# Patient Record
Sex: MALE | Race: WHITE | NOT HISPANIC OR LATINO | Employment: FULL TIME | URBAN - METROPOLITAN AREA
[De-identification: names, ages, dates, MRNs, and addresses within clinical notes are randomized per-mention and may not be internally consistent; named-entity substitution may affect disease eponyms.]

---

## 2018-01-07 ENCOUNTER — APPOINTMENT (OUTPATIENT)
Dept: GENERAL RADIOLOGY | Facility: HOSPITAL | Age: 58
End: 2018-01-07

## 2018-01-07 ENCOUNTER — APPOINTMENT (OUTPATIENT)
Dept: CARDIOLOGY | Facility: HOSPITAL | Age: 58
End: 2018-01-07

## 2018-01-07 ENCOUNTER — HOSPITAL ENCOUNTER (OUTPATIENT)
Facility: HOSPITAL | Age: 58
Setting detail: OBSERVATION
Discharge: HOME | End: 2018-01-09
Attending: EMERGENCY MEDICINE | Admitting: INTERNAL MEDICINE

## 2018-01-07 DIAGNOSIS — R07.9 CHEST PAIN WITH HIGH RISK FOR CARDIAC ETIOLOGY: Primary | ICD-10-CM

## 2018-01-07 DIAGNOSIS — R06.02 SOB (SHORTNESS OF BREATH): ICD-10-CM

## 2018-01-07 PROBLEM — Z72.0 TOBACCO ABUSE: Status: ACTIVE | Noted: 2018-01-07

## 2018-01-07 PROBLEM — J44.9 COPD (CHRONIC OBSTRUCTIVE PULMONARY DISEASE): Status: ACTIVE | Noted: 2018-01-07

## 2018-01-07 PROBLEM — I16.0 HYPERTENSIVE URGENCY: Status: ACTIVE | Noted: 2018-01-07

## 2018-01-07 LAB
ALBUMIN SERPL-MCNC: 4.4 G/DL (ref 3.2–4.8)
ALBUMIN/GLOB SERPL: 1.2 G/DL (ref 1.5–2.5)
ALP SERPL-CCNC: 101 U/L (ref 25–100)
ALT SERPL W P-5'-P-CCNC: 20 U/L (ref 7–40)
ANION GAP SERPL CALCULATED.3IONS-SCNC: 5 MMOL/L (ref 3–11)
ARTICHOKE IGE QN: 105 MG/DL (ref 0–130)
AST SERPL-CCNC: 27 U/L (ref 0–33)
BASOPHILS # BLD AUTO: 0.01 10*3/MM3 (ref 0–0.2)
BASOPHILS NFR BLD AUTO: 0.1 % (ref 0–1)
BILIRUB SERPL-MCNC: 0.3 MG/DL (ref 0.3–1.2)
BNP SERPL-MCNC: 37 PG/ML (ref 0–100)
BUN BLD-MCNC: 16 MG/DL (ref 9–23)
BUN/CREAT SERPL: 17.8 (ref 7–25)
CALCIUM SPEC-SCNC: 9.3 MG/DL (ref 8.7–10.4)
CHLORIDE SERPL-SCNC: 107 MMOL/L (ref 99–109)
CHOLEST SERPL-MCNC: 157 MG/DL (ref 0–200)
CO2 SERPL-SCNC: 30 MMOL/L (ref 20–31)
CREAT BLD-MCNC: 0.9 MG/DL (ref 0.6–1.3)
DEPRECATED RDW RBC AUTO: 54.2 FL (ref 37–54)
EOSINOPHIL # BLD AUTO: 0.01 10*3/MM3 (ref 0–0.3)
EOSINOPHIL NFR BLD AUTO: 0.1 % (ref 0–3)
ERYTHROCYTE [DISTWIDTH] IN BLOOD BY AUTOMATED COUNT: 15.6 % (ref 11.3–14.5)
FLUAV SUBTYP SPEC NAA+PROBE: NOT DETECTED
FLUBV RNA ISLT QL NAA+PROBE: DETECTED
GFR SERPL CREATININE-BSD FRML MDRD: 87 ML/MIN/1.73
GLOBULIN UR ELPH-MCNC: 3.6 GM/DL
GLUCOSE BLD-MCNC: 104 MG/DL (ref 70–100)
HCT VFR BLD AUTO: 46 % (ref 38.9–50.9)
HDLC SERPL-MCNC: 43 MG/DL (ref 40–60)
HGB BLD-MCNC: 14.6 G/DL (ref 13.1–17.5)
HOLD SPECIMEN: NORMAL
HOLD SPECIMEN: NORMAL
IMM GRANULOCYTES # BLD: 0.02 10*3/MM3 (ref 0–0.03)
IMM GRANULOCYTES NFR BLD: 0.3 % (ref 0–0.6)
LYMPHOCYTES # BLD AUTO: 2.64 10*3/MM3 (ref 0.6–4.8)
LYMPHOCYTES NFR BLD AUTO: 35.2 % (ref 24–44)
MCH RBC QN AUTO: 30.1 PG (ref 27–31)
MCHC RBC AUTO-ENTMCNC: 31.7 G/DL (ref 32–36)
MCV RBC AUTO: 94.8 FL (ref 80–99)
MONOCYTES # BLD AUTO: 1.14 10*3/MM3 (ref 0–1)
MONOCYTES NFR BLD AUTO: 15.2 % (ref 0–12)
NEUTROPHILS # BLD AUTO: 3.68 10*3/MM3 (ref 1.5–8.3)
NEUTROPHILS NFR BLD AUTO: 49.1 % (ref 41–71)
PLATELET # BLD AUTO: 229 10*3/MM3 (ref 150–450)
PMV BLD AUTO: 10.4 FL (ref 6–12)
POTASSIUM BLD-SCNC: 3.5 MMOL/L (ref 3.5–5.5)
PROT SERPL-MCNC: 8 G/DL (ref 5.7–8.2)
RBC # BLD AUTO: 4.85 10*6/MM3 (ref 4.2–5.76)
SODIUM BLD-SCNC: 142 MMOL/L (ref 132–146)
TRIGL SERPL-MCNC: 169 MG/DL (ref 0–150)
TROPONIN I SERPL-MCNC: 0 NG/ML (ref 0–0.07)
TROPONIN I SERPL-MCNC: 0.01 NG/ML (ref 0–0.07)
WBC NRBC COR # BLD: 7.5 10*3/MM3 (ref 3.5–10.8)
WHOLE BLOOD HOLD SPECIMEN: NORMAL
WHOLE BLOOD HOLD SPECIMEN: NORMAL

## 2018-01-07 PROCEDURE — 99254 IP/OBS CNSLTJ NEW/EST MOD 60: CPT | Performed by: INTERNAL MEDICINE

## 2018-01-07 PROCEDURE — 99285 EMERGENCY DEPT VISIT HI MDM: CPT

## 2018-01-07 PROCEDURE — 80061 LIPID PANEL: CPT | Performed by: EMERGENCY MEDICINE

## 2018-01-07 PROCEDURE — 87502 INFLUENZA DNA AMP PROBE: CPT | Performed by: NURSE PRACTITIONER

## 2018-01-07 PROCEDURE — 71045 X-RAY EXAM CHEST 1 VIEW: CPT

## 2018-01-07 PROCEDURE — 83880 ASSAY OF NATRIURETIC PEPTIDE: CPT | Performed by: EMERGENCY MEDICINE

## 2018-01-07 PROCEDURE — 99220 PR INITIAL OBSERVATION CARE/DAY 70 MINUTES: CPT | Performed by: INTERNAL MEDICINE

## 2018-01-07 PROCEDURE — G0378 HOSPITAL OBSERVATION PER HR: HCPCS

## 2018-01-07 PROCEDURE — 80053 COMPREHEN METABOLIC PANEL: CPT | Performed by: EMERGENCY MEDICINE

## 2018-01-07 PROCEDURE — 93306 TTE W/DOPPLER COMPLETE: CPT | Performed by: INTERNAL MEDICINE

## 2018-01-07 PROCEDURE — 93306 TTE W/DOPPLER COMPLETE: CPT

## 2018-01-07 PROCEDURE — 85025 COMPLETE CBC W/AUTO DIFF WBC: CPT | Performed by: EMERGENCY MEDICINE

## 2018-01-07 PROCEDURE — 84484 ASSAY OF TROPONIN QUANT: CPT

## 2018-01-07 PROCEDURE — 93005 ELECTROCARDIOGRAM TRACING: CPT | Performed by: EMERGENCY MEDICINE

## 2018-01-07 RX ORDER — LISINOPRIL 5 MG/1
5 TABLET ORAL
Status: DISCONTINUED | OUTPATIENT
Start: 2018-01-07 | End: 2018-01-08

## 2018-01-07 RX ORDER — ASPIRIN 81 MG/1
TABLET, CHEWABLE ORAL
Status: COMPLETED
Start: 2018-01-07 | End: 2018-01-07

## 2018-01-07 RX ORDER — CLONIDINE HYDROCHLORIDE 0.1 MG/1
0.2 TABLET ORAL ONCE
Status: DISCONTINUED | OUTPATIENT
Start: 2018-01-07 | End: 2018-01-07

## 2018-01-07 RX ORDER — ASPIRIN 81 MG/1
324 TABLET, CHEWABLE ORAL ONCE
Status: COMPLETED | OUTPATIENT
Start: 2018-01-07 | End: 2018-01-07

## 2018-01-07 RX ORDER — ATORVASTATIN CALCIUM 40 MG/1
40 TABLET, FILM COATED ORAL NIGHTLY
Status: DISCONTINUED | OUTPATIENT
Start: 2018-01-07 | End: 2018-01-09 | Stop reason: HOSPADM

## 2018-01-07 RX ORDER — ACETAMINOPHEN 325 MG/1
650 TABLET ORAL EVERY 6 HOURS PRN
Status: DISCONTINUED | OUTPATIENT
Start: 2018-01-07 | End: 2018-01-09 | Stop reason: HOSPADM

## 2018-01-07 RX ORDER — DOXYCYCLINE HYCLATE 100 MG/1
100 CAPSULE ORAL EVERY 12 HOURS SCHEDULED
Status: DISCONTINUED | OUTPATIENT
Start: 2018-01-07 | End: 2018-01-09 | Stop reason: HOSPADM

## 2018-01-07 RX ORDER — SODIUM CHLORIDE 0.9 % (FLUSH) 0.9 %
10 SYRINGE (ML) INJECTION AS NEEDED
Status: DISCONTINUED | OUTPATIENT
Start: 2018-01-07 | End: 2018-01-09 | Stop reason: HOSPADM

## 2018-01-07 RX ADMIN — NITROGLYCERIN 1 INCH: 20 OINTMENT TOPICAL at 11:52

## 2018-01-07 RX ADMIN — ASPIRIN 81 MG 324 MG: 81 TABLET ORAL at 11:52

## 2018-01-07 RX ADMIN — ATORVASTATIN CALCIUM 40 MG: 40 TABLET, FILM COATED ORAL at 16:54

## 2018-01-07 RX ADMIN — LISINOPRIL 5 MG: 5 TABLET ORAL at 16:54

## 2018-01-07 RX ADMIN — METOPROLOL TARTRATE 25 MG: 25 TABLET ORAL at 19:21

## 2018-01-07 RX ADMIN — DOXYCYCLINE HYCLATE 100 MG: 100 CAPSULE ORAL at 19:22

## 2018-01-07 RX ADMIN — ASPIRIN 324 MG: 81 TABLET, CHEWABLE ORAL at 11:52

## 2018-01-07 NOTE — ED NOTES
+++ekg done at 1446 done on wrong name.  EKG department not available on Sundays to correct+++     Grover Palafox  01/07/18 5499

## 2018-01-07 NOTE — CONSULTS
[facility] at [facility]  CARDIOLOGY CONSULTATION NOTE    Francisco Clark  : 1960  MRN:[id number]  Home Phone:[phone]    Date of Admission:2018  Date of Consultation: 18  Referring: Hospitalist   PCP: None    IDENTIFICATION: A 57 y.o. male     Chief Complaint   Patient presents with   • Shortness of Breath       PROBLEM LIST:   1. Chest pain with atypical, typical features, negative markers, EKG  2. HTN urgencies  3. COPD, Bronchitis  4. Ongoing tobacco abuse    ALLERGIES: No Known Allergies    HPI:   The patient seen in consultation is a pleasant 57-year-old white male who is seen in consultation regarding chest pain and hypertension urgency.  The patient denies any previous cardiac history but does report multiple risk factors including ongoing tobacco abuse family history point disease and uncontrolled blood pressure.  He states he does not go to a physician on a regular basis and has not been to a doctor for a long time.  He states that about a couple weeks ago he began having fevers chills and he was pretty certain that he had the flu.  He began to feel somewhat better but then after this.  Began to have progressively worsening shortness of breath and cough with sputum production.  Associated with this he's had a severe cough and when he does cough he has significant chest pain and even at times with ambulation.  He also reports that when he tries to exert himself seems to make his chest feel tight as well as makes the cough worse.  He has been to urgent treatment back on  with an chest x-ray revealing no evidence of pneumonia.  However his symptoms have progressed.  He did start himself on Tamiflu as he is hoping this would make him feel better but he continues to feel bad. Trop and BNP WNL, EKG with only nonspecific changes.  He decided to present to the emergency room this morning was found to have severe hypertension with blood pressure greater than  "200.  He had a troponin level that was negative and EKG revealing normal rhythm with LVH.  EKG revealed no acute changes.      In view of the patient's symptoms and risk factors for CAD the ER physician recommended a cardiology evaluation for this chest pain.       The pain comes and goes and def  gets worse with cough and even ambulation and movement at certain times.  Seems that there mostly atypical but some typical features as well as the chest pain.  He does have multiple risk factors.  It is concerning low however that the patient doesn't have upper respiratory type infection symptoms prior to all these issues.  He'll dealing with a cough with sputum production however no fevers and chills the past couple days however prior to that did have fever and chills.  He states biggest thing is really just never gotten over how he felt from a sickness that he had about 2-3 weeks ago with fevers chills night sweats cough with sputum production.    ROS: All systems have been reviewed and are negative with the exception of those mentioned in the HPI and problem list above.    Surgical History: Surgical history reviewed and nothing affecting acute issues    Social History:   Social History     Social History   • Marital status:      Spouse name: N/A   • Number of children: N/A   • Years of education: N/A     Occupational History   • Not on file.     Social History Main Topics   • Smoking status: Current Every Day Smoker     Packs/day: 1.00     Types: Cigarettes   • Smokeless tobacco: Not on file   • Alcohol use No   • Drug use: Defer   • Sexual activity: Defer     Other Topics Concern   • Not on file     Social History Narrative   • No narrative on file       Family History:   Coronary disease but not early in life, DM, Hypertension, COPD    Objective     /98  Pulse 73  Temp 97.9 °F (36.6 °C) (Oral)   Resp 18  Ht 177.8 cm (70\")  Wt 59 kg (130 lb)  SpO2 92%  BMI 18.65 kg/m2  No intake or output data in " the 24 hours ending 01/07/18 1508    PHYSICAL EXAM:  Constitutional:  NAD at this time. With talking to me does get sob.   Head:  Normocephalic, without obvious abnormality, atraumatic.   Neck: No adenopathy, supple, trachea midline, no thyromegaly, no    carotid bruit, no JVD.   Respiratory:   Decreased breath sounds, wheezes    Cardiovascular:  Regular rhythm and normal rate, normal S1 and S2, no            murmur, no gallop, no rub, no click.   Pulses: Peripheral pulses are present and equal bilaterally.   GI:   Soft, non-distended. Bowel sounds heard throughout. No organomegaly or masses. Non-tender to palpation, no guarding. thin   Extremities: No edema, clubbing or cyanosis.   Skin: Skin is warm and dry. No bleeding, bruising or rash.   Neurological: Alert, oriented to time, person and place. No focal deficits.     All PE reviewed and completed and agree with changes made accordingly    Labs/Diagnostic Data    Results from last 7 days  Lab Units 01/07/18  1149   SODIUM mmol/L 142   POTASSIUM mmol/L 3.5   CHLORIDE mmol/L 107   CO2 mmol/L 30.0   BUN mg/dL 16   CREATININE mg/dL 0.90   GLUCOSE mg/dL 104*   CALCIUM mg/dL 9.3           Results from last 7 days  Lab Units 01/07/18  1149   WBC 10*3/mm3 7.50   HEMOGLOBIN g/dL 14.6   HEMATOCRIT % 46.0   PLATELETS 10*3/mm3 229           Results from last 7 days  Lab Units 01/07/18  1149   CHOLESTEROL mg/dL 157   TRIGLYCERIDES mg/dL 169*   HDL CHOL mg/dL 43   LDL CHOL mg/dL 105                       I personally reviewed the patient's EKG/Telemetry data    Radiology Data:   EXAMINATION: XR CHEST 2 VW- 01/04/2018    INDICATION: cough     COMPARISON: NONE    FINDINGS: Cardiac silhouette within normal limits. Pulmonary vascularity  within normal limits. Chronic lung changes including hyperinflated  appearance as well as prior granulomatous involvement without focal  opacification or consolidation. No pneumothorax or pleural effusion.        IMPRESSION:  Chronic lung changes  without acute cardiopulmonary process.    D:  01/04/2018  E:  01/04/2018    This report was finalized on 1/4/2018 4:17 PM by Dr. Javi Mansfield.       EKG:NSR 90 bpm, LAD, peaked T waves in ant leads, LVH. Otherwise nonspecific changes.              Assessment and Plan:     1. SOB with atypical and typical chest pain features.  On discussion with the patient seems that the patient's chest pain does get worse with cough and even movement of his upper body.  However patient does get some chest tightness as well as ambulation.  This is a tough situation since the patient does have multiple risk factors however did have an upper respiratory type infection just weeks priors with fevers chills and productive cough.  Patient still states cough and shortness of breath are his major issues and with more coughing the chest tightness started occurring.  Patient is a smoker.  Chest x-ray shows COPD with nothing acute just chronic hyperinflation of lungs. Normal BNP  2. Recent Bronchitis, Fever.  3. COPD, Tobacco abuse  4. HTN urgency    PLAN:  · Admission per Hospitalist rule out Flu, PNA and treatment for COPD exacerbation  · Serial cardiac markers, EKG's.  First set of enzymes were negative.  Normal creatinine and no significant issues with electrolytes.  On EKG patient does have some T waves however other nonspecific changes noted and in normal sinus rhythm.  · Echocardiogram  · Add BB, ACE and continue NTG paste for BP. Currently Chest tightness is not there unless he coughs he states. Needs better BP control  · Consider further evaluation for ischemic heart disease secondary to risk factors ie stress.  We'll continue trending enzymes and serial EKGs.  If anything was to change or if no other causes of the patient's chest tightness then could be more aggressive and consider LHC prior to a stress test.   · Dr Coreas to take over care in AM    Discussed with ER team      Scribed for Dr. Nava by Eduard Arroyo PA-C. 1/7/2018   3:08 PM      Thank you for allowing me to participate in the care of Francisco HOMAR Amber. Feel free to contact me directly with any further questions or concerns.    I, Eduardo Nava, personally performed the services described in this documentation as scribed by the above named individual in my presence, and it is both accurate and complete.  1/7/2018  3:33 PM    Eduardo Nava,   3:33 PM  01/07/18

## 2018-01-07 NOTE — H&P
"    [facility] Medicine Services  HISTORY AND PHYSICAL    Patient Name: Francisco Clark  : 1960  MRN: [id number]  Primary Care Physician: No Known Provider    Subjective   Subjective     Chief Complaint:  SOA/ chest tightness     HPI:  Francisco Clark is a 57 y.o. male with no known past medical history due to lack of seeking medical treatment with last several years, presented to [facility] ED today with complaints of shortness of air and chest tightness.  Patient has been a longtime smoker who endorses that he has had some intermittent shortness of breat at times until most recently.  Patient indicated that he had the flu last month with fever, body aches, nausea and vomiting, increased cough and weakness. Patient went to [facility] on 18 to be seen for shortness of air, sputum production, chest tightness, fatigue and easily winded once his flu symptoms mostly resolved without resolution of \"winded and cough\".  Patient was told at that time the bronchitis, no pneumonia seen on chest x-ray but was not given any prescriptions.  Has continued to have increasing fatigue and being easily winded over the last week especially at work, and decided to be seen in the ED today.  Upon arrival to ED was noted to have hypertensive urgency with systolics greater than 200's.  Has had a headache today but no visual changes.  Endorses he has continued to have thick, green productive sputum over the last month.  Preliminary reading of chest x-ray in ED with chronic changes, no acute process seen. Troponins negative, cont to trend. EKG without ischemic changes. BNP 37.   We'll be admitted to Hospital medicine for further evaluation with consultation to cardiology.      Review of Systems   Constitutional: Positive for activity change, appetite change and fatigue. Negative for chills, fever and unexpected weight change.   HENT: Negative for congestion, facial swelling, mouth sores, postnasal drip, rhinorrhea, " sinus pain, trouble swallowing and voice change.    Eyes: Negative for photophobia, redness and visual disturbance.   Respiratory: Positive for cough, chest tightness, shortness of breath and wheezing.    Cardiovascular: Positive for chest pain. Negative for palpitations and leg swelling.   Gastrointestinal: Negative for abdominal distention, abdominal pain, constipation, diarrhea, nausea and vomiting.   Endocrine: Negative for cold intolerance and heat intolerance.   Genitourinary: Negative for difficulty urinating, dysuria, flank pain and hematuria.   Musculoskeletal: Negative for arthralgias, back pain and gait problem.   Skin: Negative for color change, pallor, rash and wound.   Neurological: Positive for headaches. Negative for dizziness, tremors, syncope, facial asymmetry, speech difficulty, weakness and light-headedness.   Hematological: Negative for adenopathy. Does not bruise/bleed easily.   Psychiatric/Behavioral: Negative for agitation, behavioral problems and confusion. The patient is not nervous/anxious.       Otherwise 10-system ROS reviewed and is negative except as mentioned in the HPI.    Personal History     History reviewed. No pertinent past medical history.    History reviewed. No pertinent surgical history.    Family History: family history is not on file.     Social History:  reports that he has been smoking Cigarettes.  He has been smoking about 1.00 pack per day. He does not have any smokeless tobacco history on file. Drug use questions deferred to the physician. He reports that he does not drink alcohol.  Social History     Social History Narrative   • No narrative on file       Medications:    (Not in a hospital admission)    No Known Allergies    Objective   Objective     Vital Signs:   Temp:  [97.9 °F (36.6 °C)] 97.9 °F (36.6 °C)  Heart Rate:  [67-93] 67  Resp:  [18] 18  BP: (138-212)/() 156/72        Physical Exam   Constitutional: No acute distress, awake, alert  Eyes: PERRLA,  sclerae anicteric, no conjunctival injection  HENT: NCAT, mucous membranes moist  Neck: Supple, no thyromegaly, no lymphadenopathy, trachea midline  Respiratory: Very minimal air movement in bilateral bases, prolonged exp wheezing, nonlabored respirations, no r/rh  Cardiovascular: RRR, no murmurs, rubs, or gallops, palpable pedal pulses bilaterally  Gastrointestinal: Positive bowel sounds, soft, nontender, nondistended  Musculoskeletal: No bilateral ankle edema, no clubbing or cyanosis to extremities  Psychiatric: Appropriate affect, cooperative  Neurologic: Oriented x 3, strength symmetric in all extremities, Cranial Nerves grossly intact to confrontation, speech clear  Skin: No rashes      Results Reviewed:  I have personally reviewed current lab, radiology, and data and agree.      Results from last 7 days  Lab Units 01/07/18  1149   WBC 10*3/mm3 7.50   HEMOGLOBIN g/dL 14.6   HEMATOCRIT % 46.0   PLATELETS 10*3/mm3 229       Results from last 7 days  Lab Units 01/07/18  1149   SODIUM mmol/L 142   POTASSIUM mmol/L 3.5   CHLORIDE mmol/L 107   CO2 mmol/L 30.0   BUN mg/dL 16   CREATININE mg/dL 0.90   GLUCOSE mg/dL 104*   CALCIUM mg/dL 9.3   ALT (SGPT) U/L 20   AST (SGOT) U/L 27     Brief Urine Lab Results     None        BNP   Date Value Ref Range Status   01/07/2018 37.0 0.0 - 100.0 pg/mL Final     No results found for: PHART  Imaging Results (last 24 hours)     Procedure Component Value Units Date/Time    XR Chest 1 View [[id number]] Collected:  01/07/18 1725     Updated:  01/07/18 1725    Narrative:          EXAMINATION: XR CHEST 1 VW - 01/07/2018     INDICATION: Shortness of air.     COMPARISON: 01/04/2018.     FINDINGS: Portable chest reveals cardiac and mediastinal silhouettes are  within normal limits. Underlying chronic and emphysematous changes seen  within the lung fields  bilaterally. No focal parenchymal opacification present. No pleural  effusion or pneumothorax. Degenerative change is seen within the  "spine.  Pulmonary vascularity is within normal limits.           Impression:       Chronic and emphysematous changes within  the lung fields  bilaterally with no acute parenchymal disease.     DICTATED:     01/07/2018  EDITED    :     01/07/2018                    Assessment/Plan   Assessment / Plan     Hospital Problem List     * (Principal)Hypertensive urgency    Chest pain    COPD (chronic obstructive pulmonary disease)    Tobacco abuse            Assessment & Plan:  Admit to tele  Consult Cardiology-- already seen in ED  ECHO --pending   Continue to trend troponin's  Steroids   Nicotine patch and  tobacco cessation education  Cont current HTN medications including statin   Influenza PCR   Regular diet  PRN pain meds   Doxycycline x 5 days     DVT prophylaxis: Lovenox     CODE STATUS:  No OrderFULL    Admission Status:  I believe this patient meets OBSERVATION status, however if further evaluation or treatment plans warrant, status may change.  Based upon current information, I predict patient's care encounter to be less than or equal to 2 midnights.      Blanca Atkinson, APRN   01/07/18   5:40 PM      Brief Attending Admission Attestation     I have seen and examined the patient, performing an independent face-to-face diagnostic evaluation with plan of care reviewed and developed with the advanced practice clinician (APC).      Brief Summary Statement/HPI:   Francisco Clark is a 57 y.o. male is a pleasant  male with no significant past medical history except tobacco abuse who presents to  ED with complaints of chest tightness and SOB. Patient has not been to see a physician in \"many years\". He endorses worsening wheezing, productive cough and chest tightness as well as dypsnea on exertion. He was seen last week in urgent care and diagnosed with bronchitis, but was given no prescriptions. On arrival to the ED his BP was noted to be 206/104. Troponin negative. EKG without ischemic " changes.      Attending Physical Exam:  Constitutional: No acute distress, awake, alert  Eyes: PERRLA, sclerae anicteric, no conjunctival injection  HENT: NCAT, mucous membranes moist  Neck: Supple, no thyromegaly, no lymphadenopathy, trachea midline  Respiratory: very poor air movement bilaterally, no wheezing heard, no increased WOB  Cardiovascular: RRR, no murmurs, rubs, or gallops, palpable pedal pulses bilaterally  Gastrointestinal: Positive bowel sounds, soft, nontender, nondistended  Musculoskeletal: No bilateral ankle edema, no clubbing or cyanosis to extremities  Psychiatric: Appropriate affect, cooperative  Neurologic: Oriented x 3, strength symmetric in all extremities, Cranial Nerves grossly intact to confrontation, speech clear  Skin: No rashes      Brief Assessment/Plan :  See above for further detailed assessment and plan developed with APC which I have reviewed and/or edited.    1. HUNTER - He likely has underlying undiagnosed COPD from many years of tobacco abuse, CXR showing chronic emphysematous changes, no infiltrate to suggest PNA. Will treat for COPD exacerbation with steroids, nebs and doxy. He will need outpatient PFTs and to be set up with a PCP to follow him regularly at discharge. Cardiac causes need to be r/o given he is at increased risk, cards already following. Will trend trop and get echo. EKG without ischemic changes.    2. HTN urgency - already started on PO BP medications by cards in ED, will continue and adjust as needed.    I believe this patient meets OBSERVATION status, however if further evaluation or treatment plans warrant, status may change.  Based upon current information, I predict patient's care encounter to be less than or equal to 2 midnights.    Jacinda Moran DO  01/07/18  6:11 PM

## 2018-01-08 LAB
ANION GAP SERPL CALCULATED.3IONS-SCNC: 7 MMOL/L (ref 3–11)
BASOPHILS # BLD AUTO: 0.01 10*3/MM3 (ref 0–0.2)
BASOPHILS NFR BLD AUTO: 0.1 % (ref 0–1)
BH CV ECHO MEAS - AI DEC SLOPE: 151.5 CM/SEC^2
BH CV ECHO MEAS - AI MAX PG: 88.5 MMHG
BH CV ECHO MEAS - AI MAX VEL: 470 CM/SEC
BH CV ECHO MEAS - AI P1/2T: 908.6 MSEC
BH CV ECHO MEAS - AO ROOT AREA (BSA CORRECTED): 1.8
BH CV ECHO MEAS - AO ROOT AREA: 7.5 CM^2
BH CV ECHO MEAS - AO ROOT DIAM: 3.1 CM
BH CV ECHO MEAS - ASC AORTA: 2.4 CM
BH CV ECHO MEAS - BSA(HAYCOCK): 1.7 M^2
BH CV ECHO MEAS - BSA: 1.7 M^2
BH CV ECHO MEAS - BZI_BMI: 18.7 KILOGRAMS/M^2
BH CV ECHO MEAS - BZI_METRIC_HEIGHT: 177.8 CM
BH CV ECHO MEAS - BZI_METRIC_WEIGHT: 59 KG
BH CV ECHO MEAS - CONTRAST EF (2CH): 46.7 ML/M^2
BH CV ECHO MEAS - CONTRAST EF 4CH: 45.5 ML/M^2
BH CV ECHO MEAS - EDV(CUBED): 82.9 ML
BH CV ECHO MEAS - EDV(MOD-SP2): 60 ML
BH CV ECHO MEAS - EDV(MOD-SP4): 66 ML
BH CV ECHO MEAS - EDV(TEICH): 85.8 ML
BH CV ECHO MEAS - EF(CUBED): 40.8 %
BH CV ECHO MEAS - EF(MOD-SP2): 46.7 %
BH CV ECHO MEAS - EF(MOD-SP4): 45.5 %
BH CV ECHO MEAS - EF(TEICH): 34 %
BH CV ECHO MEAS - ESV(CUBED): 49 ML
BH CV ECHO MEAS - ESV(MOD-SP2): 32 ML
BH CV ECHO MEAS - ESV(MOD-SP4): 36 ML
BH CV ECHO MEAS - ESV(TEICH): 56.6 ML
BH CV ECHO MEAS - FS: 16.1 %
BH CV ECHO MEAS - IVS/LVPW: 1.1
BH CV ECHO MEAS - IVSD: 1 CM
BH CV ECHO MEAS - LAT PEAK E' VEL: 8.5 CM/SEC
BH CV ECHO MEAS - LV DIASTOLIC VOL/BSA (35-75): 38 ML/M^2
BH CV ECHO MEAS - LV MASS(C)D: 138.6 GRAMS
BH CV ECHO MEAS - LV MASS(C)DI: 79.7 GRAMS/M^2
BH CV ECHO MEAS - LV SYSTOLIC VOL/BSA (12-30): 20.7 ML/M^2
BH CV ECHO MEAS - LVIDD: 4.4 CM
BH CV ECHO MEAS - LVIDS: 3.7 CM
BH CV ECHO MEAS - LVLD AP2: 7.5 CM
BH CV ECHO MEAS - LVLD AP4: 7 CM
BH CV ECHO MEAS - LVLS AP2: 5.8 CM
BH CV ECHO MEAS - LVLS AP4: 6.1 CM
BH CV ECHO MEAS - LVOT AREA (M): 3.5 CM^2
BH CV ECHO MEAS - LVOT AREA: 3.5 CM^2
BH CV ECHO MEAS - LVOT DIAM: 2.1 CM
BH CV ECHO MEAS - LVPWD: 0.92 CM
BH CV ECHO MEAS - MED PEAK E' VEL: 7.74 CM/SEC
BH CV ECHO MEAS - MV A MAX VEL: 75.5 CM/SEC
BH CV ECHO MEAS - MV DEC TIME: 0.27 SEC
BH CV ECHO MEAS - MV E MAX VEL: 56.8 CM/SEC
BH CV ECHO MEAS - MV E/A: 0.75
BH CV ECHO MEAS - PA ACC SLOPE: 958 CM/SEC^2
BH CV ECHO MEAS - PA ACC TIME: 0.13 SEC
BH CV ECHO MEAS - PA MAX PG: 7.2 MMHG
BH CV ECHO MEAS - PA PR(ACCEL): 21.9 MMHG
BH CV ECHO MEAS - PA V2 MAX: 134 CM/SEC
BH CV ECHO MEAS - SI(CUBED): 19.5 ML/M^2
BH CV ECHO MEAS - SI(MOD-SP2): 16.1 ML/M^2
BH CV ECHO MEAS - SI(MOD-SP4): 17.3 ML/M^2
BH CV ECHO MEAS - SI(TEICH): 16.8 ML/M^2
BH CV ECHO MEAS - SV(CUBED): 33.9 ML
BH CV ECHO MEAS - SV(MOD-SP2): 28 ML
BH CV ECHO MEAS - SV(MOD-SP4): 30 ML
BH CV ECHO MEAS - SV(TEICH): 29.2 ML
BH CV ECHO MEAS - TAPSE (>1.6): 2.94 CM2
BH CV XLRA - RV BASE: 1.9 CM
BH CV XLRA - RV LENGTH: 5 CM
BH CV XLRA - RV MID: 1.2 CM
BH CV XLRA - TDI S': 20.7 CM/SEC
BUN BLD-MCNC: 18 MG/DL (ref 9–23)
BUN/CREAT SERPL: 25.7 (ref 7–25)
CALCIUM SPEC-SCNC: 9.6 MG/DL (ref 8.7–10.4)
CHLORIDE SERPL-SCNC: 107 MMOL/L (ref 99–109)
CO2 SERPL-SCNC: 27 MMOL/L (ref 20–31)
CREAT BLD-MCNC: 0.7 MG/DL (ref 0.6–1.3)
DEPRECATED RDW RBC AUTO: 51.7 FL (ref 37–54)
E/E' RATIO: 7.3
EOSINOPHIL # BLD AUTO: 0 10*3/MM3 (ref 0–0.3)
EOSINOPHIL NFR BLD AUTO: 0 % (ref 0–3)
ERYTHROCYTE [DISTWIDTH] IN BLOOD BY AUTOMATED COUNT: 15 % (ref 11.3–14.5)
GFR SERPL CREATININE-BSD FRML MDRD: 116 ML/MIN/1.73
GLUCOSE BLD-MCNC: 112 MG/DL (ref 70–100)
HBA1C MFR BLD: 6.2 % (ref 4.8–5.6)
HCT VFR BLD AUTO: 42.7 % (ref 38.9–50.9)
HGB BLD-MCNC: 13.8 G/DL (ref 13.1–17.5)
IMM GRANULOCYTES # BLD: 0.03 10*3/MM3 (ref 0–0.03)
IMM GRANULOCYTES NFR BLD: 0.4 % (ref 0–0.6)
LEFT ATRIUM VOLUME INDEX: 5.7 ML/M2
LV EF 2D ECHO EST: 45 %
LYMPHOCYTES # BLD AUTO: 0.87 10*3/MM3 (ref 0.6–4.8)
LYMPHOCYTES NFR BLD AUTO: 11.6 % (ref 24–44)
MAXIMAL PREDICTED HEART RATE: 162 BPM
MCH RBC QN AUTO: 30.3 PG (ref 27–31)
MCHC RBC AUTO-ENTMCNC: 32.3 G/DL (ref 32–36)
MCV RBC AUTO: 93.8 FL (ref 80–99)
MONOCYTES # BLD AUTO: 0.16 10*3/MM3 (ref 0–1)
MONOCYTES NFR BLD AUTO: 2.1 % (ref 0–12)
NEUTROPHILS # BLD AUTO: 6.42 10*3/MM3 (ref 1.5–8.3)
NEUTROPHILS NFR BLD AUTO: 85.8 % (ref 41–71)
PLATELET # BLD AUTO: 228 10*3/MM3 (ref 150–450)
PMV BLD AUTO: 9.6 FL (ref 6–12)
POTASSIUM BLD-SCNC: 4.1 MMOL/L (ref 3.5–5.5)
RBC # BLD AUTO: 4.55 10*6/MM3 (ref 4.2–5.76)
SODIUM BLD-SCNC: 141 MMOL/L (ref 132–146)
STRESS TARGET HR: 138 BPM
TROPONIN I SERPL-MCNC: 0.01 NG/ML
WBC NRBC COR # BLD: 7.49 10*3/MM3 (ref 3.5–10.8)

## 2018-01-08 PROCEDURE — 85025 COMPLETE CBC W/AUTO DIFF WBC: CPT | Performed by: NURSE PRACTITIONER

## 2018-01-08 PROCEDURE — G0378 HOSPITAL OBSERVATION PER HR: HCPCS

## 2018-01-08 PROCEDURE — 94760 N-INVAS EAR/PLS OXIMETRY 1: CPT

## 2018-01-08 PROCEDURE — 99232 SBSQ HOSP IP/OBS MODERATE 35: CPT | Performed by: INTERNAL MEDICINE

## 2018-01-08 PROCEDURE — 80048 BASIC METABOLIC PNL TOTAL CA: CPT | Performed by: NURSE PRACTITIONER

## 2018-01-08 PROCEDURE — 96374 THER/PROPH/DIAG INJ IV PUSH: CPT

## 2018-01-08 PROCEDURE — 94640 AIRWAY INHALATION TREATMENT: CPT

## 2018-01-08 PROCEDURE — 94799 UNLISTED PULMONARY SVC/PX: CPT

## 2018-01-08 PROCEDURE — 96376 TX/PRO/DX INJ SAME DRUG ADON: CPT

## 2018-01-08 PROCEDURE — 84484 ASSAY OF TROPONIN QUANT: CPT | Performed by: PHYSICIAN ASSISTANT

## 2018-01-08 PROCEDURE — 99225 PR SBSQ OBSERVATION CARE/DAY 25 MINUTES: CPT | Performed by: INTERNAL MEDICINE

## 2018-01-08 PROCEDURE — 83036 HEMOGLOBIN GLYCOSYLATED A1C: CPT | Performed by: NURSE PRACTITIONER

## 2018-01-08 PROCEDURE — 25010000002 METHYLPREDNISOLONE PER 40 MG: Performed by: NURSE PRACTITIONER

## 2018-01-08 RX ORDER — SODIUM CHLORIDE 0.9 % (FLUSH) 0.9 %
1-10 SYRINGE (ML) INJECTION AS NEEDED
Status: DISCONTINUED | OUTPATIENT
Start: 2018-01-08 | End: 2018-01-09 | Stop reason: HOSPADM

## 2018-01-08 RX ORDER — CARVEDILOL 12.5 MG/1
12.5 TABLET ORAL EVERY 12 HOURS SCHEDULED
Status: DISCONTINUED | OUTPATIENT
Start: 2018-01-08 | End: 2018-01-09 | Stop reason: HOSPADM

## 2018-01-08 RX ORDER — DOCUSATE SODIUM 100 MG/1
100 CAPSULE, LIQUID FILLED ORAL 2 TIMES DAILY
Status: DISCONTINUED | OUTPATIENT
Start: 2018-01-08 | End: 2018-01-09 | Stop reason: HOSPADM

## 2018-01-08 RX ORDER — OSELTAMIVIR PHOSPHATE 75 MG/1
75 CAPSULE ORAL EVERY 12 HOURS SCHEDULED
Status: DISCONTINUED | OUTPATIENT
Start: 2018-01-08 | End: 2018-01-09 | Stop reason: HOSPADM

## 2018-01-08 RX ORDER — ASPIRIN 81 MG/1
324 TABLET, CHEWABLE ORAL DAILY
Status: DISCONTINUED | OUTPATIENT
Start: 2018-01-08 | End: 2018-01-08

## 2018-01-08 RX ORDER — ASPIRIN 81 MG/1
81 TABLET ORAL DAILY
Status: DISCONTINUED | OUTPATIENT
Start: 2018-01-09 | End: 2018-01-09 | Stop reason: HOSPADM

## 2018-01-08 RX ORDER — SENNA AND DOCUSATE SODIUM 50; 8.6 MG/1; MG/1
2 TABLET, FILM COATED ORAL NIGHTLY
Status: DISCONTINUED | OUTPATIENT
Start: 2018-01-08 | End: 2018-01-09 | Stop reason: HOSPADM

## 2018-01-08 RX ORDER — ACETAMINOPHEN, ASPIRIN AND CAFFEINE 250; 250; 65 MG/1; MG/1; MG/1
1 TABLET, FILM COATED ORAL EVERY 6 HOURS PRN
COMMUNITY
End: 2022-11-29

## 2018-01-08 RX ORDER — METHYLPREDNISOLONE SODIUM SUCCINATE 40 MG/ML
30 INJECTION, POWDER, LYOPHILIZED, FOR SOLUTION INTRAMUSCULAR; INTRAVENOUS EVERY 12 HOURS SCHEDULED
Status: DISCONTINUED | OUTPATIENT
Start: 2018-01-08 | End: 2018-01-09 | Stop reason: HOSPADM

## 2018-01-08 RX ORDER — NICOTINE 21 MG/24HR
1 PATCH, TRANSDERMAL 24 HOURS TRANSDERMAL
Status: DISCONTINUED | OUTPATIENT
Start: 2018-01-08 | End: 2018-01-09 | Stop reason: HOSPADM

## 2018-01-08 RX ORDER — LISINOPRIL 10 MG/1
10 TABLET ORAL
Status: DISCONTINUED | OUTPATIENT
Start: 2018-01-09 | End: 2018-01-09

## 2018-01-08 RX ORDER — FAMOTIDINE 20 MG/1
40 TABLET, FILM COATED ORAL DAILY
Status: DISCONTINUED | OUTPATIENT
Start: 2018-01-08 | End: 2018-01-09 | Stop reason: HOSPADM

## 2018-01-08 RX ADMIN — CARVEDILOL 12.5 MG: 12.5 TABLET, FILM COATED ORAL at 13:10

## 2018-01-08 RX ADMIN — METHYLPREDNISOLONE SODIUM SUCCINATE 30 MG: 40 INJECTION, POWDER, FOR SOLUTION INTRAMUSCULAR; INTRAVENOUS at 02:44

## 2018-01-08 RX ADMIN — METOPROLOL TARTRATE 25 MG: 25 TABLET ORAL at 09:02

## 2018-01-08 RX ADMIN — DOCUSATE SODIUM 100 MG: 100 CAPSULE, LIQUID FILLED ORAL at 09:01

## 2018-01-08 RX ADMIN — ASPIRIN 81 MG 324 MG: 81 TABLET ORAL at 09:02

## 2018-01-08 RX ADMIN — ATORVASTATIN CALCIUM 40 MG: 40 TABLET, FILM COATED ORAL at 20:53

## 2018-01-08 RX ADMIN — IPRATROPIUM BROMIDE 0.5 MG: 0.5 SOLUTION RESPIRATORY (INHALATION) at 12:12

## 2018-01-08 RX ADMIN — IPRATROPIUM BROMIDE 0.5 MG: 0.5 SOLUTION RESPIRATORY (INHALATION) at 15:49

## 2018-01-08 RX ADMIN — LISINOPRIL 5 MG: 5 TABLET ORAL at 09:02

## 2018-01-08 RX ADMIN — DOXYCYCLINE HYCLATE 100 MG: 100 CAPSULE ORAL at 09:02

## 2018-01-08 RX ADMIN — FAMOTIDINE 40 MG: 20 TABLET, FILM COATED ORAL at 09:01

## 2018-01-08 RX ADMIN — OSELTAMIVIR PHOSPHATE 75 MG: 75 CAPSULE ORAL at 20:53

## 2018-01-08 RX ADMIN — CARVEDILOL 12.5 MG: 12.5 TABLET, FILM COATED ORAL at 20:53

## 2018-01-08 RX ADMIN — OSELTAMIVIR PHOSPHATE 75 MG: 75 CAPSULE ORAL at 10:29

## 2018-01-08 RX ADMIN — IPRATROPIUM BROMIDE 0.5 MG: 0.5 SOLUTION RESPIRATORY (INHALATION) at 19:58

## 2018-01-08 RX ADMIN — DOXYCYCLINE HYCLATE 100 MG: 100 CAPSULE ORAL at 20:53

## 2018-01-08 RX ADMIN — DOCUSATE SODIUM 100 MG: 100 CAPSULE, LIQUID FILLED ORAL at 20:53

## 2018-01-08 RX ADMIN — Medication 2 TABLET: at 20:53

## 2018-01-08 RX ADMIN — IPRATROPIUM BROMIDE 0.5 MG: 0.5 SOLUTION RESPIRATORY (INHALATION) at 08:00

## 2018-01-08 RX ADMIN — METHYLPREDNISOLONE SODIUM SUCCINATE 30 MG: 40 INJECTION, POWDER, FOR SOLUTION INTRAMUSCULAR; INTRAVENOUS at 17:18

## 2018-01-08 NOTE — PROGRESS NOTES
[facility] Medicine Services  PROGRESS NOTE    Patient Name: Francisco Clark  : 1960  MRN: [id number]    Date of Admission: 2018  Length of Stay: 0  Primary Care Physician: No Known Provider    Subjective   Subjective     CC:CP, SOB    SUBJECTIVE:  Rest in bed in no acute distress overall feels comfortable.  Denies any fever or chills.  No chest pain or palpitation.  No nausea, vomiting, diarrhea.      Review of Systems      Otherwise ROS is negative except as mentioned above.    Objective   Objective     Vital Signs:   Temp:  [96.9 °F (36.1 °C)-98.6 °F (37 °C)] 96.9 °F (36.1 °C)  Heart Rate:  [60-87] 64  Resp:  [14-18] 16  BP: (103-177)/() 134/80        Physical Exam:  Constitutional: No acute distress, awake, alert  Eyes: PERRLA, sclerae anicteric, no conjunctival injection  HENT: NCAT, mucous membranes moist  Neck: Supple, no thyromegaly, no lymphadenopathy, trachea midline  Respiratory: Clear to auscultation bilaterally, nonlabored respirations   Cardiovascular: RRR, no murmurs, rubs, or gallops, palpable pedal pulses bilaterally  Gastrointestinal: Positive bowel sounds, soft, nontender, nondistended  Musculoskeletal: Trajectory treated very low muscle mass.  No temporal wasting however.  Psychiatric: Appropriate affect, cooperative  Neurologic: Oriented x 3, strength symmetric in all extremities, Cranial Nerves grossly intact to confrontation, speech clear  Skin: No rashes    Results Reviewed:  I have personally reviewed current lab, radiology, and data and agree.      Results from last 7 days  Lab Units 18  0630 18  1149   WBC 10*3/mm3 7.49 7.50   HEMOGLOBIN g/dL 13.8 14.6   HEMATOCRIT % 42.7 46.0   PLATELETS 10*3/mm3 228 229       Results from last 7 days  Lab Units 18  0630 18  1149   SODIUM mmol/L 141 142   POTASSIUM mmol/L 4.1 3.5   CHLORIDE mmol/L 107 107   CO2 mmol/L 27.0 30.0   BUN mg/dL 18 16   CREATININE mg/dL 0.70 0.90   GLUCOSE  mg/dL 112* 104*   CALCIUM mg/dL 9.6 9.3   ALT (SGPT) U/L  --  20   AST (SGOT) U/L  --  27   TROPONIN I ng/mL 0.010  --      BNP   Date Value Ref Range Status   01/07/2018 37.0 0.0 - 100.0 pg/mL Final     No results found for: PHART    Microbiology Results Abnormal     Procedure Component Value - Date/Time    Influenza A & B, RT PCR - Swab, Nasopharynx [[id number]]  (Abnormal) Collected:  01/07/18 1834    Lab Status:  Final result Specimen:  Swab from Nasopharynx Updated:  01/07/18 1934     Influenza A PCR Not Detected     Influenza B PCR Detected (A)          Imaging Results (last 24 hours)     Procedure Component Value Units Date/Time    XR Chest 1 View [[id number]] Collected:  01/07/18 1725     Updated:  01/08/18 0907    Narrative:          EXAMINATION: XR CHEST 1 VW - 01/07/2018     INDICATION: Shortness of air.     COMPARISON: 01/04/2018.     FINDINGS: Portable chest reveals cardiac and mediastinal silhouettes are  within normal limits. Underlying chronic and emphysematous changes seen  within the lung fields  bilaterally. No focal parenchymal opacification present. No pleural  effusion or pneumothorax. Degenerative change is seen within the spine.  Pulmonary vascularity is within normal limits.           Impression:       Chronic and emphysematous changes within  the lung fields  bilaterally with no acute parenchymal disease.     DICTATED:     01/07/2018  EDITED    :     01/07/2018      This report was finalized on 1/8/2018 9:05 AM by Dr. Cinthia Malcolm MD.           Results for orders placed during the hospital encounter of 01/07/18   Adult Transthoracic Echo Complete W/ Cont if Necessary Per Protocol    Narrative · Left ventricular systolic function is mildly decreased. Estimated EF =   45%.  · Left ventricular diastolic dysfunction (grade I) consistent with   impaired relaxation.  · Mild aortic valve regurgitation is present.  · Trace mitral regurgitation, trace tricuspid regurgitation.          I have  reviewed the medications.    Assessment/Plan   Assessment / Plan     Hospital Problem List     * (Principal)Hypertensive urgency    Chest pain    COPD (chronic obstructive pulmonary disease)    Tobacco abuse    Chest pain with high risk for cardiac etiology             Brief Hospital Course to date:  Francisco Clark is a 57 y.o. male       Assessment & Plan:  *CP, resolved.  Cardiology has seen and evaluated the patient and recommends continuation of medical management and then probably more cardiac workup after he gets over the flu.    * Flu, started on Tamiflu.    * COPD with ongoing tobacco abuse.    * Cachexia, chronic since many years ago.  No recent fluctuation in weight.  PLAN:  - Continue current care.  - Home soon when he is more stable.    DVT Prophylaxis:      CODE STATUS: Full Code    Disposition: I expect the patient to be discharged home in a few days.    Adam Toure MD  01/08/18  4:28 PM

## 2018-01-08 NOTE — PLAN OF CARE
Problem: Patient Care Overview (Adult)  Goal: Plan of Care Review  Outcome: Ongoing (interventions implemented as appropriate)   01/08/18 1500   Coping/Psychosocial Response Interventions   Plan Of Care Reviewed With patient   Patient Care Overview   Progress no change   Outcome Evaluation   Outcome Summary/Follow up Plan Pt has been pleasant all day, complains of no pain and mild shortness of breath on exertion. RA throughout day, BP has been elevated. Dr. Coreas adjusted cardiac meds. NSR on tele. Started on tamiflu will continue to monitor and DC home when medically ready.

## 2018-01-08 NOTE — PROGRESS NOTES
"[facility] at [facility] Progress Note     LOS: 0 days   Patient Care Team:  No Known Provider as PCP - General  PCP:  No Known Provider    Chief Complaint: Chest pain    SUBJECTIVE:   Feeling better today.  Still with intermittent cough which produces sharp localized chest pain.  Blood pressure trend improving.  Transthoracic echo reviewed.      Review of Systems:   All systems have been reviewed and are negative with the exception of those mentioned above.      OBJECTIVE:    Vital Sign Min/Max for last 24 hours  Temp  Min: 97.1 °F (36.2 °C)  Max: 98.6 °F (37 °C)   BP  Min: 103/57  Max: 177/98   Pulse  Min: 60  Max: 93   Resp  Min: 14  Max: 18   SpO2  Min: 89 %  Max: 95 %   No Data Recorded   Weight  Min: 50.9 kg (112 lb 3.2 oz)  Max: 59 kg (130 lb)     Flowsheet Rows         First Filed Value    Admission Height  177.8 cm (70\") Documented at 01/07/2018 1124    Admission Weight  59 kg (130 lb) Documented at 01/07/2018 1124              No intake or output data in the 24 hours ending 01/08/18 1244  Intake & Output (last 3 days)     None           Physical Exam:    General Appearance:    Alert, cooperative, no distress, appears stated age   Neck:   Supple, symmetrical, trachea midline.   Lungs:     Mild scattered wheezing, respirations unlabored   Chest Wall:    No tenderness or deformity    Heart:    Regular rate and rhythm, S1 and S2 normal, no murmur, rub   or gallop, normal carotid impulse bilaterally without bruit.   Extremities:   Extremities normal, atraumatic, no cyanosis or edema   Pulses:   2+ and symmetric all extremities   Skin:   Skin color, texture, turgor normal, no rashes or lesions      LABS/DIAGNOSTIC DATA:    Results from last 7 days  Lab Units 01/08/18  0630 01/07/18  1149   WBC 10*3/mm3 7.49 7.50   HEMOGLOBIN g/dL 13.8 14.6   HEMATOCRIT % 42.7 46.0   PLATELETS 10*3/mm3 228 229     No results found for: TROPONINT        Results from last 7 days  Lab Units 01/08/18  0630 " 01/07/18  1149   SODIUM mmol/L 141 142   POTASSIUM mmol/L 4.1 3.5   CHLORIDE mmol/L 107 107   CO2 mmol/L 27.0 30.0   BUN mg/dL 18 16   CREATININE mg/dL 0.70 0.90   CALCIUM mg/dL 9.6 9.3   BILIRUBIN mg/dL  --  0.3   ALK PHOS U/L  --  101*   ALT (SGPT) U/L  --  20   AST (SGOT) U/L  --  27   GLUCOSE mg/dL 112* 104*       Results from last 7 days  Lab Units 01/08/18  0630   HEMOGLOBIN A1C % 6.20*       Results from last 7 days  Lab Units 01/07/18  1149   CHOLESTEROL mg/dL 157   TRIGLYCERIDES mg/dL 169*   HDL CHOL mg/dL 43           Results from last 7 days  Lab Units 01/07/18  1149   BNP pg/mL 37.0       Medication Review:     [START ON 1/9/2018] aspirin 81 mg Oral Daily   atorvastatin 40 mg Oral Nightly   carvedilol 12.5 mg Oral Q12H   docusate sodium 100 mg Oral BID   doxycycline 100 mg Oral Q12H   enoxaparin 40 mg Subcutaneous Q24H   famotidine 40 mg Oral Daily   ipratropium 0.5 mg Nebulization 4x Daily - RT   [START ON 1/9/2018] lisinopril 10 mg Oral Q24H   methylPREDNISolone sodium succinate 30 mg Intravenous Q12H   nicotine 1 patch Transdermal Q24H   oseltamivir 75 mg Oral Q12H   pharmacy consult - MTM  Does not apply Daily   sennosides-docusate sodium 2 tablet Oral Nightly            Principal Problem:    Hypertensive urgency  Active Problems:    Chest pain    COPD (chronic obstructive pulmonary disease)    Tobacco abuse    Chest pain with high risk for cardiac etiology  Cardiomyopathy, EF 45% currently euvolemic and compensated  Noncardiac chest pain: Pleuritic    ASSESSMENT/PLAN:  Stable from a cardiac standpoint  Continue cardiac medical therapy to include ACE inhibitor beta-blockade: Lisinopril and carvedilol  Outpatient exercise myocardial perfusion imaging once he has recovered from influenza  Smoking cessation  Low-dose aspirin  Follow-up with Dr. Coreas in 2 weeks.          Amador Coreas III, MD   01/08/18  12:44 PM

## 2018-01-08 NOTE — NURSING NOTE
[facility] Note    Re: HTN, depressed LVEF    Medical records reviewed. LVEF 45%. Patient admitted with chest pain and hypertensive urgency. No history of CHF. BNP and CXR unremarkable.  Discussed role of [facility] and when to call. Reviewed HF zones and signs and symptoms of CHF. Handout provided. Provided him with contact information for our clinic. Follow up in 1 week

## 2018-01-08 NOTE — PROGRESS NOTES
"Adult Nutrition  Assessment/PES    Patient Name:  Francisco Clark  YOB: 1960  MRN: [id number]  Admit Date:  1/7/2018    Assessment Date:  1/8/2018        Reason for Assessment       01/08/18 1434    Reason for Assessment    Reason For Assessment/Visit identified at risk by screening criteria    Identified At Risk By Screening Criteria BMI    Time Spent (min) 20    Diagnosis Diagnosis    Cardiac Other (comment)   hypertensive urgency    Infectious Disease Other (comment)   influenza B positive (1/7)    Pulmonary/Critical Care COPD   shortness of air    Substance Use Tobacco              Nutrition/Diet History       01/08/18 1446    Nutrition/Diet History    Reported/Observed By Patient;Family    Food Habit/Preferences Uses Supplements    Other Patient and family member in room at time of visit. Patient initial reports no recent weight loss, states appetite has been normal eating well \"especially over the holidays with all the good food.\" When questioned about admitting weight of 130 lbs and recent standing scale weight of 112 lbs. Patient states his UBW is around 135 and the last he remembered weighing this was around \"a couple months ago.\" Reports he may have been eating slightly less than normal due to his sickness but nothing drastic. Willing to drink chocolate ensure while here, has had it before.            Anthropometrics       01/08/18 1458    Anthropometrics (Special Considerations)    Height Used for Calculations 1.778 m (5' 10\")    Weight Used for Calculations 50.9 kg (112 lb 3.4 oz)   standing scale weight per charting 1/8    Usual Body Weight (UBW)    Usual Body Weight 61.2 kg (135 lb)    Weight Loss 10.4 kg (23 lb)    % Weight Loss  17 %    Weight Loss Time Frame ~ 2 months            Labs/Tests/Procedures/Meds       01/08/18 1500    Labs/Tests/Procedures/Meds    Labs/Tests Review Reviewed                Nutrition Prescription Ordered       01/08/18 1501    Nutrition Prescription PO    " Current PO Diet Regular    Common Modifiers Cardiac            Evaluation of Received Nutrient/Fluid Intake       01/08/18 1502    PO Evaluation    Number of Days PO Intake Evaluated --   no recorded PO intake          Problem/Interventions:        Problem 1       01/08/18 1502    Nutrition Diagnoses Problem 1    Problem 1 Unintended Weight Loss    Etiology (related to) --   clinical condition    Signs/Symptoms (evidenced by) Unintended Weight Change    Unintended Weight Change Loss    Number of Pounds Lost 23 lbs    Weight loss time period ~ 2 months                Intervention Goal       01/08/18 1505    Intervention Goal    General Nutrition support treatment    PO Establish PO            Nutrition Intervention       01/08/18 1505    Nutrition Intervention    RD/Tech Action Advise alternate selection;Encourage intake;Recommend/ordered;Supplement provided;Follow Tx progress;Care plan reviewd    Recommended/Ordered Supplement            Nutrition Prescription       01/08/18 1505    Nutrition Prescription PO    PO Prescription Begin/change supplement    Supplement Ensure HP   chocolate    Supplement Frequency 2 times a day    New PO Prescription Ordered? Yes            Education/Evaluation       01/08/18 1506    Monitor/Evaluation    Monitor Per protocol;PO intake;Supplement intake;Weight        Electronically signed by:  Leyid Philippe  01/08/18 3:06 PM

## 2018-01-08 NOTE — PROGRESS NOTES
Discharge Planning Assessment  [facility]     Patient Name: Francisco Clark  MRN: [id number]  Today's Date: 1/8/2018    Admit Date: 1/7/2018          Discharge Needs Assessment       01/08/18 1624    Living Environment    Lives With child(jose martin), adult    Living Arrangements apartment    Home Accessibility no concerns    Stair Railings at Home none    Type of Financial/Environmental Concern none    Transportation Available car;family or friend will provide    Living Environment    Provides Primary Care For no one    Quality Of Family Relationships supportive    Able to Return to Prior Living Arrangements yes    Discharge Needs Assessment    Concerns To Be Addressed discharge planning concerns    Readmission Within The Last 30 Days no previous admission in last 30 days    Anticipated Changes Related to Illness none    Equipment Currently Used at Home none    Equipment Needed After Discharge none    Discharge Disposition still a patient            Discharge Plan       01/08/18 1627    Case Management/Social Work Plan    Plan Home    Additional Comments Met with Mr. Clark at the bedside, he denies having any DME or HH services.  He is independant with all ADL's and works at [employer].  He reports his medications and copays are affordable.  His plan is to return home and go back to work.  He states his son will transport home.   Message left for Lorrie Malcolm to arrange a pcp appointment.  CM will cont to follow.         Discharge Placement     No information found        Expected Discharge Date and Time     Expected Discharge Date Expected Discharge Time    Jan 9, 2018               Demographic Summary       01/08/18 1621    Referral Information    Admission Type inpatient    Arrived From home or self-care    Referral Source admission list    Record Reviewed medical record;history and physical    Contact Information    Permission Granted to Share Information With             Functional Status        01/08/18 1622    Functional Status Current    Current Functional Level Comment see nursing notes    Functional Status Prior    Ambulation 0-->independent    Transferring 0-->independent    Toileting 0-->independent    Bathing 0-->independent    Dressing 0-->independent    Eating 0-->independent    Communication 0-->understands/communicates without difficulty    Swallowing 0-->swallows foods/liquids without difficulty    IADL    Medications independent    Meal Preparation independent    Housekeeping independent    Laundry independent    Shopping independent    Oral Care independent    Activity Tolerance    Current Activity Limitations none    Usual Activity Tolerance good    Current Activity Tolerance good            Psychosocial     None            Abuse/Neglect     None            Legal     None            Substance Abuse     None            Patient Forms     None          Aubree Crowell RN

## 2018-01-09 VITALS
TEMPERATURE: 98.2 F | OXYGEN SATURATION: 94 % | DIASTOLIC BLOOD PRESSURE: 72 MMHG | BODY MASS INDEX: 16.06 KG/M2 | HEART RATE: 56 BPM | RESPIRATION RATE: 18 BRPM | WEIGHT: 112.2 LBS | SYSTOLIC BLOOD PRESSURE: 139 MMHG | HEIGHT: 70 IN

## 2018-01-09 PROCEDURE — 94760 N-INVAS EAR/PLS OXIMETRY 1: CPT

## 2018-01-09 PROCEDURE — 25010000002 ENOXAPARIN PER 10 MG: Performed by: NURSE PRACTITIONER

## 2018-01-09 PROCEDURE — 25010000002 METHYLPREDNISOLONE PER 40 MG: Performed by: NURSE PRACTITIONER

## 2018-01-09 PROCEDURE — 94799 UNLISTED PULMONARY SVC/PX: CPT

## 2018-01-09 PROCEDURE — 96372 THER/PROPH/DIAG INJ SC/IM: CPT

## 2018-01-09 PROCEDURE — 99213 OFFICE O/P EST LOW 20 MIN: CPT | Performed by: INTERNAL MEDICINE

## 2018-01-09 PROCEDURE — 96376 TX/PRO/DX INJ SAME DRUG ADON: CPT

## 2018-01-09 PROCEDURE — 94640 AIRWAY INHALATION TREATMENT: CPT

## 2018-01-09 PROCEDURE — G0378 HOSPITAL OBSERVATION PER HR: HCPCS

## 2018-01-09 PROCEDURE — 99217 PR OBSERVATION CARE DISCHARGE MANAGEMENT: CPT | Performed by: NURSE PRACTITIONER

## 2018-01-09 RX ORDER — DOXYCYCLINE HYCLATE 100 MG/1
100 CAPSULE ORAL EVERY 12 HOURS SCHEDULED
Qty: 6 CAPSULE | Refills: 0 | Status: SHIPPED | OUTPATIENT
Start: 2018-01-09 | End: 2018-01-12

## 2018-01-09 RX ORDER — ASPIRIN 81 MG/1
81 TABLET ORAL DAILY
Qty: 30 TABLET | Refills: 0 | Status: SHIPPED | OUTPATIENT
Start: 2018-01-10 | End: 2018-03-08

## 2018-01-09 RX ORDER — OSELTAMIVIR PHOSPHATE 75 MG/1
75 CAPSULE ORAL EVERY 12 HOURS SCHEDULED
Qty: 7 CAPSULE | Refills: 0 | Status: SHIPPED | OUTPATIENT
Start: 2018-01-09 | End: 2018-01-13

## 2018-01-09 RX ORDER — PREDNISONE 10 MG/1
TABLET ORAL
Qty: 21 TABLET | Refills: 0 | Status: SHIPPED | OUTPATIENT
Start: 2018-01-09 | End: 2018-01-23

## 2018-01-09 RX ORDER — NICOTINE 21 MG/24HR
1 PATCH, TRANSDERMAL 24 HOURS TRANSDERMAL
Qty: 28 PATCH | Refills: 0 | Status: SHIPPED | OUTPATIENT
Start: 2018-01-10 | End: 2018-01-23

## 2018-01-09 RX ORDER — LISINOPRIL 20 MG/1
20 TABLET ORAL
Qty: 30 TABLET | Refills: 0 | Status: SHIPPED | OUTPATIENT
Start: 2018-01-10 | End: 2018-02-12 | Stop reason: SDUPTHER

## 2018-01-09 RX ORDER — CARVEDILOL 12.5 MG/1
12.5 TABLET ORAL EVERY 12 HOURS SCHEDULED
Qty: 60 TABLET | Refills: 0 | Status: SHIPPED | OUTPATIENT
Start: 2018-01-09 | End: 2018-02-12

## 2018-01-09 RX ORDER — LISINOPRIL 20 MG/1
20 TABLET ORAL
Status: DISCONTINUED | OUTPATIENT
Start: 2018-01-10 | End: 2018-01-09 | Stop reason: HOSPADM

## 2018-01-09 RX ORDER — ATORVASTATIN CALCIUM 40 MG/1
40 TABLET, FILM COATED ORAL NIGHTLY
Qty: 30 TABLET | Refills: 0 | Status: SHIPPED | OUTPATIENT
Start: 2018-01-09 | End: 2018-02-12 | Stop reason: SDUPTHER

## 2018-01-09 RX ADMIN — LISINOPRIL 10 MG: 10 TABLET ORAL at 08:42

## 2018-01-09 RX ADMIN — FAMOTIDINE 40 MG: 20 TABLET, FILM COATED ORAL at 08:42

## 2018-01-09 RX ADMIN — CARVEDILOL 12.5 MG: 12.5 TABLET, FILM COATED ORAL at 08:42

## 2018-01-09 RX ADMIN — DOCUSATE SODIUM 100 MG: 100 CAPSULE, LIQUID FILLED ORAL at 08:42

## 2018-01-09 RX ADMIN — ASPIRIN 81 MG: 81 TABLET, COATED ORAL at 08:42

## 2018-01-09 RX ADMIN — METHYLPREDNISOLONE SODIUM SUCCINATE 30 MG: 40 INJECTION, POWDER, FOR SOLUTION INTRAMUSCULAR; INTRAVENOUS at 06:09

## 2018-01-09 RX ADMIN — IPRATROPIUM BROMIDE 0.5 MG: 0.5 SOLUTION RESPIRATORY (INHALATION) at 07:24

## 2018-01-09 RX ADMIN — OSELTAMIVIR PHOSPHATE 75 MG: 75 CAPSULE ORAL at 08:42

## 2018-01-09 RX ADMIN — ENOXAPARIN SODIUM 40 MG: 40 INJECTION SUBCUTANEOUS at 08:42

## 2018-01-09 RX ADMIN — DOXYCYCLINE HYCLATE 100 MG: 100 CAPSULE ORAL at 08:42

## 2018-01-09 RX ADMIN — IPRATROPIUM BROMIDE 0.5 MG: 0.5 SOLUTION RESPIRATORY (INHALATION) at 12:04

## 2018-01-09 NOTE — PROGRESS NOTES
"[facility] at [facility] Progress Note     LOS: 0 days   Patient Care Team:  No Known Provider as PCP - General  PCP:  No Known Provider    Chief Complaint: Chest pain    SUBJECTIVE:   Feeling better today.  Cough resolving.  Blood pressure trend improving.  Transthoracic echo reviewed.      Review of Systems:   All systems have been reviewed and are negative with the exception of those mentioned above.      OBJECTIVE:    Vital Sign Min/Max for last 24 hours  Temp  Min: 96.9 °F (36.1 °C)  Max: 98.2 °F (36.8 °C)   BP  Min: 134/80  Max: 145/89   Pulse  Min: 53  Max: 77   Resp  Min: 16  Max: 20   SpO2  Min: 89 %  Max: 94 %   Flow (L/min)  Min: 3  Max: 3   No Data Recorded     Flowsheet Rows         First Filed Value    Admission Height  177.8 cm (70\") Documented at 01/07/2018 1124    Admission Weight  59 kg (130 lb) Documented at 01/07/2018 1124                Intake/Output Summary (Last 24 hours) at 01/09/18 1316  Last data filed at 01/09/18 1140   Gross per 24 hour   Intake              480 ml   Output              400 ml   Net               80 ml     Intake & Output (last 3 days)       01/06 0701 - 01/07 0700 01/07 0701 - 01/08 0700 01/08 0701 - 01/09 0700 01/09 0701 - 01/10 0700    P.O.   240 240    Total Intake(mL/kg)   240 (4.7) 240 (4.7)    Urine (mL/kg/hr)   400 (0.3)     Total Output     400      Net     -160 +240            Unmeasured Urine Occurrence   2 x            Physical Exam:    General Appearance:    Alert, cooperative, no distress, appears stated age   Neck:   Supple, symmetrical, trachea midline.   Lungs:     Diminished but otherwise clear, respirations unlabored   Chest Wall:    No tenderness or deformity    Heart:    Regular rate and rhythm, S1 and S2 normal, no murmur, rub   or gallop, normal carotid impulse bilaterally without bruit.   Extremities:   Extremities normal, atraumatic, no cyanosis or edema   Pulses:   2+ and symmetric all extremities   Skin:   Skin color, " texture, turgor normal, no rashes or lesions      LABS/DIAGNOSTIC DATA:    Results from last 7 days  Lab Units 01/08/18  0630 01/07/18  1149   WBC 10*3/mm3 7.49 7.50   HEMOGLOBIN g/dL 13.8 14.6   HEMATOCRIT % 42.7 46.0   PLATELETS 10*3/mm3 228 229     No results found for: TROPONINT        Results from last 7 days  Lab Units 01/08/18  0630 01/07/18  1149   SODIUM mmol/L 141 142   POTASSIUM mmol/L 4.1 3.5   CHLORIDE mmol/L 107 107   CO2 mmol/L 27.0 30.0   BUN mg/dL 18 16   CREATININE mg/dL 0.70 0.90   CALCIUM mg/dL 9.6 9.3   BILIRUBIN mg/dL  --  0.3   ALK PHOS U/L  --  101*   ALT (SGPT) U/L  --  20   AST (SGOT) U/L  --  27   GLUCOSE mg/dL 112* 104*       Results from last 7 days  Lab Units 01/08/18  0630   HEMOGLOBIN A1C % 6.20*       Results from last 7 days  Lab Units 01/07/18  1149   CHOLESTEROL mg/dL 157   TRIGLYCERIDES mg/dL 169*   HDL CHOL mg/dL 43           Results from last 7 days  Lab Units 01/07/18  1149   BNP pg/mL 37.0       Medication Review:     aspirin 81 mg Oral Daily   atorvastatin 40 mg Oral Nightly   carvedilol 12.5 mg Oral Q12H   docusate sodium 100 mg Oral BID   doxycycline 100 mg Oral Q12H   enoxaparin 40 mg Subcutaneous Q24H   famotidine 40 mg Oral Daily   ipratropium 0.5 mg Nebulization 4x Daily - RT   [START ON 1/10/2018] lisinopril 20 mg Oral Q24H   methylPREDNISolone sodium succinate 30 mg Intravenous Q12H   nicotine 1 patch Transdermal Q24H   oseltamivir 75 mg Oral Q12H   pharmacy consult - MTM  Does not apply Daily   sennosides-docusate sodium 2 tablet Oral Nightly            Principal Problem:    Hypertensive urgency  Active Problems:    Chest pain    COPD (chronic obstructive pulmonary disease)    Tobacco abuse    Chest pain with high risk for cardiac etiology  Cardiomyopathy, EF 45% currently euvolemic and compensated  Noncardiac chest pain: Pleuritic    ASSESSMENT/PLAN:  -Stable from a cardiac standpoint for discharge on current medical therapy: ASA, statin, ACE-I, and  coreg.  -Outpatient exercise myocardial perfusion imaging once he has recovered from influenza  -Smoking cessation  -Follow-up with Dr. Coreas in 2-4 weeks.          Amador Coreas III, MD   01/09/18  1:16 PM

## 2018-01-09 NOTE — DISCHARGE SUMMARY
"    [facility] Medicine Services  DISCHARGE SUMMARY    Patient Name: Francisco Clark  : 1960  MRN: [id number]    Date of Admission: 2018  Date of Discharge:  18  Primary Care Physician: No Known Provider    Consults     Date and Time Order Name Status Description    2018 0231 Inpatient Consult to Cardiology          Hospital Course     Presenting Problem:   Chest pain with high risk for cardiac etiology [R07.9]    Active Hospital Problems (** Indicates Principal Problem)    Diagnosis Date Noted   • **Hypertensive urgency [I16.0] 2018   • Chest pain [R07.9] 2018   • COPD (chronic obstructive pulmonary disease) [J44.9] 2018   • Tobacco abuse [Z72.0] 2018   • Chest pain with high risk for cardiac etiology [R07.9] 2018      Resolved Hospital Problems    Diagnosis Date Noted Date Resolved   No resolved problems to display.          Hospital Course:  Francisco Clark is a 57 y.o. male with no known past medical history due to lack of seeking medical treatment in last several years, presented to [facility] ED on 18 with complaints of shortness of air and chest tightness.  Patient has been a longtime smoker who endorses that he has had some intermittent shortness of breat at times until most recently.  Patient indicated that he had the flu last month with fever, body aches, nausea and vomiting, increased cough and weakness. Patient went to [facility] on 18 to be seen for shortness of air, sputum production, chest tightness, fatigue and easily winded once his flu symptoms mostly resolved without resolution of \"winded and cough\".  Patient was told at that time the bronchitis, no pneumonia seen on chest x-ray but was not given any prescriptions.  Has continued to have increasing fatigue and being easily winded over the last week especially at work, and decided to seek attention at the ED.  Upon arrival to ED was noted to have hypertensive urgency with " systolics greater than 200's.  Preliminary reading of chest x-ray in ED with chronic changes, no acute process seen. Troponins negative, cont to trend. EKG without ischemic changes. BNP 37. He was admitted Hospital medicine for further evaluation with consultation to cardiology.    Upon admission, the patient tested positive for Influenza B.  Tamiflu was started as well as course of doxycycline and IV steroids.  Cardiology evaluated the patient and made recommendations for blood pressure control.  The patient was advised about the need for smoking cessation.  He was also started on low dose aspirin and statin therapy.    He has remained stable and cardiology has cleared the patient from cardiac standpoint for discharge home.  He will be discharged home today with course of prednisone dose pack, doxycycline, and Tamiflu.  Additionally, cardiology has added medications as listed above (lisinopril, aspirin, atorvastatin, and carvedilol).  He will be transported home by family member.    Mr. Clark will need to follow up with Dr. Coreas in 2 weeks.  At that time, he will need an exercise stress test to further evaluate cardiac status.  Of note, he needs to recover from the flu before going forth with this.  He will need to see a PCP in 1-2 weeks.      I have provided smoking cessation counseling and have also prescribed Mr. Clark a 1 month supply of nicotine replacement to use at his discretion.               Day of Discharge     HPI:   Patient resting in bed awake, family at bedside.  Patient reports feels much better overall but still has some ongoing shortness of breath.  He is requesting a work excuse for this week.    Review of Systems   Constitutional: Negative for chills and fever.   Cardiovascular: Negative for chest pain.   Gastrointestinal: Negative for abdominal pain, diarrhea, nausea and vomiting.     Otherwise ROS is negative except as mentioned in the HPI.    Vital Signs:   Temp:  [96.9 °F (36.1  °C)-98.2 °F (36.8 °C)] 98.2 °F (36.8 °C)  Heart Rate:  [53-77] 56  Resp:  [16-20] 18  BP: (134-145)/(72-89) 139/72     Physical Exam:  Constitutional: No acute distress, awake, alert  Eyes: PERRLA, sclerae anicteric, no conjunctival injection  HENT: NCAT, mucous membranes moist  Neck: Supple, no thyromegaly, no lymphadenopathy, trachea midline  Respiratory: Clear to auscultation bilaterally, nonlabored respirations   Cardiovascular: RRR, no murmurs, rubs, or gallops, palpable pedal pulses bilaterally  Gastrointestinal: Positive bowel sounds, soft, nontender, nondistended  Musculoskeletal: Trajectory treated very low muscle mass.  No temporal wasting however.  Psychiatric: Appropriate affect, cooperative  Neurologic: Oriented x 3, strength symmetric in all extremities, Cranial Nerves grossly intact to confrontation, speech clear  Skin: No rashes       Pertinent  and/or Most Recent Results       Results from last 7 days  Lab Units 01/08/18  0630 01/07/18  1149   WBC 10*3/mm3 7.49 7.50   HEMOGLOBIN g/dL 13.8 14.6   HEMATOCRIT % 42.7 46.0   PLATELETS 10*3/mm3 228 229   SODIUM mmol/L 141 142   POTASSIUM mmol/L 4.1 3.5   CHLORIDE mmol/L 107 107   CO2 mmol/L 27.0 30.0   BUN mg/dL 18 16   CREATININE mg/dL 0.70 0.90   GLUCOSE mg/dL 112* 104*   CALCIUM mg/dL 9.6 9.3       Results from last 7 days  Lab Units 01/07/18  1149   BILIRUBIN mg/dL 0.3   ALK PHOS U/L 101*   ALT (SGPT) U/L 20   AST (SGOT) U/L 27       Results from last 7 days  Lab Units 01/07/18  1149   CHOLESTEROL mg/dL 157   TRIGLYCERIDES mg/dL 169*   HDL CHOL mg/dL 43   LDL CHOL mg/dL 105       Results from last 7 days  Lab Units 01/08/18  0630 01/07/18  1149   HEMOGLOBIN A1C % 6.20*  --    BNP pg/mL  --  37.0   TROPONIN I ng/mL 0.010  --      Brief Urine Lab Results     None          Microbiology Results Abnormal     Procedure Component Value - Date/Time    Influenza A & B, RT PCR - Swab, Nasopharynx [[id number]]  (Abnormal) Collected:  01/07/18 0044    Lab Status:   Final result Specimen:  Swab from Nasopharynx Updated:  01/07/18 1934     Influenza A PCR Not Detected     Influenza B PCR Detected (A)          Imaging Results (all)     Procedure Component Value Units Date/Time    XR Chest 1 View [[id number]] Collected:  01/07/18 1725     Updated:  01/08/18 0907    Narrative:          EXAMINATION: XR CHEST 1 VW - 01/07/2018     INDICATION: Shortness of air.     COMPARISON: 01/04/2018.     FINDINGS: Portable chest reveals cardiac and mediastinal silhouettes are  within normal limits. Underlying chronic and emphysematous changes seen  within the lung fields  bilaterally. No focal parenchymal opacification present. No pleural  effusion or pneumothorax. Degenerative change is seen within the spine.  Pulmonary vascularity is within normal limits.           Impression:       Chronic and emphysematous changes within  the lung fields  bilaterally with no acute parenchymal disease.     DICTATED:     01/07/2018  EDITED    :     01/07/2018      This report was finalized on 1/8/2018 9:05 AM by Dr. Cinthia Malcolm MD.             Results for orders placed during the hospital encounter of 01/07/18   Adult Transthoracic Echo Complete W/ Cont if Necessary Per Protocol    Narrative · Left ventricular systolic function is mildly decreased. Estimated EF =   45%.  · Left ventricular diastolic dysfunction (grade I) consistent with   impaired relaxation.  · Mild aortic valve regurgitation is present.  · Trace mitral regurgitation, trace tricuspid regurgitation.            Discharge Details      Francisco Clark   Home Medication Instructions SURJIT:[id number]    Printed on:01/09/18 1543   Medication Information                      aspirin 81 MG EC tablet  Take 1 tablet by mouth Daily.             aspirin-acetaminophen-caffeine (EXCEDRIN MIGRAINE) 250-250-65 MG per tablet  Take 1 tablet by mouth Every 6 (Six) Hours As Needed for Headache (migraine).             atorvastatin (LIPITOR) 40 MG  tablet  Take 1 tablet by mouth Every Night.             carvedilol (COREG) 12.5 MG tablet  Take 1 tablet by mouth Every 12 (Twelve) Hours.             doxycycline (VIBRAMYCIN) 100 MG capsule  Take 1 capsule by mouth Every 12 (Twelve) Hours for 6 doses. Indications: Upper Respiratory Tract Infection             lisinopril (PRINIVIL,ZESTRIL) 20 MG tablet  Take 1 tablet by mouth Daily.             oseltamivir (TAMIFLU) 75 MG capsule  Take 1 capsule by mouth Every 12 (Twelve) Hours for 7 doses. Indications: Influenza B             PredniSONE (DELTASONE) 10 MG (48) tablet pack  Take as directed                   Discharge Disposition:  Home or Self Care    Discharge Diet:  Diet Instructions     Diet: Regular, Cardiac; Thin       Discharge Diet:   Regular  Cardiac      Fluid Consistency:  Thin                 Discharge Activity:   Activity Instructions     Activity as Tolerated                     Future Appointments  Date Time Provider Department Center   1/25/2018 10:30 AM NATALIO Rainey [facility] None       Additional Instructions for the Follow-ups that You Need to Schedule     Discharge Follow-up with PCP    As directed    Follow Up Details:  Follow up with PCP in 1-2 weeks.           Discharge Follow-up with Specified Provider: Follow up with Dr. Coreas in 2-3 weeks.  Will need exercise stress test.    As directed    To:  Follow up with Dr. Coreas in 2-3 weeks.  Will need exercise stress test.                   Time Spent on Discharge:  35 minutes    NATALIO Hannah  01/09/18  2:45 PM

## 2018-01-09 NOTE — PLAN OF CARE
Problem: Patient Care Overview (Adult)  Goal: Plan of Care Review  Outcome: Ongoing (interventions implemented as appropriate)   01/09/18 7302   Coping/Psychosocial Response Interventions   Plan Of Care Reviewed With patient   Patient Care Overview   Progress no change   Outcome Evaluation   Outcome Summary/Follow up Plan Pt VSS. No c/o of pain. NSR on tele. ready for d/c

## 2018-01-09 NOTE — PLAN OF CARE
Problem: Patient Care Overview (Adult)  Goal: Plan of Care Review  Outcome: Ongoing (interventions implemented as appropriate)   01/09/18 0510   Coping/Psychosocial Response Interventions   Plan Of Care Reviewed With patient   Patient Care Overview   Progress progress toward functional goals as expected   Outcome Evaluation   Outcome Summary/Follow up Plan Pt VSS. BP remained WDL overnight. NSR on tele. No complaints.        Problem: Hypertensive Disease/Crisis (Arterial) (Adult)  Goal: Signs and Symptoms of Listed Potential Problems Will be Absent or Manageable (Hypertensive Disease/Crisis)  Outcome: Ongoing (interventions implemented as appropriate)   01/09/18 0510   Hypertensive Disease/Crisis (Arterial)   Problems Assessed (Hypertensive Disease/Crisis (Arterial)) all   Problems Present (Hypertensive Disease/Crisis (Arterial)) none

## 2018-01-09 NOTE — PROGRESS NOTES
Continued Stay Note  [facility]     Patient Name: Francisco Clark  MRN: [id number]  Today's Date: 1/9/2018    Admit Date: 1/7/2018          Discharge Plan       01/09/18 1624    Case Management/Social Work Plan    Additional Comments PCP appointment made and placed on the AVS per Lorrie Malcolm.  No other needs identified. CM will cont to follow.               Discharge Codes     None        Expected Discharge Date and Time     Expected Discharge Date Expected Discharge Time    Jan 9, 2018             Aubree Crowell RN

## 2018-01-23 ENCOUNTER — HOSPITAL ENCOUNTER (OUTPATIENT)
Dept: CARDIOLOGY | Facility: HOSPITAL | Age: 58
Discharge: HOME | End: 2018-01-23
Attending: NURSE PRACTITIONER | Admitting: NURSE PRACTITIONER

## 2018-01-23 ENCOUNTER — OFFICE VISIT (OUTPATIENT)
Dept: CARDIOLOGY | Facility: HOSPITAL | Age: 58
End: 2018-01-23

## 2018-01-23 VITALS
DIASTOLIC BLOOD PRESSURE: 65 MMHG | HEIGHT: 70 IN | RESPIRATION RATE: 18 BRPM | TEMPERATURE: 98.1 F | OXYGEN SATURATION: 97 % | WEIGHT: 116 LBS | HEART RATE: 84 BPM | SYSTOLIC BLOOD PRESSURE: 107 MMHG | BODY MASS INDEX: 16.61 KG/M2

## 2018-01-23 DIAGNOSIS — R06.00 DYSPNEA, UNSPECIFIED TYPE: ICD-10-CM

## 2018-01-23 DIAGNOSIS — J44.9 CHRONIC OBSTRUCTIVE PULMONARY DISEASE, UNSPECIFIED COPD TYPE (HCC): ICD-10-CM

## 2018-01-23 DIAGNOSIS — Z72.0 TOBACCO ABUSE: ICD-10-CM

## 2018-01-23 DIAGNOSIS — I42.9 CARDIOMYOPATHY, UNSPECIFIED TYPE (HCC): Primary | ICD-10-CM

## 2018-01-23 DIAGNOSIS — I10 ESSENTIAL HYPERTENSION: ICD-10-CM

## 2018-01-23 PROCEDURE — 99214 OFFICE O/P EST MOD 30 MIN: CPT | Performed by: NURSE PRACTITIONER

## 2018-01-23 PROCEDURE — 93005 ELECTROCARDIOGRAM TRACING: CPT | Performed by: NURSE PRACTITIONER

## 2018-01-23 PROCEDURE — 99406 BEHAV CHNG SMOKING 3-10 MIN: CPT | Performed by: NURSE PRACTITIONER

## 2018-01-23 PROCEDURE — 93010 ELECTROCARDIOGRAM REPORT: CPT | Performed by: INTERNAL MEDICINE

## 2018-01-23 NOTE — PROGRESS NOTES
"Encounter Date:01/23/2018      Patient ID: Francisco Clark is a 57 y.o. male.        Subjective:     Chief Complaint: Hospital FU, decreased LVEF     History of Present Illness  Francisco Clark is a 57 y.o. male with no known past medical history due to lack of seeking medical treatment in last several years, presented to [facility] ED on 1/7/18 with complaints of shortness of air and chest tightness.  Patient has been a longtime smoker who endorses that he has had some intermittent shortness of breat at times until most recently.  Patient indicated that he had the flu last month with fever, body aches, nausea and vomiting, increased cough and weakness. Patient went to [facility] on 1/4/18 to be seen for shortness of air, sputum production, chest tightness, fatigue and easily winded once his flu symptoms mostly resolved without resolution of \"winded and cough\".  Patient was told at that time the bronchitis, no pneumonia seen on chest x-ray but was not given any prescriptions. In the ED was found to have a systolic blood pressure of over 200.  He tested positive for influenza B.  He did report chest pain but troponins were negative.  Echo showed a left ventricular systolic function of 45% with grade 1 diastolic dysfunction.  His BNP was 37 and chest x-ray showed chronic lung changes with no acute cardiopulmonary process.    He [facility] to the [facility] today to follow-up on left ventricular systolic dysfunction. He reports his breathing has improved significantly and chest pain has resolved. However, he is still very weak. He says he has lost 30lbs since he was diagnosed with the flu but has gained 4lbs back since the hospital. He is now back to work at [employer] and says he moves \"slower than the 80 year olds\". He is trying to increase his calories by drinking Boost three days a week. Patient denies chest pain, chest pressure, palpitations, tachycardia,  presyncope, syncope, orthopnea, PND, abdominal fullness, early " satiety, claudication, cough or edema.    Patient Active Problem List   Diagnosis   • Chest pain   • COPD (chronic obstructive pulmonary disease)   • Tobacco abuse   • Hypertensive urgency   • Chest pain with high risk for cardiac etiology         Past Surgical History:   Procedure Laterality Date   • NO PAST SURGERIES         No Known Allergies      Current Outpatient Prescriptions:   •  aspirin 81 MG EC tablet, Take 1 tablet by mouth Daily., Disp: 30 tablet, Rfl: 0  •  aspirin-acetaminophen-caffeine (EXCEDRIN MIGRAINE) 250-250-65 MG per tablet, Take 1 tablet by mouth Every 6 (Six) Hours As Needed for Headache (migraine)., Disp: , Rfl:   •  atorvastatin (LIPITOR) 40 MG tablet, Take 1 tablet by mouth Every Night., Disp: 30 tablet, Rfl: 0  •  carvedilol (COREG) 12.5 MG tablet, Take 1 tablet by mouth Every 12 (Twelve) Hours., Disp: 60 tablet, Rfl: 0  •  lisinopril (PRINIVIL,ZESTRIL) 20 MG tablet, Take 1 tablet by mouth Daily., Disp: 30 tablet, Rfl: 0    The following portions of the chart were reviewed and updated as appropriate: Allergies, current medications, past family history, social history, past medical history.     Review of Systems   Constitution: Positive for weakness, malaise/fatigue and weight loss. Negative for chills, decreased appetite, diaphoresis, fever, night sweats and weight gain.   HENT: Negative for congestion, hearing loss, hoarse voice and nosebleeds.    Eyes: Negative for blurred vision, visual disturbance and visual halos.   Cardiovascular: Positive for dyspnea on exertion. Negative for chest pain, claudication, cyanosis, irregular heartbeat, leg swelling, near-syncope, orthopnea, palpitations, paroxysmal nocturnal dyspnea and syncope.   Respiratory: Positive for cough and shortness of breath. Negative for hemoptysis, sleep disturbances due to breathing, snoring, sputum production and wheezing.    Hematologic/Lymphatic: Negative for bleeding problem. Does not bruise/bleed easily.   Skin:  "Negative for dry skin, itching and rash.   Musculoskeletal: Negative for arthritis, joint pain, joint swelling and myalgias.   Gastrointestinal: Positive for abdominal pain and nausea. Negative for bloating, constipation, diarrhea, flatus, heartburn, hematemesis, hematochezia, melena and vomiting.   Genitourinary: Negative for dysuria, frequency, hematuria, nocturia and urgency.   Neurological: Negative for excessive daytime sleepiness, dizziness, headaches, light-headedness and loss of balance.   Psychiatric/Behavioral: Negative for depression. The patient does not have insomnia and is not nervous/anxious.            Objective:     Vitals:    01/23/18 1511 01/23/18 1514 01/23/18 1516   BP: 114/58 115/55 107/65   BP Location: Right arm Left arm Left arm   Patient Position: Sitting Sitting Standing   Pulse: 75 75 84   Resp: 18     Temp: 98.1 °F (36.7 °C)     TempSrc: Temporal Artery      SpO2: 97%     Weight: 52.6 kg (116 lb)     Height: 177.8 cm (70\")           Physical Exam   Constitutional: He is oriented to person, place, and time. He appears well-developed. He appears cachectic. No distress.   HENT:   Head: Normocephalic.   Eyes: Conjunctivae are normal. Pupils are equal, round, and reactive to light.   Neck: Neck supple. No JVD present. No thyromegaly present.   Cardiovascular: Normal rate, regular rhythm, normal heart sounds and intact distal pulses.  Exam reveals no gallop and no friction rub.    No murmur heard.  Pulmonary/Chest: Effort normal. No respiratory distress. He has decreased breath sounds in the right lower field and the left lower field. He has no wheezes. He has rhonchi. He has no rales. He exhibits no tenderness.   Abdominal: Soft. Bowel sounds are normal.   Musculoskeletal: Normal range of motion. He exhibits no edema.   Neurological: He is alert and oriented to person, place, and time.   Skin: Skin is warm and dry.   Psychiatric: He has a normal mood and affect. His behavior is normal. " Thought content normal.   Vitals reviewed.      Lab and Diagnostic Review:    Echo 1/8/18  · Left ventricular systolic function is mildly decreased. Estimated EF = 45%.  · Left ventricular diastolic dysfunction (grade I) consistent with impaired relaxation.  · Mild aortic valve regurgitation is present.  · Trace mitral regurgitation, trace tricuspid regurgitation.  EKG: NSR, possible LAE, LAFB    Assessment and Plan:         1. Cardiomyopathy, unspecified type  - LVEF 45%  - Euvolemic   - Heart failure education provided today including signs and symptoms, causes of heart failure, medications, daily weights, low sodium diet (less than 2000 mg per day), 1.5L fluid restriction and daily physical activity as tolerated. Reviewed HF Zones with patient and family.  - Needs ischemic work up. He has a follow up in one week with , so I think this will give him more time to gain his strength and gain some of his weight back  - ECG 12 Lead; Future    2. Chronic obstructive pulmonary disease, unspecified COPD type  - No acute exacerbation    3. Tobacco abuse  - Smoking cessation counseling provided for 5 minutes.  Various cessation strategies discussed including nicotine replacement therapy, bupropion, chantix, cognitive behavioral therapy and community resources. Discussed risks of ongoing tobacco abuse including cardiovascular disease, stroke, lung cancer, high blood pressure and death    4. Essential hypertension  - Controlled    5. Cachexia secondary to acute illness  - Continue to increase protein intake. Eat every 3 hours. Discussed calorie-rich foods to help him get back to his baseline  - If weight loss persists then recommend that he have CT scan of his lungs to rule out lung CA. I discussed this with him    Also provided him with a list of PCPs to get established with    FU in 4 weeks    It has been a pleasure to participate in the care of this patient.  Patient was instructed to call the [facility] with any questions, concerns, or worsening symptoms.      * Please note that portions of this note were completed with a voice recognition program. Efforts were made to edit the dictation but occasionally words are transcribed.

## 2018-02-01 ENCOUNTER — OFFICE VISIT (OUTPATIENT)
Dept: INTERNAL MEDICINE | Facility: CLINIC | Age: 58
End: 2018-02-01

## 2018-02-01 VITALS
HEIGHT: 70 IN | HEART RATE: 74 BPM | SYSTOLIC BLOOD PRESSURE: 98 MMHG | BODY MASS INDEX: 16.86 KG/M2 | WEIGHT: 117.8 LBS | RESPIRATION RATE: 18 BRPM | DIASTOLIC BLOOD PRESSURE: 52 MMHG | TEMPERATURE: 97.5 F | OXYGEN SATURATION: 98 %

## 2018-02-01 DIAGNOSIS — R07.9 CHEST PAIN WITH HIGH RISK FOR CARDIAC ETIOLOGY: ICD-10-CM

## 2018-02-01 DIAGNOSIS — Z12.11 SCREENING FOR COLON CANCER: ICD-10-CM

## 2018-02-01 DIAGNOSIS — I10 ESSENTIAL HYPERTENSION: ICD-10-CM

## 2018-02-01 DIAGNOSIS — J44.9 CHRONIC OBSTRUCTIVE PULMONARY DISEASE, UNSPECIFIED COPD TYPE (HCC): Primary | ICD-10-CM

## 2018-02-01 DIAGNOSIS — Z23 NEED FOR PNEUMOCOCCAL VACCINATION: ICD-10-CM

## 2018-02-01 DIAGNOSIS — E78.5 HYPERLIPIDEMIA, UNSPECIFIED HYPERLIPIDEMIA TYPE: ICD-10-CM

## 2018-02-01 DIAGNOSIS — Z72.0 TOBACCO ABUSE: ICD-10-CM

## 2018-02-01 PROCEDURE — 99203 OFFICE O/P NEW LOW 30 MIN: CPT | Performed by: NURSE PRACTITIONER

## 2018-02-01 PROCEDURE — 90471 IMMUNIZATION ADMIN: CPT | Performed by: NURSE PRACTITIONER

## 2018-02-01 PROCEDURE — 90732 PPSV23 VACC 2 YRS+ SUBQ/IM: CPT | Performed by: NURSE PRACTITIONER

## 2018-02-01 PROCEDURE — 99406 BEHAV CHNG SMOKING 3-10 MIN: CPT | Performed by: NURSE PRACTITIONER

## 2018-02-01 NOTE — PROGRESS NOTES
"Subjective   Francisco Clark is a 57 y.o. male    Chief Complaint   Patient presents with   • Establish Care     Still gets very tired and weak easily and sometimes still has difficulty breathing.      History of Present Illness     New pt here to establish care    Francisco Clark is a 57 y.o. male with no known past medical history due to lack of seeking medical treatment in last several years, presented to [facility] ED on 1/7/18 with complaints of shortness of air and chest tightness.  Patient has been a longtime smoker who endorses that he has had some intermittent shortness of breat at times until most recently.  Patient indicated that he had the flu last month with fever, body aches, nausea and vomiting, increased cough and weakness. Patient went to [facility] on 1/4/18 to be seen for shortness of air, sputum production, chest tightness, fatigue and easily winded once his flu symptoms mostly resolved without resolution of \"winded and cough\".  Patient was told at that time the bronchitis, no pneumonia seen on chest x-ray but was not given any prescriptions.  Has continued to have increasing fatigue and being easily winded over the last week especially at work, and decided to seek attention at the ED.  Upon arrival to ED was noted to have hypertensive urgency with systolics greater than 200's. Preliminary reading of chest x-ray in ED with chronic changes, no acute process seen. Troponins negative, cont to trend. EKG without ischemic changes. BNP 37. He was admitted Hospital medicine for further evaluation with consultation to cardiology.     Upon admission, the patient tested positive for Influenza B.  Tamiflu was started as well as course of doxycycline and IV steroids.  Cardiology evaluated the patient and made recommendations for blood pressure control.  The patient was advised about the need for smoking cessation.  He was also started on low dose aspirin and statin therapy.     He has remained stable and cardiology has " cleared the patient from cardiac standpoint for discharge home.  He will be discharged home on 1/9/18 with a prednisone dose pack, doxycycline, and Tamiflu. Additionally, cardiology added medications as listed above (lisinopril, aspirin, atorvastatin, and carvedilol).  He was transported home by family member.     Mr. Clark was instructed to f/u with Dr. Coreas in 2 weeks, which he has already seen.  He has another upcoming appt later this month.  He has not had a stress test yet, as they were waiting until he gained is strength.      Presents today to establish care.  States that overall, he is feeling much better.  Has has increased his calories and drinking Ensure daily.  Has gained back about 5 lbs since his hospitalization.    Past Medical History:   Diagnosis Date   • History of stomach ulcers      Past Surgical History:   Procedure Laterality Date   • NO PAST SURGERIES       No Known Allergies    Family History   Problem Relation Age of Onset   • Emphysema Mother    • Heart disease Father    • Migraines Father    • Heart attack Father    • Aneurysm Sister    • Heart disease Brother      50's   • Migraines Brother    • No Known Problems Maternal Grandmother    • No Known Problems Maternal Grandfather    • No Known Problems Paternal Grandmother    • No Known Problems Paternal Grandfather    • Other Brother      killed in [place]   • No Known Problems Son    • No Known Problems Son    • No Known Problems Daughter      Social History     Social History   • Marital status:      Spouse name: N/A   • Number of children: N/A   • Years of education: N/A     Occupational History   • Not on file.     Social History Main Topics   • Smoking status: Current Every Day Smoker     Packs/day: 1.00     Years: 35.00     Types: Cigarettes   • Smokeless tobacco: Never Used      Comment: less than 1/2 ppd since getting sick   • Alcohol use No   • Drug use: No   • Sexual activity: Defer      Comment:      Other  Topics Concern   • Not on file     Social History Narrative    Caffeine use: none.    Patient lives at home with his son.          at [employer]          The following portions of the patient's history were reviewed and updated as appropriate: allergies, current medications, past family history, past medical history, past social history, past surgical history and problem list.    Current Outpatient Prescriptions:   •  aspirin 81 MG EC tablet, Take 1 tablet by mouth Daily., Disp: 30 tablet, Rfl: 0  •  atorvastatin (LIPITOR) 40 MG tablet, Take 1 tablet by mouth Every Night., Disp: 30 tablet, Rfl: 0  •  carvedilol (COREG) 12.5 MG tablet, Take 1 tablet by mouth Every 12 (Twelve) Hours., Disp: 60 tablet, Rfl: 0  •  lisinopril (PRINIVIL,ZESTRIL) 20 MG tablet, Take 1 tablet by mouth Daily., Disp: 30 tablet, Rfl: 0  •  aspirin-acetaminophen-caffeine (EXCEDRIN MIGRAINE) 250-250-65 MG per tablet, Take 1 tablet by mouth Every 6 (Six) Hours As Needed for Headache (migraine)., Disp: , Rfl:      Review of Systems   Constitutional: Negative for chills, fatigue and fever.   Respiratory: Negative for cough, chest tightness and shortness of breath.    Cardiovascular: Negative for chest pain.   Gastrointestinal: Negative for abdominal pain, diarrhea, nausea and vomiting.   Endocrine: Negative for cold intolerance and heat intolerance.   Musculoskeletal: Negative for arthralgias.   Neurological: Negative for dizziness.       Objective   Physical Exam   Constitutional: He is oriented to person, place, and time. He appears well-developed and well-nourished.   HENT:   Head: Normocephalic and atraumatic.   Eyes: Conjunctivae and EOM are normal. Pupils are equal, round, and reactive to light.   Neck: Normal range of motion.   Cardiovascular: Normal rate, regular rhythm and normal heart sounds.    Pulmonary/Chest: Effort normal and breath sounds normal.   Abdominal: Soft. Bowel sounds are normal.   Musculoskeletal: Normal range  "of motion.   Neurological: He is alert and oriented to person, place, and time. He has normal reflexes.   Skin: Skin is warm and dry.   Psychiatric: He has a normal mood and affect. His behavior is normal. Judgment and thought content normal.     Vitals:    02/01/18 1452   BP: 98/52   Pulse: 74   Resp: 18   Temp: 97.5 °F (36.4 °C)   TempSrc: Temporal Artery    SpO2: 98%   Weight: 53.4 kg (117 lb 12.8 oz)   Height: 177.8 cm (70\")         Assessment/Plan   Francisco was seen today for establish care.    Diagnoses and all orders for this visit:    Chronic obstructive pulmonary disease, unspecified COPD type    Chest pain with high risk for cardiac etiology    Tobacco abuse  -     CT Chest Low Dose Wo; Future    Essential hypertension    Hyperlipidemia, unspecified hyperlipidemia type    Screening for colon cancer  -     Ambulatory Referral For Screening Colonoscopy    Need for pneumococcal vaccination  -     Pneumococcal Polysaccharide Vaccine 23-Valent Greater Than or Equal To 3yo Subcutaneous / IM      Pneumovax updated today  Lung CA screening Ct ordered  Screening colonoscopy ordered  No med changes  I advised the patient of the risks in continuing to use tobacco, and I provided this patient with smoking cessation educational materials.  I also discussed how to quit smoking and the patient has expressed the willingness to quit.    During this visit, I spent 3-10 mintues counseling the patient regarding smoking cessation.   RTC soon for PE               "

## 2018-02-02 NOTE — PATIENT INSTRUCTIONS
Steps to Quit Smoking  Smoking tobacco can be harmful to your health and can affect almost every organ in your body. Smoking puts you, and those around you, at risk for developing many serious chronic diseases. Quitting smoking is difficult, but it is one of the best things that you can do for your health. It is never too late to quit.  What are the benefits of quitting smoking?  When you quit smoking, you lower your risk of developing serious diseases and conditions, such as:  · Lung cancer or lung disease, such as COPD.  · Heart disease.  · Stroke.  · Heart attack.  · Infertility.  · Osteoporosis and bone fractures.  Additionally, symptoms such as coughing, wheezing, and shortness of breath may get better when you quit. You may also find that you get sick less often because your body is stronger at fighting off colds and infections. If you are pregnant, quitting smoking can help to reduce your chances of having a baby of low birth weight.  How do I get ready to quit?  When you decide to quit smoking, create a plan to make sure that you are successful. Before you quit:  · Pick a date to quit. Set a date within the next two weeks to give you time to prepare.  · Write down the reasons why you are quitting. Keep this list in places where you will see it often, such as on your bathroom mirror or in your car or wallet.  · Identify the people, places, things, and activities that make you want to smoke (triggers) and avoid them. Make sure to take these actions:  ¨ Throw away all cigarettes at home, at work, and in your car.  ¨ Throw away smoking accessories, such as ashtrays and lighters.  ¨ Clean your car and make sure to empty the ashtray.  ¨ Clean your home, including curtains and carpets.  · Tell your family, friends, and coworkers that you are quitting. Support from your loved ones can make quitting easier.  · Talk with your health care provider about your options for quitting smoking.  · Find out what treatment  options are covered by your health insurance.  What strategies can I use to quit smoking?  Talk with your healthcare provider about different strategies to quit smoking. Some strategies include:  · Quitting smoking altogether instead of gradually lessening how much you smoke over a period of time. Research shows that quitting “cold turkey” is more successful than gradually quitting.  · Attending in-person counseling to help you build problem-solving skills. You are more likely to have success in quitting if you attend several counseling sessions. Even short sessions of 10 minutes can be effective.  · Finding resources and support systems that can help you to quit smoking and remain smoke-free after you quit. These resources are most helpful when you use them often. They can include:  ¨ Online chats with a counselor.  ¨ Telephone quitlines.  ¨ Printed self-help materials.  ¨ Support groups or group counseling.  ¨ Text messaging programs.  ¨ Mobile phone applications.  · Taking medicines to help you quit smoking. (If you are pregnant or breastfeeding, talk with your health care provider first.) Some medicines contain nicotine and some do not. Both types of medicines help with cravings, but the medicines that include nicotine help to relieve withdrawal symptoms. Your health care provider may recommend:  ¨ Nicotine patches, gum, or lozenges.  ¨ Nicotine inhalers or sprays.  ¨ Non-nicotine medicine that is taken by mouth.  Talk with your health care provider about combining strategies, such as taking medicines while you are also receiving in-person counseling. Using these two strategies together makes you more likely to succeed in quitting than if you used either strategy on its own.  If you are pregnant or breastfeeding, talk with your health care provider about finding counseling or other support strategies to quit smoking. Do not take medicine to help you quit smoking unless told to do so by your health care  provider.  What things can I do to make it easier to quit?  Quitting smoking might feel overwhelming at first, but there is a lot that you can do to make it easier. Take these important actions:  · Reach out to your family and friends and ask that they support and encourage you during this time. Call telephone quitlines, reach out to support groups, or work with a counselor for support.  · Ask people who smoke to avoid smoking around you.  · Avoid places that trigger you to smoke, such as bars, parties, or smoke-break areas at work.  · Spend time around people who do not smoke.  · Lessen stress in your life, because stress can be a smoking trigger for some people. To lessen stress, try:  ¨ Exercising regularly.  ¨ Deep-breathing exercises.  ¨ Yoga.  ¨ Meditating.  ¨ Performing a body scan. This involves closing your eyes, scanning your body from head to toe, and noticing which parts of your body are particularly tense. Purposefully relax the muscles in those areas.  · Download or purchase mobile phone or tablet apps (applications) that can help you stick to your quit plan by providing reminders, tips, and encouragement. There are many free apps, such as QuitGuide from the CDC (Centers for Disease Control and Prevention). You can find other support for quitting smoking (smoking cessation) through smokefree.gov and other websites.  How will I feel when I quit smoking?  Within the first 24 hours of quitting smoking, you may start to feel some withdrawal symptoms. These symptoms are usually most noticeable 2-3 days after quitting, but they usually do not last beyond 2-3 weeks. Changes or symptoms that you might experience include:  · Mood swings.  · Restlessness, anxiety, or irritation.  · Difficulty concentrating.  · Dizziness.  · Strong cravings for sugary foods in addition to nicotine.  · Mild weight gain.  · Constipation.  · Nausea.  · Coughing or a sore throat.  · Changes in how your medicines work in your  body.  · A depressed mood.  · Difficulty sleeping (insomnia).  After the first 2-3 weeks of quitting, you may start to notice more positive results, such as:  · Improved sense of smell and taste.  · Decreased coughing and sore throat.  · Slower heart rate.  · Lower blood pressure.  · Clearer skin.  · The ability to breathe more easily.  · Fewer sick days.  Quitting smoking is very challenging for most people. Do not get discouraged if you are not successful the first time. Some people need to make many attempts to quit before they achieve long-term success. Do your best to stick to your quit plan, and talk with your health care provider if you have any questions or concerns.  This information is not intended to replace advice given to you by your health care provider. Make sure you discuss any questions you have with your health care provider.  Document Released: 12/12/2002 Document Revised: 08/15/2017 Document Reviewed: 05/03/2016  [employer] Interactive Patient Education © 2017 [employer]

## 2018-02-12 ENCOUNTER — OFFICE VISIT (OUTPATIENT)
Dept: CARDIOLOGY | Facility: CLINIC | Age: 58
End: 2018-02-12

## 2018-02-12 VITALS
HEIGHT: 70 IN | HEART RATE: 90 BPM | WEIGHT: 115 LBS | BODY MASS INDEX: 16.46 KG/M2 | DIASTOLIC BLOOD PRESSURE: 64 MMHG | OXYGEN SATURATION: 96 % | SYSTOLIC BLOOD PRESSURE: 140 MMHG

## 2018-02-12 DIAGNOSIS — I10 ESSENTIAL HYPERTENSION: ICD-10-CM

## 2018-02-12 DIAGNOSIS — R07.2 PRECORDIAL PAIN: ICD-10-CM

## 2018-02-12 DIAGNOSIS — I42.0 DILATED CARDIOMYOPATHY (HCC): Primary | ICD-10-CM

## 2018-02-12 PROCEDURE — 99213 OFFICE O/P EST LOW 20 MIN: CPT | Performed by: INTERNAL MEDICINE

## 2018-02-12 RX ORDER — ATORVASTATIN CALCIUM 40 MG/1
40 TABLET, FILM COATED ORAL NIGHTLY
Qty: 90 TABLET | Refills: 1 | Status: SHIPPED | OUTPATIENT
Start: 2018-02-12 | End: 2022-10-16

## 2018-02-12 RX ORDER — LISINOPRIL 20 MG/1
20 TABLET ORAL
Qty: 30 TABLET | Refills: 5 | Status: SHIPPED | OUTPATIENT
Start: 2018-02-12 | End: 2022-10-16

## 2018-02-12 RX ORDER — CARVEDILOL 6.25 MG/1
6.25 TABLET ORAL 2 TIMES DAILY
Qty: 180 TABLET | Refills: 3 | Status: SHIPPED | OUTPATIENT
Start: 2018-02-12 | End: 2022-10-16

## 2018-02-12 NOTE — PROGRESS NOTES
"[facility]  Office visit  Francisco Clark  1960  [phone]    VISIT DATE:  02/12/2018    PCP: Rossana Garner, APRN  [address] [place] [id number]    CC:  Chief Complaint   Patient presents with   • Chest Pain   • Shortness of Breath   • Dizziness   • Cardiomyopathy       ASSESSMENT:   Diagnosis Plan   1. Dilated cardiomyopathy     2. Essential hypertension     3. Precordial pain         PLAN:  Exercise myocardial perfusion imaging  Weaning carvedilol back to 6.25 mg by mouth twice a day due to symptoms of fatigue and intermittent symptomatic hypotension, continue lisinopril 20 mg by mouth bedtime  Continue aspirin and statin therapy.  Smoking cessation.    Subjective  57-year-old active smoker who Recently presented with chest discomfort and hypertensive urgency in the setting of influenza.   transthoracic echocardiogram revealed an EF of 45% grade 1 diastolic dysfunction and mild aortic insufficiency.He still having intermittent episodes of mild chest tightness.  Often triggered by exertion.  Continues to smoke less than a pack per day.  Blood pressures were running less than 130/80 mmHg and intermittently less than 100/60 mmHg he had routine follow-up.  He is compliant with medical therapy.  Reports generalized fatigue on current medical therapy.    PHYSICAL EXAMINATION:  Vitals:    02/12/18 1527   BP: 140/64   BP Location: Right arm   Patient Position: Sitting   Pulse: 90   SpO2: 96%   Weight: 52.2 kg (115 lb)   Height: 177.8 cm (70\")     General Appearance:    Alert, cooperative, no distress, appears stated age   Head:    Normocephalic, without obvious abnormality, atraumatic   Eyes:    conjunctiva/corneas clear   Nose:   Nares normal, septum midline, mucosa normal, no drainage   Throat:   Lips, teeth and gums normal   Neck:   Supple, symmetrical, trachea midline, no carotid    bruit or JVD   Lungs:     Clear to auscultation bilaterally, respirations " unlabored   Chest Wall:    No tenderness or deformity    Heart:    Regular rate and rhythm, S1 and S2 normal, no murmur, rub   or gallop, normal carotid impulse bilaterally without bruit.   Abdomen:     Soft, non-tender   Extremities:   Extremities normal, atraumatic, no cyanosis or edema   Pulses:   2+ and symmetric all extremities   Skin:   Skin color, texture, turgor normal, no rashes or lesions       Diagnostic Data:  Procedures  Lab Results   Component Value Date    TRIG 169 (H) 01/07/2018    HDL 43 01/07/2018    LDLDIRECT 105 01/07/2018     Lab Results   Component Value Date    GLUCOSE 112 (H) 01/08/2018    BUN 18 01/08/2018    CREATININE 0.70 01/08/2018     01/08/2018    K 4.1 01/08/2018     01/08/2018    CO2 27.0 01/08/2018     Lab Results   Component Value Date    HGBA1C 6.20 (H) 01/08/2018     Lab Results   Component Value Date    WBC 7.49 01/08/2018    HGB 13.8 01/08/2018    HCT 42.7 01/08/2018     01/08/2018       Allergies  No Known Allergies    Current Medications    Current Outpatient Prescriptions:   •  aspirin 81 MG EC tablet, Take 1 tablet by mouth Daily., Disp: 30 tablet, Rfl: 0  •  aspirin-acetaminophen-caffeine (EXCEDRIN MIGRAINE) 250-250-65 MG per tablet, Take 1 tablet by mouth Every 6 (Six) Hours As Needed for Headache (migraine)., Disp: , Rfl:   •  atorvastatin (LIPITOR) 40 MG tablet, Take 1 tablet by mouth Every Night., Disp: 30 tablet, Rfl: 0  •  carvedilol (COREG) 12.5 MG tablet, Take 1 tablet by mouth Every 12 (Twelve) Hours., Disp: 60 tablet, Rfl: 0  •  lisinopril (PRINIVIL,ZESTRIL) 20 MG tablet, Take 1 tablet by mouth Daily., Disp: 30 tablet, Rfl: 0          ROS  Review of Systems   Cardiovascular: Positive for chest pain and dyspnea on exertion. Negative for irregular heartbeat and palpitations.   Respiratory: Positive for cough and shortness of breath. Negative for sputum production.          SOCIAL HX  Social History     Social History   • Marital status:       Spouse name: N/A   • Number of children: N/A   • Years of education: N/A     Occupational History   • Not on file.     Social History Main Topics   • Smoking status: Current Every Day Smoker     Packs/day: 0.50     Years: 35.00     Types: Cigarettes   • Smokeless tobacco: Never Used      Comment: less than 1/2 ppd since getting sick   • Alcohol use No   • Drug use: No   • Sexual activity: Defer      Comment:      Other Topics Concern   • Not on file     Social History Narrative    Caffeine use: none.    Patient lives at home with his son.          at [facility]        [place]   Family History   Problem Relation Age of Onset   • Emphysema Mother    • Heart disease Father    • Migraines Father    • Heart attack Father    • Aneurysm Sister    • Heart disease Brother      50's   • Migraines Brother    • No Known Problems Maternal Grandmother    • No Known Problems Maternal Grandfather    • No Known Problems Paternal Grandmother    • No Known Problems Paternal Grandfather    • Other Brother      killed in [place]   • No Known Problems Son    • No Known Problems Son    • No Known Problems Daughter              Amador Coreas III, MD, FACC

## 2018-02-21 ENCOUNTER — OFFICE VISIT (OUTPATIENT)
Dept: CARDIOLOGY | Facility: HOSPITAL | Age: 58
End: 2018-02-21

## 2018-02-21 VITALS
BODY MASS INDEX: 17.12 KG/M2 | DIASTOLIC BLOOD PRESSURE: 67 MMHG | TEMPERATURE: 98.4 F | RESPIRATION RATE: 16 BRPM | OXYGEN SATURATION: 95 % | HEIGHT: 70 IN | WEIGHT: 119.6 LBS | SYSTOLIC BLOOD PRESSURE: 112 MMHG | HEART RATE: 80 BPM

## 2018-02-21 DIAGNOSIS — I10 ESSENTIAL HYPERTENSION: ICD-10-CM

## 2018-02-21 DIAGNOSIS — Z72.0 TOBACCO ABUSE: ICD-10-CM

## 2018-02-21 DIAGNOSIS — I51.89 MILD LEFT VENTRICULAR SYSTOLIC DYSFUNCTION: Primary | ICD-10-CM

## 2018-02-21 DIAGNOSIS — J44.9 CHRONIC OBSTRUCTIVE PULMONARY DISEASE, UNSPECIFIED COPD TYPE (HCC): ICD-10-CM

## 2018-02-21 PROCEDURE — 99406 BEHAV CHNG SMOKING 3-10 MIN: CPT | Performed by: NURSE PRACTITIONER

## 2018-02-21 PROCEDURE — 99214 OFFICE O/P EST MOD 30 MIN: CPT | Performed by: NURSE PRACTITIONER

## 2018-02-21 RX ORDER — VARENICLINE TARTRATE 1 MG/1
1 TABLET, FILM COATED ORAL DAILY
Qty: 30 TABLET | Refills: 1 | Status: SHIPPED | OUTPATIENT
Start: 2018-02-21 | End: 2022-10-16

## 2018-02-21 NOTE — PROGRESS NOTES
Encounter Date:02/21/2018      Patient ID: Francisco Clark is a 57 y.o. male.        Subjective:     Chief Complaint: Follow-up cardiomyopathy     History of Present Illness  [facility]. [facility] on cardiomyopathy. He is doing much better. Appetite and fatigue has improved dramatically. Denies shortness of breath, denies chest pain. He has stress test at the end of this month. Unfortunately he is still smoking 1/2 ppd.     Patient Active Problem List   Diagnosis   • Chest pain   • COPD (chronic obstructive pulmonary disease)   • Tobacco abuse   • Essential hypertension   • Precordial pain   • Dilated cardiomyopathy         Past Surgical History:   Procedure Laterality Date   • NO PAST SURGERIES         No Known Allergies      Current Outpatient Prescriptions:   •  aspirin 81 MG EC tablet, Take 1 tablet by mouth Daily., Disp: 30 tablet, Rfl: 0  •  aspirin-acetaminophen-caffeine (EXCEDRIN MIGRAINE) 250-250-65 MG per tablet, Take 1 tablet by mouth Every 6 (Six) Hours As Needed for Headache (migraine)., Disp: , Rfl:   •  atorvastatin (LIPITOR) 40 MG tablet, Take 1 tablet by mouth Every Night., Disp: 90 tablet, Rfl: 1  •  carvedilol (COREG) 6.25 MG tablet, Take 1 tablet by mouth 2 (Two) Times a Day., Disp: 180 tablet, Rfl: 3  •  lisinopril (PRINIVIL,ZESTRIL) 20 MG tablet, Take 1 tablet by mouth every night at bedtime., Disp: 30 tablet, Rfl: 5  •  varenicline (CHANTIX CONTINUING MONTH PAK) 1 MG tablet, Take 1 tablet by mouth Daily., Disp: 30 tablet, Rfl: 1  •  varenicline (CHANTIX STARTING MONTH PAK) 0.5 MG X 11 & 1 MG X 42 tablet, Take 0.5 mg one daily on days 1-3 and and 0.5 mg twice daily on days 4-7.Then 1 mg twice daily for a total of 12 weeks., Disp: 53 tablet, Rfl: 0    The following portions of the chart were reviewed and updated as appropriate: Allergies, current medications, past family history, social history, past medical history.     Review of Systems  "  Constitution: Positive for weakness, malaise/fatigue and weight loss. Negative for chills, decreased appetite, diaphoresis, fever, night sweats and weight gain.   HENT: Negative for congestion, hearing loss, hoarse voice and nosebleeds.    Eyes: Negative for blurred vision, visual disturbance and visual halos.   Cardiovascular: Positive for dyspnea on exertion. Negative for chest pain, claudication, cyanosis, irregular heartbeat, leg swelling, near-syncope, orthopnea, palpitations, paroxysmal nocturnal dyspnea and syncope.   Respiratory: Positive for shortness of breath. Negative for cough, hemoptysis, sleep disturbances due to breathing, snoring, sputum production and wheezing.    Hematologic/Lymphatic: Negative for bleeding problem. Does not bruise/bleed easily.   Skin: Negative for dry skin, itching and rash.   Musculoskeletal: Negative for arthritis, joint pain, joint swelling and myalgias.   Gastrointestinal: Positive for abdominal pain, diarrhea and nausea. Negative for bloating, constipation, flatus, heartburn, hematemesis, hematochezia, melena and vomiting.   Genitourinary: Negative for dysuria, frequency, hematuria, nocturia and urgency.   Neurological: Positive for dizziness. Negative for excessive daytime sleepiness, headaches, light-headedness and loss of balance.   Psychiatric/Behavioral: Negative for depression. The patient does not have insomnia and is not nervous/anxious.            Objective:     Vitals:    02/21/18 1525 02/21/18 1528 02/21/18 1529   BP: 109/59 110/57 112/67   BP Location: Right arm Left arm Left arm   Patient Position: Sitting Sitting Standing   Pulse: 78  80   Resp: 16     Temp: 98.4 °F (36.9 °C)     TempSrc: Temporal Artery      SpO2: 95%     Weight: 54.3 kg (119 lb 9.6 oz)     Height: 177.8 cm (70\")           Physical Exam   Constitutional: He is oriented to person, place, and time. He appears well-developed. No distress.   HENT:   Head: Normocephalic.   Eyes: Conjunctivae are " normal. Pupils are equal, round, and reactive to light.   Neck: Neck supple. No JVD present. No thyromegaly present.   Cardiovascular: Normal rate, regular rhythm, normal heart sounds and intact distal pulses.  Exam reveals no gallop and no friction rub.    No murmur heard.  Pulmonary/Chest: Effort normal. No respiratory distress. He has decreased breath sounds in the right lower field and the left lower field. He has no wheezes. He has no rhonchi. He has no rales. He exhibits no tenderness.   Abdominal: Soft. Bowel sounds are normal.   Musculoskeletal: Normal range of motion. He exhibits no edema.   Neurological: He is alert and oriented to person, place, and time.   Skin: Skin is warm and dry.   Psychiatric: He has a normal mood and affect. His behavior is normal. Thought content normal.   Vitals reviewed.      Lab and Diagnostic Review:         Assessment and Plan:         1. Mild left ventricular systolic dysfunction  - EF 45% in the setting of viral illness.   - Stress test pending  - Euvolemic    2. Essential hypertension  - Controlled. Prone to hypotension. Coreg recently reduced    3. Chronic obstructive pulmonary disease, unspecified COPD type  - No exacerbation  - Tobacco cessation counseling  - CT chest pending to rule out malignancy    4. Tobacco abuse  - Smoking cessation counseling provided for 7 minutes.  Various cessation strategies discussed including nicotine replacement therapy, bupropion, chantix, cognitive behavioral therapy and community resources. Discussed risks of ongoing tobacco abuse including cardiovascular disease, stroke, lung cancer, PVD, high blood pressure and death. He would like to try chantix. Discussed risks vs. Benefits. Provided information and coupon. Prescription sent. Call if any adverse side effects    It has been a pleasure to participate in the care of this patient.  Patient was instructed to call the [facility] with any questions, concerns, or worsening  symptoms. Continue to follow up with Dr. Coreas. [facility] with [facility]      * Please note that portions of this note were completed with a voice recognition program. Efforts were made to edit the dictation but occasionally words are transcribed.

## 2018-02-28 ENCOUNTER — HOSPITAL ENCOUNTER (OUTPATIENT)
Dept: CARDIOLOGY | Facility: HOSPITAL | Age: 58
Discharge: HOME | End: 2018-02-28
Attending: INTERNAL MEDICINE

## 2018-02-28 VITALS — BODY MASS INDEX: 17.04 KG/M2 | HEIGHT: 70 IN | WEIGHT: 119 LBS

## 2018-02-28 LAB
BH CV STRESS BP STAGE 1: NORMAL
BH CV STRESS BP STAGE 2: NORMAL
BH CV STRESS DURATION MIN STAGE 1: 3
BH CV STRESS DURATION MIN STAGE 2: 2
BH CV STRESS DURATION SEC STAGE 1: 0
BH CV STRESS DURATION SEC STAGE 2: 20
BH CV STRESS GRADE STAGE 1: 10
BH CV STRESS GRADE STAGE 2: 12
BH CV STRESS HR STAGE 1: 127
BH CV STRESS HR STAGE 2: 137
BH CV STRESS METS STAGE 1: 5
BH CV STRESS METS STAGE 2: 7.5
BH CV STRESS O2 STAGE 1: 96
BH CV STRESS O2 STAGE 2: 97
BH CV STRESS PROTOCOL 1: NORMAL
BH CV STRESS RECOVERY BP: NORMAL MMHG
BH CV STRESS RECOVERY HR: 75 BPM
BH CV STRESS RECOVERY O2: 97 %
BH CV STRESS SPEED STAGE 1: 1.7
BH CV STRESS SPEED STAGE 2: 2.5
BH CV STRESS STAGE 1: 1
BH CV STRESS STAGE 2: 2
LV EF NUC BP: 38 %
MAXIMAL PREDICTED HEART RATE: 162 BPM
PERCENT MAX PREDICTED HR: 85.8 %
STRESS BASELINE BP: NORMAL MMHG
STRESS BASELINE HR: 58 BPM
STRESS O2 SAT REST: 96 %
STRESS PERCENT HR: 101 %
STRESS POST ESTIMATED WORKLOAD: 6.1 METS
STRESS POST EXERCISE DUR MIN: 5 MIN
STRESS POST EXERCISE DUR SEC: 20 SEC
STRESS POST O2 SAT PEAK: 97 %
STRESS POST PEAK BP: NORMAL MMHG
STRESS POST PEAK HR: 139 BPM
STRESS TARGET HR: 138 BPM

## 2018-02-28 PROCEDURE — 78452 HT MUSCLE IMAGE SPECT MULT: CPT

## 2018-02-28 PROCEDURE — 93018 CV STRESS TEST I&R ONLY: CPT | Performed by: INTERNAL MEDICINE

## 2018-02-28 PROCEDURE — 78452 HT MUSCLE IMAGE SPECT MULT: CPT | Performed by: INTERNAL MEDICINE

## 2018-02-28 PROCEDURE — 0 TECHNETIUM SESTAMIBI: Performed by: INTERNAL MEDICINE

## 2018-02-28 PROCEDURE — 93017 CV STRESS TEST TRACING ONLY: CPT

## 2018-02-28 PROCEDURE — A9500 TC99M SESTAMIBI: HCPCS | Performed by: INTERNAL MEDICINE

## 2018-02-28 RX ADMIN — TECHNETIUM TC 99M SESTAMIBI 1 DOSE: 1 INJECTION INTRAVENOUS at 08:10

## 2018-02-28 RX ADMIN — TECHNETIUM TC 99M SESTAMIBI 1 DOSE: 1 INJECTION INTRAVENOUS at 09:40

## 2018-03-05 ENCOUNTER — OUTSIDE FACILITY SERVICE (OUTPATIENT)
Dept: GASTROENTEROLOGY | Facility: CLINIC | Age: 58
End: 2018-03-05

## 2018-03-05 PROCEDURE — G0121 COLON CA SCRN NOT HI RSK IND: HCPCS | Performed by: INTERNAL MEDICINE

## 2018-03-06 ENCOUNTER — TELEPHONE (OUTPATIENT)
Dept: CARDIOLOGY | Facility: CLINIC | Age: 58
End: 2018-03-06

## 2018-03-06 NOTE — TELEPHONE ENCOUNTER
Patient called returning call from last week about stress test. Told him stress test revealed that heart function is stable. No evidence of blockage. He verbalized understanding.

## 2018-03-08 ENCOUNTER — OFFICE VISIT (OUTPATIENT)
Dept: INTERNAL MEDICINE | Facility: CLINIC | Age: 58
End: 2018-03-08

## 2018-03-08 VITALS
OXYGEN SATURATION: 98 % | HEIGHT: 70 IN | TEMPERATURE: 98 F | BODY MASS INDEX: 17.38 KG/M2 | SYSTOLIC BLOOD PRESSURE: 126 MMHG | WEIGHT: 121.4 LBS | HEART RATE: 56 BPM | DIASTOLIC BLOOD PRESSURE: 74 MMHG | RESPIRATION RATE: 16 BRPM

## 2018-03-08 DIAGNOSIS — J44.9 CHRONIC OBSTRUCTIVE PULMONARY DISEASE, UNSPECIFIED COPD TYPE (HCC): ICD-10-CM

## 2018-03-08 DIAGNOSIS — I42.0 DILATED CARDIOMYOPATHY (HCC): ICD-10-CM

## 2018-03-08 DIAGNOSIS — I10 ESSENTIAL HYPERTENSION: Primary | ICD-10-CM

## 2018-03-08 DIAGNOSIS — Z72.0 TOBACCO ABUSE: ICD-10-CM

## 2018-03-08 PROCEDURE — 99213 OFFICE O/P EST LOW 20 MIN: CPT | Performed by: NURSE PRACTITIONER

## 2018-03-08 NOTE — PROGRESS NOTES
Subjective   Francisco Clark is a 57 y.o. male    Chief Complaint   Patient presents with   • 4 week follow up     Has since seen Cardiology and had Colonoscopy.     History of Present Illness     Francisco presents today for f/u     He was hospitalized in January 2018 for Influenza and was noted to have CP.  He was treated and released with appt's to see Cards.  Since his initial visit with me, he has seen cards and had a NORMAL stress test.  He was started in Coreg and Lisinopril during his hospitalization.  Coreg has been decreased by Dr. Coreas due to c/o fatigue and hypotension. States that he feels much better!    Also had screening colonscopy since last visit     Low dose Ct of the chest was ordered.  He has not hear anything about his appt yet.  He continues to smoke, but has cut down.    Tdap - unknown  Flu shot - declines  Pneumovax - 2018  Colon - 3/5/18  Hep C - never    The following portions of the patient's history were reviewed and updated as appropriate: allergies, current medications, past family history, past medical history, past social history, past surgical history and problem list.    Current Outpatient Prescriptions:   •  aspirin-acetaminophen-caffeine (EXCEDRIN MIGRAINE) 250-250-65 MG per tablet, Take 1 tablet by mouth Every 6 (Six) Hours As Needed for Headache (migraine)., Disp: , Rfl:   •  atorvastatin (LIPITOR) 40 MG tablet, Take 1 tablet by mouth Every Night., Disp: 90 tablet, Rfl: 1  •  carvedilol (COREG) 6.25 MG tablet, Take 1 tablet by mouth 2 (Two) Times a Day., Disp: 180 tablet, Rfl: 3  •  lisinopril (PRINIVIL,ZESTRIL) 20 MG tablet, Take 1 tablet by mouth every night at bedtime., Disp: 30 tablet, Rfl: 5  •  varenicline (CHANTIX CONTINUING MONTH DERRICK) 1 MG tablet, Take 1 tablet by mouth Daily., Disp: 30 tablet, Rfl: 1  •  varenicline (CHANTIX STARTING MONTH DERRICK) 0.5 MG X 11 & 1 MG X 42 tablet, Take 0.5 mg one daily on days 1-3 and and 0.5 mg twice daily on days 4-7.Then 1 mg twice daily  "for a total of 12 weeks., Disp: 53 tablet, Rfl: 0     Review of Systems   Constitutional: Negative for chills, fatigue and fever.   Respiratory: Negative for cough, chest tightness and shortness of breath.    Cardiovascular: Negative for chest pain.   Gastrointestinal: Negative for abdominal pain, diarrhea, nausea and vomiting.   Endocrine: Negative for cold intolerance and heat intolerance.   Musculoskeletal: Negative for arthralgias.   Neurological: Negative for dizziness.       Objective   Physical Exam   Constitutional: He is oriented to person, place, and time. He appears well-developed and well-nourished.   HENT:   Head: Normocephalic and atraumatic.   Eyes: Conjunctivae and EOM are normal. Pupils are equal, round, and reactive to light.   Neck: Normal range of motion.   Cardiovascular: Normal rate, regular rhythm and normal heart sounds.    Pulmonary/Chest: Effort normal and breath sounds normal.   Abdominal: Soft. Bowel sounds are normal.   Musculoskeletal: Normal range of motion.   Neurological: He is alert and oriented to person, place, and time. He has normal reflexes.   Skin: Skin is warm and dry.   Psychiatric: He has a normal mood and affect. His behavior is normal. Judgment and thought content normal.     Vitals:    03/08/18 1418   BP: 126/74   Pulse: 56   Resp: 16   Temp: 98 °F (36.7 °C)   TempSrc: Temporal Artery    SpO2: 98%   Weight: 55.1 kg (121 lb 6.4 oz)   Height: 177.8 cm (70\")         Assessment/Plan   Francisco was seen today for 4 week follow up.    Diagnoses and all orders for this visit:    Essential hypertension    Dilated cardiomyopathy    Chronic obstructive pulmonary disease, unspecified COPD type    Tobacco abuse    No changes today  Referral coordinator is working on the Low Dose CT of the chest  F/U in 3 months for PE or RTC sooner with any problems           "

## 2018-03-13 DIAGNOSIS — F17.210 CIGARETTE SMOKER: Primary | ICD-10-CM

## 2018-03-13 DIAGNOSIS — Z87.891 PERSONAL HISTORY OF SMOKING: ICD-10-CM

## 2018-03-14 ENCOUNTER — HOSPITAL ENCOUNTER (OUTPATIENT)
Dept: CT IMAGING | Facility: HOSPITAL | Age: 58
End: 2018-03-14

## 2018-03-14 ENCOUNTER — APPOINTMENT (OUTPATIENT)
Dept: CT IMAGING | Facility: HOSPITAL | Age: 58
End: 2018-03-14

## 2018-03-16 ENCOUNTER — HOSPITAL ENCOUNTER (OUTPATIENT)
Dept: CT IMAGING | Facility: HOSPITAL | Age: 58
Discharge: HOME | End: 2018-03-16
Admitting: NURSE PRACTITIONER

## 2018-03-16 DIAGNOSIS — Z87.891 PERSONAL HISTORY OF SMOKING: ICD-10-CM

## 2018-03-16 DIAGNOSIS — F17.210 CIGARETTE SMOKER: ICD-10-CM

## 2018-03-16 PROCEDURE — G0297 LDCT FOR LUNG CA SCREEN: HCPCS

## 2018-03-23 ENCOUNTER — TELEPHONE (OUTPATIENT)
Dept: INTERNAL MEDICINE | Facility: CLINIC | Age: 58
End: 2018-03-23

## 2022-10-16 ENCOUNTER — HOSPITAL ENCOUNTER (OUTPATIENT)
Facility: HOSPITAL | Age: 62
Discharge: HOME | End: 2022-10-20
Attending: EMERGENCY MEDICINE | Admitting: INTERNAL MEDICINE

## 2022-10-16 ENCOUNTER — APPOINTMENT (OUTPATIENT)
Dept: CT IMAGING | Facility: HOSPITAL | Age: 62
End: 2022-10-16

## 2022-10-16 ENCOUNTER — APPOINTMENT (OUTPATIENT)
Dept: CARDIOLOGY | Facility: HOSPITAL | Age: 62
End: 2022-10-16

## 2022-10-16 ENCOUNTER — APPOINTMENT (OUTPATIENT)
Dept: GENERAL RADIOLOGY | Facility: HOSPITAL | Age: 62
End: 2022-10-16

## 2022-10-16 DIAGNOSIS — J90 PLEURAL EFFUSION, BILATERAL: ICD-10-CM

## 2022-10-16 DIAGNOSIS — R13.12 OROPHARYNGEAL DYSPHAGIA: ICD-10-CM

## 2022-10-16 DIAGNOSIS — R06.00 DYSPNEA AND RESPIRATORY ABNORMALITIES: ICD-10-CM

## 2022-10-16 DIAGNOSIS — I50.9 CONGESTIVE HEART FAILURE, UNSPECIFIED HF CHRONICITY, UNSPECIFIED HEART FAILURE TYPE: Primary | ICD-10-CM

## 2022-10-16 DIAGNOSIS — J44.9 OBSTRUCTIVE LUNG DISEASE: ICD-10-CM

## 2022-10-16 DIAGNOSIS — R06.89 DYSPNEA AND RESPIRATORY ABNORMALITIES: ICD-10-CM

## 2022-10-16 LAB
ALBUMIN SERPL-MCNC: 3.8 G/DL (ref 3.5–5.2)
ALBUMIN/GLOB SERPL: 1.2 G/DL
ALP SERPL-CCNC: 142 U/L (ref 39–117)
ALT SERPL W P-5'-P-CCNC: 33 U/L (ref 1–41)
ANION GAP SERPL CALCULATED.3IONS-SCNC: 10 MMOL/L (ref 5–15)
AST SERPL-CCNC: 40 U/L (ref 1–40)
BASOPHILS # BLD AUTO: 0.02 10*3/MM3 (ref 0–0.2)
BASOPHILS NFR BLD AUTO: 0.3 % (ref 0–1.5)
BH CV ECHO MEAS - AO MAX PG: 6.6 MMHG
BH CV ECHO MEAS - AO MEAN PG: 2.9 MMHG
BH CV ECHO MEAS - AO ROOT DIAM: 2.8 CM
BH CV ECHO MEAS - AO V2 MAX: 128 CM/SEC
BH CV ECHO MEAS - AO V2 VTI: 21.5 CM
BH CV ECHO MEAS - AVA(I,D): 1.79 CM2
BH CV ECHO MEAS - EDV(CUBED): 247.2 ML
BH CV ECHO MEAS - EDV(MOD-SP2): 99.8 ML
BH CV ECHO MEAS - EDV(MOD-SP4): 106 ML
BH CV ECHO MEAS - EF(MOD-BP): 10.8 %
BH CV ECHO MEAS - EF(MOD-SP2): 6.5 %
BH CV ECHO MEAS - EF(MOD-SP4): 10.8 %
BH CV ECHO MEAS - ESV(CUBED): 197.9 ML
BH CV ECHO MEAS - ESV(MOD-SP2): 93.3 ML
BH CV ECHO MEAS - ESV(MOD-SP4): 94.6 ML
BH CV ECHO MEAS - FS: 7.1 %
BH CV ECHO MEAS - IVS/LVPW: 1.06 CM
BH CV ECHO MEAS - IVSD: 1 CM
BH CV ECHO MEAS - LA DIMENSION: 4.2 CM
BH CV ECHO MEAS - LAT PEAK E' VEL: 5.4 CM/SEC
BH CV ECHO MEAS - LV MASS(C)D: 175.3 GRAMS
BH CV ECHO MEAS - LV MAX PG: 3.9 MMHG
BH CV ECHO MEAS - LV MEAN PG: 1.92 MMHG
BH CV ECHO MEAS - LV V1 MAX: 99 CM/SEC
BH CV ECHO MEAS - LV V1 VTI: 12.8 CM
BH CV ECHO MEAS - LVIDD: 6.3 CM
BH CV ECHO MEAS - LVIDS: 5.8 CM
BH CV ECHO MEAS - LVOT AREA: 3 CM2
BH CV ECHO MEAS - LVOT DIAM: 1.96 CM
BH CV ECHO MEAS - LVPWD: 1 CM
BH CV ECHO MEAS - MED PEAK E' VEL: 4 CM/SEC
BH CV ECHO MEAS - MV A MAX VEL: 77.1 CM/SEC
BH CV ECHO MEAS - MV DEC SLOPE: 1050 CM/SEC2
BH CV ECHO MEAS - MV DEC TIME: 0.13 MSEC
BH CV ECHO MEAS - MV E MAX VEL: 122 CM/SEC
BH CV ECHO MEAS - MV E/A: 1.58
BH CV ECHO MEAS - MV MAX PG: 6.7 MMHG
BH CV ECHO MEAS - MV MEAN PG: 2.31 MMHG
BH CV ECHO MEAS - MV P1/2T: 30.3 MSEC
BH CV ECHO MEAS - MV V2 VTI: 17.7 CM
BH CV ECHO MEAS - MVA(P1/2T): 7.3 CM2
BH CV ECHO MEAS - MVA(VTI): 2.18 CM2
BH CV ECHO MEAS - PA ACC TIME: 0.06 SEC
BH CV ECHO MEAS - PA PR(ACCEL): 52.1 MMHG
BH CV ECHO MEAS - RAP SYSTOLE: 3 MMHG
BH CV ECHO MEAS - RVSP: 39.3 MMHG
BH CV ECHO MEAS - SV(LVOT): 38.5 ML
BH CV ECHO MEAS - SV(MOD-SP2): 6.5 ML
BH CV ECHO MEAS - SV(MOD-SP4): 11.4 ML
BH CV ECHO MEAS - TAPSE (>1.6): 2.38 CM
BH CV ECHO MEAS - TR MAX PG: 36.3 MMHG
BH CV ECHO MEAS - TR MAX VEL: 301.3 CM/SEC
BH CV ECHO MEASUREMENTS AVERAGE E/E' RATIO: 25.96
BH CV XLRA - RV BASE: 3.7 CM
BH CV XLRA - RV LENGTH: 6.1 CM
BH CV XLRA - RV MID: 2.8 CM
BH CV XLRA - TDI S': 11.2 CM/SEC
BILIRUB SERPL-MCNC: 0.3 MG/DL (ref 0–1.2)
BUN SERPL-MCNC: 28 MG/DL (ref 8–23)
BUN/CREAT SERPL: 31.8 (ref 7–25)
CALCIUM SPEC-SCNC: 9.3 MG/DL (ref 8.6–10.5)
CHLORIDE SERPL-SCNC: 101 MMOL/L (ref 98–107)
CO2 SERPL-SCNC: 27 MMOL/L (ref 22–29)
CREAT SERPL-MCNC: 0.88 MG/DL (ref 0.76–1.27)
D DIMER PPP FEU-MCNC: 0.53 MCGFEU/ML (ref 0.01–0.5)
DEPRECATED RDW RBC AUTO: 51.8 FL (ref 37–54)
EGFRCR SERPLBLD CKD-EPI 2021: 97.2 ML/MIN/1.73
EOSINOPHIL # BLD AUTO: 0.01 10*3/MM3 (ref 0–0.4)
EOSINOPHIL NFR BLD AUTO: 0.1 % (ref 0.3–6.2)
ERYTHROCYTE [DISTWIDTH] IN BLOOD BY AUTOMATED COUNT: 18.4 % (ref 12.3–15.4)
FLUAV RNA RESP QL NAA+PROBE: NOT DETECTED
FLUBV RNA RESP QL NAA+PROBE: NOT DETECTED
GLOBULIN UR ELPH-MCNC: 3.2 GM/DL
GLUCOSE SERPL-MCNC: 132 MG/DL (ref 65–99)
HCT VFR BLD AUTO: 44.2 % (ref 37.5–51)
HGB BLD-MCNC: 13.1 G/DL (ref 13–17.7)
HOLD SPECIMEN: NORMAL
IMM GRANULOCYTES # BLD AUTO: 0.03 10*3/MM3 (ref 0–0.05)
IMM GRANULOCYTES NFR BLD AUTO: 0.4 % (ref 0–0.5)
LEFT ATRIUM VOLUME INDEX: 32.7 ML/M2
LIPASE SERPL-CCNC: 34 U/L (ref 13–60)
LV EF 2D ECHO EST: 18 %
LYMPHOCYTES # BLD AUTO: 1.06 10*3/MM3 (ref 0.7–3.1)
LYMPHOCYTES NFR BLD AUTO: 14.8 % (ref 19.6–45.3)
MAXIMAL PREDICTED HEART RATE: 158 BPM
MCH RBC QN AUTO: 24.2 PG (ref 26.6–33)
MCHC RBC AUTO-ENTMCNC: 29.6 G/DL (ref 31.5–35.7)
MCV RBC AUTO: 81.5 FL (ref 79–97)
MONOCYTES # BLD AUTO: 0.8 10*3/MM3 (ref 0.1–0.9)
MONOCYTES NFR BLD AUTO: 11.2 % (ref 5–12)
NEUTROPHILS NFR BLD AUTO: 5.22 10*3/MM3 (ref 1.7–7)
NEUTROPHILS NFR BLD AUTO: 73.2 % (ref 42.7–76)
NRBC BLD AUTO-RTO: 0 /100 WBC (ref 0–0.2)
NT-PROBNP SERPL-MCNC: ABNORMAL PG/ML (ref 0–900)
PLATELET # BLD AUTO: 236 10*3/MM3 (ref 140–450)
PMV BLD AUTO: 9.8 FL (ref 6–12)
POTASSIUM SERPL-SCNC: 4.5 MMOL/L (ref 3.5–5.2)
PROT SERPL-MCNC: 7 G/DL (ref 6–8.5)
QT INTERVAL: 354 MS
QTC INTERVAL: 492 MS
RBC # BLD AUTO: 5.42 10*6/MM3 (ref 4.14–5.8)
SARS-COV-2 RNA RESP QL NAA+PROBE: NOT DETECTED
SODIUM SERPL-SCNC: 138 MMOL/L (ref 136–145)
STRESS TARGET HR: 134 BPM
TROPONIN T SERPL-MCNC: 0.03 NG/ML (ref 0–0.03)
TROPONIN T SERPL-MCNC: 0.04 NG/ML (ref 0–0.03)
TROPONIN T SERPL-MCNC: 0.05 NG/ML (ref 0–0.03)
WBC NRBC COR # BLD: 7.14 10*3/MM3 (ref 3.4–10.8)
WHOLE BLOOD HOLD COAG: NORMAL
WHOLE BLOOD HOLD SPECIMEN: NORMAL

## 2022-10-16 PROCEDURE — 87636 SARSCOV2 & INF A&B AMP PRB: CPT | Performed by: EMERGENCY MEDICINE

## 2022-10-16 PROCEDURE — 93005 ELECTROCARDIOGRAM TRACING: CPT | Performed by: EMERGENCY MEDICINE

## 2022-10-16 PROCEDURE — 96365 THER/PROPH/DIAG IV INF INIT: CPT

## 2022-10-16 PROCEDURE — 84484 ASSAY OF TROPONIN QUANT: CPT | Performed by: HOSPITALIST

## 2022-10-16 PROCEDURE — 93306 TTE W/DOPPLER COMPLETE: CPT

## 2022-10-16 PROCEDURE — 94799 UNLISTED PULMONARY SVC/PX: CPT

## 2022-10-16 PROCEDURE — 93005 ELECTROCARDIOGRAM TRACING: CPT

## 2022-10-16 PROCEDURE — 71250 CT THORAX DX C-: CPT

## 2022-10-16 PROCEDURE — 96375 TX/PRO/DX INJ NEW DRUG ADDON: CPT

## 2022-10-16 PROCEDURE — 85379 FIBRIN DEGRADATION QUANT: CPT | Performed by: INTERNAL MEDICINE

## 2022-10-16 PROCEDURE — 96372 THER/PROPH/DIAG INJ SC/IM: CPT

## 2022-10-16 PROCEDURE — 74176 CT ABD & PELVIS W/O CONTRAST: CPT

## 2022-10-16 PROCEDURE — 25010000002 ENOXAPARIN PER 10 MG: Performed by: INTERNAL MEDICINE

## 2022-10-16 PROCEDURE — 96366 THER/PROPH/DIAG IV INF ADDON: CPT

## 2022-10-16 PROCEDURE — G0378 HOSPITAL OBSERVATION PER HR: HCPCS

## 2022-10-16 PROCEDURE — 71045 X-RAY EXAM CHEST 1 VIEW: CPT

## 2022-10-16 PROCEDURE — 25010000002 FUROSEMIDE PER 20 MG: Performed by: EMERGENCY MEDICINE

## 2022-10-16 PROCEDURE — 80053 COMPREHEN METABOLIC PANEL: CPT | Performed by: EMERGENCY MEDICINE

## 2022-10-16 PROCEDURE — 99285 EMERGENCY DEPT VISIT HI MDM: CPT

## 2022-10-16 PROCEDURE — 94761 N-INVAS EAR/PLS OXIMETRY MLT: CPT

## 2022-10-16 PROCEDURE — 94640 AIRWAY INHALATION TREATMENT: CPT

## 2022-10-16 PROCEDURE — 96376 TX/PRO/DX INJ SAME DRUG ADON: CPT

## 2022-10-16 PROCEDURE — 83880 ASSAY OF NATRIURETIC PEPTIDE: CPT | Performed by: EMERGENCY MEDICINE

## 2022-10-16 PROCEDURE — 93306 TTE W/DOPPLER COMPLETE: CPT | Performed by: INTERNAL MEDICINE

## 2022-10-16 PROCEDURE — 99204 OFFICE O/P NEW MOD 45 MIN: CPT | Performed by: INTERNAL MEDICINE

## 2022-10-16 PROCEDURE — 25010000002 METHYLPREDNISOLONE PER 125 MG: Performed by: HOSPITALIST

## 2022-10-16 PROCEDURE — C9803 HOPD COVID-19 SPEC COLLECT: HCPCS

## 2022-10-16 PROCEDURE — 85025 COMPLETE CBC W/AUTO DIFF WBC: CPT | Performed by: EMERGENCY MEDICINE

## 2022-10-16 PROCEDURE — 99220 PR INITIAL OBSERVATION CARE/DAY 70 MINUTES: CPT | Performed by: HOSPITALIST

## 2022-10-16 PROCEDURE — 83690 ASSAY OF LIPASE: CPT | Performed by: EMERGENCY MEDICINE

## 2022-10-16 PROCEDURE — 84484 ASSAY OF TROPONIN QUANT: CPT | Performed by: EMERGENCY MEDICINE

## 2022-10-16 RX ORDER — FAMOTIDINE 20 MG/1
20 TABLET, FILM COATED ORAL DAILY PRN
COMMUNITY
End: 2022-10-31

## 2022-10-16 RX ORDER — SODIUM CHLORIDE 0.9 % (FLUSH) 0.9 %
10 SYRINGE (ML) INJECTION AS NEEDED
Status: DISCONTINUED | OUTPATIENT
Start: 2022-10-16 | End: 2022-10-20 | Stop reason: HOSPADM

## 2022-10-16 RX ORDER — ACETAMINOPHEN 160 MG/5ML
650 SOLUTION ORAL EVERY 4 HOURS PRN
Status: DISCONTINUED | OUTPATIENT
Start: 2022-10-16 | End: 2022-10-20 | Stop reason: HOSPADM

## 2022-10-16 RX ORDER — CARVEDILOL 6.25 MG/1
6.25 TABLET ORAL 2 TIMES DAILY
Status: DISCONTINUED | OUTPATIENT
Start: 2022-10-16 | End: 2022-10-16

## 2022-10-16 RX ORDER — FUROSEMIDE 10 MG/ML
40 INJECTION INTRAMUSCULAR; INTRAVENOUS ONCE
Status: COMPLETED | OUTPATIENT
Start: 2022-10-16 | End: 2022-10-16

## 2022-10-16 RX ORDER — LISINOPRIL 20 MG/1
20 TABLET ORAL
Status: DISCONTINUED | OUTPATIENT
Start: 2022-10-16 | End: 2022-10-17

## 2022-10-16 RX ORDER — AMOXICILLIN 250 MG
2 CAPSULE ORAL 2 TIMES DAILY
Status: DISCONTINUED | OUTPATIENT
Start: 2022-10-16 | End: 2022-10-20 | Stop reason: HOSPADM

## 2022-10-16 RX ORDER — ENOXAPARIN SODIUM 100 MG/ML
40 INJECTION SUBCUTANEOUS EVERY 24 HOURS
Status: DISCONTINUED | OUTPATIENT
Start: 2022-10-16 | End: 2022-10-20 | Stop reason: HOSPADM

## 2022-10-16 RX ORDER — BISACODYL 5 MG/1
5 TABLET, DELAYED RELEASE ORAL DAILY PRN
Status: DISCONTINUED | OUTPATIENT
Start: 2022-10-16 | End: 2022-10-20 | Stop reason: HOSPADM

## 2022-10-16 RX ORDER — CARVEDILOL 12.5 MG/1
12.5 TABLET ORAL 2 TIMES DAILY
Status: DISCONTINUED | OUTPATIENT
Start: 2022-10-16 | End: 2022-10-19

## 2022-10-16 RX ORDER — METHYLPREDNISOLONE SODIUM SUCCINATE 125 MG/2ML
60 INJECTION, POWDER, LYOPHILIZED, FOR SOLUTION INTRAMUSCULAR; INTRAVENOUS ONCE
Status: COMPLETED | OUTPATIENT
Start: 2022-10-16 | End: 2022-10-16

## 2022-10-16 RX ORDER — PANTOPRAZOLE SODIUM 40 MG/1
40 TABLET, DELAYED RELEASE ORAL
Status: DISCONTINUED | OUTPATIENT
Start: 2022-10-17 | End: 2022-10-17

## 2022-10-16 RX ORDER — BUMETANIDE 0.25 MG/ML
0.5 INJECTION INTRAMUSCULAR; INTRAVENOUS ONCE
Status: COMPLETED | OUTPATIENT
Start: 2022-10-16 | End: 2022-10-16

## 2022-10-16 RX ORDER — IPRATROPIUM BROMIDE AND ALBUTEROL SULFATE 2.5; .5 MG/3ML; MG/3ML
3 SOLUTION RESPIRATORY (INHALATION) EVERY 4 HOURS PRN
Status: DISCONTINUED | OUTPATIENT
Start: 2022-10-16 | End: 2022-10-20 | Stop reason: HOSPADM

## 2022-10-16 RX ORDER — DOXYCYCLINE 100 MG/1
100 CAPSULE ORAL EVERY 12 HOURS SCHEDULED
Status: DISCONTINUED | OUTPATIENT
Start: 2022-10-16 | End: 2022-10-20 | Stop reason: HOSPADM

## 2022-10-16 RX ORDER — ACETAMINOPHEN 650 MG/1
650 SUPPOSITORY RECTAL EVERY 4 HOURS PRN
Status: DISCONTINUED | OUTPATIENT
Start: 2022-10-16 | End: 2022-10-20 | Stop reason: HOSPADM

## 2022-10-16 RX ORDER — POLYETHYLENE GLYCOL 3350 17 G/17G
17 POWDER, FOR SOLUTION ORAL DAILY PRN
Status: DISCONTINUED | OUTPATIENT
Start: 2022-10-16 | End: 2022-10-20 | Stop reason: HOSPADM

## 2022-10-16 RX ORDER — ACETAMINOPHEN 325 MG/1
650 TABLET ORAL EVERY 4 HOURS PRN
Status: DISCONTINUED | OUTPATIENT
Start: 2022-10-16 | End: 2022-10-20 | Stop reason: HOSPADM

## 2022-10-16 RX ORDER — BISACODYL 10 MG
10 SUPPOSITORY, RECTAL RECTAL DAILY PRN
Status: DISCONTINUED | OUTPATIENT
Start: 2022-10-16 | End: 2022-10-20 | Stop reason: HOSPADM

## 2022-10-16 RX ORDER — DILTIAZEM HCL IN NACL,ISO-OSM 125 MG/125
5-15 PLASTIC BAG, INJECTION (ML) INTRAVENOUS CONTINUOUS
Status: DISCONTINUED | OUTPATIENT
Start: 2022-10-16 | End: 2022-10-20 | Stop reason: HOSPADM

## 2022-10-16 RX ORDER — IPRATROPIUM BROMIDE AND ALBUTEROL SULFATE 2.5; .5 MG/3ML; MG/3ML
3 SOLUTION RESPIRATORY (INHALATION)
Status: DISCONTINUED | OUTPATIENT
Start: 2022-10-16 | End: 2022-10-20 | Stop reason: HOSPADM

## 2022-10-16 RX ORDER — SODIUM CHLORIDE 0.9 % (FLUSH) 0.9 %
10 SYRINGE (ML) INJECTION EVERY 12 HOURS SCHEDULED
Status: DISCONTINUED | OUTPATIENT
Start: 2022-10-16 | End: 2022-10-20 | Stop reason: HOSPADM

## 2022-10-16 RX ORDER — ATORVASTATIN CALCIUM 40 MG/1
40 TABLET, FILM COATED ORAL NIGHTLY
Status: DISCONTINUED | OUTPATIENT
Start: 2022-10-16 | End: 2022-10-20 | Stop reason: HOSPADM

## 2022-10-16 RX ADMIN — ENOXAPARIN SODIUM 40 MG: 40 INJECTION SUBCUTANEOUS at 17:46

## 2022-10-16 RX ADMIN — BUMETANIDE 0.5 MG: 0.25 INJECTION, SOLUTION INTRAMUSCULAR; INTRAVENOUS at 17:46

## 2022-10-16 RX ADMIN — FUROSEMIDE 40 MG: 10 INJECTION, SOLUTION INTRAMUSCULAR; INTRAVENOUS at 08:55

## 2022-10-16 RX ADMIN — Medication 10 ML: at 23:08

## 2022-10-16 RX ADMIN — LISINOPRIL 20 MG: 20 TABLET ORAL at 13:53

## 2022-10-16 RX ADMIN — SENNOSIDES AND DOCUSATE SODIUM 2 TABLET: 50; 8.6 TABLET ORAL at 23:06

## 2022-10-16 RX ADMIN — IPRATROPIUM BROMIDE AND ALBUTEROL SULFATE 3 ML: .5; 3 SOLUTION RESPIRATORY (INHALATION) at 12:13

## 2022-10-16 RX ADMIN — IPRATROPIUM BROMIDE AND ALBUTEROL SULFATE 3 ML: .5; 3 SOLUTION RESPIRATORY (INHALATION) at 16:21

## 2022-10-16 RX ADMIN — ATORVASTATIN CALCIUM 40 MG: 40 TABLET, FILM COATED ORAL at 23:06

## 2022-10-16 RX ADMIN — ACETAMINOPHEN 650 MG: 325 TABLET, FILM COATED ORAL at 23:06

## 2022-10-16 RX ADMIN — METHYLPREDNISOLONE SODIUM SUCCINATE 60 MG: 125 INJECTION, POWDER, FOR SOLUTION INTRAMUSCULAR; INTRAVENOUS at 12:55

## 2022-10-16 RX ADMIN — DOXYCYCLINE 100 MG: 100 CAPSULE ORAL at 23:06

## 2022-10-16 RX ADMIN — Medication 5 MG/HR: at 08:55

## 2022-10-16 RX ADMIN — CARVEDILOL 12.5 MG: 12.5 TABLET, FILM COATED ORAL at 23:06

## 2022-10-16 RX ADMIN — IPRATROPIUM BROMIDE AND ALBUTEROL SULFATE 3 ML: .5; 3 SOLUTION RESPIRATORY (INHALATION) at 20:42

## 2022-10-16 RX ADMIN — DOXYCYCLINE 100 MG: 100 CAPSULE ORAL at 12:54

## 2022-10-16 NOTE — H&P
[facility] Medicine Services  HISTORY AND PHYSICAL    Patient Name: Francisco Clark  : 1960  MRN: [id number]  Primary Care Physician: Provider, No Known  Date of admission: 10/16/2022      Subjective   Subjective     Chief Complaint: Dyspnea    HPI:  Francisco Clark is a 61 y.o. male with history of tobacco abuse and probable COPD, CM, and HTN, that awoke with acute shortness of breath. He noted chest tightness with wheezing. He notes palpitations. He has a cough with white sputum. Denies recent f/c/sweats. No chest pain or pressure currently. Found to have tachycardia in the ED and reported intermittent atrial fibrillation.       Review of Systems   Constitutional: Positive for activity change and fatigue.   HENT: Positive for congestion.    Respiratory: Positive for cough, chest tightness, shortness of breath and wheezing.    Cardiovascular: Positive for palpitations.   Gastrointestinal: Positive for abdominal pain and nausea.   Genitourinary: Negative.    Musculoskeletal: Negative.    Neurological: Negative.         All other systems reviewed and are negative.     Personal History     Past Medical History:   Diagnosis Date   • History of stomach ulcers        Past Surgical History:   Procedure Laterality Date   • COLONOSCOPY  2018    Dr. AGNES Olea. Normal. Repeat 1- yrs if wihtout significant family history or 5 years if first degree relative younger than age 60 with high rish polyp or colorectal cancer.    • NO PAST SURGERIES         Family History: Her family history includes Aneurysm in his sister; Emphysema in his mother; Heart attack in his father; Heart disease in his brother and father; Migraines in his brother and father; No Known Problems in his daughter, maternal grandfather, maternal grandmother, paternal grandfather, paternal grandmother, son, and son; Other in his brother. Otherwise pertinent FHx was reviewed and unremarkable.     Social History:   reports that he has been smoking cigarettes. He has a 17.50 pack-year smoking history. He has never used smokeless tobacco. He reports that he does not drink alcohol and does not use drugs.  Social History     Social History Narrative    Caffeine use: none.    Patient lives at home with his son.          at [employer] [employer] [facility] [employer]        Medications:  Available home medication information reviewed.  (Not in a hospital admission)      No Known Allergies    Objective   Objective     Vital Signs:   Temp:  [98.3 °F (36.8 °C)] 98.3 °F (36.8 °C)  Heart Rate:  [] 98  Resp:  [18] 18  BP: (137-159)/(88-99) 137/95  Flow (L/min):  [2] 2       Physical Exam   NAD, alert and oriented  OP clear, MMM  Neck supple  No LAD  Tachy  Decreased at bases, distant BS, no wheezing  B LE edema  No rashes  Normal affect    Result Review:  I have personally reviewed the results from the time of this admission to 10/16/2022 10:58 EDT and agree with these findings:  []  Laboratory list / accordion  []  Microbiology  []  Radiology  []  EKG/Telemetry   []  Cardiology/Vascular   []  Pathology  []  Old records  []  Other:  Most notable findings include: CT reviewed        LAB RESULTS:      Lab 10/16/22  0712   WBC 7.14   HEMOGLOBIN 13.1   HEMATOCRIT 44.2   PLATELETS 236   NEUTROS ABS 5.22   IMMATURE GRANS (ABS) 0.03   LYMPHS ABS 1.06   MONOS ABS 0.80   EOS ABS 0.01   MCV 81.5         Lab 10/16/22  0712   SODIUM 138   POTASSIUM 4.5   CHLORIDE 101   CO2 27.0   ANION GAP 10.0   BUN 28*   CREATININE 0.88   EGFR 97.2   GLUCOSE 132*   CALCIUM 9.3         Lab 10/16/22  0712   TOTAL PROTEIN 7.0   ALBUMIN 3.80   GLOBULIN 3.2   ALT (SGPT) 33   AST (SGOT) 40   BILIRUBIN 0.3   ALK PHOS 142*   LIPASE 34         Lab 10/16/22  0712   PROBNP 13,718.0*   TROPONIN T 0.047*                     Microbiology Results (last 10 days)     Procedure Component Value - Date/Time    COVID PRE-OP / PRE-PROCEDURE SCREENING ORDER (NO ISOLATION) - Swab, Nasopharynx  [[id number]]  (Normal) Collected: 10/16/22 0737    Lab Status: Final result Specimen: Swab from Nasopharynx Updated: 10/16/22 0826    Narrative:      The following orders were created for panel order COVID PRE-OP / PRE-PROCEDURE SCREENING ORDER (NO ISOLATION) - Swab, Nasopharynx.  Procedure                               Abnormality         Status                     ---------                               -----------         ------                     COVID-19 and FLU A/B PCR...[[id number]]  Normal              Final result                 Please view results for these tests on the individual orders.    COVID-19 and FLU A/B PCR - Swab, Nasopharynx [[id number]]  (Normal) Collected: 10/16/22 0737    Lab Status: Final result Specimen: Swab from Nasopharynx Updated: 10/16/22 0826     COVID19 Not Detected     Influenza A PCR Not Detected     Influenza B PCR Not Detected    Narrative:      Fact sheet for providers: https://www.fda.gov/media/297904/download    Fact sheet for patients: https://www.fda.gov/media/790119/download    Test performed by PCR.          CT Abdomen Pelvis Without Contrast    Result Date: 10/16/2022  DATE OF EXAM: 10/16/2022 7:47 AM  PROCEDURE: CT CHEST WO CONTRAST DIAGNOSTIC-, CT ABDOMEN PELVIS WO CONTRAST-  INDICATIONS: cough, copd, hx pna, + sputum  COMPARISON: No comparisons available.  TECHNIQUE: Routine transaxial slices were obtained through the chest, abdomen and pelvis without the administration of intravenous contrast. Reconstructed coronal and sagittal images were also obtained. Automated exposure control and iterative construction methods were used.  The radiation dose reduction device was turned on for each scan per the ALARA (As Low as Reasonably Achievable) protocol.  FINDINGS: CT chest: There is no pathologic axillary adenopathy or other worrisome body wall soft tissue finding in the chest. There are small to moderate right and trace left pleural effusions. There is no pericardial  effusion. There is no pathologic mediastinal adenopathy. Mildly atherosclerotic, nonaneurysmal thoracic aorta. Evaluation of the osseous structures demonstrates no evidence of acute fracture or aggressive osseous lesion. Evaluation of the lung fields demonstrates diffuse moderate to severe centrilobular emphysema. There is evidence of some mild pulmonary edema, with interlobular septal thickening and intervening groundglass opacity present at the lung bases. Mild four-chamber cardiac enlargement is present. There is no evidence of acute infectious process or distinct suspicious pulmonary nodularity.  CT abdomen pelvis: The body wall soft tissues demonstrate no acute finding. The liver, spleen, pancreas and bilateral adrenal glands demonstrate homogeneous attenuation without evidence of suspicious focal lesion, accounting for limited noncontrast exam. Minimal cholelithiasis is present without acute inflammatory signs of cholecystitis. Simple appearing renal cysts are present, with the kidneys otherwise demonstrate no evidence of stones, hydronephrosis or nephropathy. Small and large bowel loops are nondilated, with evaluation limited due to noncontrast technique and paucity of intra-abdominal fat. Diverticulosis changes are noted, without acute inflammatory signs of diverticulitis. There is no free fluid or pneumoperitoneum. Atherosclerotic abdominal aorta. No bulky retroperitoneal lymphadenopathy. The pelvic viscera are normal. There is moderate anasarca.      Impression: There is severe emphysema in addition to bilateral pleural effusions, larger on the right, with evidence of likely cardiogenic pulmonary edema present, most notably at the lung bases. There is otherwise no evidence of pneumonia or suspicious pulmonary nodularity.  Abdominal evaluation is significantly limited due to noncontrast technique and a paucity of intra-abdominal fat. There is no obvious pneumoperitoneum, free fluid, abscess or obstruction.  There is moderate diffuse anasarca. Diverticulosis changes are present, without specific evidence of diverticulitis.  This report was finalized on 10/16/2022 8:44 AM by Franklin Chan.      CT Chest Without Contrast Diagnostic    Result Date: 10/16/2022  DATE OF EXAM: 10/16/2022 7:47 AM  PROCEDURE: CT CHEST WO CONTRAST DIAGNOSTIC-, CT ABDOMEN PELVIS WO CONTRAST-  INDICATIONS: cough, copd, hx pna, + sputum  COMPARISON: No comparisons available.  TECHNIQUE: Routine transaxial slices were obtained through the chest, abdomen and pelvis without the administration of intravenous contrast. Reconstructed coronal and sagittal images were also obtained. Automated exposure control and iterative construction methods were used.  The radiation dose reduction device was turned on for each scan per the ALARA (As Low as Reasonably Achievable) protocol.  FINDINGS: CT chest: There is no pathologic axillary adenopathy or other worrisome body wall soft tissue finding in the chest. There are small to moderate right and trace left pleural effusions. There is no pericardial effusion. There is no pathologic mediastinal adenopathy. Mildly atherosclerotic, nonaneurysmal thoracic aorta. Evaluation of the osseous structures demonstrates no evidence of acute fracture or aggressive osseous lesion. Evaluation of the lung fields demonstrates diffuse moderate to severe centrilobular emphysema. There is evidence of some mild pulmonary edema, with interlobular septal thickening and intervening groundglass opacity present at the lung bases. Mild four-chamber cardiac enlargement is present. There is no evidence of acute infectious process or distinct suspicious pulmonary nodularity.  CT abdomen pelvis: The body wall soft tissues demonstrate no acute finding. The liver, spleen, pancreas and bilateral adrenal glands demonstrate homogeneous attenuation without evidence of suspicious focal lesion, accounting for limited noncontrast exam. Minimal  cholelithiasis is present without acute inflammatory signs of cholecystitis. Simple appearing renal cysts are present, with the kidneys otherwise demonstrate no evidence of stones, hydronephrosis or nephropathy. Small and large bowel loops are nondilated, with evaluation limited due to noncontrast technique and paucity of intra-abdominal fat. Diverticulosis changes are noted, without acute inflammatory signs of diverticulitis. There is no free fluid or pneumoperitoneum. Atherosclerotic abdominal aorta. No bulky retroperitoneal lymphadenopathy. The pelvic viscera are normal. There is moderate anasarca.      Impression: There is severe emphysema in addition to bilateral pleural effusions, larger on the right, with evidence of likely cardiogenic pulmonary edema present, most notably at the lung bases. There is otherwise no evidence of pneumonia or suspicious pulmonary nodularity.  Abdominal evaluation is significantly limited due to noncontrast technique and a paucity of intra-abdominal fat. There is no obvious pneumoperitoneum, free fluid, abscess or obstruction. There is moderate diffuse anasarca. Diverticulosis changes are present, without specific evidence of diverticulitis.  This report was finalized on 10/16/2022 8:44 AM by Franklin Chan.      XR Chest 1 View    Result Date: 10/16/2022  XR CHEST 1 VW-  Date of Exam: 10/16/2022 7:24 AM  Indication: SOA triage protocol.  Comparison:?01/17/2018  Technique:?A single view of the chest was obtained.  FINDINGS:  ?There is mild cardiomegaly.  Pulmonary vessels are within normal limits.  There is emphysematous change of the lungs.  There is left basilar airspace disease which may be due to atelectasis or pneumonia. There is also some hazy right basilar airspace disease which may also be secondary to pneumonia.  There are small bilateral pleural effusions.        Impression:   1.  Small bilateral pleural effusions and mild bibasilar airspace disease which may be  secondary to atelectasis or pneumonia.   This report was finalized on 10/16/2022 7:53 AM by Tin Iverson MD.        Results for orders placed during the hospital encounter of 01/07/18    Adult Transthoracic Echo Complete W/ Cont if Necessary Per Protocol    Interpretation Summary  · Left ventricular systolic function is mildly decreased. Estimated EF = 45%.  · Left ventricular diastolic dysfunction (grade I) consistent with impaired relaxation.  · Mild aortic valve regurgitation is present.  · Trace mitral regurgitation, trace tricuspid regurgitation.      Assessment & Plan   Assessment & Plan     Active Hospital Problems    Diagnosis  POA   • **Congestive heart failure, unspecified HF chronicity, unspecified heart failure type (HCC) [I50.9]  Yes   • Dilated cardiomyopathy (HCC) [I42.0]  Yes   • Chest pain [R07.9]  Yes   • COPD (chronic obstructive pulmonary disease) (HCC) [J44.9]  Yes   • Tobacco abuse [Z72.0]  Yes     Acute hypoxic respiratory failure  A/C CHF  Possible atrial fibrillation  Suspected COPD with AE  -s/p lasix  -cardizem for rate control  -ECHO  -trend troponin levels  -heparin, for now  -doxy/nebs  -steroids x 1  -consult cardiology    GERD  Odynophagia  -notes difficulty swallowing and pain after eating  -unremarkable CT  -PPI  -consult GI for EGD in AM    Hx of tobacco abuse    HTN        DVT prophylaxis:  SCDS      CODE STATUS:  Full/CPR  Code Status and Medical Interventions:   Ordered at: 10/16/22 1054     Code Status (Patient has no pulse and is not breathing):    CPR (Attempt to Resuscitate)     Medical Interventions (Patient has pulse or is breathing):    Full Support         Lito Welch MD  10/16/22

## 2022-10-16 NOTE — CONSULTS
Francisco Clark  [id number]  1960   LOS: 0 days   Patient Care Team:  Provider, No Known as PCP - General    ID: 61-year-old  white male [employer] / from [place], [place] admitted from [facility] ED.    Chief Complaint:  SOB    Problem List:  1. Acute hypoxic respiratory failure in the setting of probable chronic obstructive pulmonary disease with acute exacerbation and chronic tobacco use, October 2022  2. Intermittent tachypalpitation-possible atrial fibrillation - probable multifocal atrial tachycardia  3. Possible hypertensive cardiovascular disease/nonischemic dilated congestive cardiomyopathy  A.  Abnormal echocardiogram with mildly reduced estimated LVEF (0.45) with mild diastolic LV dysfunction and mild AI without pulm arterial hypertension, January 2018  B.  Abnormal Cardiolite GXT with moderate exercise impairment the absence of myocardial perfusion abnormalities/abnormal electrocardiogram with moderate reduction in global systolic left ventricular function (LVEF 0.38), 2018  C.  Residual CCS class II-3 atypical chest pain syndrome/NYHA class III-4 CHF symptoms with abnormal electrocardiogram and borderline elevation of serum troponin, October 2022  4. Odynophagia with apparent 15 pound weight loss over the past year.  5. Remote Covid pneumonia/respiratory failure not requiring hospitalization, March 2021 without subsequent immunizations  6. Chronic tobacco use  7. Hypertension with remote hypertensive urgency, 2018  8. Reduced weight suggestive of suboptimal nutrition  9. Dental caries  10. Remote acceptable colonoscopy (2018) without apparent additional operations  11. Type II prediabetes mellitus (hemoglobin A1c 6.2% January 2018)    No Known Allergies     Medications Prior to Admission   Medication Sig Dispense Refill Last Dose   • aspirin-acetaminophen-caffeine (EXCEDRIN MIGRAINE) 250-250-65 MG per tablet Take 1 tablet by mouth Every 6 (Six) Hours As Needed for  Headache (migraine). OTC   Past Month   • EPINEPHrine 0.125 MG/ACT aerosol Inhale 2 puffs 2 (Two) Times a Day. OTC   10/16/2022   • famotidine (PEPCID) 20 MG tablet Take 1 tablet by mouth 2 (Two) Times a Day As Needed for Heartburn or Indigestion. OTC   Past Week     Scheduled Meds:atorvastatin, 40 mg, Oral, Nightly  carvedilol, 6.25 mg, Oral, BID  doxycycline, 100 mg, Oral, Q12H  ipratropium-albuterol, 3 mL, Nebulization, 4x Daily - RT  lisinopril, 20 mg, Oral, Q24H  [START ON 10/17/2022] pantoprazole, 40 mg, Oral, Q AM  pharmacy consult - MTM, , Does not apply, Daily  senna-docusate sodium, 2 tablet, Oral, BID  sodium chloride, 10 mL, Intravenous, Q12H      Continuous Infusions:dilTIAZem, 5-15 mg/hr, Last Rate: 5 mg/hr (10/16/22 1259)      PRN Meds:.•  acetaminophen **OR** acetaminophen **OR** acetaminophen  •  senna-docusate sodium **AND** polyethylene glycol **AND** bisacodyl **AND** bisacodyl  •  ipratropium-albuterol  •  sodium chloride  •  sodium chloride       History of Present Illness:      Middle aged gentleman who remotely underwent hospitalization and evaluation for increased exertional dyspnea and fatigue in early 2018 with findings of probable moderate nonischemic cardiomyopathy and mild to moderate COPD; he was lost to follow-up and states that he has not had subsequent significant medical evaluation or care over the ensuing many years.  Continues to work as outlined above but has had increasing difficulty with fatigue, weakness, and poor ability to tolerate activity.  He states he develops marked fatigue and weakness as well as acute dyspnea after walking less than 1 block.  Currently admitted to hospital with a several week history of increased orthopnea, PND, audible bronchospasm treated with over-the-counter Primatene Mist, and nonproductive cough with somewhat suboptimal appetite.  He relates less notable tobacco use over the past week.  He denies anginal type chest discomfort, sputum  "production, pleurisy, hemoptysis, or tachypalpitations.  There have been no episodes of presyncope, TIA, or claudication.  The patient lives with a son.  He has had recent pedal edema.  No headache or focal motor-sensory changes.  Patient has not been hospitalized since his evaluation in early 2018.  Usually smokes 0.5 packs/day and has done so the majority of his adult life.    Cardiac risk factors: advanced age (older than 55 for men, 65 for women), dyslipidemia, family history of premature cardiovascular disease, hypertension, male gender, sedentary lifestyle and smoking/ tobacco exposure.    Social History     Socioeconomic History   • Marital status:    Tobacco Use   • Smoking status: Every Day     Packs/day: 0.50     Years: 35.00     Pack years: 17.50     Types: Cigarettes   • Smokeless tobacco: Never   • Tobacco comments:     less than 1/2 ppd since getting sick   Substance and Sexual Activity   • Alcohol use: No   • Drug use: No   • Sexual activity: Defer     Comment:      Family History   Problem Relation Age of Onset   • Emphysema Mother    • Heart disease Father    • Migraines Father    • Heart attack Father    • Aneurysm Sister    • Heart disease Brother         50's   • Migraines Brother    • No Known Problems Maternal Grandmother    • No Known Problems Maternal Grandfather    • No Known Problems Paternal Grandmother    • No Known Problems Paternal Grandfather    • Other Brother         killed in [place]   • No Known Problems Son    • No Known Problems Son    • No Known Problems Daughter        Review of Systems  10 point review of systems was completed, positives outlined in the HPI, and otherwise all other systems are negative.      Objective:       Physical Exam  /93   Pulse 105   Temp 98.3 °F (36.8 °C) (Oral)   Resp 18   Ht 177.8 cm (70\")   Wt 55.1 kg (121 lb 8 oz)   SpO2 96%   BMI 17.43 kg/m²       10/16/22  0704 10/16/22  1341   Weight: 59 kg (130 lb) 55.1 kg (121 lb 8 " oz)     Body mass index is 17.43 kg/m².  No intake or output data in the 24 hours ending 10/16/22 1448    General Appearance:  Alert, cooperative, no distress, appears older than stated age and chronically ill   Head:  Normocephalic, without obvious abnormality, atraumatic   Eyes:  PERRL, conjunctivae/corneas clear, EOM's intact, fundi benign, both eyes   Throat: Lips, mucosa, and tongue normal; teeth and gums normal   Neck: Supple, symmetrical, trachea midline, no adenopathy, thyroid: not enlarged, symmetric, no tenderness/mass/nodules, no carotid bruit or JVD   Lungs:    Diffuse diminished breath sounds with scattered rhonchi and wheezes and bibasilar crackles without egophony or dullness, respirations unlabored on 2 L/min nasal cannula (oximetry 94%)   Heart:  Regular rate and rhythm, S1, S2 normal, grade 1/6 systolic murmur, rub or gallop   Abdomen:   Soft, nontender, no masses, no organomegaly, bowel sounds audible x4   Extremities:  Trace bipedal edema, normal range of motion   Pulses: 2+ and symmetric   Skin: Skin color, texture, turgor normal, no rashes or lesions   Neurologic: Normal     · Cardiographics:    · EKG:    Sinus tachycardia with premature atrial complexes  Biatrial enlargement  Pulmonary disease pattern  Left anterior fascicular block  Left ventricular hypertrophy with QRS widening and repolarization abnormality  Abnormal ECG  When compared with ECG of 23-JAN-2018 15:19,  premature atrial complexes are now present  Vent. rate has increased BY  43 BPM  Questionable change in QRS duration  Confirmed by BECK PRO MD ([id number]) on 10/16/2022 7:14:22 AM    · Imaging:    · Chest x-ray:    FINDINGS:     There is mild cardiomegaly.  Pulmonary vessels are within normal  limits.  There is emphysematous change of the lungs.  There is left  basilar airspace disease which may be due to atelectasis or pneumonia.   There is also some hazy right basilar airspace disease which may also be  secondary to  pneumonia.  There are small bilateral pleural effusions.     IMPRESSION:  Small bilateral pleural effusions and mild bibasilar airspace  disease which may be secondary to atelectasis or pneumonia.    · ABDOMINOPELVIC CT SCAN WO CONTRAST:    IMPRESSION:  There is severe emphysema in addition to bilateral pleural effusions,  larger on the right, with evidence of likely cardiogenic pulmonary edema  present, most notably at the lung bases. There is otherwise no evidence  of pneumonia or suspicious pulmonary nodularity.     Abdominal evaluation is significantly limited due to noncontrast  technique and a paucity of intra-abdominal fat. There is no obvious  pneumoperitoneum, free fluid, abscess or obstruction. There is moderate  diffuse anasarca. Diverticulosis changes are present, without specific  evidence of diverticulitis.    · CHEST CT SCAN WO CONTRAST:    FINDINGS:  CT chest: There is no pathologic axillary adenopathy or other worrisome  body wall soft tissue finding in the chest. There are small to moderate  right and trace left pleural effusions. There is no pericardial  effusion. There is no pathologic mediastinal adenopathy. Mildly  atherosclerotic, nonaneurysmal thoracic aorta. Evaluation of the osseous  structures demonstrates no evidence of acute fracture or aggressive  osseous lesion. Evaluation of the lung fields demonstrates diffuse  moderate to severe centrilobular emphysema. There is evidence of some  mild pulmonary edema, with interlobular septal thickening and  intervening groundglass opacity present at the lung bases. Mild  four-chamber cardiac enlargement is present. There is no evidence of  acute infectious process or distinct suspicious pulmonary nodularity.     CT abdomen pelvis: The body wall soft tissues demonstrate no acute  finding. The liver, spleen, pancreas and bilateral adrenal glands  demonstrate homogeneous attenuation without evidence of suspicious focal  lesion, accounting for limited  noncontrast exam. Minimal cholelithiasis  is present without acute inflammatory signs of cholecystitis. Simple  appearing renal cysts are present, with the kidneys otherwise  demonstrate no evidence of stones, hydronephrosis or nephropathy. Small  and large bowel loops are nondilated, with evaluation limited due to  noncontrast technique and paucity of intra-abdominal fat. Diverticulosis  changes are noted, without acute inflammatory signs of diverticulitis.  There is no free fluid or pneumoperitoneum. Atherosclerotic abdominal  aorta. No bulky retroperitoneal lymphadenopathy. The pelvic viscera are  normal. There is moderate anasarca.     IMPRESSION:  There is severe emphysema in addition to bilateral pleural effusions,  larger on the right, with evidence of likely cardiogenic pulmonary edema  present, most notably at the lung bases. There is otherwise no evidence  of pneumonia or suspicious pulmonary nodularity.     Abdominal evaluation is significantly limited due to noncontrast  technique and a paucity of intra-abdominal fat. There is no obvious  pneumoperitoneum, free fluid, abscess or obstruction. There is moderate  diffuse anasarca. Diverticulosis changes are present, without specific  evidence of diverticulitis.    Lab Review:     Results from last 7 days   Lab Units 10/16/22  0712   SODIUM mmol/L 138   POTASSIUM mmol/L 4.5   CHLORIDE mmol/L 101   CO2 mmol/L 27.0   BUN mg/dL 28*   CREATININE mg/dL 0.88   GLUCOSE mg/dL 132*   CALCIUM mg/dL 9.3     Results from last 7 days   Lab Units 10/16/22  0712   WBC 10*3/mm3 7.14   HEMOGLOBIN g/dL 13.1   HEMATOCRIT % 44.2   PLATELETS 10*3/mm3 236     Results from last 7 days   Lab Units 10/16/22  0712   TROPONIN T ng/mL 0.047*     · ProBNP: 13,718.0  · LIPASE: 34   · DRIP = IV diltiazem 5 mg/hour continuous infusion.     Assessment:      Unfortunate chronically ill gentleman with probable significant chronic obstructive pulmonary disease and associated decompensated  biventricular congestive heart failure related to probable nonischemic cardiomyopathy not treated.  He has been on no guideline directed medical therapy for his congestive heart failure over the past many years.  I doubt acute coronary syndrome and suspect elevated troponin related to decompensated congestive heart failure.  Concur with empiric statin drug therapy, carvedilol, and empiric lisinopril.  He needs DVT prophylaxis.      Plan:     1.  Defer MPS/LHC at this time  2.  Defer full anticoagulation with IV heparin but would recommend DVT prophylaxis with Lovenox 40 mg subcu daily  3.  Add IV Bumex 0.5 mg BID  4.  Reassess GFR, magnesium level, troponin, and electrocardiogram in a.m. as well as FLP and D-dimer  5.  Echocardiogram to assess LV size and function to help plan further appropriate therapy and intervention; if there has been progressive decline in systolic left ventricular function consideration of diagnostic coronary angiography as well as altering lisinopril to Entresto and adding selective aldosterone receptor antagonist as well as possible eventual SGLT2 inhibitor drug therapy  6.  Total abstinence from alcohol and tobacco at this time  7.  Defer IV Diamox therapy at this time as well as metolazone  8.  Consideration of baseline pulmonary function test with assessment of bronchodilator response and DLCO when decongested  9.  Will uptitrate on carvedilol and would wean off IV diltiazem if heart rate less than 100/minute; I am unable to find any electrocardiograms or rhythm strips that document atrial fibrillation at this time and suspect his prominent presentation was multifocal atrial tachycardia  10.  Defer formal antiarrhythmic therapy or NOAC at this time; patient may need outpatient screening event monitor at the time of discharge  11.  Patient may need to consider medical disability or leave from his current occupation

## 2022-10-16 NOTE — ED PROVIDER NOTES
[place]    EMERGENCY DEPARTMENT ENCOUNTER      Pt Name: Francisco Clark  MRN: [id number]  YOB: 1960  Date of evaluation: 10/16/2022  Provider: Ab Ashton DO    CHIEF COMPLAINT       Chief Complaint   Patient presents with   • Shortness of Breath   • Abdominal Pain         HISTORY OF PRESENT ILLNESS  (Location/Symptom, Timing/Onset, Context/Setting, Quality, Duration, Modifying Factors, Severity.)   Francisco Clark is a 61 y.o. male who presents to the emergency department for evaluation of increasing shortness of breath, dyspnea which he notes is been worsening over the last 1 week.  He notes a remote history of COPD, smoking history, has had issues with chronic respiratory illness.  Over the last few years has had recurrent bouts of pneumonia, COVID and since then really has not returned back to his respiratory baseline.  He does now has had a cough and congestion, dyspnea with any type of exertion, orthopnea without any peripheral edema.  No history of heart disease or CHF or tachycardia or dysrhythmia.  He denies any vomiting or diarrhea, states his appetite has been somewhat decreased but no vomiting.  He denies any chest pain, he does not wear any supplemental oxygen at home.  He has been using over-the-counter epinephrine Primatene inhaler, likely more than he should per the patient.  He denies any previous cardiac work-up, no known history of heart failure.  Patient denies any other acute systemic complaints at this time.      Nursing notes were reviewed.    REVIEW OF SYSTEMS    (2-9 systems for level 4, 10 or more for level 5)   ROS:  General:  No fevers, no chills, + weakness  Cardiovascular:  No chest pain, no palpitations  Respiratory:  + shortness of breath, + cough, no wheezing  Gastrointestinal:  No pain, no nausea, no vomiting, no diarrhea  Musculoskeletal:  No muscle pain, no joint pain  Skin:  No rash  Neurologic:  No headache, no unilateral weakness  Psychiatric:  +  Mild anxiety  Genitourinary:  No dysuria, no hematuria    Except as noted above the remainder of the review of systems was reviewed and negative.       PAST MEDICAL HISTORY     Past Medical History:   Diagnosis Date   • History of stomach ulcers          SURGICAL HISTORY       Past Surgical History:   Procedure Laterality Date   • COLONOSCOPY  03/05/2018    Dr. AGNES Olea. Normal. Repeat 1- yrs if wihtout significant family history or 5 years if first degree relative younger than age 60 with high rish polyp or colorectal cancer.    • NO PAST SURGERIES           CURRENT MEDICATIONS       Current Facility-Administered Medications:   •  dilTIAZem (CARDIZEM) 125 mg in 125 mL 0.7% sodium chloride  infusion, 5-15 mg/hr, Intravenous, Continuous, Ab Ashton DO, Last Rate: 5 mL/hr at 10/16/22 0855, 5 mg/hr at 10/16/22 0855  •  sodium chloride 0.9 % flush 10 mL, 10 mL, Intravenous, PRN, Ab Ashton DO    Current Outpatient Medications:   •  aspirin-acetaminophen-caffeine (EXCEDRIN MIGRAINE) 250-250-65 MG per tablet, Take 1 tablet by mouth Every 6 (Six) Hours As Needed for Headache (migraine)., Disp: , Rfl:   •  atorvastatin (LIPITOR) 40 MG tablet, Take 1 tablet by mouth Every Night., Disp: 90 tablet, Rfl: 1  •  carvedilol (COREG) 6.25 MG tablet, Take 1 tablet by mouth 2 (Two) Times a Day., Disp: 180 tablet, Rfl: 3  •  lisinopril (PRINIVIL,ZESTRIL) 20 MG tablet, Take 1 tablet by mouth every night at bedtime., Disp: 30 tablet, Rfl: 5  •  varenicline (CHANTIX CONTINUING MONTH PAK) 1 MG tablet, Take 1 tablet by mouth Daily., Disp: 30 tablet, Rfl: 1  •  varenicline (CHANTIX STARTING MONTH PAK) 0.5 MG X 11 & 1 MG X 42 tablet, Take 0.5 mg one daily on days 1-3 and and 0.5 mg twice daily on days 4-7.Then 1 mg twice daily for a total of 12 weeks., Disp: 53 tablet, Rfl: 0    ALLERGIES     Patient has no known allergies.    FAMILY HISTORY       Family History   Problem Relation Age of Onset   • Emphysema  Mother    • Heart disease Father    • Migraines Father    • Heart attack Father    • Aneurysm Sister    • Heart disease Brother         50's   • Migraines Brother    • No Known Problems Maternal Grandmother    • No Known Problems Maternal Grandfather    • No Known Problems Paternal Grandmother    • No Known Problems Paternal Grandfather    • Other Brother         killed in [place]   • No Known Problems Son    • No Known Problems Son    • No Known Problems Daughter           SOCIAL HISTORY       Social History     Socioeconomic History   • Marital status:    Tobacco Use   • Smoking status: Every Day     Packs/day: 0.50     Years: 35.00     Pack years: 17.50     Types: Cigarettes   • Smokeless tobacco: Never   • Tobacco comments:     less than 1/2 ppd since getting sick   Substance and Sexual Activity   • Alcohol use: No   • Drug use: No   • Sexual activity: Defer     Comment:          PHYSICAL EXAM    (up to 7 for level 4, 8 or more for level 5)     Vitals:    10/16/22 0945 10/16/22 1013 10/16/22 1014 10/16/22 1015   BP: 159/96      BP Location: Right arm      Patient Position: Lying      Pulse: 104 101 92 89   Resp: 18      Temp:       TempSrc:       SpO2: 98% 96% 96% 96%   Weight:       Height:           Physical Exam  General : Patient is awake, alert, oriented, in no acute distress, nontoxic appearing  HEENT: Pupils are equally round, EOMI, conjunctivae clear, there is no injection no icterus.  Oral mucosa is moist, uvula midline  Neck: Neck is supple, full range of motion, trachea midline  Cardiac: Heart regular rate, rhythm, no murmurs, rubs, or gallops  Lungs: Lungs with decreased breath sounds bilaterally, no acute respiratory distress, oxygen saturations 98% on 2 L nasal cannula  Chest wall: There is no tenderness to palpation over the chest wall or over ribs  Abdomen: Abdomen is soft, nontender, nondistended. There are no firm or pulsatile masses, no rebound rigidity or  guarding  Musculoskeletal: Generalized muscle atrophy, no peripheral edema, 5 out of 5 strength in all 4 extremities.  No focal muscle deficits are appreciated  Neuro: Motor intact, sensory intact, level of consciousness is normal  Dermatology: Skin is warm and dry  Psych: Mentation is grossly normal, cognition is grossly normal. Affect is appropriate      DIAGNOSTIC RESULTS     EKG: All EKGs are interpreted by the Emergency Department Physician who either signs or Co-signs this chart in the absence of a cardiologist.    ECG 12 Lead   Final Result   Test Reason : SOA Protocol   Blood Pressure :   */*   mmHG   Vent. Rate : 116 BPM     Atrial Rate : 116 BPM      P-R Int : 136 ms          QRS Dur : 120 ms       QT Int : 354 ms       P-R-T Axes :  86 -63 110 degrees      QTc Int : 492 ms      Sinus tachycardia with premature atrial complexes   Biatrial enlargement   Pulmonary disease pattern   Left anterior fascicular block   Left ventricular hypertrophy with QRS widening and repolarization    abnormality   Abnormal ECG   When compared with ECG of 23-JAN-2018 15:19,   premature atrial complexes are now present   Vent. rate has increased BY  43 BPM   Questionable change in QRS duration   Confirmed by BECK PRO MD ([id number]) on 10/16/2022 7:14:22 AM      Referred By: EDMD           Confirmed By: BECK PRO MD          RADIOLOGY:   Non-plain film images such as CT, Ultrasound and MRI are read by the radiologist. Plain radiographic images are visualized and preliminarily interpreted by the emergency physician with the below findings:      [] Radiologist's Report Reviewed:  CT Chest Without Contrast Diagnostic   Final Result   There is severe emphysema in addition to bilateral pleural effusions,   larger on the right, with evidence of likely cardiogenic pulmonary edema   present, most notably at the lung bases. There is otherwise no evidence   of pneumonia or suspicious pulmonary nodularity.       Abdominal evaluation  is significantly limited due to noncontrast   technique and a paucity of intra-abdominal fat. There is no obvious   pneumoperitoneum, free fluid, abscess or obstruction. There is moderate   diffuse anasarca. Diverticulosis changes are present, without specific   evidence of diverticulitis.       This report was finalized on 10/16/2022 8:44 AM by Franklin Chan.          CT Abdomen Pelvis Without Contrast   Final Result   There is severe emphysema in addition to bilateral pleural effusions,   larger on the right, with evidence of likely cardiogenic pulmonary edema   present, most notably at the lung bases. There is otherwise no evidence   of pneumonia or suspicious pulmonary nodularity.       Abdominal evaluation is significantly limited due to noncontrast   technique and a paucity of intra-abdominal fat. There is no obvious   pneumoperitoneum, free fluid, abscess or obstruction. There is moderate   diffuse anasarca. Diverticulosis changes are present, without specific   evidence of diverticulitis.       This report was finalized on 10/16/2022 8:44 AM by Franklin Chan.          XR Chest 1 View   Final Result           1.  Small bilateral pleural effusions and mild bibasilar airspace   disease which may be secondary to atelectasis or pneumonia.           This report was finalized on 10/16/2022 7:53 AM by Tin Iverson MD.                ED BEDSIDE ULTRASOUND:   Performed by ED Physician - none    LABS:    I have reviewed and interpreted all of the currently available lab results from this visit (if applicable):  Results for orders placed or performed during the hospital encounter of 10/16/22   COVID-19 and FLU A/B PCR - Swab, Nasopharynx    Specimen: Nasopharynx; Swab   Result Value Ref Range    COVID19 Not Detected Not Detected - Ref. Range    Influenza A PCR Not Detected Not Detected    Influenza B PCR Not Detected Not Detected   Comprehensive Metabolic Panel    Specimen: Blood   Result Value Ref Range     Glucose 132 (H) 65 - 99 mg/dL    BUN 28 (H) 8 - 23 mg/dL    Creatinine 0.88 0.76 - 1.27 mg/dL    Sodium 138 136 - 145 mmol/L    Potassium 4.5 3.5 - 5.2 mmol/L    Chloride 101 98 - 107 mmol/L    CO2 27.0 22.0 - 29.0 mmol/L    Calcium 9.3 8.6 - 10.5 mg/dL    Total Protein 7.0 6.0 - 8.5 g/dL    Albumin 3.80 3.50 - 5.20 g/dL    ALT (SGPT) 33 1 - 41 U/L    AST (SGOT) 40 1 - 40 U/L    Alkaline Phosphatase 142 (H) 39 - 117 U/L    Total Bilirubin 0.3 0.0 - 1.2 mg/dL    Globulin 3.2 gm/dL    A/G Ratio 1.2 g/dL    BUN/Creatinine Ratio 31.8 (H) 7.0 - 25.0    Anion Gap 10.0 5.0 - 15.0 mmol/L    eGFR 97.2 >60.0 mL/min/1.73   BNP    Specimen: Blood   Result Value Ref Range    proBNP 13,718.0 (H) 0.0 - 900.0 pg/mL   Troponin    Specimen: Blood   Result Value Ref Range    Troponin T 0.047 (C) 0.000 - 0.030 ng/mL   CBC Auto Differential    Specimen: Blood   Result Value Ref Range    WBC 7.14 3.40 - 10.80 10*3/mm3    RBC 5.42 4.14 - 5.80 10*6/mm3    Hemoglobin 13.1 13.0 - 17.7 g/dL    Hematocrit 44.2 37.5 - 51.0 %    MCV 81.5 79.0 - 97.0 fL    MCH 24.2 (L) 26.6 - 33.0 pg    MCHC 29.6 (L) 31.5 - 35.7 g/dL    RDW 18.4 (H) 12.3 - 15.4 %    RDW-SD 51.8 37.0 - 54.0 fl    MPV 9.8 6.0 - 12.0 fL    Platelets 236 140 - 450 10*3/mm3    Neutrophil % 73.2 42.7 - 76.0 %    Lymphocyte % 14.8 (L) 19.6 - 45.3 %    Monocyte % 11.2 5.0 - 12.0 %    Eosinophil % 0.1 (L) 0.3 - 6.2 %    Basophil % 0.3 0.0 - 1.5 %    Immature Grans % 0.4 0.0 - 0.5 %    Neutrophils, Absolute 5.22 1.70 - 7.00 10*3/mm3    Lymphocytes, Absolute 1.06 0.70 - 3.10 10*3/mm3    Monocytes, Absolute 0.80 0.10 - 0.90 10*3/mm3    Eosinophils, Absolute 0.01 0.00 - 0.40 10*3/mm3    Basophils, Absolute 0.02 0.00 - 0.20 10*3/mm3    Immature Grans, Absolute 0.03 0.00 - 0.05 10*3/mm3    nRBC 0.0 0.0 - 0.2 /100 WBC   Lipase    Specimen: Blood   Result Value Ref Range    Lipase 34 13 - 60 U/L   ECG 12 Lead   Result Value Ref Range    QT Interval 354 ms    QTC Interval 492 ms   Green Top (Gel)    Result Value Ref Range    Extra Tube Hold for add-ons.    Lavender Top   Result Value Ref Range    Extra Tube hold for add-on    Gold Top - SST   Result Value Ref Range    Extra Tube Hold for add-ons.    Light Blue Top   Result Value Ref Range    Extra Tube Hold for add-ons.         All other labs were within normal range or not returned as of this dictation.      EMERGENCY DEPARTMENT COURSE and DIFFERENTIAL DIAGNOSIS/MDM:   Vitals:    Vitals:    10/16/22 0945 10/16/22 1013 10/16/22 1014 10/16/22 1015   BP: 159/96      BP Location: Right arm      Patient Position: Lying      Pulse: 104 101 92 89   Resp: 18      Temp:       TempSrc:       SpO2: 98% 96% 96% 96%   Weight:       Height:                Patient with increasing dyspnea with a history of chronic COPD, emphysema.  Has had increased shortness of breath over the last 1 week.  On arrival he is tachycardic, sinus tach with intermittent PACs, appears to be going in and out of atrial fibrillation as well.  He has been using his Primatene inhaler, he describes likely too much, which could be underlying exacerbating factor.  Denies any peripheral edema, blood pressure 141/99, we did obtain IV, labs, imaging for further evaluation.  He does have elevated BNP, over 13,000, kidney function is normal, white count is 7.1.  X-ray with bilateral pleural effusions, atelectasis, patient without fever, white count normal, less likely pneumonia at this point.  Patient started on diuresis.  We will plan on continuing diuresis and treatment for new onset heart failure, rate control medications initiated.  He does have bilateral pleural effusions on top of his obstructive lung disease.  As this is all new diagnosis we will plan for admission to the hospital for further work-up treatment and evaluation.  Case discussed with hospitalist team for admission, Dr Padron      MEDICATIONS ADMINISTERED IN ED:  Medications   sodium chloride 0.9 % flush 10 mL (has no administration in time  range)   dilTIAZem (CARDIZEM) 125 mg in 125 mL 0.7% sodium chloride  infusion (5 mg/hr Intravenous New Bag 10/16/22 0855)   dilTIAZem (CARDIZEM) bolus from bag 1 mg/mL 10 mg (10 mg Intravenous Bolus from Bag 10/16/22 0900)   furosemide (LASIX) injection 40 mg (40 mg Intravenous Given 10/16/22 0855)       PROCEDURES:  Procedures    CRITICAL CARE TIME    Total Critical Care time was 30 minutes, excluding separately reportable procedures.  New onset cardiopulmonary process with heart failure in conjunction with COPD requiring of medication administrations, continuous monitoring, reevaluations, discussion with consultants. There was a high probability of clinically significant/life threatening deterioration in the patient's condition which required my urgent intervention.      FINAL IMPRESSION      1. Congestive heart failure, unspecified HF chronicity, unspecified heart failure type (HCC)    2. Pleural effusion, bilateral    3. Dyspnea and respiratory abnormalities    4. Obstructive lung disease (HCC)          DISPOSITION/PLAN     ED Disposition     ED Disposition   Decision to Admit    Condition   --    Comment   Level of Care: Telemetry [5]   Diagnosis: Congestive heart failure, unspecified HF chronicity, unspecified heart failure type (HCC) [[id number]]                 Comment: Please note this report has been produced using speech recognition software.      Ab Ashton DO  Attending Emergency Physician               Ab Ashton,   10/16/22 1026       Ab Ashton,   10/16/22 1027

## 2022-10-17 LAB
ALBUMIN SERPL-MCNC: 3.2 G/DL (ref 3.5–5.2)
ALBUMIN/GLOB SERPL: 1.1 G/DL
ALP SERPL-CCNC: 110 U/L (ref 39–117)
ALT SERPL W P-5'-P-CCNC: 25 U/L (ref 1–41)
ANION GAP SERPL CALCULATED.3IONS-SCNC: 9 MMOL/L (ref 5–15)
AST SERPL-CCNC: 26 U/L (ref 1–40)
BILIRUB SERPL-MCNC: 0.2 MG/DL (ref 0–1.2)
BUN SERPL-MCNC: 31 MG/DL (ref 8–23)
BUN/CREAT SERPL: 36.9 (ref 7–25)
CALCIUM SPEC-SCNC: 8.8 MG/DL (ref 8.6–10.5)
CHLORIDE SERPL-SCNC: 103 MMOL/L (ref 98–107)
CHOLEST SERPL-MCNC: 134 MG/DL (ref 0–200)
CO2 SERPL-SCNC: 30 MMOL/L (ref 22–29)
CREAT SERPL-MCNC: 0.84 MG/DL (ref 0.76–1.27)
DEPRECATED RDW RBC AUTO: 52.1 FL (ref 37–54)
EGFRCR SERPLBLD CKD-EPI 2021: 98.6 ML/MIN/1.73
ERYTHROCYTE [DISTWIDTH] IN BLOOD BY AUTOMATED COUNT: 17.4 % (ref 12.3–15.4)
GLOBULIN UR ELPH-MCNC: 2.8 GM/DL
GLUCOSE BLDC GLUCOMTR-MCNC: 130 MG/DL (ref 70–130)
GLUCOSE BLDC GLUCOMTR-MCNC: 158 MG/DL (ref 70–130)
GLUCOSE SERPL-MCNC: 133 MG/DL (ref 65–99)
HCT VFR BLD AUTO: 39.3 % (ref 37.5–51)
HDLC SERPL-MCNC: 46 MG/DL (ref 40–60)
HGB BLD-MCNC: 11.4 G/DL (ref 13–17.7)
LDLC SERPL CALC-MCNC: 75 MG/DL (ref 0–100)
LDLC/HDLC SERPL: 1.64 {RATIO}
MAGNESIUM SERPL-MCNC: 2 MG/DL (ref 1.6–2.4)
MCH RBC QN AUTO: 24.1 PG (ref 26.6–33)
MCHC RBC AUTO-ENTMCNC: 29 G/DL (ref 31.5–35.7)
MCV RBC AUTO: 83.1 FL (ref 79–97)
PHOSPHATE SERPL-MCNC: 4.9 MG/DL (ref 2.5–4.5)
PLATELET # BLD AUTO: 231 10*3/MM3 (ref 140–450)
PMV BLD AUTO: 9.6 FL (ref 6–12)
POTASSIUM SERPL-SCNC: 4.4 MMOL/L (ref 3.5–5.2)
PROT SERPL-MCNC: 6 G/DL (ref 6–8.5)
RBC # BLD AUTO: 4.73 10*6/MM3 (ref 4.14–5.8)
SODIUM SERPL-SCNC: 142 MMOL/L (ref 136–145)
TRIGL SERPL-MCNC: 62 MG/DL (ref 0–150)
TROPONIN T SERPL-MCNC: 0.03 NG/ML (ref 0–0.03)
VLDLC SERPL-MCNC: 13 MG/DL (ref 5–40)
WBC NRBC COR # BLD: 6.89 10*3/MM3 (ref 3.4–10.8)

## 2022-10-17 PROCEDURE — 96366 THER/PROPH/DIAG IV INF ADDON: CPT

## 2022-10-17 PROCEDURE — 93005 ELECTROCARDIOGRAM TRACING: CPT | Performed by: INTERNAL MEDICINE

## 2022-10-17 PROCEDURE — 99226 PR SBSQ OBSERVATION CARE/DAY 35 MINUTES: CPT | Performed by: INTERNAL MEDICINE

## 2022-10-17 PROCEDURE — 94799 UNLISTED PULMONARY SVC/PX: CPT

## 2022-10-17 PROCEDURE — G0378 HOSPITAL OBSERVATION PER HR: HCPCS

## 2022-10-17 PROCEDURE — 25010000002 FUROSEMIDE PER 20 MG: Performed by: INTERNAL MEDICINE

## 2022-10-17 PROCEDURE — 99222 1ST HOSP IP/OBS MODERATE 55: CPT | Performed by: PHYSICIAN ASSISTANT

## 2022-10-17 PROCEDURE — 25010000002 ENOXAPARIN PER 10 MG: Performed by: INTERNAL MEDICINE

## 2022-10-17 PROCEDURE — 85027 COMPLETE CBC AUTOMATED: CPT | Performed by: HOSPITALIST

## 2022-10-17 PROCEDURE — 99214 OFFICE O/P EST MOD 30 MIN: CPT | Performed by: INTERNAL MEDICINE

## 2022-10-17 PROCEDURE — 96376 TX/PRO/DX INJ SAME DRUG ADON: CPT

## 2022-10-17 PROCEDURE — 80061 LIPID PANEL: CPT | Performed by: INTERNAL MEDICINE

## 2022-10-17 PROCEDURE — 80053 COMPREHEN METABOLIC PANEL: CPT | Performed by: HOSPITALIST

## 2022-10-17 PROCEDURE — 83735 ASSAY OF MAGNESIUM: CPT | Performed by: INTERNAL MEDICINE

## 2022-10-17 PROCEDURE — 63710000001 INSULIN LISPRO (HUMAN) PER 5 UNITS: Performed by: INTERNAL MEDICINE

## 2022-10-17 PROCEDURE — 84100 ASSAY OF PHOSPHORUS: CPT | Performed by: INTERNAL MEDICINE

## 2022-10-17 PROCEDURE — 96372 THER/PROPH/DIAG INJ SC/IM: CPT

## 2022-10-17 PROCEDURE — 84484 ASSAY OF TROPONIN QUANT: CPT | Performed by: INTERNAL MEDICINE

## 2022-10-17 PROCEDURE — 82962 GLUCOSE BLOOD TEST: CPT

## 2022-10-17 RX ORDER — BUMETANIDE 1 MG/1
0.5 TABLET ORAL
Status: DISCONTINUED | OUTPATIENT
Start: 2022-10-17 | End: 2022-10-19

## 2022-10-17 RX ORDER — NICOTINE POLACRILEX 4 MG
15 LOZENGE BUCCAL
Status: DISCONTINUED | OUTPATIENT
Start: 2022-10-17 | End: 2022-10-20 | Stop reason: HOSPADM

## 2022-10-17 RX ORDER — INSULIN LISPRO 100 [IU]/ML
0-7 INJECTION, SOLUTION INTRAVENOUS; SUBCUTANEOUS
Status: DISCONTINUED | OUTPATIENT
Start: 2022-10-17 | End: 2022-10-20 | Stop reason: HOSPADM

## 2022-10-17 RX ORDER — SPIRONOLACTONE 25 MG/1
25 TABLET ORAL DAILY
Status: DISCONTINUED | OUTPATIENT
Start: 2022-10-17 | End: 2022-10-19

## 2022-10-17 RX ORDER — DEXTROSE MONOHYDRATE 25 G/50ML
25 INJECTION, SOLUTION INTRAVENOUS
Status: DISCONTINUED | OUTPATIENT
Start: 2022-10-17 | End: 2022-10-20 | Stop reason: HOSPADM

## 2022-10-17 RX ORDER — FUROSEMIDE 10 MG/ML
40 INJECTION INTRAMUSCULAR; INTRAVENOUS ONCE
Status: COMPLETED | OUTPATIENT
Start: 2022-10-17 | End: 2022-10-17

## 2022-10-17 RX ORDER — PANTOPRAZOLE SODIUM 40 MG/1
40 TABLET, DELAYED RELEASE ORAL
Status: DISCONTINUED | OUTPATIENT
Start: 2022-10-17 | End: 2022-10-20 | Stop reason: HOSPADM

## 2022-10-17 RX ADMIN — BUMETANIDE 0.5 MG: 1 TABLET ORAL at 17:27

## 2022-10-17 RX ADMIN — FUROSEMIDE 40 MG: 10 INJECTION, SOLUTION INTRAMUSCULAR; INTRAVENOUS at 09:49

## 2022-10-17 RX ADMIN — ENOXAPARIN SODIUM 40 MG: 40 INJECTION SUBCUTANEOUS at 17:26

## 2022-10-17 RX ADMIN — Medication 10 ML: at 08:49

## 2022-10-17 RX ADMIN — SPIRONOLACTONE 25 MG: 25 TABLET ORAL at 11:21

## 2022-10-17 RX ADMIN — Medication 10 ML: at 22:12

## 2022-10-17 RX ADMIN — DOXYCYCLINE 100 MG: 100 CAPSULE ORAL at 22:12

## 2022-10-17 RX ADMIN — IPRATROPIUM BROMIDE AND ALBUTEROL SULFATE 3 ML: .5; 3 SOLUTION RESPIRATORY (INHALATION) at 19:59

## 2022-10-17 RX ADMIN — CARVEDILOL 12.5 MG: 12.5 TABLET, FILM COATED ORAL at 08:47

## 2022-10-17 RX ADMIN — DOXYCYCLINE 100 MG: 100 CAPSULE ORAL at 08:47

## 2022-10-17 RX ADMIN — EMPAGLIFLOZIN 10 MG: 10 TABLET, FILM COATED ORAL at 12:38

## 2022-10-17 RX ADMIN — SENNOSIDES AND DOCUSATE SODIUM 2 TABLET: 50; 8.6 TABLET ORAL at 22:12

## 2022-10-17 RX ADMIN — CARVEDILOL 12.5 MG: 12.5 TABLET, FILM COATED ORAL at 22:12

## 2022-10-17 RX ADMIN — LISINOPRIL 20 MG: 20 TABLET ORAL at 08:48

## 2022-10-17 RX ADMIN — PANTOPRAZOLE SODIUM 40 MG: 40 TABLET, DELAYED RELEASE ORAL at 17:27

## 2022-10-17 RX ADMIN — INSULIN LISPRO 2 UNITS: 100 INJECTION, SOLUTION INTRAVENOUS; SUBCUTANEOUS at 11:21

## 2022-10-17 RX ADMIN — IPRATROPIUM BROMIDE AND ALBUTEROL SULFATE 3 ML: .5; 3 SOLUTION RESPIRATORY (INHALATION) at 14:41

## 2022-10-17 RX ADMIN — ATORVASTATIN CALCIUM 40 MG: 40 TABLET, FILM COATED ORAL at 22:12

## 2022-10-17 RX ADMIN — BUMETANIDE 0.5 MG: 1 TABLET ORAL at 11:21

## 2022-10-17 NOTE — PLAN OF CARE
Problem: Adult Inpatient Plan of Care  Goal: Plan of Care Review  Outcome: Ongoing, Progressing  Flowsheets (Taken 10/17/2022 1313)  Progress: no change  Goal: Patient-Specific Goal (Individualized)  Outcome: Ongoing, Progressing  Goal: Absence of Hospital-Acquired Illness or Injury  Outcome: Ongoing, Progressing  Intervention: Identify and Manage Fall Risk  Recent Flowsheet Documentation  Taken 10/17/2022 1040 by Belén Jean RN  Safety Promotion/Fall Prevention: safety round/check completed  Taken 10/17/2022 0855 by Belén Jean RN  Safety Promotion/Fall Prevention:   assistive device/personal items within reach   clutter free environment maintained   safety round/check completed  Intervention: Prevent Skin Injury  Recent Flowsheet Documentation  Taken 10/17/2022 0855 by Belén Jean RN  Skin Protection: adhesive use limited  Intervention: Prevent and Manage VTE (Venous Thromboembolism) Risk  Recent Flowsheet Documentation  Taken 10/17/2022 0855 by Belén Jean RN  VTE Prevention/Management: sequential compression devices off  Intervention: Prevent Infection  Recent Flowsheet Documentation  Taken 10/17/2022 1040 by Belén Jean RN  Infection Prevention: hand hygiene promoted  Taken 10/17/2022 0855 by Belén Jean RN  Infection Prevention: hand hygiene promoted  Goal: Optimal Comfort and Wellbeing  Outcome: Ongoing, Progressing  Intervention: Provide Person-Centered Care  Recent Flowsheet Documentation  Taken 10/17/2022 0855 by Belén Jean RN  Trust Relationship/Rapport: care explained  Goal: Readiness for Transition of Care  Outcome: Ongoing, Progressing     Problem: Heart Failure Comorbidity  Goal: Maintenance of Heart Failure Symptom Control  Outcome: Ongoing, Progressing  Intervention: Maintain Heart Failure-Management  Recent Flowsheet Documentation  Taken 10/17/2022 0855 by Belén Jean RN  Medication Review/Management: medications reviewed     Problem:  Adjustment to Illness (Heart Failure)  Goal: Optimal Coping  Outcome: Ongoing, Progressing  Intervention: Support Psychosocial Response  Recent Flowsheet Documentation  Taken 10/17/2022 0855 by Belén Jean RN  Supportive Measures: active listening utilized  Family/Support System Care: support provided     Problem: Cardiac Output Decreased (Heart Failure)  Goal: Optimal Cardiac Output  Outcome: Ongoing, Progressing  Intervention: Optimize Cardiac Output  Recent Flowsheet Documentation  Taken 10/17/2022 0855 by Belén Jean RN  Environmental Support: calm environment promoted     Problem: Dysrhythmia (Heart Failure)  Goal: Stable Heart Rate and Rhythm  Outcome: Ongoing, Progressing     Problem: Fluid Imbalance (Heart Failure)  Goal: Fluid Balance  Outcome: Ongoing, Progressing     Problem: Functional Ability Impaired (Heart Failure)  Goal: Optimal Functional Ability  Outcome: Ongoing, Progressing     Problem: Oral Intake Inadequate (Heart Failure)  Goal: Optimal Nutrition Intake  Outcome: Ongoing, Progressing     Problem: Respiratory Compromise (Heart Failure)  Goal: Effective Oxygenation and Ventilation  Outcome: Ongoing, Progressing  Intervention: Promote Airway Secretion Clearance  Recent Flowsheet Documentation  Taken 10/17/2022 0855 by Belén Jean RN  Breathing Techniques/Airway Clearance: deep/controlled cough encouraged  Cough And Deep Breathing: done independently per patient  Intervention: Optimize Oxygenation and Ventilation  Recent Flowsheet Documentation  Taken 10/17/2022 0855 by Belén Jean RN  Airway/Ventilation Management: airway patency maintained     Problem: Sleep Disordered Breathing (Heart Failure)  Goal: Effective Breathing Pattern During Sleep  Outcome: Ongoing, Progressing  Intervention: Monitor and Manage Obstructive Sleep Apnea  Recent Flowsheet Documentation  Taken 10/17/2022 0855 by Belén Jean RN  NPPV/CPAP Maintenance: tubes secured   Goal Outcome  Evaluation:      Pt rested throughout shift. No complaints of pain. Pt remains in SR and 3L O2. No O2 at home. Denies chest pain. Cardizem gtt stopped last night. GI consulted. NPO tonight for possible heart cath tomorrow 10/18. Pt complained of SOA, see MAR. Will continue to observe.      Progress: no change

## 2022-10-17 NOTE — CONSULTS
[facility]    Referring Provider:  Cleve Giang MD   PCP: Provider, No Known    Reason for Consultation: Dysphagia and GERD     Chief complaint: Shortness of breath     History of present illness:    Francisco Clark is a 61 y.o. male who is admitted with acute decompensated systolic heart failure (EF of 15%) and acute hypoxia secondary to pulmonary edema.  He is being diuresed with IV Lasix.  GI is consulted for concerns of dysphagia.   The patient describes intermittent dysphagia to solids and liquids for one year.  He states his symptoms occur approximately once per month and occur sporadically where he feels he gets choked on food or water.   He additionally has post prandial epigastric pain described as burning.   He takes excessive OTC aspirin for headaches.   He has lost 15 lbs in the last year unintentionally.  He has never had an EGD.      Allergies:  Patient has no known allergies.    Scheduled Meds:  atorvastatin, 40 mg, Oral, Nightly  bumetanide, 0.5 mg, Oral, BID  carvedilol, 12.5 mg, Oral, BID  doxycycline, 100 mg, Oral, Q12H  empagliflozin, 10 mg, Oral, Daily  enoxaparin, 40 mg, Subcutaneous, Q24H  insulin lispro, 0-7 Units, Subcutaneous, TID AC  ipratropium-albuterol, 3 mL, Nebulization, 4x Daily - RT  pantoprazole, 40 mg, Oral, Q AM  pharmacy consult - [facility], , Does not apply, Daily  [START ON 10/18/2022] sacubitril-valsartan, 1 tablet, Oral, Q12H  senna-docusate sodium, 2 tablet, Oral, BID  sodium chloride, 10 mL, Intravenous, Q12H  spironolactone, 25 mg, Oral, Daily         Infusions:  dilTIAZem, 5-15 mg/hr, Last Rate: Stopped (10/17/22 0107)        PRN Meds:  •  acetaminophen **OR** acetaminophen **OR** acetaminophen  •  senna-docusate sodium **AND** polyethylene glycol **AND** bisacodyl **AND** bisacodyl  •  dextrose  •  dextrose  •  glucagon (human recombinant)  •  ipratropium-albuterol  •  sodium chloride  •  sodium chloride    Home Meds:  Medications Prior to Admission    Medication Sig Dispense Refill Last Dose   • aspirin-acetaminophen-caffeine (EXCEDRIN MIGRAINE) 250-250-65 MG per tablet Take 1 tablet by mouth Every 6 (Six) Hours As Needed for Headache (migraine). OTC   Past Month   • EPINEPHrine 0.125 MG/ACT aerosol Inhale 2 puffs 2 (Two) Times a Day. OTC   10/16/2022   • famotidine (PEPCID) 20 MG tablet Take 1 tablet by mouth 2 (Two) Times a Day As Needed for Heartburn or Indigestion. OTC   Past Week       ROS: Review of Systems   Constitutional: Positive for fatigue and unexpected weight change.   HENT: Positive for trouble swallowing.    Eyes: Negative.    Respiratory: Positive for choking and shortness of breath.    Cardiovascular: Negative.    Gastrointestinal: Positive for abdominal pain.   Endocrine: Negative.    Genitourinary: Negative.    Musculoskeletal: Negative.    Skin: Negative.    Allergic/Immunologic: Negative.    Neurological: Positive for weakness and headaches.   Hematological: Negative.    Psychiatric/Behavioral: Negative.        PAST MED HX:  Past Medical History:   Diagnosis Date   • History of stomach ulcers        PAST SURG HX:  Past Surgical History:   Procedure Laterality Date   • COLONOSCOPY  03/05/2018    Dr. AGNES Olea. Normal. Repeat 1- yrs if wihtout significant family history or 5 years if first degree relative younger than age 60 with high rish polyp or colorectal cancer.    • NO PAST SURGERIES         FAM HX:  Family History   Problem Relation Age of Onset   • Emphysema Mother    • Heart disease Father    • Migraines Father    • Heart attack Father    • Aneurysm Sister    • Heart disease Brother         50's   • Migraines Brother    • No Known Problems Maternal Grandmother    • No Known Problems Maternal Grandfather    • No Known Problems Paternal Grandmother    • No Known Problems Paternal Grandfather    • Other Brother         killed in [place]   • No Known Problems Son    • No Known Problems Son    • No Known Problems Daughter        SOC  "HX:  Social History     Socioeconomic History   • Marital status:    Tobacco Use   • Smoking status: Every Day     Packs/day: 0.50     Years: 35.00     Pack years: 17.50     Types: Cigarettes   • Smokeless tobacco: Never   • Tobacco comments:     less than 1/2 ppd since getting sick   Substance and Sexual Activity   • Alcohol use: No   • Drug use: No   • Sexual activity: Defer     Comment:        PHYSICAL EXAM  /72   Pulse 82   Temp 98.8 °F (37.1 °C) (Oral)   Resp 18   Ht 177.8 cm (70\")   Wt 55.9 kg (123 lb 3.2 oz)   SpO2 91%   BMI 17.68 kg/m²   Wt Readings from Last 3 Encounters:   10/17/22 55.9 kg (123 lb 3.2 oz)   03/08/18 55.1 kg (121 lb 6.4 oz)   02/28/18 54 kg (119 lb)   ,body mass index is 17.68 kg/m².  Physical Exam  Constitutional:       General: He is not in acute distress.     Appearance: He is ill-appearing.      Comments: Thin, underweight appearing male.   He appears chronically ill and older than his stated age.   Pleasant to speak with    HENT:      Head: Normocephalic and atraumatic.      Mouth/Throat:      Mouth: Mucous membranes are moist.   Eyes:      General: No scleral icterus.  Cardiovascular:      Rate and Rhythm: Normal rate and regular rhythm.   Pulmonary:      Effort: Pulmonary effort is normal. No respiratory distress.   Abdominal:      General: Bowel sounds are normal. There is no distension.      Palpations: Abdomen is soft.      Tenderness: There is no abdominal tenderness.   Musculoskeletal:      Right lower leg: No edema.      Left lower leg: No edema.   Skin:     General: Skin is warm and dry.   Neurological:      Mental Status: He is alert and oriented to person, place, and time.   Psychiatric:         Behavior: Behavior normal.       Results Review:   I reviewed the patient's new clinical results.    Lab Results   Component Value Date    WBC 6.89 10/17/2022    HGB 11.4 (L) 10/17/2022    HGB 13.1 10/16/2022    HGB 13.8 01/08/2018    HCT 39.3 10/17/2022 "    MCV 83.1 10/17/2022     10/17/2022     No results found for: INR    Lab Results   Component Value Date    GLUCOSE 133 (H) 10/17/2022    BUN 31 (H) 10/17/2022    CREATININE 0.84 10/17/2022    EGFRIFNONA 116 01/08/2018    BCR 36.9 (H) 10/17/2022     10/17/2022    K 4.4 10/17/2022    CO2 30.0 (H) 10/17/2022    CALCIUM 8.8 10/17/2022    ALBUMIN 3.20 (L) 10/17/2022    ALKPHOS 110 10/17/2022    BILITOT 0.2 10/17/2022    ALT 25 10/17/2022    AST 26 10/17/2022     CT Abdomen/Pelvis and Chest (as interpreted by radiologist)    FINDINGS:  CT chest: There is no pathologic axillary adenopathy or other worrisome  body wall soft tissue finding in the chest. There are small to moderate  right and trace left pleural effusions. There is no pericardial  effusion. There is no pathologic mediastinal adenopathy. Mildly  atherosclerotic, nonaneurysmal thoracic aorta. Evaluation of the osseous  structures demonstrates no evidence of acute fracture or aggressive  osseous lesion. Evaluation of the lung fields demonstrates diffuse  moderate to severe centrilobular emphysema. There is evidence of some  mild pulmonary edema, with interlobular septal thickening and  intervening groundglass opacity present at the lung bases. Mild  four-chamber cardiac enlargement is present. There is no evidence of  acute infectious process or distinct suspicious pulmonary nodularity.   CT abdomen pelvis: The body wall soft tissues demonstrate no acute  finding. The liver, spleen, pancreas and bilateral adrenal glands  demonstrate homogeneous attenuation without evidence of suspicious focal  lesion, accounting for limited noncontrast exam. Minimal cholelithiasis  is present without acute inflammatory signs of cholecystitis. Simple  appearing renal cysts are present, with the kidneys otherwise  demonstrate no evidence of stones, hydronephrosis or nephropathy. Small  and large bowel loops are nondilated, with evaluation limited due to  noncontrast  technique and paucity of intra-abdominal fat. Diverticulosis  changes are noted, without acute inflammatory signs of diverticulitis.  There is no free fluid or pneumoperitoneum. Atherosclerotic abdominal  aorta. No bulky retroperitoneal lymphadenopathy. The pelvic viscera are  normal. There is moderate anasarca.   IMPRESSION:  There is severe emphysema in addition to bilateral pleural effusions,  larger on the right, with evidence of likely cardiogenic pulmonary edema  present, most notably at the lung bases. There is otherwise no evidence  of pneumonia or suspicious pulmonary nodularity.   Abdominal evaluation is significantly limited due to noncontrast  technique and a paucity of intra-abdominal fat. There is no obvious  pneumoperitoneum, free fluid, abscess or obstruction. There is moderate  diffuse anasarca. Diverticulosis changes are present, without specific  evidence of diverticulitis.     ASSESSMENTS/PLANS    1. Dysphagia to solids and liquids, intermittent.   2. GERD  3. Epigastric pain, risk for PUD on heavy OTC  aspirin use    4. Acute decompensated systolic heart failure, ejection fraction of 15%  5. Acute hypoxia secondary to pulmonary edema   6. Tobacco abuse, quit  1 week ago.    7. Unintentional weight loss    Patient with intermittent dysphagia to solids and liquids as well as GERD and postprandial epigastric pain.  He has concerning signs of unintentional weight loss (15 lbs).  I personally reviewed his CT which shows mild thickening of the distal esophagus but malignancy is not seen.   He is high risk for upper endoscopy at this time with his acute systolic heart failure with an EF of 15%.       >> Recommend trial of BID PPI   >> Obtain esophagram   >> Outpatient follow up with [facility] in 6 weeks to consider outpatient EGD     I discussed the patient's findings and my recommendations with patient    ELENA Mead  10/17/22  12:31 EDT

## 2022-10-17 NOTE — CASE MANAGEMENT/SOCIAL WORK
Discharge Planning Assessment  [facility]     Patient Name: Francisco Clark  MRN: [id number]  Today's Date: 10/17/2022    Admit Date: 10/16/2022    Plan: discharge plan   Discharge Needs Assessment     Row Name 10/17/22 1118       Living Environment    People in Home child(jose martin), adult    Name(s) of People in Home Maximiliano Clark    Current Living Arrangements apartment    Primary Care Provided by self    Provides Primary Care For no one    Family Caregiver if Needed child(jose martin), adult    Family Caregiver Names Alley Webb(daughter) or Maximiliano Clark(son)    Quality of Family Relationships helpful;supportive    Able to Return to Prior Arrangements yes    Living Arrangement Comments I spoke with pt in room with permission regarding discharge plan. Pt resides in [place] with Maximiliano kumar       Transition Planning    Patient/Family Anticipates Transition to home with family    Patient/Family Anticipated Services at Transition     Transportation Anticipated family or friend will provide       Discharge Needs Assessment    Readmission Within the Last 30 Days no previous admission in last 30 days    Equipment Currently Used at Home none    Concerns to be Addressed discharge planning    Anticipated Changes Related to Illness other (see comments)  TBD    Equipment Needed After Discharge oxygen;respiratory supplies;other (see comments)  TBD, possibly home O2 has no preference to DME company    Discharge Coordination/Progress Pt  has "[employer] with prescription coverage and uses [facility] in [place]. Pt reports he is independent with ADLS and currently uses no DME. Pt came in SOB and diagnosis of CHR unspecified. Pt has cards and GI consult. Per discussion in MDR, pt may need life vest prior to discharge. Pt reports he is independent with ADLs and uses no DME. Pt currently on 2 L O2 and does not uses home O2. If home O2 needed at discharge, pt has no preference to a particular DME company. Plan  is home at discharge and requesting a new PCP appointment w/in [facility].  CM will cont to follow.               Discharge Plan     Row Name 10/17/22 1128       Plan    Plan discharge plan    Plan Comments Pt here with diagnosis of CHF and  currently on 2 L O2 and does not uses home O2. If home O2 needed at discharge, pt has no preference to a particular DME company. Plan is home at discharge and requesting a new PCP appointment w/in [facility].  CM will cont to follow.    Final Discharge Disposition Code 01 - home or self-care              Continued Care and Services - Admitted Since 10/16/2022    Coordination has not been started for this encounter.       Expected Discharge Date and Time     Expected Discharge Date Expected Discharge Time    Oct 19, 2022          Demographic Summary     Row Name 10/17/22 1115       General Information    General Information Comments Pt does not have a PCP and would like an appointment made prior to discharge within the [facility]       Contact Information    Permission Granted to Share Info With     Contact Information Obtained for     Contact Information Comments Alley Webb(daughter) [phone] or Juan C Clark(son) [phone]               Functional Status    No documentation.                Psychosocial    No documentation.                Abuse/Neglect    No documentation.                Legal    No documentation.                Substance Abuse    No documentation.                Patient Forms    No documentation.                   Julia Box RN

## 2022-10-17 NOTE — PROGRESS NOTES
Malnutrition Severity Assessment    Patient Name:  Francisco Clark  YOB: 1960  MRN: [id number]  Admit Date:  10/16/2022    Patient meets criteria for : Severe Malnutrition (Pt meets criteria for severe acute malnutrition based on wasting. Unable to confirm timeframe of wt loss.)    Comments:      Malnutrition Severity Assessment  Malnutrition Type: Acute Disease or Injury - Related Malnutrition  Malnutrition Type (last 8 hours)     Malnutrition Severity Assessment     Row Name 10/17/22 1859       Malnutrition Severity Assessment    Malnutrition Type Acute Disease or Injury - Related Malnutrition    Row Name 10/17/22 1859       Insufficient Energy Intake     Insufficient Energy Intake Findings --  unable to quantify Rpt of intermittent dysphagia    Row Name 10/17/22 1859       Unintentional Weight Loss     Unintentional Weight Loss Findings --  unable to confirm timeframe of loss Per pt UBW of 135 -138 lbs but unable to recall when this was. Now bed wt of 123 lb.s    Row Name 10/17/22 1859       Muscle Loss    Loss of Muscle Mass Findings Moderate    Latter day Region Moderate - slight depression    Clavicle Bone Region Moderate - some protrusion in females, visible in males    Acromion Bone Region Moderate - acromion may slightly protrude    Scapular Bone Region Moderate - mild depression, bones may show slightly    Dorsal Hand Region Moderate - slight depression    Patellar Region Moderate - patella more prominent, less muscle definition around patella    Anterior Thigh Region Moderate - mild depression on inner thigh    Posterior Calf Region Moderate - some roundness, slight firmness    Row Name 10/17/22 1859       Fat Loss    Subcutaneous Fat Loss Findings Moderate    Orbital Region  Moderate -  somewhat hollowness, slightly dark circles    Upper Arm Region --  mild    Thoracic & Lumbar Region Moderate - ribs visible with mild depressions, iliac crest somewhat prominent    Row Name 10/17/22 1859        Criteria Met (Must meet criteria for severity in at least 2 of these categories: M Wasting, Fat Loss, Fluid, Secondary Signs, Wt. Status, Intake)    Patient meets criteria for  Severe Malnutrition  Pt meets criteria for severe acute malnutrition based on wasting. Unable to confirm timeframe of wt loss.                Electronically signed by:  Lore Perry RD  10/17/22 19:16 EDT

## 2022-10-17 NOTE — PROGRESS NOTES
"                  Clinical Nutrition     Nutrition Assessment  Reason for Visit:   Identified at risk by screening criteria, BMI, Malnutrition Severity Assessment      Patient Name: Francisco Clark  YOB: 1960  MRN: [id number]  Date of Encounter: 10/17/22 19:04 EDT  Admission date: 10/16/2022      Comments:  Pt meets criteria for severe acute malnutrition based on wasting. Unable to confirm timeframe of wt loss. See flowsheet note.        Admission Diagnosis    Congestive heart failure, unspecified HF chronicity, unspecified heart failure type (HCC) [I50.9]     Hospital Problem List    Congestive heart failure, unspecified HF chronicity, unspecified heart failure type (HCC)    Chest pain    COPD (chronic obstructive pulmonary disease) (HCC)    Tobacco abuse    Dilated cardiomyopathy (HCC)    HTN       Hypoxia, pulm edema    PreDM    GERD    Other Applicable: hx intermittent dysphagia, odynophagia, stomach ulcers  Per GI risk of PUD 2/2 aspirin use; defer EGD w current condition. Hope to perform this adm.     Applicable Interval History:      Applicable PMH/PSxH:     PMH: He  has a past medical history of History of stomach ulcers.   PSxH: He  has a past surgical history that includes No past surgeries and Colonoscopy (03/05/2018).         Diet/Nutrition Related History:     Pt allows UBW of 135-138 lbs. Unable to recall when this was. Allows likes suppl and will use. Has appetite.       Labs reviewed   Yes  Elevated pBNP       Medications reviewed   Yes  Bumex, Abx, Insulin, Protonix, Pericolace, Spironolactone    Intake/Ouptut 24 hrs reviewed   Yes    Anthropometrics     Admission Height 177.8 cm (70\") Documented at 10/16/2022 0704   Admission Weight 59 kg (130 lb) Documented at 10/16/2022 0704   Stated wt.    Height: 177.8 cm (70\")    Last filed wt: Weight: 55.9 kg (123 lb 3.2 oz) (10/17/22 0504)  Weight Method: Bed scale  Note wt may be inflated 2/2 fluid status    BMI: BMI (Calculated): " 17.7  Underweight:<18.5kg/m2    Ideal Body Weight (IBW) (kg): 76.48      Weight Change   UBW: 135-138 lbs per pt   Note wts per EMR from 2018 : rpt of 121 lbs 3/8/2018 and standing wt of 112 lbs   1/8/2018  Weight change: if pt rpt accurate and bed wt accurate 13.5 lbs  % wt change: 10%  Time frame of weight loss: unk at this time      Nutrition Focused Physical Exam  Date: 10/17     Pt meets criteria for severe acute malnutrition based on wasting. Unable to confirm timeframe of wt loss. See flowsheet note.        Current Nutrition Prescription     PO: Diet Regular; Cardiac, Consistent Carbohydrate  NPO Diet NPO Type: Sips with Meds  Orders Placed This Encounter      Dietary Nutrition Supplements Boost Glucose Control; chocolate  2x/da added per RD    Intake: insuffic data 62% x 2 meals      Nutrition Diagnosis     10/17  Problem Malnutrition  severe acute   Etiology Intermittent issues w food tolerance - odynophagia   Signs/Symptoms Wasting (wt loss unk timeframe)   Status:      Goal:   General: Nutrition to support treatment  PO: Increase intake  Additional goals:      Nutrition Intervention     Follow treatment progress, Care plan reviewed, Advise alternate selection, Menu provided, Supplement provided      Monitoring/Evaluation:   Per protocol, I&O, PO intake, Supplement intake, Pertinent labs, Weight, GI status, Symptoms        Lore Perry RD,   Time Spent: 30 min

## 2022-10-17 NOTE — PROGRESS NOTES
[facility] Medicine Services  PROGRESS NOTE    Patient Name: Francisco Clark  : 1960  MRN: [id number]    Date of Admission: 10/16/2022  Primary Care Physician: Provider, No Known    Subjective   Subjective     CC:  Follow-up for shortness of breath and heart failure    HPI:  I have seen and evaluated the patient this morning.  For couple of weeks now he is feeling weaker, complaining of orthopnea, paroxysmal nocturnal dyspnea and lower exercise tolerance.  No chest pain today.  Still feeling short of breath.  Reported that the dose of Lasix he received yesterday improved his shortness of breath    ROS:  General : no fevers, chills  CVS: No chest pain, palpitations  Respiratory: + cough, + dyspnea  GI: No N/V/D, abd pain  10 point review of systems is negative except for what is mentioned in HPI    Objective   Objective     Vital Signs:   Temp:  [98 °F (36.7 °C)-98.5 °F (36.9 °C)] 98.5 °F (36.9 °C)  Heart Rate:  [] 92  Resp:  [14-18] 18  BP: (104-159)/(67-97) 111/78  Flow (L/min):  [2-4] 4     Physical Exam:  General: Chronically looking, not in distress, conversant and cooperative  Head: Atraumatic and normocephalic  Eyes: No Icterus. No pallor  Ears:  Ears appear intact with no abnormalities noted  Throat: No oral lesions, no thrush  Neck: Supple, trachea midline  Lungs: Clear to auscultation bilaterally, equal air entry, n bilateral fine basal crackles  Heart:  Normal S1 and S2, no murmur, no gallop, No JVD, no lower extremity swelling  Abdomen:  Soft, no tenderness, no organomegaly, normal bowel sounds, no organomegaly  Extremities: pulses equal bilaterally  Skin: No bleeding, bruising or rash, normal skin turgor and elasticity  Neurologic: Cranial nerves appear intact with no evidence of facial asymmetry, normal motor and sensory functions in all 4 extremities  Psych: Alert and oriented x 3, normal mood    Results Reviewed:  LAB RESULTS:      Lab 10/17/22  7551  10/16/22  1817 10/16/22  0712   WBC 6.89  --  7.14   HEMOGLOBIN 11.4*  --  13.1   HEMATOCRIT 39.3  --  44.2   PLATELETS 231  --  236   NEUTROS ABS  --   --  5.22   IMMATURE GRANS (ABS)  --   --  0.03   LYMPHS ABS  --   --  1.06   MONOS ABS  --   --  0.80   EOS ABS  --   --  0.01   MCV 83.1  --  81.5   D DIMER QUANT  --  0.53*  --          Lab 10/17/22  0427 10/16/22  0712   SODIUM 142 138   POTASSIUM 4.4 4.5   CHLORIDE 103 101   CO2 30.0* 27.0   ANION GAP 9.0 10.0   BUN 31* 28*   CREATININE 0.84 0.88   EGFR 98.6 97.2   GLUCOSE 133* 132*   CALCIUM 8.8 9.3   MAGNESIUM 2.0  --    PHOSPHORUS 4.9*  --          Lab 10/17/22  0427 10/16/22  0712   TOTAL PROTEIN 6.0 7.0   ALBUMIN 3.20* 3.80   GLOBULIN 2.8 3.2   ALT (SGPT) 25 33   AST (SGOT) 26 40   BILIRUBIN 0.2 0.3   ALK PHOS 110 142*   LIPASE  --  34         Lab 10/17/22  0427 10/16/22  1817 10/16/22  1504 10/16/22  0712   PROBNP  --   --   --  13,718.0*   TROPONIN T 0.031* 0.028 0.036* 0.047*         Lab 10/17/22  0427   CHOLESTEROL 134   LDL CHOL 75   HDL CHOL 46   TRIGLYCERIDES 62             Brief Urine Lab Results     None          Microbiology Results Abnormal     Procedure Component Value - Date/Time    COVID PRE-OP / PRE-PROCEDURE SCREENING ORDER (NO ISOLATION) - Swab, Nasopharynx [[id number]]  (Normal) Collected: 10/16/22 0737    Lab Status: Final result Specimen: Swab from Nasopharynx Updated: 10/16/22 0826    Narrative:      The following orders were created for panel order COVID PRE-OP / PRE-PROCEDURE SCREENING ORDER (NO ISOLATION) - Swab, Nasopharynx.  Procedure                               Abnormality         Status                     ---------                               -----------         ------                     COVID-19 and FLU A/B PCR...[[id number]]  Normal              Final result                 Please view results for these tests on the individual orders.    COVID-19 and FLU A/B PCR - Swab, Nasopharynx [[id number]]  (Normal) Collected: 10/16/22  0737    Lab Status: Final result Specimen: Swab from Nasopharynx Updated: 10/16/22 0826     COVID19 Not Detected     Influenza A PCR Not Detected     Influenza B PCR Not Detected    Narrative:      Fact sheet for providers: https://www.fda.gov/media/103472/download    Fact sheet for patients: https://www.fda.gov/media/507874/download    Test performed by PCR.          Adult Transthoracic Echo Complete W/ Cont if Necessary Per Protocol    Result Date: 10/16/2022  •  Estimated left ventricular EF = 18% Estimated left ventricular EF was in agreement with the calculated left ventricular EF. Left ventricular ejection fraction appears to be less than 20%. Left ventricular systolic function is severely decreased. •  The left ventricular cavity is moderately dilated. •  The following left ventricular wall segments are hypokinetic: mid anterior, apical anterior, basal anterolateral, mid anterolateral, apical lateral, basal inferolateral, mid inferolateral, apical inferior, mid inferior, apical septal, basal inferoseptal, mid inferoseptal, apex hypokinetic, mid anteroseptal, basal anterior, basal inferior and basal inferoseptal. •  The findings are consistent with dilated cardiomyopathy. •  Left ventricular diastolic function is consistent with (grade II w/high LAP) pseudonormalization. •  Estimated right ventricular systolic pressure from tricuspid regurgitation is mildly elevated (35-45 mmHg). •  Mild pulmonary hypertension is present. •  There is a trivial circumferential pericardial effusion. •  There is a moderate sized left pleural effusion. There is a moderate sized right pleural effusion. •  Normal right ventricular cavity size, wall thickness, systolic function and septal motion noted.     CT Abdomen Pelvis Without Contrast    Result Date: 10/16/2022  DATE OF EXAM: 10/16/2022 7:47 AM  PROCEDURE: CT CHEST WO CONTRAST DIAGNOSTIC-, CT ABDOMEN PELVIS WO CONTRAST-  INDICATIONS: cough, copd, hx pna, + sputum  COMPARISON:  No comparisons available.  TECHNIQUE: Routine transaxial slices were obtained through the chest, abdomen and pelvis without the administration of intravenous contrast. Reconstructed coronal and sagittal images were also obtained. Automated exposure control and iterative construction methods were used.  The radiation dose reduction device was turned on for each scan per the ALARA (As Low as Reasonably Achievable) protocol.  FINDINGS: CT chest: There is no pathologic axillary adenopathy or other worrisome body wall soft tissue finding in the chest. There are small to moderate right and trace left pleural effusions. There is no pericardial effusion. There is no pathologic mediastinal adenopathy. Mildly atherosclerotic, nonaneurysmal thoracic aorta. Evaluation of the osseous structures demonstrates no evidence of acute fracture or aggressive osseous lesion. Evaluation of the lung fields demonstrates diffuse moderate to severe centrilobular emphysema. There is evidence of some mild pulmonary edema, with interlobular septal thickening and intervening groundglass opacity present at the lung bases. Mild four-chamber cardiac enlargement is present. There is no evidence of acute infectious process or distinct suspicious pulmonary nodularity.  CT abdomen pelvis: The body wall soft tissues demonstrate no acute finding. The liver, spleen, pancreas and bilateral adrenal glands demonstrate homogeneous attenuation without evidence of suspicious focal lesion, accounting for limited noncontrast exam. Minimal cholelithiasis is present without acute inflammatory signs of cholecystitis. Simple appearing renal cysts are present, with the kidneys otherwise demonstrate no evidence of stones, hydronephrosis or nephropathy. Small and large bowel loops are nondilated, with evaluation limited due to noncontrast technique and paucity of intra-abdominal fat. Diverticulosis changes are noted, without acute inflammatory signs of diverticulitis.  There is no free fluid or pneumoperitoneum. Atherosclerotic abdominal aorta. No bulky retroperitoneal lymphadenopathy. The pelvic viscera are normal. There is moderate anasarca.      Impression: There is severe emphysema in addition to bilateral pleural effusions, larger on the right, with evidence of likely cardiogenic pulmonary edema present, most notably at the lung bases. There is otherwise no evidence of pneumonia or suspicious pulmonary nodularity.  Abdominal evaluation is significantly limited due to noncontrast technique and a paucity of intra-abdominal fat. There is no obvious pneumoperitoneum, free fluid, abscess or obstruction. There is moderate diffuse anasarca. Diverticulosis changes are present, without specific evidence of diverticulitis.  This report was finalized on 10/16/2022 8:44 AM by Franklin Chan.      CT Chest Without Contrast Diagnostic    Result Date: 10/16/2022  DATE OF EXAM: 10/16/2022 7:47 AM  PROCEDURE: CT CHEST WO CONTRAST DIAGNOSTIC-, CT ABDOMEN PELVIS WO CONTRAST-  INDICATIONS: cough, copd, hx pna, + sputum  COMPARISON: No comparisons available.  TECHNIQUE: Routine transaxial slices were obtained through the chest, abdomen and pelvis without the administration of intravenous contrast. Reconstructed coronal and sagittal images were also obtained. Automated exposure control and iterative construction methods were used.  The radiation dose reduction device was turned on for each scan per the ALARA (As Low as Reasonably Achievable) protocol.  FINDINGS: CT chest: There is no pathologic axillary adenopathy or other worrisome body wall soft tissue finding in the chest. There are small to moderate right and trace left pleural effusions. There is no pericardial effusion. There is no pathologic mediastinal adenopathy. Mildly atherosclerotic, nonaneurysmal thoracic aorta. Evaluation of the osseous structures demonstrates no evidence of acute fracture or aggressive osseous lesion. Evaluation  of the lung fields demonstrates diffuse moderate to severe centrilobular emphysema. There is evidence of some mild pulmonary edema, with interlobular septal thickening and intervening groundglass opacity present at the lung bases. Mild four-chamber cardiac enlargement is present. There is no evidence of acute infectious process or distinct suspicious pulmonary nodularity.  CT abdomen pelvis: The body wall soft tissues demonstrate no acute finding. The liver, spleen, pancreas and bilateral adrenal glands demonstrate homogeneous attenuation without evidence of suspicious focal lesion, accounting for limited noncontrast exam. Minimal cholelithiasis is present without acute inflammatory signs of cholecystitis. Simple appearing renal cysts are present, with the kidneys otherwise demonstrate no evidence of stones, hydronephrosis or nephropathy. Small and large bowel loops are nondilated, with evaluation limited due to noncontrast technique and paucity of intra-abdominal fat. Diverticulosis changes are noted, without acute inflammatory signs of diverticulitis. There is no free fluid or pneumoperitoneum. Atherosclerotic abdominal aorta. No bulky retroperitoneal lymphadenopathy. The pelvic viscera are normal. There is moderate anasarca.      Impression: There is severe emphysema in addition to bilateral pleural effusions, larger on the right, with evidence of likely cardiogenic pulmonary edema present, most notably at the lung bases. There is otherwise no evidence of pneumonia or suspicious pulmonary nodularity.  Abdominal evaluation is significantly limited due to noncontrast technique and a paucity of intra-abdominal fat. There is no obvious pneumoperitoneum, free fluid, abscess or obstruction. There is moderate diffuse anasarca. Diverticulosis changes are present, without specific evidence of diverticulitis.  This report was finalized on 10/16/2022 8:44 AM by Franklin Chan.      XR Chest 1 View    Result Date:  10/16/2022  XR CHEST 1 VW-  Date of Exam: 10/16/2022 7:24 AM  Indication: SOA triage protocol.  Comparison:?01/17/2018  Technique:?A single view of the chest was obtained.  FINDINGS:  ?There is mild cardiomegaly.  Pulmonary vessels are within normal limits.  There is emphysematous change of the lungs.  There is left basilar airspace disease which may be due to atelectasis or pneumonia. There is also some hazy right basilar airspace disease which may also be secondary to pneumonia.  There are small bilateral pleural effusions.        Impression:   1.  Small bilateral pleural effusions and mild bibasilar airspace disease which may be secondary to atelectasis or pneumonia.   This report was finalized on 10/16/2022 7:53 AM by Tin Iverson MD.        Results for orders placed during the hospital encounter of 10/16/22    Adult Transthoracic Echo Complete W/ Cont if Necessary Per Protocol    Interpretation Summary  •  Estimated left ventricular EF = 18% Estimated left ventricular EF was in agreement with the calculated left ventricular EF. Left ventricular ejection fraction appears to be less than 20%. Left ventricular systolic function is severely decreased.  •  The left ventricular cavity is moderately dilated.  •  The following left ventricular wall segments are hypokinetic: mid anterior, apical anterior, basal anterolateral, mid anterolateral, apical lateral, basal inferolateral, mid inferolateral, apical inferior, mid inferior, apical septal, basal inferoseptal, mid inferoseptal, apex hypokinetic, mid anteroseptal, basal anterior, basal inferior and basal inferoseptal.  •  The findings are consistent with dilated cardiomyopathy.  •  Left ventricular diastolic function is consistent with (grade II w/high LAP) pseudonormalization.  •  Estimated right ventricular systolic pressure from tricuspid regurgitation is mildly elevated (35-45 mmHg).  •  Mild pulmonary hypertension is present.  •  There is a trivial  circumferential pericardial effusion.  •  There is a moderate sized left pleural effusion. There is a moderate sized right pleural effusion.  •  Normal right ventricular cavity size, wall thickness, systolic function and septal motion noted.      I have reviewed the medications:  Scheduled Meds:atorvastatin, 40 mg, Oral, Nightly  carvedilol, 12.5 mg, Oral, BID  doxycycline, 100 mg, Oral, Q12H  enoxaparin, 40 mg, Subcutaneous, Q24H  ipratropium-albuterol, 3 mL, Nebulization, 4x Daily - RT  lisinopril, 20 mg, Oral, Q24H  pantoprazole, 40 mg, Oral, Q AM  pharmacy consult - MTM, , Does not apply, Daily  senna-docusate sodium, 2 tablet, Oral, BID  sodium chloride, 10 mL, Intravenous, Q12H      Continuous Infusions:dilTIAZem, 5-15 mg/hr, Last Rate: Stopped (10/17/22 0107)      PRN Meds:.•  acetaminophen **OR** acetaminophen **OR** acetaminophen  •  senna-docusate sodium **AND** polyethylene glycol **AND** bisacodyl **AND** bisacodyl  •  ipratropium-albuterol  •  sodium chloride  •  sodium chloride    Assessment & Plan   Assessment & Plan     Active Hospital Problems    Diagnosis  POA   • **Congestive heart failure, unspecified HF chronicity, unspecified heart failure type (HCC) [I50.9]  Yes   • Dilated cardiomyopathy (HCC) [I42.0]  Yes   • Chest pain [R07.9]  Yes   • COPD (chronic obstructive pulmonary disease) (HCC) [J44.9]  Yes   • Tobacco abuse [Z72.0]  Yes      Resolved Hospital Problems   No resolved problems to display.        Brief Hospital Course to date:  Francisco Clark is a 61 y.o. male with past medical history of essential hypertension, systolic heart failure, tobacco use disorder, quit 1 week ago, GERD and dysphagia presented to the hospital with worsening shortness of breath and generalized weakness    Assessment and plan:  Acute decompensated systolic heart failure, ejection fraction 15%  Acute hypoxia secondary to pulmonary edema  Essential pretension  · Repeat echo showed ejection fraction of 15 to  20%  · Seen by Dr. Hammond and currently started on heart failure goal-directed therapy  · Titrate up Coreg as tolerated  · Seen by Dr. Hammond, no plan for left heart cath  · Currently on Bumex 0.5 mg twice daily.  Still congested, given extra dose of Lasix 40 mg now  · Start Entresto, Aldactone and Farxiga  · Might need LifeVest upon discharge  · Patient does not carry a diagnosis of COPD but with his smoking history and clinical exam, he would benefit from pulmonary function test as outpatient  · Continue DuoNebs  · Continue p.o. doxycycline given his increased cough and sputum production     Prediabetes  · A1c 6.2%  · Diabetic diet and insulin sliding scale  · Start Farxiga while hospitalized    GERD  Odynophagia  · GI team consulted to evaluate the patient for EGD  · Continue PPI    Hx of tobacco abuse  · Quit 1 week ago   · Counseled       Expected Discharge Location and Transportation: Home  Expected Discharge Date: 10/19/2022    DVT prophylaxis:  Medical and mechanical DVT prophylaxis orders are present.          CODE STATUS:   Code Status and Medical Interventions:   Ordered at: 10/16/22 1054     Code Status (Patient has no pulse and is not breathing):    CPR (Attempt to Resuscitate)     Medical Interventions (Patient has pulse or is breathing):    Full Support       Cleve Giang MD  10/17/22

## 2022-10-17 NOTE — PROGRESS NOTES
"[facility] Progress Note     LOS: 1 day   Patient Care Team:  Provider, No Known as PCP - General  PCP:  Provider, No Known    Chief Complaint: CHF    SUBJECTIVE: Resting comfortably in bed, shortness of breath improved somewhat since admission.  Intermittent cough productive of clear phlegm.    Review of Systems:   All systems have been reviewed and are negative with the exception of those mentioned above.      OBJECTIVE:    Vital Sign Min/Max for last 24 hours  Temp  Min: 98 °F (36.7 °C)  Max: 98.8 °F (37.1 °C)   BP  Min: 104/89  Max: 147/90   Pulse  Min: 81  Max: 106   Resp  Min: 14  Max: 18   SpO2  Min: 91 %  Max: 97 %   No data recorded   Weight  Min: 55.1 kg (121 lb 8 oz)  Max: 55.9 kg (123 lb 3.2 oz)     Flowsheet Rows    Flowsheet Row First Filed Value   Admission Height 177.8 cm (70\") Documented at 10/16/2022 0704   Admission Weight 59 kg (130 lb) Documented at 10/16/2022 0704              Intake/Output Summary (Last 24 hours) at 10/17/2022 1302  Last data filed at 10/17/2022 1000  Gross per 24 hour   Intake 240 ml   Output --   Net 240 ml     Intake & Output (last 3 days)       10/14 0701  10/15 0700 10/15 0701  10/16 0700 10/16 0701  10/17 0700 10/17 0701  10/18 0700    P.O.    240    Total Intake(mL/kg)    240 (4.3)    Net    +240            Urine Unmeasured Occurrence    1 x           Physical Exam:    General Appearance:    Alert, cooperative, no distress, appears stated age   Neck:   Supple, symmetrical, trachea midline.   Lungs:    Diminished throughout, in particular at the bases, scattered mild expiratory wheezing, respirations unlabored   Chest Wall:    No tenderness or deformity    Heart:    Regular rate and rhythm, S1 and S2 normal, no murmur, rub   or gallop, normal carotid impulse bilaterally without bruit.   Extremities:   Extremities normal, atraumatic, no cyanosis or edema   Pulses:   2+ and symmetric all extremities   Skin:   Skin color, " texture, turgor normal, no rashes or lesions      LABS/DIAGNOSTIC DATA:  Results from last 7 days   Lab Units 10/17/22  0427 10/16/22  0712   WBC 10*3/mm3 6.89 7.14   HEMOGLOBIN g/dL 11.4* 13.1   HEMATOCRIT % 39.3 44.2   PLATELETS 10*3/mm3 231 236     Lab Results   Lab Value Date/Time    TROPONINT 0.031 (C) 10/17/2022 0427    TROPONINT 0.028 10/16/2022 1817    TROPONINT 0.036 (C) 10/16/2022 1504    TROPONINT 0.047 (C) 10/16/2022 0712         Results from last 7 days   Lab Units 10/17/22  0427 10/16/22  0712   SODIUM mmol/L 142 138   POTASSIUM mmol/L 4.4 4.5   CHLORIDE mmol/L 103 101   CO2 mmol/L 30.0* 27.0   BUN mg/dL 31* 28*   CREATININE mg/dL 0.84 0.88   CALCIUM mg/dL 8.8 9.3   BILIRUBIN mg/dL 0.2 0.3   ALK PHOS U/L 110 142*   ALT (SGPT) U/L 25 33   AST (SGOT) U/L 26 40   GLUCOSE mg/dL 133* 132*         Results from last 7 days   Lab Units 10/17/22  0427   CHOLESTEROL mg/dL 134   TRIGLYCERIDES mg/dL 62   HDL CHOL mg/dL 46   LDL CHOL mg/dL 75               Medication Review:   atorvastatin, 40 mg, Oral, Nightly  bumetanide, 0.5 mg, Oral, BID  carvedilol, 12.5 mg, Oral, BID  doxycycline, 100 mg, Oral, Q12H  empagliflozin, 10 mg, Oral, Daily  enoxaparin, 40 mg, Subcutaneous, Q24H  insulin lispro, 0-7 Units, Subcutaneous, TID AC  ipratropium-albuterol, 3 mL, Nebulization, 4x Daily - RT  pantoprazole, 40 mg, Oral, Q AM  pharmacy consult - [place], , Does not apply, Daily  [START ON 10/18/2022] sacubitril-valsartan, 1 tablet, Oral, Q12H  senna-docusate sodium, 2 tablet, Oral, BID  sodium chloride, 10 mL, Intravenous, Q12H  spironolactone, 25 mg, Oral, Daily       dilTIAZem, 5-15 mg/hr, Last Rate: Stopped (10/17/22 0107)         ASSESSMENT/PLAN:    Congestive heart failure, unspecified HF chronicity, unspecified heart failure type (HCC)    Chest pain    COPD (chronic obstructive pulmonary disease) (HCC)    Tobacco abuse    Dilated cardiomyopathy (HCC)      Heart failure with reduced ejection fraction, acute on chronic  combined systolic and diastolic, EF 18%, associated grade 2 diastolic dysfunction: High risk for underlying obstructive CAD.  Recommending cardiac catheterization for definitive evaluation of coronary anatomy.  Otherwise we will continue to gradually titrate guideline directed medical therapy for underlying cardiomyopathy.  LifeVest at discharge.          Amador Coreas III, MD   10/17/22  13:02 EDT

## 2022-10-18 ENCOUNTER — APPOINTMENT (OUTPATIENT)
Dept: GENERAL RADIOLOGY | Facility: HOSPITAL | Age: 62
End: 2022-10-18

## 2022-10-18 PROBLEM — E43 SEVERE MALNUTRITION: Status: ACTIVE | Noted: 2022-10-18

## 2022-10-18 LAB
ANION GAP SERPL CALCULATED.3IONS-SCNC: 7 MMOL/L (ref 5–15)
BASOPHILS # BLD AUTO: 0.02 10*3/MM3 (ref 0–0.2)
BASOPHILS NFR BLD AUTO: 0.3 % (ref 0–1.5)
BUN SERPL-MCNC: 42 MG/DL (ref 8–23)
BUN/CREAT SERPL: 47.7 (ref 7–25)
CALCIUM SPEC-SCNC: 9 MG/DL (ref 8.6–10.5)
CHLORIDE SERPL-SCNC: 98 MMOL/L (ref 98–107)
CO2 SERPL-SCNC: 35 MMOL/L (ref 22–29)
CREAT SERPL-MCNC: 0.88 MG/DL (ref 0.76–1.27)
DEPRECATED RDW RBC AUTO: 50.4 FL (ref 37–54)
EGFRCR SERPLBLD CKD-EPI 2021: 97.2 ML/MIN/1.73
EOSINOPHIL # BLD AUTO: 0.03 10*3/MM3 (ref 0–0.4)
EOSINOPHIL NFR BLD AUTO: 0.4 % (ref 0.3–6.2)
ERYTHROCYTE [DISTWIDTH] IN BLOOD BY AUTOMATED COUNT: 17.6 % (ref 12.3–15.4)
GLUCOSE BLDC GLUCOMTR-MCNC: 100 MG/DL (ref 70–130)
GLUCOSE BLDC GLUCOMTR-MCNC: 106 MG/DL (ref 70–130)
GLUCOSE BLDC GLUCOMTR-MCNC: 152 MG/DL (ref 70–130)
GLUCOSE SERPL-MCNC: 124 MG/DL (ref 65–99)
HCT VFR BLD AUTO: 40.1 % (ref 37.5–51)
HGB BLD-MCNC: 11.9 G/DL (ref 13–17.7)
IMM GRANULOCYTES # BLD AUTO: 0.02 10*3/MM3 (ref 0–0.05)
IMM GRANULOCYTES NFR BLD AUTO: 0.3 % (ref 0–0.5)
LYMPHOCYTES # BLD AUTO: 1.21 10*3/MM3 (ref 0.7–3.1)
LYMPHOCYTES NFR BLD AUTO: 16.8 % (ref 19.6–45.3)
MAGNESIUM SERPL-MCNC: 1.5 MG/DL (ref 1.6–2.4)
MCH RBC QN AUTO: 24.1 PG (ref 26.6–33)
MCHC RBC AUTO-ENTMCNC: 29.7 G/DL (ref 31.5–35.7)
MCV RBC AUTO: 81.2 FL (ref 79–97)
MONOCYTES # BLD AUTO: 0.75 10*3/MM3 (ref 0.1–0.9)
MONOCYTES NFR BLD AUTO: 10.4 % (ref 5–12)
NEUTROPHILS NFR BLD AUTO: 5.19 10*3/MM3 (ref 1.7–7)
NEUTROPHILS NFR BLD AUTO: 71.8 % (ref 42.7–76)
NRBC BLD AUTO-RTO: 0 /100 WBC (ref 0–0.2)
PHOSPHATE SERPL-MCNC: 4.6 MG/DL (ref 2.5–4.5)
PLATELET # BLD AUTO: 256 10*3/MM3 (ref 140–450)
PMV BLD AUTO: 10.3 FL (ref 6–12)
POTASSIUM SERPL-SCNC: 3.6 MMOL/L (ref 3.5–5.2)
RBC # BLD AUTO: 4.94 10*6/MM3 (ref 4.14–5.8)
SODIUM SERPL-SCNC: 140 MMOL/L (ref 136–145)
WBC NRBC COR # BLD: 7.22 10*3/MM3 (ref 3.4–10.8)

## 2022-10-18 PROCEDURE — 94799 UNLISTED PULMONARY SVC/PX: CPT

## 2022-10-18 PROCEDURE — 93458 L HRT ARTERY/VENTRICLE ANGIO: CPT | Performed by: INTERNAL MEDICINE

## 2022-10-18 PROCEDURE — 0 MAGNESIUM SULFATE 4 GM/100ML SOLUTION: Performed by: INTERNAL MEDICINE

## 2022-10-18 PROCEDURE — 25010000002 HEPARIN (PORCINE) PER 1000 UNITS: Performed by: INTERNAL MEDICINE

## 2022-10-18 PROCEDURE — 25010000002 ENOXAPARIN PER 10 MG: Performed by: INTERNAL MEDICINE

## 2022-10-18 PROCEDURE — 84100 ASSAY OF PHOSPHORUS: CPT | Performed by: INTERNAL MEDICINE

## 2022-10-18 PROCEDURE — 99225 PR SBSQ OBSERVATION CARE/DAY 25 MINUTES: CPT | Performed by: INTERNAL MEDICINE

## 2022-10-18 PROCEDURE — C1894 INTRO/SHEATH, NON-LASER: HCPCS | Performed by: INTERNAL MEDICINE

## 2022-10-18 PROCEDURE — 82962 GLUCOSE BLOOD TEST: CPT

## 2022-10-18 PROCEDURE — G0378 HOSPITAL OBSERVATION PER HR: HCPCS

## 2022-10-18 PROCEDURE — 83735 ASSAY OF MAGNESIUM: CPT | Performed by: INTERNAL MEDICINE

## 2022-10-18 PROCEDURE — 0 IOPAMIDOL PER 1 ML: Performed by: INTERNAL MEDICINE

## 2022-10-18 PROCEDURE — 25010000002 MIDAZOLAM PER 1 MG: Performed by: INTERNAL MEDICINE

## 2022-10-18 PROCEDURE — 74220 X-RAY XM ESOPHAGUS 1CNTRST: CPT

## 2022-10-18 PROCEDURE — 25010000002 FENTANYL CITRATE (PF) 50 MCG/ML SOLUTION: Performed by: INTERNAL MEDICINE

## 2022-10-18 PROCEDURE — 94761 N-INVAS EAR/PLS OXIMETRY MLT: CPT

## 2022-10-18 PROCEDURE — 85025 COMPLETE CBC W/AUTO DIFF WBC: CPT | Performed by: INTERNAL MEDICINE

## 2022-10-18 PROCEDURE — 63710000001 INSULIN LISPRO (HUMAN) PER 5 UNITS: Performed by: INTERNAL MEDICINE

## 2022-10-18 PROCEDURE — 94664 DEMO&/EVAL PT USE INHALER: CPT

## 2022-10-18 PROCEDURE — 80048 BASIC METABOLIC PNL TOTAL CA: CPT | Performed by: INTERNAL MEDICINE

## 2022-10-18 RX ORDER — POTASSIUM CHLORIDE 7.45 MG/ML
10 INJECTION INTRAVENOUS
Status: DISCONTINUED | OUTPATIENT
Start: 2022-10-18 | End: 2022-10-20 | Stop reason: HOSPADM

## 2022-10-18 RX ORDER — MIDAZOLAM HYDROCHLORIDE 1 MG/ML
INJECTION INTRAMUSCULAR; INTRAVENOUS
Status: DISCONTINUED | OUTPATIENT
Start: 2022-10-18 | End: 2022-10-18 | Stop reason: HOSPADM

## 2022-10-18 RX ORDER — MAGNESIUM SULFATE HEPTAHYDRATE 40 MG/ML
4 INJECTION, SOLUTION INTRAVENOUS ONCE
Status: COMPLETED | OUTPATIENT
Start: 2022-10-18 | End: 2022-10-18

## 2022-10-18 RX ORDER — FENTANYL CITRATE 50 UG/ML
INJECTION, SOLUTION INTRAMUSCULAR; INTRAVENOUS
Status: DISCONTINUED | OUTPATIENT
Start: 2022-10-18 | End: 2022-10-18 | Stop reason: HOSPADM

## 2022-10-18 RX ORDER — POTASSIUM CHLORIDE 750 MG/1
40 CAPSULE, EXTENDED RELEASE ORAL AS NEEDED
Status: DISCONTINUED | OUTPATIENT
Start: 2022-10-18 | End: 2022-10-20 | Stop reason: HOSPADM

## 2022-10-18 RX ORDER — NICARDIPINE HCL-0.9% SOD CHLOR 1 MG/10 ML
SYRINGE (ML) INTRAVENOUS
Status: DISCONTINUED | OUTPATIENT
Start: 2022-10-18 | End: 2022-10-18 | Stop reason: HOSPADM

## 2022-10-18 RX ORDER — SODIUM CHLORIDE 9 MG/ML
1 INJECTION, SOLUTION INTRAVENOUS CONTINUOUS
Status: ACTIVE | OUTPATIENT
Start: 2022-10-18 | End: 2022-10-18

## 2022-10-18 RX ORDER — HEPARIN SODIUM 1000 [USP'U]/ML
INJECTION, SOLUTION INTRAVENOUS; SUBCUTANEOUS
Status: DISCONTINUED | OUTPATIENT
Start: 2022-10-18 | End: 2022-10-18 | Stop reason: HOSPADM

## 2022-10-18 RX ORDER — LIDOCAINE HYDROCHLORIDE 10 MG/ML
INJECTION, SOLUTION EPIDURAL; INFILTRATION; INTRACAUDAL; PERINEURAL
Status: DISCONTINUED | OUTPATIENT
Start: 2022-10-18 | End: 2022-10-18 | Stop reason: HOSPADM

## 2022-10-18 RX ORDER — POTASSIUM CHLORIDE 1.5 G/1.77G
40 POWDER, FOR SOLUTION ORAL AS NEEDED
Status: DISCONTINUED | OUTPATIENT
Start: 2022-10-18 | End: 2022-10-20 | Stop reason: HOSPADM

## 2022-10-18 RX ADMIN — DOXYCYCLINE 100 MG: 100 CAPSULE ORAL at 08:14

## 2022-10-18 RX ADMIN — Medication 10 ML: at 21:46

## 2022-10-18 RX ADMIN — ATORVASTATIN CALCIUM 40 MG: 40 TABLET, FILM COATED ORAL at 21:42

## 2022-10-18 RX ADMIN — SPIRONOLACTONE 25 MG: 25 TABLET ORAL at 08:14

## 2022-10-18 RX ADMIN — ACETAMINOPHEN 650 MG: 325 TABLET, FILM COATED ORAL at 21:51

## 2022-10-18 RX ADMIN — IPRATROPIUM BROMIDE AND ALBUTEROL SULFATE 3 ML: .5; 3 SOLUTION RESPIRATORY (INHALATION) at 07:59

## 2022-10-18 RX ADMIN — SENNOSIDES AND DOCUSATE SODIUM 2 TABLET: 50; 8.6 TABLET ORAL at 21:42

## 2022-10-18 RX ADMIN — EMPAGLIFLOZIN 10 MG: 10 TABLET, FILM COATED ORAL at 08:14

## 2022-10-18 RX ADMIN — DOXYCYCLINE 100 MG: 100 CAPSULE ORAL at 21:42

## 2022-10-18 RX ADMIN — IPRATROPIUM BROMIDE AND ALBUTEROL SULFATE 3 ML: .5; 3 SOLUTION RESPIRATORY (INHALATION) at 20:12

## 2022-10-18 RX ADMIN — Medication 10 ML: at 08:13

## 2022-10-18 RX ADMIN — BARIUM SULFATE 183 ML: 960 POWDER, FOR SUSPENSION ORAL at 11:56

## 2022-10-18 RX ADMIN — PANTOPRAZOLE SODIUM 40 MG: 40 TABLET, DELAYED RELEASE ORAL at 08:14

## 2022-10-18 RX ADMIN — BUMETANIDE 0.5 MG: 1 TABLET ORAL at 08:14

## 2022-10-18 RX ADMIN — SODIUM CHLORIDE 1 ML/KG/HR: 9 INJECTION, SOLUTION INTRAVENOUS at 16:15

## 2022-10-18 RX ADMIN — BUMETANIDE 0.5 MG: 1 TABLET ORAL at 17:15

## 2022-10-18 RX ADMIN — INSULIN LISPRO 2 UNITS: 100 INJECTION, SOLUTION INTRAVENOUS; SUBCUTANEOUS at 17:16

## 2022-10-18 RX ADMIN — ENOXAPARIN SODIUM 40 MG: 40 INJECTION SUBCUTANEOUS at 17:15

## 2022-10-18 RX ADMIN — MAGNESIUM SULFATE HEPTAHYDRATE 4 G: 40 INJECTION, SOLUTION INTRAVENOUS at 08:13

## 2022-10-18 RX ADMIN — PANTOPRAZOLE SODIUM 40 MG: 40 TABLET, DELAYED RELEASE ORAL at 17:15

## 2022-10-18 RX ADMIN — SENNOSIDES AND DOCUSATE SODIUM 2 TABLET: 50; 8.6 TABLET ORAL at 08:14

## 2022-10-18 NOTE — PROGRESS NOTES
I was asked to perform a left heart catheterization on this patient due to severe cardiomyopathy and high risk for obstructive coronary disease.  The risks, benefits, and alternative options of cardiac catheterization were discussed with the patient.  The risks include death, MI, stroke, infection, vascular injury requiring surgical repair and/or blood transfusion, coronary dissection, renal dysfunction/failure, allergic reaction, emergent CABG.  If PCI is needed there is a 1-2% risk of emergent CABG.  The patient is agreeable for cardiac catheterization, possible PCI or CABG. Plan is to proceed with cardiac catheterization and possible PCI.     Kd Roque M.D., F.A.C.C.  Interventional Cardiology  10/18/22  11:46 EDT

## 2022-10-18 NOTE — PLAN OF CARE
Problem: Adult Inpatient Plan of Care  Goal: Plan of Care Review  Outcome: Ongoing, Progressing  Flowsheets (Taken 10/18/2022 1313)  Progress: no change  Plan of Care Reviewed With: patient  Goal: Patient-Specific Goal (Individualized)  Outcome: Ongoing, Progressing  Goal: Absence of Hospital-Acquired Illness or Injury  Outcome: Ongoing, Progressing  Intervention: Identify and Manage Fall Risk  Recent Flowsheet Documentation  Taken 10/18/2022 1230 by Belén Jean RN  Safety Promotion/Fall Prevention: safety round/check completed  Taken 10/18/2022 1045 by Belén Jean RN  Safety Promotion/Fall Prevention: safety round/check completed  Taken 10/18/2022 0815 by Belén Jean RN  Safety Promotion/Fall Prevention:   assistive device/personal items within reach   clutter free environment maintained   nonskid shoes/slippers when out of bed   safety round/check completed  Intervention: Prevent Skin Injury  Recent Flowsheet Documentation  Taken 10/18/2022 0815 by Belén Jean RN  Body Position: sitting up in bed  Skin Protection: adhesive use limited  Intervention: Prevent and Manage VTE (Venous Thromboembolism) Risk  Recent Flowsheet Documentation  Taken 10/18/2022 0815 by Belén Jean RN  Activity Management: up ad lula  VTE Prevention/Management: sequential compression devices off  Range of Motion: ROM (range of motion) performed  Intervention: Prevent Infection  Recent Flowsheet Documentation  Taken 10/18/2022 1230 by Belén Jean RN  Infection Prevention: hand hygiene promoted  Taken 10/18/2022 1045 by Belén Jean RN  Infection Prevention: hand hygiene promoted  Taken 10/18/2022 0815 by Belén Jean RN  Infection Prevention: hand hygiene promoted  Goal: Optimal Comfort and Wellbeing  Outcome: Ongoing, Progressing  Intervention: Provide Person-Centered Care  Recent Flowsheet Documentation  Taken 10/18/2022 0815 by Belén Jean RN  Trust Relationship/Rapport: care  explained  Goal: Readiness for Transition of Care  Outcome: Ongoing, Progressing     Problem: Heart Failure Comorbidity  Goal: Maintenance of Heart Failure Symptom Control  Outcome: Ongoing, Progressing  Intervention: Maintain Heart Failure-Management  Recent Flowsheet Documentation  Taken 10/18/2022 0815 by Belén Jean RN  Medication Review/Management: medications reviewed     Problem: Adjustment to Illness (Heart Failure)  Goal: Optimal Coping  Outcome: Ongoing, Progressing  Intervention: Support Psychosocial Response  Recent Flowsheet Documentation  Taken 10/18/2022 0815 by Belén Jean RN  Supportive Measures: active listening utilized  Family/Support System Care: support provided     Problem: Cardiac Output Decreased (Heart Failure)  Goal: Optimal Cardiac Output  Outcome: Ongoing, Progressing  Intervention: Optimize Cardiac Output  Recent Flowsheet Documentation  Taken 10/18/2022 0815 by Belén Jean RN  Environmental Support: calm environment promoted     Problem: Dysrhythmia (Heart Failure)  Goal: Stable Heart Rate and Rhythm  Outcome: Ongoing, Progressing     Problem: Fluid Imbalance (Heart Failure)  Goal: Fluid Balance  Outcome: Ongoing, Progressing     Problem: Functional Ability Impaired (Heart Failure)  Goal: Optimal Functional Ability  Outcome: Ongoing, Progressing  Intervention: Optimize Functional Ability  Recent Flowsheet Documentation  Taken 10/18/2022 0815 by Belén Jean RN  Activity Management: up ad lula     Problem: Oral Intake Inadequate (Heart Failure)  Goal: Optimal Nutrition Intake  Outcome: Ongoing, Progressing     Problem: Respiratory Compromise (Heart Failure)  Goal: Effective Oxygenation and Ventilation  Outcome: Ongoing, Progressing  Intervention: Promote Airway Secretion Clearance  Recent Flowsheet Documentation  Taken 10/18/2022 0815 by Belén Jean RN  Breathing Techniques/Airway Clearance: deep/controlled cough encouraged  Intervention: Optimize  Oxygenation and Ventilation  Recent Flowsheet Documentation  Taken 10/18/2022 0815 by Belén Jean RN  Airway/Ventilation Management: airway patency maintained     Problem: Sleep Disordered Breathing (Heart Failure)  Goal: Effective Breathing Pattern During Sleep  Outcome: Ongoing, Progressing  Intervention: Monitor and Manage Obstructive Sleep Apnea  Recent Flowsheet Documentation  Taken 10/18/2022 0815 by Belén Jean RN  NPPV/CPAP Maintenance: tubes secured     Problem: Arrhythmia/Dysrhythmia (Cardiac Catheterization)  Goal: Stable Heart Rate and Rhythm  Outcome: Ongoing, Progressing     Problem: Bleeding (Cardiac Catheterization)  Goal: Absence of Bleeding  Outcome: Ongoing, Progressing     Problem: Contrast-Induced Injury Risk (Cardiac Catheterization)  Goal: Absence of Contrast-Induced Injury  Outcome: Ongoing, Progressing     Problem: Embolism (Cardiac Catheterization)  Goal: Absence of Embolism Signs and Symptoms  Outcome: Ongoing, Progressing  Intervention: Prevent or Manage Embolism  Recent Flowsheet Documentation  Taken 10/18/2022 0815 by Belén Jean RN  VTE Prevention/Management: sequential compression devices off     Problem: Ongoing Anesthesia/Sedation Effects (Cardiac Catheterization)  Goal: Anesthesia/Sedation Recovery  Outcome: Ongoing, Progressing  Intervention: Optimize Anesthesia Recovery  Recent Flowsheet Documentation  Taken 10/18/2022 1230 by Belén Jean RN  Safety Promotion/Fall Prevention: safety round/check completed  Taken 10/18/2022 1045 by Belén Jean RN  Safety Promotion/Fall Prevention: safety round/check completed  Taken 10/18/2022 0815 by Belén Jean RN  Safety Promotion/Fall Prevention:   assistive device/personal items within reach   clutter free environment maintained   nonskid shoes/slippers when out of bed   safety round/check completed     Problem: Pain (Cardiac Catheterization)  Goal: Acceptable Pain Control  Outcome: Ongoing,  Progressing  Intervention: Prevent or Manage Pain  Recent Flowsheet Documentation  Taken 10/18/2022 0815 by Belén Jean, RN  Diversional Activities: television     Problem: Vascular Access Protection (Cardiac Catheterization)  Goal: Absence of Vascular Access Complication  Outcome: Ongoing, Progressing  Intervention: Prevent Access Site Complications  Recent Flowsheet Documentation  Taken 10/18/2022 0815 by Belén Jean RN  Activity Management: up ad lula  Head of Bed (HOB) Positioning: HOB at 20-30 degrees   Goal Outcome Evaluation:  Plan of Care Reviewed With: patient   Pt rested throughout shift. Pt had esophogram today. Pt scheduled at 4pm to have heart cath today. Pt has remained NPO. LifeVest has been ordered for d/c. Magnesium replaced this am. No complaints of pain. Pt remains on 3L. Pt remains in SR. Will continue to observe.      Progress: no change

## 2022-10-18 NOTE — PROGRESS NOTES
[facility] Medicine Services  PROGRESS NOTE    Patient Name: Francisco Clark  : 1960  MRN: [id number]    Date of Admission: 10/16/2022  Primary Care Physician: Provider, No Known    Subjective   Subjective     CC:  Follow-up for shortness of breath and heart failure    HPI:  I have seen and evaluated the patient this morning.  Shortness of breath improved.  No chest pain.  Currently n.p.o. for left heart cath and esophageogram     ROS:  General : no fevers, chills  CVS: No chest pain, palpitations  Respiratory: + cough, + dyspnea  GI: No N/V/D, abd pain  10 point review of systems is negative except for what is mentioned in HPI    Objective   Objective     Vital Signs:   Temp:  [98.1 °F (36.7 °C)-98.8 °F (37.1 °C)] 98.1 °F (36.7 °C)  Heart Rate:  [74-97] 81  Resp:  [16-18] 16  BP: ()/(65-78) 99/65  Flow (L/min):  [3] 3     Physical Exam:  General: Chronically looking, not in distress, conversant and cooperative  Head: Atraumatic and normocephalic  Eyes: No Icterus. No pallor  Ears:  Ears appear intact with no abnormalities noted  Throat: No oral lesions, no thrush  Neck: Supple, trachea midline  Lungs: Clear to auscultation bilaterally, equal air entry, n bilateral fine basal crackles  Heart:  Normal S1 and S2, no murmur, no gallop, No JVD, no lower extremity swelling  Abdomen:  Soft, no tenderness, no organomegaly, normal bowel sounds, no organomegaly  Extremities: pulses equal bilaterally  Skin: No bleeding, bruising or rash, normal skin turgor and elasticity  Neurologic: Cranial nerves appear intact with no evidence of facial asymmetry, normal motor and sensory functions in all 4 extremities  Psych: Alert and oriented x 3, normal mood    Results Reviewed:  LAB RESULTS:      Lab 10/18/22  0339 10/17/22  0427 10/16/22  1817 10/16/22  0712   WBC 7.22 6.89  --  7.14   HEMOGLOBIN 11.9* 11.4*  --  13.1   HEMATOCRIT 40.1 39.3  --  44.2   PLATELETS 256 231  --  236   NEUTROS ABS  5.19  --   --  5.22   IMMATURE GRANS (ABS) 0.02  --   --  0.03   LYMPHS ABS 1.21  --   --  1.06   MONOS ABS 0.75  --   --  0.80   EOS ABS 0.03  --   --  0.01   MCV 81.2 83.1  --  81.5   D DIMER QUANT  --   --  0.53*  --          Lab 10/18/22  0339 10/17/22  0427 10/16/22  0712   SODIUM 140 142 138   POTASSIUM 3.6 4.4 4.5   CHLORIDE 98 103 101   CO2 35.0* 30.0* 27.0   ANION GAP 7.0 9.0 10.0   BUN 42* 31* 28*   CREATININE 0.88 0.84 0.88   EGFR 97.2 98.6 97.2   GLUCOSE 124* 133* 132*   CALCIUM 9.0 8.8 9.3   MAGNESIUM 1.5* 2.0  --    PHOSPHORUS 4.6* 4.9*  --          Lab 10/17/22  0427 10/16/22  0712   TOTAL PROTEIN 6.0 7.0   ALBUMIN 3.20* 3.80   GLOBULIN 2.8 3.2   ALT (SGPT) 25 33   AST (SGOT) 26 40   BILIRUBIN 0.2 0.3   ALK PHOS 110 142*   LIPASE  --  34         Lab 10/17/22  0427 10/16/22  1817 10/16/22  1504 10/16/22  0712   PROBNP  --   --   --  13,718.0*   TROPONIN T 0.031* 0.028 0.036* 0.047*         Lab 10/17/22  0427   CHOLESTEROL 134   LDL CHOL 75   HDL CHOL 46   TRIGLYCERIDES 62             Brief Urine Lab Results     None          Microbiology Results Abnormal     Procedure Component Value - Date/Time    COVID PRE-OP / PRE-PROCEDURE SCREENING ORDER (NO ISOLATION) - Swab, Nasopharynx [[id number]]  (Normal) Collected: 10/16/22 0737    Lab Status: Final result Specimen: Swab from Nasopharynx Updated: 10/16/22 0826    Narrative:      The following orders were created for panel order COVID PRE-OP / PRE-PROCEDURE SCREENING ORDER (NO ISOLATION) - Swab, Nasopharynx.  Procedure                               Abnormality         Status                     ---------                               -----------         ------                     COVID-19 and FLU A/B PCR...[[id number]]  Normal              Final result                 Please view results for these tests on the individual orders.    COVID-19 and FLU A/B PCR - Swab, Nasopharynx [[id number]]  (Normal) Collected: 10/16/22 0737    Lab Status: Final result Specimen:  Swab from Nasopharynx Updated: 10/16/22 0826     COVID19 Not Detected     Influenza A PCR Not Detected     Influenza B PCR Not Detected    Narrative:      Fact sheet for providers: https://www.fda.gov/media/006491/download    Fact sheet for patients: https://www.fda.gov/media/655355/download    Test performed by PCR.          Adult Transthoracic Echo Complete W/ Cont if Necessary Per Protocol    Result Date: 10/16/2022  •  Estimated left ventricular EF = 18% Estimated left ventricular EF was in agreement with the calculated left ventricular EF. Left ventricular ejection fraction appears to be less than 20%. Left ventricular systolic function is severely decreased. •  The left ventricular cavity is moderately dilated. •  The following left ventricular wall segments are hypokinetic: mid anterior, apical anterior, basal anterolateral, mid anterolateral, apical lateral, basal inferolateral, mid inferolateral, apical inferior, mid inferior, apical septal, basal inferoseptal, mid inferoseptal, apex hypokinetic, mid anteroseptal, basal anterior, basal inferior and basal inferoseptal. •  The findings are consistent with dilated cardiomyopathy. •  Left ventricular diastolic function is consistent with (grade II w/high LAP) pseudonormalization. •  Estimated right ventricular systolic pressure from tricuspid regurgitation is mildly elevated (35-45 mmHg). •  Mild pulmonary hypertension is present. •  There is a trivial circumferential pericardial effusion. •  There is a moderate sized left pleural effusion. There is a moderate sized right pleural effusion. •  Normal right ventricular cavity size, wall thickness, systolic function and septal motion noted.     CT Abdomen Pelvis Without Contrast    Result Date: 10/16/2022  DATE OF EXAM: 10/16/2022 7:47 AM  PROCEDURE: CT CHEST WO CONTRAST DIAGNOSTIC-, CT ABDOMEN PELVIS WO CONTRAST-  INDICATIONS: cough, copd, hx pna, + sputum  COMPARISON: No comparisons available.  TECHNIQUE:  Routine transaxial slices were obtained through the chest, abdomen and pelvis without the administration of intravenous contrast. Reconstructed coronal and sagittal images were also obtained. Automated exposure control and iterative construction methods were used.  The radiation dose reduction device was turned on for each scan per the ALARA (As Low as Reasonably Achievable) protocol.  FINDINGS: CT chest: There is no pathologic axillary adenopathy or other worrisome body wall soft tissue finding in the chest. There are small to moderate right and trace left pleural effusions. There is no pericardial effusion. There is no pathologic mediastinal adenopathy. Mildly atherosclerotic, nonaneurysmal thoracic aorta. Evaluation of the osseous structures demonstrates no evidence of acute fracture or aggressive osseous lesion. Evaluation of the lung fields demonstrates diffuse moderate to severe centrilobular emphysema. There is evidence of some mild pulmonary edema, with interlobular septal thickening and intervening groundglass opacity present at the lung bases. Mild four-chamber cardiac enlargement is present. There is no evidence of acute infectious process or distinct suspicious pulmonary nodularity.  CT abdomen pelvis: The body wall soft tissues demonstrate no acute finding. The liver, spleen, pancreas and bilateral adrenal glands demonstrate homogeneous attenuation without evidence of suspicious focal lesion, accounting for limited noncontrast exam. Minimal cholelithiasis is present without acute inflammatory signs of cholecystitis. Simple appearing renal cysts are present, with the kidneys otherwise demonstrate no evidence of stones, hydronephrosis or nephropathy. Small and large bowel loops are nondilated, with evaluation limited due to noncontrast technique and paucity of intra-abdominal fat. Diverticulosis changes are noted, without acute inflammatory signs of diverticulitis. There is no free fluid or  pneumoperitoneum. Atherosclerotic abdominal aorta. No bulky retroperitoneal lymphadenopathy. The pelvic viscera are normal. There is moderate anasarca.      Impression: There is severe emphysema in addition to bilateral pleural effusions, larger on the right, with evidence of likely cardiogenic pulmonary edema present, most notably at the lung bases. There is otherwise no evidence of pneumonia or suspicious pulmonary nodularity.  Abdominal evaluation is significantly limited due to noncontrast technique and a paucity of intra-abdominal fat. There is no obvious pneumoperitoneum, free fluid, abscess or obstruction. There is moderate diffuse anasarca. Diverticulosis changes are present, without specific evidence of diverticulitis.  This report was finalized on 10/16/2022 8:44 AM by Franklin Chan.      CT Chest Without Contrast Diagnostic    Result Date: 10/16/2022  DATE OF EXAM: 10/16/2022 7:47 AM  PROCEDURE: CT CHEST WO CONTRAST DIAGNOSTIC-, CT ABDOMEN PELVIS WO CONTRAST-  INDICATIONS: cough, copd, hx pna, + sputum  COMPARISON: No comparisons available.  TECHNIQUE: Routine transaxial slices were obtained through the chest, abdomen and pelvis without the administration of intravenous contrast. Reconstructed coronal and sagittal images were also obtained. Automated exposure control and iterative construction methods were used.  The radiation dose reduction device was turned on for each scan per the ALARA (As Low as Reasonably Achievable) protocol.  FINDINGS: CT chest: There is no pathologic axillary adenopathy or other worrisome body wall soft tissue finding in the chest. There are small to moderate right and trace left pleural effusions. There is no pericardial effusion. There is no pathologic mediastinal adenopathy. Mildly atherosclerotic, nonaneurysmal thoracic aorta. Evaluation of the osseous structures demonstrates no evidence of acute fracture or aggressive osseous lesion. Evaluation of the lung fields  demonstrates diffuse moderate to severe centrilobular emphysema. There is evidence of some mild pulmonary edema, with interlobular septal thickening and intervening groundglass opacity present at the lung bases. Mild four-chamber cardiac enlargement is present. There is no evidence of acute infectious process or distinct suspicious pulmonary nodularity.  CT abdomen pelvis: The body wall soft tissues demonstrate no acute finding. The liver, spleen, pancreas and bilateral adrenal glands demonstrate homogeneous attenuation without evidence of suspicious focal lesion, accounting for limited noncontrast exam. Minimal cholelithiasis is present without acute inflammatory signs of cholecystitis. Simple appearing renal cysts are present, with the kidneys otherwise demonstrate no evidence of stones, hydronephrosis or nephropathy. Small and large bowel loops are nondilated, with evaluation limited due to noncontrast technique and paucity of intra-abdominal fat. Diverticulosis changes are noted, without acute inflammatory signs of diverticulitis. There is no free fluid or pneumoperitoneum. Atherosclerotic abdominal aorta. No bulky retroperitoneal lymphadenopathy. The pelvic viscera are normal. There is moderate anasarca.      Impression: There is severe emphysema in addition to bilateral pleural effusions, larger on the right, with evidence of likely cardiogenic pulmonary edema present, most notably at the lung bases. There is otherwise no evidence of pneumonia or suspicious pulmonary nodularity.  Abdominal evaluation is significantly limited due to noncontrast technique and a paucity of intra-abdominal fat. There is no obvious pneumoperitoneum, free fluid, abscess or obstruction. There is moderate diffuse anasarca. Diverticulosis changes are present, without specific evidence of diverticulitis.  This report was finalized on 10/16/2022 8:44 AM by Franklin Chan.      XR Chest 1 View    Result Date: 10/16/2022  XR CHEST 1 VW-   Date of Exam: 10/16/2022 7:24 AM  Indication: SOA triage protocol.  Comparison:?01/17/2018  Technique:?A single view of the chest was obtained.  FINDINGS:  ?There is mild cardiomegaly.  Pulmonary vessels are within normal limits.  There is emphysematous change of the lungs.  There is left basilar airspace disease which may be due to atelectasis or pneumonia. There is also some hazy right basilar airspace disease which may also be secondary to pneumonia.  There are small bilateral pleural effusions.        Impression:   1.  Small bilateral pleural effusions and mild bibasilar airspace disease which may be secondary to atelectasis or pneumonia.   This report was finalized on 10/16/2022 7:53 AM by Tin Iverson MD.        Results for orders placed during the hospital encounter of 10/16/22    Adult Transthoracic Echo Complete W/ Cont if Necessary Per Protocol    Interpretation Summary  •  Estimated left ventricular EF = 18% Estimated left ventricular EF was in agreement with the calculated left ventricular EF. Left ventricular ejection fraction appears to be less than 20%. Left ventricular systolic function is severely decreased.  •  The left ventricular cavity is moderately dilated.  •  The following left ventricular wall segments are hypokinetic: mid anterior, apical anterior, basal anterolateral, mid anterolateral, apical lateral, basal inferolateral, mid inferolateral, apical inferior, mid inferior, apical septal, basal inferoseptal, mid inferoseptal, apex hypokinetic, mid anteroseptal, basal anterior, basal inferior and basal inferoseptal.  •  The findings are consistent with dilated cardiomyopathy.  •  Left ventricular diastolic function is consistent with (grade II w/high LAP) pseudonormalization.  •  Estimated right ventricular systolic pressure from tricuspid regurgitation is mildly elevated (35-45 mmHg).  •  Mild pulmonary hypertension is present.  •  There is a trivial circumferential pericardial  effusion.  •  There is a moderate sized left pleural effusion. There is a moderate sized right pleural effusion.  •  Normal right ventricular cavity size, wall thickness, systolic function and septal motion noted.      I have reviewed the medications:  Scheduled Meds:atorvastatin, 40 mg, Oral, Nightly  bumetanide, 0.5 mg, Oral, BID  carvedilol, 12.5 mg, Oral, BID  doxycycline, 100 mg, Oral, Q12H  empagliflozin, 10 mg, Oral, Daily  enoxaparin, 40 mg, Subcutaneous, Q24H  insulin lispro, 0-7 Units, Subcutaneous, TID AC  ipratropium-albuterol, 3 mL, Nebulization, 4x Daily - RT  pantoprazole, 40 mg, Oral, BID AC  pharmacy consult - MTM, , Does not apply, Daily  sacubitril-valsartan, 1 tablet, Oral, Q12H  senna-docusate sodium, 2 tablet, Oral, BID  sodium chloride, 10 mL, Intravenous, Q12H  spironolactone, 25 mg, Oral, Daily      Continuous Infusions:dilTIAZem, 5-15 mg/hr, Last Rate: Stopped (10/17/22 0107)      PRN Meds:.•  acetaminophen **OR** acetaminophen **OR** acetaminophen  •  senna-docusate sodium **AND** polyethylene glycol **AND** bisacodyl **AND** bisacodyl  •  dextrose  •  dextrose  •  glucagon (human recombinant)  •  ipratropium-albuterol  •  sodium chloride  •  sodium chloride    Assessment & Plan   Assessment & Plan     Active Hospital Problems    Diagnosis  POA   • **Congestive heart failure, unspecified HF chronicity, unspecified heart failure type (HCC) [I50.9]  Yes   • Dilated cardiomyopathy (HCC) [I42.0]  Yes   • Chest pain [R07.9]  Yes   • COPD (chronic obstructive pulmonary disease) (HCC) [J44.9]  Yes   • Tobacco abuse [Z72.0]  Yes      Resolved Hospital Problems   No resolved problems to display.        Brief Hospital Course to date:  Francisco Clark is a 61 y.o. male with past medical history of essential hypertension, systolic heart failure, tobacco use disorder, quit 1 week ago, GERD and dysphagia presented to the hospital with worsening shortness of breath and generalized weakness    Assessment  and plan:  Acute decompensated systolic heart failure, ejection fraction 15%  Acute hypoxia secondary to pulmonary edema  Essential pretension  · Repeat echo showed ejection fraction of 15 to 20%  · Started on heart failure goal-directed directed medications with Entresto 24/26, Jardiance, Aldactone  · Currently on Coreg, titrate as tolerated by his blood pressure  · Seen by cardiology team, with plan for left heart cath  · Will need LifeVest upon discharge    Possible COPD   Tobacco use disorder  · The patient does not carry a diagnosis of COPD but with his smoking history and clinical exam, he would benefit from pulmonary function test as outpatient  · Continue DuoNebs  · Continue p.o. doxycycline given his increased cough and sputum production     Prediabetes  · A1c 6.2%  · Diabetic diet and insulin sliding scale  · Continue Jardiance    GERD  Odynophagia  · GI team consulted to evaluate the patient for EGD  · Continue PPI    Hx of tobacco abuse  · Quit 1 week ago   · Counseled       Expected Discharge Location and Transportation: Home  Expected Discharge Date: 10/19/2022    DVT prophylaxis:  Medical and mechanical DVT prophylaxis orders are present.          CODE STATUS:   Code Status and Medical Interventions:   Ordered at: 10/16/22 1054     Code Status (Patient has no pulse and is not breathing):    CPR (Attempt to Resuscitate)     Medical Interventions (Patient has pulse or is breathing):    Full Support       Cleve Giang MD  10/18/22

## 2022-10-19 ENCOUNTER — APPOINTMENT (OUTPATIENT)
Dept: GENERAL RADIOLOGY | Facility: HOSPITAL | Age: 62
End: 2022-10-19

## 2022-10-19 PROBLEM — I50.23 ACUTE ON CHRONIC HFREF (HEART FAILURE WITH REDUCED EJECTION FRACTION): Status: ACTIVE | Noted: 2022-10-19

## 2022-10-19 LAB
ANION GAP SERPL CALCULATED.3IONS-SCNC: 7 MMOL/L (ref 5–15)
BASOPHILS # BLD AUTO: 0.02 10*3/MM3 (ref 0–0.2)
BASOPHILS NFR BLD AUTO: 0.3 % (ref 0–1.5)
BUN SERPL-MCNC: 32 MG/DL (ref 8–23)
BUN/CREAT SERPL: 38.6 (ref 7–25)
CALCIUM SPEC-SCNC: 8.8 MG/DL (ref 8.6–10.5)
CHLORIDE SERPL-SCNC: 100 MMOL/L (ref 98–107)
CO2 SERPL-SCNC: 34 MMOL/L (ref 22–29)
CREAT SERPL-MCNC: 0.83 MG/DL (ref 0.76–1.27)
DEPRECATED RDW RBC AUTO: 51.5 FL (ref 37–54)
EGFRCR SERPLBLD CKD-EPI 2021: 99 ML/MIN/1.73
EOSINOPHIL # BLD AUTO: 0.02 10*3/MM3 (ref 0–0.4)
EOSINOPHIL NFR BLD AUTO: 0.3 % (ref 0.3–6.2)
ERYTHROCYTE [DISTWIDTH] IN BLOOD BY AUTOMATED COUNT: 17.5 % (ref 12.3–15.4)
GLUCOSE BLDC GLUCOMTR-MCNC: 123 MG/DL (ref 70–130)
GLUCOSE BLDC GLUCOMTR-MCNC: 157 MG/DL (ref 70–130)
GLUCOSE SERPL-MCNC: 96 MG/DL (ref 65–99)
HCT VFR BLD AUTO: 43.5 % (ref 37.5–51)
HGB BLD-MCNC: 12.5 G/DL (ref 13–17.7)
IMM GRANULOCYTES # BLD AUTO: 0.03 10*3/MM3 (ref 0–0.05)
IMM GRANULOCYTES NFR BLD AUTO: 0.4 % (ref 0–0.5)
LYMPHOCYTES # BLD AUTO: 1.26 10*3/MM3 (ref 0.7–3.1)
LYMPHOCYTES NFR BLD AUTO: 17 % (ref 19.6–45.3)
MAGNESIUM SERPL-MCNC: 2.1 MG/DL (ref 1.6–2.4)
MCH RBC QN AUTO: 23.9 PG (ref 26.6–33)
MCHC RBC AUTO-ENTMCNC: 28.7 G/DL (ref 31.5–35.7)
MCV RBC AUTO: 83 FL (ref 79–97)
MONOCYTES # BLD AUTO: 0.87 10*3/MM3 (ref 0.1–0.9)
MONOCYTES NFR BLD AUTO: 11.7 % (ref 5–12)
NEUTROPHILS NFR BLD AUTO: 5.23 10*3/MM3 (ref 1.7–7)
NEUTROPHILS NFR BLD AUTO: 70.3 % (ref 42.7–76)
NRBC BLD AUTO-RTO: 0 /100 WBC (ref 0–0.2)
PHOSPHATE SERPL-MCNC: 4.2 MG/DL (ref 2.5–4.5)
PLATELET # BLD AUTO: 265 10*3/MM3 (ref 140–450)
PMV BLD AUTO: 9.9 FL (ref 6–12)
POTASSIUM SERPL-SCNC: 4.1 MMOL/L (ref 3.5–5.2)
RBC # BLD AUTO: 5.24 10*6/MM3 (ref 4.14–5.8)
SODIUM SERPL-SCNC: 141 MMOL/L (ref 136–145)
WBC NRBC COR # BLD: 7.43 10*3/MM3 (ref 3.4–10.8)

## 2022-10-19 PROCEDURE — 25010000002 CEFTRIAXONE PER 250 MG: Performed by: INTERNAL MEDICINE

## 2022-10-19 PROCEDURE — 85025 COMPLETE CBC W/AUTO DIFF WBC: CPT | Performed by: INTERNAL MEDICINE

## 2022-10-19 PROCEDURE — 80048 BASIC METABOLIC PNL TOTAL CA: CPT | Performed by: INTERNAL MEDICINE

## 2022-10-19 PROCEDURE — 63710000001 INSULIN LISPRO (HUMAN) PER 5 UNITS: Performed by: INTERNAL MEDICINE

## 2022-10-19 PROCEDURE — 25010000002 METHYLPREDNISOLONE PER 40 MG: Performed by: INTERNAL MEDICINE

## 2022-10-19 PROCEDURE — 84100 ASSAY OF PHOSPHORUS: CPT | Performed by: INTERNAL MEDICINE

## 2022-10-19 PROCEDURE — 82962 GLUCOSE BLOOD TEST: CPT

## 2022-10-19 PROCEDURE — 71045 X-RAY EXAM CHEST 1 VIEW: CPT

## 2022-10-19 PROCEDURE — 94761 N-INVAS EAR/PLS OXIMETRY MLT: CPT

## 2022-10-19 PROCEDURE — 87040 BLOOD CULTURE FOR BACTERIA: CPT | Performed by: INTERNAL MEDICINE

## 2022-10-19 PROCEDURE — 94799 UNLISTED PULMONARY SVC/PX: CPT

## 2022-10-19 PROCEDURE — G0378 HOSPITAL OBSERVATION PER HR: HCPCS

## 2022-10-19 PROCEDURE — 83735 ASSAY OF MAGNESIUM: CPT | Performed by: INTERNAL MEDICINE

## 2022-10-19 PROCEDURE — 99226 PR SBSQ OBSERVATION CARE/DAY 35 MINUTES: CPT | Performed by: INTERNAL MEDICINE

## 2022-10-19 PROCEDURE — 94664 DEMO&/EVAL PT USE INHALER: CPT

## 2022-10-19 PROCEDURE — 25010000002 ENOXAPARIN PER 10 MG: Performed by: INTERNAL MEDICINE

## 2022-10-19 PROCEDURE — 99214 OFFICE O/P EST MOD 30 MIN: CPT | Performed by: INTERNAL MEDICINE

## 2022-10-19 RX ORDER — BUMETANIDE 1 MG/1
1 TABLET ORAL DAILY
Status: DISCONTINUED | OUTPATIENT
Start: 2022-10-19 | End: 2022-10-20 | Stop reason: HOSPADM

## 2022-10-19 RX ORDER — BUDESONIDE AND FORMOTEROL FUMARATE DIHYDRATE 160; 4.5 UG/1; UG/1
2 AEROSOL RESPIRATORY (INHALATION)
Qty: 10 G | Refills: 2 | Status: SHIPPED | OUTPATIENT
Start: 2022-10-19 | End: 2023-03-07 | Stop reason: SDUPTHER

## 2022-10-19 RX ORDER — ALBUTEROL SULFATE 90 UG/1
2 AEROSOL, METERED RESPIRATORY (INHALATION) EVERY 4 HOURS PRN
Qty: 18 G | Refills: 2 | Status: SHIPPED | OUTPATIENT
Start: 2022-10-19

## 2022-10-19 RX ORDER — METHYLPREDNISOLONE SODIUM SUCCINATE 40 MG/ML
40 INJECTION, POWDER, LYOPHILIZED, FOR SOLUTION INTRAMUSCULAR; INTRAVENOUS EVERY 8 HOURS
Status: DISCONTINUED | OUTPATIENT
Start: 2022-10-19 | End: 2022-10-20 | Stop reason: HOSPADM

## 2022-10-19 RX ORDER — CARVEDILOL 6.25 MG/1
6.25 TABLET ORAL 2 TIMES DAILY
Status: DISCONTINUED | OUTPATIENT
Start: 2022-10-19 | End: 2022-10-20 | Stop reason: HOSPADM

## 2022-10-19 RX ORDER — BUMETANIDE 0.5 MG/1
0.5 TABLET ORAL DAILY
Qty: 30 TABLET | Refills: 1 | Status: SHIPPED | OUTPATIENT
Start: 2022-10-19 | End: 2023-02-14 | Stop reason: SDUPTHER

## 2022-10-19 RX ORDER — GUAIFENESIN/DEXTROMETHORPHAN 100-10MG/5
10 SYRUP ORAL EVERY 4 HOURS PRN
Status: DISCONTINUED | OUTPATIENT
Start: 2022-10-19 | End: 2022-10-20 | Stop reason: HOSPADM

## 2022-10-19 RX ORDER — SPIRONOLACTONE 25 MG/1
12.5 TABLET ORAL DAILY
Qty: 15 TABLET | Refills: 1 | Status: SHIPPED | OUTPATIENT
Start: 2022-10-20 | End: 2022-11-02

## 2022-10-19 RX ORDER — DOXYCYCLINE 100 MG/1
100 CAPSULE ORAL 2 TIMES DAILY
Qty: 5 CAPSULE | Refills: 0 | Status: SHIPPED | OUTPATIENT
Start: 2022-10-19 | End: 2022-10-21

## 2022-10-19 RX ORDER — SPIRONOLACTONE 25 MG/1
12.5 TABLET ORAL DAILY
Status: DISCONTINUED | OUTPATIENT
Start: 2022-10-19 | End: 2022-10-20 | Stop reason: HOSPADM

## 2022-10-19 RX ORDER — CARVEDILOL 6.25 MG/1
6.25 TABLET ORAL 2 TIMES DAILY WITH MEALS
Qty: 60 TABLET | Refills: 1 | Status: SHIPPED | OUTPATIENT
Start: 2022-10-19 | End: 2022-11-09 | Stop reason: SDUPTHER

## 2022-10-19 RX ORDER — GUAIFENESIN/DEXTROMETHORPHAN 100-10MG/5
5 SYRUP ORAL 3 TIMES DAILY PRN
Qty: 237 ML | Refills: 1 | Status: SHIPPED | OUTPATIENT
Start: 2022-10-19 | End: 2022-10-31

## 2022-10-19 RX ORDER — CEFDINIR 300 MG/1
300 CAPSULE ORAL 2 TIMES DAILY
Qty: 14 CAPSULE | Refills: 0 | Status: SHIPPED | OUTPATIENT
Start: 2022-10-19 | End: 2022-10-26

## 2022-10-19 RX ORDER — OMEPRAZOLE 40 MG/1
40 CAPSULE, DELAYED RELEASE ORAL DAILY
Qty: 30 CAPSULE | Refills: 0 | Status: SHIPPED | OUTPATIENT
Start: 2022-10-19 | End: 2022-11-18

## 2022-10-19 RX ORDER — PREDNISONE 20 MG/1
40 TABLET ORAL DAILY
Qty: 10 TABLET | Refills: 0 | Status: SHIPPED | OUTPATIENT
Start: 2022-10-19 | End: 2022-10-24

## 2022-10-19 RX ADMIN — ACETAMINOPHEN 650 MG: 325 TABLET, FILM COATED ORAL at 21:11

## 2022-10-19 RX ADMIN — SACUBITRIL AND VALSARTAN 1 TABLET: 24; 26 TABLET, FILM COATED ORAL at 09:59

## 2022-10-19 RX ADMIN — Medication 10 ML: at 09:59

## 2022-10-19 RX ADMIN — IPRATROPIUM BROMIDE AND ALBUTEROL SULFATE 3 ML: .5; 3 SOLUTION RESPIRATORY (INHALATION) at 20:22

## 2022-10-19 RX ADMIN — IPRATROPIUM BROMIDE AND ALBUTEROL SULFATE 3 ML: .5; 3 SOLUTION RESPIRATORY (INHALATION) at 08:06

## 2022-10-19 RX ADMIN — IPRATROPIUM BROMIDE AND ALBUTEROL SULFATE 3 ML: .5; 3 SOLUTION RESPIRATORY (INHALATION) at 11:44

## 2022-10-19 RX ADMIN — DOXYCYCLINE 100 MG: 100 CAPSULE ORAL at 21:11

## 2022-10-19 RX ADMIN — Medication 10 ML: at 21:11

## 2022-10-19 RX ADMIN — BUMETANIDE 1 MG: 1 TABLET ORAL at 09:58

## 2022-10-19 RX ADMIN — SENNOSIDES AND DOCUSATE SODIUM 2 TABLET: 50; 8.6 TABLET ORAL at 21:11

## 2022-10-19 RX ADMIN — ATORVASTATIN CALCIUM 40 MG: 40 TABLET, FILM COATED ORAL at 21:11

## 2022-10-19 RX ADMIN — GUAIFENESIN AND DEXTROMETHORPHAN 10 ML: 100; 10 SYRUP ORAL at 21:11

## 2022-10-19 RX ADMIN — INSULIN LISPRO 2 UNITS: 100 INJECTION, SOLUTION INTRAVENOUS; SUBCUTANEOUS at 12:45

## 2022-10-19 RX ADMIN — PANTOPRAZOLE SODIUM 40 MG: 40 TABLET, DELAYED RELEASE ORAL at 09:59

## 2022-10-19 RX ADMIN — SPIRONOLACTONE 12.5 MG: 25 TABLET ORAL at 09:58

## 2022-10-19 RX ADMIN — EMPAGLIFLOZIN 10 MG: 10 TABLET, FILM COATED ORAL at 09:59

## 2022-10-19 RX ADMIN — SACUBITRIL AND VALSARTAN 1 TABLET: 24; 26 TABLET, FILM COATED ORAL at 21:11

## 2022-10-19 RX ADMIN — DOXYCYCLINE 100 MG: 100 CAPSULE ORAL at 09:58

## 2022-10-19 RX ADMIN — METHYLPREDNISOLONE SODIUM SUCCINATE 40 MG: 40 INJECTION, POWDER, FOR SOLUTION INTRAMUSCULAR; INTRAVENOUS at 12:46

## 2022-10-19 RX ADMIN — ENOXAPARIN SODIUM 40 MG: 40 INJECTION SUBCUTANEOUS at 18:24

## 2022-10-19 RX ADMIN — IPRATROPIUM BROMIDE AND ALBUTEROL SULFATE 3 ML: .5; 3 SOLUTION RESPIRATORY (INHALATION) at 16:02

## 2022-10-19 RX ADMIN — PANTOPRAZOLE SODIUM 40 MG: 40 TABLET, DELAYED RELEASE ORAL at 18:24

## 2022-10-19 RX ADMIN — CARVEDILOL 6.25 MG: 6.25 TABLET, FILM COATED ORAL at 09:58

## 2022-10-19 RX ADMIN — SODIUM CHLORIDE 2 G: 900 INJECTION INTRAVENOUS at 13:46

## 2022-10-19 RX ADMIN — METHYLPREDNISOLONE SODIUM SUCCINATE 40 MG: 40 INJECTION, POWDER, FOR SOLUTION INTRAMUSCULAR; INTRAVENOUS at 21:11

## 2022-10-19 NOTE — PROGRESS NOTES
"[facility] Progress Note     LOS: 1 day   Patient Care Team:  Provider, No Known as PCP - General  PCP:  Provider, No Known    Chief Complaint: CHF    SUBJECTIVE: Resting comfortably in bed.  No acute issues.  Telemetry reveals sinus rhythm.    Review of Systems:   All systems have been reviewed and are negative with the exception of those mentioned above.      OBJECTIVE:    Vital Sign Min/Max for last 24 hours  Temp  Min: 97.9 °F (36.6 °C)  Max: 98.3 °F (36.8 °C)   BP  Min: 86/53  Max: 145/76   Pulse  Min: 75  Max: 92   Resp  Min: 16  Max: 18   SpO2  Min: 90 %  Max: 98 %   No data recorded   Weight  Min: 50.8 kg (112 lb)  Max: 50.8 kg (112 lb)     Flowsheet Rows    Flowsheet Row First Filed Value   Admission Height 177.8 cm (70\") Documented at 10/16/2022 0704   Admission Weight 59 kg (130 lb) Documented at 10/16/2022 0704              Intake/Output Summary (Last 24 hours) at 10/19/2022 0857  Last data filed at 10/18/2022 1700  Gross per 24 hour   Intake 500 ml   Output --   Net 500 ml     Intake & Output (last 3 days)       10/16 0701  10/17 0700 10/17 0701  10/18 0700 10/18 0701  10/19 0700 10/19 0701  10/20 0700    P.O.  630 500     Total Intake(mL/kg)  630 (11.9) 500 (9.8)     Net  +630 +500             Urine Unmeasured Occurrence  3 x             Physical Exam:    General Appearance:    Alert, cooperative, no distress, appears stated age   Neck:   Supple, symmetrical, trachea midline.   Lungs:    Diminished throughout, in particular at the bases,, respirations unlabored   Chest Wall:    No tenderness or deformity    Heart:    Regular rate and rhythm, S1 and S2 normal, no murmur, rub   or gallop, normal carotid impulse bilaterally without bruit.   Extremities:   Extremities normal, atraumatic, no cyanosis or edema   Pulses:   2+ and symmetric all extremities   Skin:   Skin color, texture, turgor normal, no rashes or lesions      LABS/DIAGNOSTIC DATA:  Results " from last 7 days   Lab Units 10/19/22  0437 10/18/22  0339 10/17/22  0427   WBC 10*3/mm3 7.43 7.22 6.89   HEMOGLOBIN g/dL 12.5* 11.9* 11.4*   HEMATOCRIT % 43.5 40.1 39.3   PLATELETS 10*3/mm3 265 256 231     Lab Results   Lab Value Date/Time    TROPONINT 0.031 (C) 10/17/2022 0427    TROPONINT 0.028 10/16/2022 1817    TROPONINT 0.036 (C) 10/16/2022 1504    TROPONINT 0.047 (C) 10/16/2022 0712         Results from last 7 days   Lab Units 10/19/22  0437 10/18/22  0339 10/17/22  0427 10/16/22  0712   SODIUM mmol/L 141 140 142 138   POTASSIUM mmol/L 4.1 3.6 4.4 4.5   CHLORIDE mmol/L 100 98 103 101   CO2 mmol/L 34.0* 35.0* 30.0* 27.0   BUN mg/dL 32* 42* 31* 28*   CREATININE mg/dL 0.83 0.88 0.84 0.88   CALCIUM mg/dL 8.8 9.0 8.8 9.3   BILIRUBIN mg/dL  --   --  0.2 0.3   ALK PHOS U/L  --   --  110 142*   ALT (SGPT) U/L  --   --  25 33   AST (SGOT) U/L  --   --  26 40   GLUCOSE mg/dL 96 124* 133* 132*         Results from last 7 days   Lab Units 10/17/22  0427   CHOLESTEROL mg/dL 134   TRIGLYCERIDES mg/dL 62   HDL CHOL mg/dL 46   LDL CHOL mg/dL 75               Medication Review:   atorvastatin, 40 mg, Oral, Nightly  bumetanide, 1 mg, Oral, Daily  carvedilol, 6.25 mg, Oral, BID  doxycycline, 100 mg, Oral, Q12H  empagliflozin, 10 mg, Oral, Daily  enoxaparin, 40 mg, Subcutaneous, Q24H  insulin lispro, 0-7 Units, Subcutaneous, TID AC  ipratropium-albuterol, 3 mL, Nebulization, 4x Daily - RT  pantoprazole, 40 mg, Oral, BID AC  pharmacy consult - MTM, , Does not apply, Daily  sacubitril-valsartan, 1 tablet, Oral, Q12H  senna-docusate sodium, 2 tablet, Oral, BID  sodium chloride, 10 mL, Intravenous, Q12H  spironolactone, 12.5 mg, Oral, Daily       dilTIAZem, 5-15 mg/hr, Last Rate: Stopped (10/17/22 0107)         ASSESSMENT/PLAN:    Congestive heart failure, unspecified HF chronicity, unspecified heart failure type (HCC)    Chest pain    COPD (chronic obstructive pulmonary disease) (HCC)    Tobacco abuse    Dilated cardiomyopathy  (HCC)    Severe malnutrition (HCC)      Heart failure with reduced ejection fraction, acute on chronic combined systolic and diastolic, EF 18%, associated grade 2 diastolic dysfunction, nonischemic:  -Appears stable for discharge on current cardiac medical therapy  -LifeVest at discharge  -We will arrange for early follow-up in heart valve clinic for further titration of guideline directed medical therapy.        Amador Coreas III, MD   10/19/22  08:57 EDT

## 2022-10-19 NOTE — PROGRESS NOTES
[facility] Medicine Services  PROGRESS NOTE    Patient Name: Francisco Clark  : 1960  MRN: [id number]    Date of Admission: 10/16/2022  Primary Care Physician: Provider, No Known    Subjective   Subjective     CC:  Follow-up for shortness of breath and heart failure    HPI:  I have seen and evaluated the patient this morning.  Slightly more short of breath today.  He started having with productive cough today with lots of sputum production.  Cough started this morning only.  Did 6-minute walk test myself and his oxygen saturation remained around 91% on room air.  Patient wanted to go home but with his worsening cough, he agreed to stay another day for further evaluation.    ROS:  General : no fevers, chills  CVS: No chest pain, palpitations  Respiratory: + cough, + dyspnea  GI: No N/V/D, abd pain  10 point review of systems is negative except for what is mentioned in HPI    Objective   Objective     Vital Signs:   Temp:  [97.9 °F (36.6 °C)-98.3 °F (36.8 °C)] 97.9 °F (36.6 °C)  Heart Rate:  [75-92] 88  Resp:  [16-18] 18  BP: ()/() 101/63  Flow (L/min):  [2-4] 4     Physical Exam:  General: Chronically looking, not in distress, conversant and cooperative  Head: Atraumatic and normocephalic  Eyes: No Icterus. No pallor  Ears:  Ears appear intact with no abnormalities noted  Throat: No oral lesions, no thrush  Neck: Supple, trachea midline  Lungs: Clear to auscultation bilaterally, equal air entry, scattered crackles  Heart:  Normal S1 and S2, no murmur, no gallop, No JVD, no lower extremity swelling  Abdomen:  Soft, no tenderness, no organomegaly, normal bowel sounds, no organomegaly  Extremities: pulses equal bilaterally  Skin: No bleeding, bruising or rash, normal skin turgor and elasticity  Neurologic: Cranial nerves appear intact with no evidence of facial asymmetry, normal motor and sensory functions in all 4 extremities  Psych: Alert and oriented x 3, normal  mood    Results Reviewed:  LAB RESULTS:      Lab 10/19/22  0437 10/18/22  0339 10/17/22  0427 10/16/22  1817 10/16/22  0712   WBC 7.43 7.22 6.89  --  7.14   HEMOGLOBIN 12.5* 11.9* 11.4*  --  13.1   HEMATOCRIT 43.5 40.1 39.3  --  44.2   PLATELETS 265 256 231  --  236   NEUTROS ABS 5.23 5.19  --   --  5.22   IMMATURE GRANS (ABS) 0.03 0.02  --   --  0.03   LYMPHS ABS 1.26 1.21  --   --  1.06   MONOS ABS 0.87 0.75  --   --  0.80   EOS ABS 0.02 0.03  --   --  0.01   MCV 83.0 81.2 83.1  --  81.5   D DIMER QUANT  --   --   --  0.53*  --          Lab 10/19/22  0437 10/18/22  0339 10/17/22  0427 10/16/22  0712   SODIUM 141 140 142 138   POTASSIUM 4.1 3.6 4.4 4.5   CHLORIDE 100 98 103 101   CO2 34.0* 35.0* 30.0* 27.0   ANION GAP 7.0 7.0 9.0 10.0   BUN 32* 42* 31* 28*   CREATININE 0.83 0.88 0.84 0.88   EGFR 99.0 97.2 98.6 97.2   GLUCOSE 96 124* 133* 132*   CALCIUM 8.8 9.0 8.8 9.3   MAGNESIUM 2.1 1.5* 2.0  --    PHOSPHORUS 4.2 4.6* 4.9*  --          Lab 10/17/22  0427 10/16/22  0712   TOTAL PROTEIN 6.0 7.0   ALBUMIN 3.20* 3.80   GLOBULIN 2.8 3.2   ALT (SGPT) 25 33   AST (SGOT) 26 40   BILIRUBIN 0.2 0.3   ALK PHOS 110 142*   LIPASE  --  34         Lab 10/17/22  0427 10/16/22  1817 10/16/22  1504 10/16/22  0712   PROBNP  --   --   --  13,718.0*   TROPONIN T 0.031* 0.028 0.036* 0.047*         Lab 10/17/22  0427   CHOLESTEROL 134   LDL CHOL 75   HDL CHOL 46   TRIGLYCERIDES 62             Brief Urine Lab Results     None          Microbiology Results Abnormal     Procedure Component Value - Date/Time    COVID PRE-OP / PRE-PROCEDURE SCREENING ORDER (NO ISOLATION) - Swab, Nasopharynx [[id number]]  (Normal) Collected: 10/16/22 0737    Lab Status: Final result Specimen: Swab from Nasopharynx Updated: 10/16/22 0826    Narrative:      The following orders were created for panel order COVID PRE-OP / PRE-PROCEDURE SCREENING ORDER (NO ISOLATION) - Swab, Nasopharynx.  Procedure                               Abnormality         Status                      ---------                               -----------         ------                     COVID-19 and FLU A/B PCR...[[id number]]  Normal              Final result                 Please view results for these tests on the individual orders.    COVID-19 and FLU A/B PCR - Swab, Nasopharynx [[id number]]  (Normal) Collected: 10/16/22 0737    Lab Status: Final result Specimen: Swab from Nasopharynx Updated: 10/16/22 0826     COVID19 Not Detected     Influenza A PCR Not Detected     Influenza B PCR Not Detected    Narrative:      Fact sheet for providers: https://www.fda.gov/media/759296/download    Fact sheet for patients: https://www.fda.gov/media/793999/download    Test performed by PCR.          FL Esophagram Complete Single Contrast    Result Date: 10/18/2022  EXAMINATION: FL ESOPHAGRAM COMPLETE SINGLE-CONTRAST-  INDICATION: Dysphagia; I50.9-Heart failure, unspecified; O69-Usvcsfb effusion, not elsewhere classified; R06.00-Dyspnea, unspecified; R06.89-Other abnormalities of breathing; J44.9-Chronic obstructive pulmonary disease, unspecified  TECHNIQUE: 48 seconds of fluoroscopic time was used for this exam. 10 associated fluoroscopic series were saved.  imaging reveals a nonobstructive bowel gas pattern.  COMPARISON: NONE  FINDINGS: Under fluoroscopic observation, the patient ingested thin barium. The oral phase of deglutition appeared normal. The esophageal mucosa appeared grossly normal. There was no evidence of a focal esophageal stricture. Gastroesophageal reflux was not demonstrated during this exam. Examination of the stomach demonstrated grossly normal gastric mucosa and gastric folds.       Impression: Fluoroscopic guided esophagram series appeared within normal limits.    This report was finalized on 10/18/2022 3:35 PM by Rome Lambert.      Cardiac Catheterization/Vascular Study    Result Date: 10/18/2022  Table formatting from the original result was not included. Images from the original  result were not included.  [facility] CARDIOLOGY [address], [address] [place], [place], [address]  [phone]  [email or web address]   CARDIAC CATHETERIZATION PROCEDURE NOTE     Impression: Normal coronaries LVEDP 9 mmHg RECOMMENDATIONS: Continue guideline directed medical therapy for nonischemic cardiomyopathy. --------------------------------------------------------------------------- ------------------------------------------- Indication(s) for this Procedure:  Severe cardiomyopathy Procedure(s) Performed: 1.  Right radial artery access . 2. Selective coronary angiography 3. Left heart catheterization.  Description of the Procedure:   Informed consent was obtained with the goals, rationale, alternatives, risks and benefits of the procedure explained to the patient.  A 6Fr Terumo slender sheath was placed in the right radial artery. Selective angiography of the right coronary artery was performed with a 5Fr JR4 diagnostic catheter.  Selective angiography of the left coronary arteries was performed with a 5Fr JL3.5 diagnostic catheter.  Left heart catheterization was performed with a 5Fr JR4 catheter placed in the left ventricle.  The procedure was completed and the sheath was removed. A radial patent hemostasis band was placed for hemostasis.  The patient and their family were updated on the findings and plan at the conclusion of the case.  Angiographic Findings: Right coronary dominant circulation Left main artery:   Large-caliber vessel trifurcates into an LAD, ramus, and circumflex, normal. Left anterior descending artery: Large-caliber vessel gives rise to multiple septal perforators, normal. Ramus intermedius: Large-caliber vessel bifurcates into 2 branches supplying the lateral wall, normal. Left circumflex artery: Moderate caliber nondominant gives rise to 3 OM branches, normal Right coronary artery: Large-caliber dominant vessel gives rise to PDA and PLV, normal. Left Ventricle: LVEF 15% by  echocardiogram Hemodynamic Findings: Ao pressure: 78/52 mmHg LVEDP: 9 mmHg LV to Ao pullback peak to peak gradient: 0 mmHg Estimated Blood Loss: Minimal Specimen(s): None obtained Sheath: Removed, radial patent hemostasis band was placed for hemostasis. Complications: There were no apparent early complications. Kd Roque MD, [facility] Interventional Cardiology       Results for orders placed during the hospital encounter of 10/16/22    Adult Transthoracic Echo Complete W/ Cont if Necessary Per Protocol    Interpretation Summary  •  Estimated left ventricular EF = 18% Estimated left ventricular EF was in agreement with the calculated left ventricular EF. Left ventricular ejection fraction appears to be less than 20%. Left ventricular systolic function is severely decreased.  •  The left ventricular cavity is moderately dilated.  •  The following left ventricular wall segments are hypokinetic: mid anterior, apical anterior, basal anterolateral, mid anterolateral, apical lateral, basal inferolateral, mid inferolateral, apical inferior, mid inferior, apical septal, basal inferoseptal, mid inferoseptal, apex hypokinetic, mid anteroseptal, basal anterior, basal inferior and basal inferoseptal.  •  The findings are consistent with dilated cardiomyopathy.  •  Left ventricular diastolic function is consistent with (grade II w/high LAP) pseudonormalization.  •  Estimated right ventricular systolic pressure from tricuspid regurgitation is mildly elevated (35-45 mmHg).  •  Mild pulmonary hypertension is present.  •  There is a trivial circumferential pericardial effusion.  •  There is a moderate sized left pleural effusion. There is a moderate sized right pleural effusion.  •  Normal right ventricular cavity size, wall thickness, systolic function and septal motion noted.      I have reviewed the medications:  Scheduled Meds:atorvastatin, 40 mg, Oral, Nightly  bumetanide, 0.5 mg, Oral, BID  carvedilol, 12.5 mg, Oral,  BID  doxycycline, 100 mg, Oral, Q12H  empagliflozin, 10 mg, Oral, Daily  enoxaparin, 40 mg, Subcutaneous, Q24H  insulin lispro, 0-7 Units, Subcutaneous, TID AC  ipratropium-albuterol, 3 mL, Nebulization, 4x Daily - RT  pantoprazole, 40 mg, Oral, BID AC  pharmacy consult - MTM, , Does not apply, Daily  sacubitril-valsartan, 1 tablet, Oral, Q12H  senna-docusate sodium, 2 tablet, Oral, BID  sodium chloride, 10 mL, Intravenous, Q12H  spironolactone, 25 mg, Oral, Daily      Continuous Infusions:dilTIAZem, 5-15 mg/hr, Last Rate: Stopped (10/17/22 0107)      PRN Meds:.•  acetaminophen **OR** acetaminophen **OR** acetaminophen  •  senna-docusate sodium **AND** polyethylene glycol **AND** bisacodyl **AND** bisacodyl  •  dextrose  •  dextrose  •  glucagon (human recombinant)  •  ipratropium-albuterol  •  potassium chloride **OR** potassium chloride **OR** potassium chloride  •  sodium chloride  •  sodium chloride    Assessment & Plan   Assessment & Plan     Active Hospital Problems    Diagnosis  POA   • **Congestive heart failure, unspecified HF chronicity, unspecified heart failure type (HCC) [I50.9]  Yes   • Severe malnutrition ([facility]) [E43]  Yes   • Dilated cardiomyopathy (HCC) [I42.0]  Yes   • Chest pain [R07.9]  Yes   • COPD (chronic obstructive pulmonary disease) ([facility]) [J44.9]  Yes   • Tobacco abuse [Z72.0]  Yes      Resolved Hospital Problems   No resolved problems to display.        Brief Hospital Course to date:  Francisco Clark is a 61 y.o. male with past medical history of essential hypertension, systolic heart failure, tobacco use disorder, quit 1 week ago, GERD and dysphagia presented to the hospital with worsening shortness of breath and generalized weakness    Assessment and plan:  Acute decompensated systolic heart failure, ejection fraction 15%  Nonischemic cardiomyopathy  Acute hypoxia secondary to pulmonary edema, improved  Essential pretension  · Repeat echo showed ejection fraction of 15 to 20%  · Left  heart cath 10/18/2022 with clean coronaries  · Started on heart failure goal-directed directed medications with Entresto 24/26, Jardiance, Aldactone  · Continue Coreg 6.25 mg twice daily.  Blood pressure is borderline but tolerating it well.  · Will need LifeVest upon discharge    Possible COPD   Acute bronchitis  Tobacco use disorder  · The patient was not formally diagnosed with COPD.  But clinical exam and history are suggestive of COPD.  He would benefit from outpatient follow-up with pulmonary team for pulmonary function testing  · For now, continue doxycycline.  Add IV Rocephin given his worsening shortness of breath and productive cough  · Chest x-ray  · DuoNebs  · IV steroids    Prediabetes  · A1c 6.2%  · Diabetic diet and insulin sliding scale  · Continue Jardiance    GERD  Odynophagia  · GI team consulted.  Patient deemed high risk for EGD during this hospitalization.  Esophageal gram was done 10/18/2022 and was normal.  Discussed with GI and plan for empiric treatment with p.o. Protonix 40 mg twice daily for 1 month and follow-up as outpatient for EGDI    Hx of tobacco abuse  · Quit 1 week ago   · Counseled       Expected Discharge Location and Transportation: Home  Expected Discharge Date: 10/20/2022    DVT prophylaxis:  Medical and mechanical DVT prophylaxis orders are present.          CODE STATUS:   Code Status and Medical Interventions:   Ordered at: 10/16/22 1054     Code Status (Patient has no pulse and is not breathing):    CPR (Attempt to Resuscitate)     Medical Interventions (Patient has pulse or is breathing):    Full Support       Cleve Giang MD  10/19/22

## 2022-10-19 NOTE — PROGRESS NOTES
Esophagram reviewed and normal.  No evidence of stricture nor malignancy.        >>> Recommend  BID PPI for 1 month due to concerns of peptic ulcer disease.    >>> Discontinue NSAID use.      >>> Patient to sip on warm liquids with meals    >>> Outpatient follow up with [facility] in 4 weeks    We will sign off.  Please call for any questions or concerns

## 2022-10-19 NOTE — CASE MANAGEMENT/SOCIAL WORK
Continued Stay Note  [facility]     Patient Name: Francisco Clark  MRN: [id number]  Today's Date: 10/19/2022    Admit Date: 10/16/2022    Plan: discharge plan   Discharge Plan     Row Name 10/19/22 1537       Plan    Plan discharge plan    Plan Comments I spoke with pt at bedside regarding discharge plan and pt states he plans to go home with his son. Per discussion in MDR, pt will have a life vest placed prior to discharge. I spoke with pt's primary RN and she states pt is not being discharged today due to cough and pending blood cultures obtained. Pt may need home O2 arranged at discharge as he is currently on 2 L and does not wear home O2. Pt also requesting a new PCP appt at discharge. CM will cont to follow.    Final Discharge Disposition Code 01 - home or self-care               Discharge Codes    No documentation.               Expected Discharge Date and Time     Expected Discharge Date Expected Discharge Time    Oct 19, 2022             Julia Box RN

## 2022-10-19 NOTE — PLAN OF CARE
Goal Outcome Evaluation:  Plan of Care Reviewed With: patient           Outcome Evaluation: Pt a/o, VSS, 2 LNC, Sat. 94%, no c/o pain at this time . Continue monitoring.

## 2022-10-20 ENCOUNTER — READMISSION MANAGEMENT (OUTPATIENT)
Dept: CALL CENTER | Facility: HOSPITAL | Age: 62
End: 2022-10-20

## 2022-10-20 VITALS
HEIGHT: 70 IN | TEMPERATURE: 97.7 F | DIASTOLIC BLOOD PRESSURE: 72 MMHG | HEART RATE: 86 BPM | RESPIRATION RATE: 16 BRPM | BODY MASS INDEX: 16.65 KG/M2 | SYSTOLIC BLOOD PRESSURE: 102 MMHG | OXYGEN SATURATION: 95 % | WEIGHT: 116.3 LBS

## 2022-10-20 LAB
ALBUMIN SERPL-MCNC: 3.5 G/DL (ref 3.5–5.2)
ALBUMIN/GLOB SERPL: 1.3 G/DL
ALP SERPL-CCNC: 110 U/L (ref 39–117)
ALT SERPL W P-5'-P-CCNC: 18 U/L (ref 1–41)
ANION GAP SERPL CALCULATED.3IONS-SCNC: 9 MMOL/L (ref 5–15)
AST SERPL-CCNC: 16 U/L (ref 1–40)
BASOPHILS # BLD AUTO: 0 10*3/MM3 (ref 0–0.2)
BASOPHILS NFR BLD AUTO: 0 % (ref 0–1.5)
BILIRUB SERPL-MCNC: 0.3 MG/DL (ref 0–1.2)
BUN SERPL-MCNC: 34 MG/DL (ref 8–23)
BUN/CREAT SERPL: 37 (ref 7–25)
CALCIUM SPEC-SCNC: 9.3 MG/DL (ref 8.6–10.5)
CHLORIDE SERPL-SCNC: 98 MMOL/L (ref 98–107)
CO2 SERPL-SCNC: 32 MMOL/L (ref 22–29)
CREAT SERPL-MCNC: 0.92 MG/DL (ref 0.76–1.27)
DEPRECATED RDW RBC AUTO: 50.7 FL (ref 37–54)
EGFRCR SERPLBLD CKD-EPI 2021: 94.1 ML/MIN/1.73
EOSINOPHIL # BLD AUTO: 0 10*3/MM3 (ref 0–0.4)
EOSINOPHIL NFR BLD AUTO: 0 % (ref 0.3–6.2)
ERYTHROCYTE [DISTWIDTH] IN BLOOD BY AUTOMATED COUNT: 17.7 % (ref 12.3–15.4)
GLOBULIN UR ELPH-MCNC: 2.8 GM/DL
GLUCOSE BLDC GLUCOMTR-MCNC: 128 MG/DL (ref 70–130)
GLUCOSE BLDC GLUCOMTR-MCNC: 135 MG/DL (ref 70–130)
GLUCOSE SERPL-MCNC: 157 MG/DL (ref 65–99)
HCT VFR BLD AUTO: 46.7 % (ref 37.5–51)
HGB BLD-MCNC: 13.7 G/DL (ref 13–17.7)
IMM GRANULOCYTES # BLD AUTO: 0.03 10*3/MM3 (ref 0–0.05)
IMM GRANULOCYTES NFR BLD AUTO: 0.4 % (ref 0–0.5)
LYMPHOCYTES # BLD AUTO: 0.67 10*3/MM3 (ref 0.7–3.1)
LYMPHOCYTES NFR BLD AUTO: 8.1 % (ref 19.6–45.3)
MAGNESIUM SERPL-MCNC: 2 MG/DL (ref 1.6–2.4)
MCH RBC QN AUTO: 24.2 PG (ref 26.6–33)
MCHC RBC AUTO-ENTMCNC: 29.3 G/DL (ref 31.5–35.7)
MCV RBC AUTO: 82.7 FL (ref 79–97)
MONOCYTES # BLD AUTO: 0.16 10*3/MM3 (ref 0.1–0.9)
MONOCYTES NFR BLD AUTO: 1.9 % (ref 5–12)
NEUTROPHILS NFR BLD AUTO: 7.38 10*3/MM3 (ref 1.7–7)
NEUTROPHILS NFR BLD AUTO: 89.6 % (ref 42.7–76)
NRBC BLD AUTO-RTO: 0 /100 WBC (ref 0–0.2)
PHOSPHATE SERPL-MCNC: 4.8 MG/DL (ref 2.5–4.5)
PLATELET # BLD AUTO: 299 10*3/MM3 (ref 140–450)
PMV BLD AUTO: 9.5 FL (ref 6–12)
POTASSIUM SERPL-SCNC: 4.4 MMOL/L (ref 3.5–5.2)
PROT SERPL-MCNC: 6.3 G/DL (ref 6–8.5)
QT INTERVAL: 404 MS
QTC INTERVAL: 499 MS
RBC # BLD AUTO: 5.65 10*6/MM3 (ref 4.14–5.8)
SODIUM SERPL-SCNC: 139 MMOL/L (ref 136–145)
WBC NRBC COR # BLD: 8.24 10*3/MM3 (ref 3.4–10.8)

## 2022-10-20 PROCEDURE — 84100 ASSAY OF PHOSPHORUS: CPT | Performed by: INTERNAL MEDICINE

## 2022-10-20 PROCEDURE — 82962 GLUCOSE BLOOD TEST: CPT

## 2022-10-20 PROCEDURE — G0378 HOSPITAL OBSERVATION PER HR: HCPCS

## 2022-10-20 PROCEDURE — 25010000002 METHYLPREDNISOLONE PER 40 MG: Performed by: INTERNAL MEDICINE

## 2022-10-20 PROCEDURE — 99217 PR OBSERVATION CARE DISCHARGE MANAGEMENT: CPT | Performed by: INTERNAL MEDICINE

## 2022-10-20 PROCEDURE — 80053 COMPREHEN METABOLIC PANEL: CPT | Performed by: INTERNAL MEDICINE

## 2022-10-20 PROCEDURE — 83735 ASSAY OF MAGNESIUM: CPT | Performed by: INTERNAL MEDICINE

## 2022-10-20 PROCEDURE — 85025 COMPLETE CBC W/AUTO DIFF WBC: CPT | Performed by: INTERNAL MEDICINE

## 2022-10-20 RX ADMIN — CARVEDILOL 6.25 MG: 6.25 TABLET, FILM COATED ORAL at 09:37

## 2022-10-20 RX ADMIN — PANTOPRAZOLE SODIUM 40 MG: 40 TABLET, DELAYED RELEASE ORAL at 09:38

## 2022-10-20 RX ADMIN — SPIRONOLACTONE 12.5 MG: 25 TABLET ORAL at 09:38

## 2022-10-20 RX ADMIN — DOXYCYCLINE 100 MG: 100 CAPSULE ORAL at 09:37

## 2022-10-20 RX ADMIN — SACUBITRIL AND VALSARTAN 1 TABLET: 24; 26 TABLET, FILM COATED ORAL at 09:37

## 2022-10-20 RX ADMIN — EMPAGLIFLOZIN 10 MG: 10 TABLET, FILM COATED ORAL at 09:38

## 2022-10-20 RX ADMIN — BUMETANIDE 1 MG: 1 TABLET ORAL at 09:38

## 2022-10-20 RX ADMIN — METHYLPREDNISOLONE SODIUM SUCCINATE 40 MG: 40 INJECTION, POWDER, FOR SOLUTION INTRAMUSCULAR; INTRAVENOUS at 05:17

## 2022-10-20 RX ADMIN — Medication 10 ML: at 09:38

## 2022-10-20 NOTE — CASE MANAGEMENT/SOCIAL WORK
Case Management Discharge Note      Final Note: Per discussion in MDR rounds with Dr Giang, pt does not need home O2 arranged at discharge. I spoke with pt and pt's son in room and plan remains home with son. Pt provided with portable pulse oximetry to check sao2 at home. Pt currently on room air.  Pt being discharged with a life vest and denies needing/wanting HH. All follow up appointments are in AVS, including a new PCP appointment. Son will transport pt home..         Selected Continued Care - Admitted Since 10/16/2022     Destination    No services have been selected for the patient.              Durable Medical Equipment    No services have been selected for the patient.              Dialysis/Infusion    No services have been selected for the patient.              Home Medical Care    No services have been selected for the patient.              Therapy    No services have been selected for the patient.              Community Resources    No services have been selected for the patient.              Community & DME    No services have been selected for the patient.                       Final Discharge Disposition Code: 01 - home or self-care

## 2022-10-20 NOTE — OUTREACH NOTE
Prep Survey    Flowsheet Row Responses   [facility] patient discharged from? [place]   Is LACE score < 7 ? No   Emergency Room discharge w/ pulse ox? No   Eligibility [facility]   Date of Admission 10/16/22   Date of Discharge 10/20/22   Discharge Disposition Home or Self Care   Discharge diagnosis CHF, heart cath, CP   Does the patient have one of the following disease processes/diagnoses(primary or secondary)? CHF   Does the patient have Home health ordered? No   Is there a DME ordered? Yes   What DME was ordered? recived a new pulse ox on d/c   Comments regarding appointments new pt appt   Medication alerts for this patient inhalers, bumex   Prep survey completed? Yes          ARACELI TENA - Registered Nurse

## 2022-10-20 NOTE — DISCHARGE SUMMARY
[facility] Medicine Services  DISCHARGE SUMMARY    Patient Name: Francisco Clark  : 1960  MRN: [id number]    Date of Admission: 10/16/2022  7:08 AM  Date of Discharge:  10/20/2023  Primary Care Physician: Provider, No Known    Consults     Date and Time Order Name Status Description    10/17/2022 12:33 AM Inpatient Gastroenterology Consult Completed     10/16/2022 11:09 AM Inpatient Cardiology Consult Completed         Hospital Course     Presenting Problem:   Congestive heart failure, unspecified HF chronicity, unspecified heart failure type (HCC) [I50.9]    Active Hospital Problems    Diagnosis  POA   • **Congestive heart failure, unspecified HF chronicity, unspecified heart failure type (HCC) [I50.9]  Yes   • Acute on chronic HFrEF (heart failure with reduced ejection fraction) (HCC) [I50.23]  Unknown   • Severe malnutrition (HCC) [E43]  Yes   • Dilated cardiomyopathy (HCC) [I42.0]  Yes   • Chest pain [R07.9]  Yes   • COPD (chronic obstructive pulmonary disease) (HCC) [J44.9]  Yes   • Tobacco abuse [Z72.0]  Yes      Resolved Hospital Problems   No resolved problems to display.      Hospital Course:  Francisco Clark is a 61 y.o. male with past medical history of essential hypertension, systolic heart failure, tobacco use disorder, quit 1 week ago, GERD and dysphagia presented to the hospital with worsening shortness of breath and generalized weakness.  SOB was felt to be secondary to COPD exacerbation and decompensated heart failure.  He was found to be in decompensated systolic heart failure.  Repeat echocardiogram showed severe systolic dysfunction with ejection fraction of 15 to 20%.  Seen by cardiology team and underwent left heart cath that showed normal coronaries.  He was started on heart failure goal-directed therapy with Entresto, Jardiance, Coreg and Aldactone.  LifeVest was placed prior to discharge.  Shortness of breath improved with conservative treatment with IV  diuresis and with treating his bronchitis with antibiotics and steroids.  Was discharged home in stable condition with close follow-up with heart and valve clinic.  He was given referral to see pulmonary service and was encouraged to quit smoking.  Discharged in stable condition    Acute decompensated systolic heart failure, ejection fraction 15%  Nonischemic cardiomyopathy  Acute hypoxia secondary to pulmonary edema, improved  Essential pretension  • Echocardiogram during this hospitalization, showed severe systolic dysfunction ejection fraction of 15 to 20%  • Left heart cath 10/18/2022 with clean coronaries  • Started on heart failure goal-directed directed medications with Entresto 24/26, Jardiance, Aldactone  • Continue Coreg 6.25 mg twice daily.  Blood pressure is borderline but tolerating it well.  • Tolerated beta-blockers and Entresto well.  • Discharged with LifeVest and close follow-up with heart and valve clinic     Possible COPD   Acute bronchitis  Tobacco use disorder  • Improved with IV Rocephin, doxycycline and steroid  • Discharged on oral cefdinir and doxycycline and short course p.o. steroids  · Given referral to see pulmonary service after discharge and was encouraged to quit smoking.    Prediabetes  • A1c 6.2%  • Continue Jardiance     GERD  Odynophagia  • GI team consulted.  Patient deemed high risk for EGD during this hospitalization.  Esophageal gram was done 10/18/2022 and was normal.  Discussed with GI and plan for empiric treatment with p.o. Protonix 40 mg twice daily for 1 month and follow-up as outpatient for EGD     Hx of tobacco abuse  • Quit 1 week ago   • Counseled      Discharge Follow Up Recommendations for outpatient labs/diagnostics:  PCP in 1 week  Heart and valve clinic in 3 days  GI service in 4 weeks    Day of Discharge     HPI:   I have seen and evaluated the patient this morning.  Comfortable in bed.  Not in distress.  Shortness of breath improved.  Cough improved.  Not  feeling as winded and able to ambulate.  Oxygen saturation is 92% room air ambulating and did not qualify for  home O2.  Wanting to go home.    Review of Systems  General : no fevers, chills  CVS: No chest pain, palpitations  Respiratory: No cough, dyspnea  GI: No N/V/D, abd pain  10 point review of systems is negative except for what is mentioned in HPI    Vital Signs:   Temp:  [97.7 °F (36.5 °C)-98.6 °F (37 °C)] 97.7 °F (36.5 °C)  Heart Rate:  [] 86  Resp:  [16-18] 16  BP: ()/(39-72) 102/72  Flow (L/min):  [2] 2    Physical Exam:  General: Chronically looking, not in distress, conversant and cooperative  Head: Atraumatic and normocephalic  Eyes: No Icterus. No pallor  Ears:  Ears appear intact with no abnormalities noted  Throat: No oral lesions, no thrush  Neck: Supple, trachea midline  Lungs: Clear to auscultation bilaterally, equal air entry,  resolved crackles  Heart:  Normal S1 and S2, no murmur, no gallop, No JVD, no lower extremity swelling  Abdomen:  Soft, no tenderness, no organomegaly, normal bowel sounds, no organomegaly  Extremities: pulses equal bilaterally  Skin: No bleeding, bruising or rash, normal skin turgor and elasticity  Neurologic: Cranial nerves appear intact with no evidence of facial asymmetry, normal motor and sensory functions in all 4 extremities  Psych: Alert and oriented x 3, normal mood    Pertinent  and/or Most Recent Results     LAB RESULTS:      Lab 10/20/22  0419 10/19/22  0437 10/18/22  0339 10/17/22  0427 10/16/22  1817 10/16/22  0712   WBC 8.24 7.43 7.22 6.89  --  7.14   HEMOGLOBIN 13.7 12.5* 11.9* 11.4*  --  13.1   HEMATOCRIT 46.7 43.5 40.1 39.3  --  44.2   PLATELETS 299 265 256 231  --  236   NEUTROS ABS 7.38* 5.23 5.19  --   --  5.22   IMMATURE GRANS (ABS) 0.03 0.03 0.02  --   --  0.03   LYMPHS ABS 0.67* 1.26 1.21  --   --  1.06   MONOS ABS 0.16 0.87 0.75  --   --  0.80   EOS ABS 0.00 0.02 0.03  --   --  0.01   MCV 82.7 83.0 81.2 83.1  --  81.5   D DIMER QUANT   --   --   --   --  0.53*  --          Lab 10/20/22  0419 10/19/22  0437 10/18/22  0339 10/17/22  0427 10/16/22  0712   SODIUM 139 141 140 142 138   POTASSIUM 4.4 4.1 3.6 4.4 4.5   CHLORIDE 98 100 98 103 101   CO2 32.0* 34.0* 35.0* 30.0* 27.0   ANION GAP 9.0 7.0 7.0 9.0 10.0   BUN 34* 32* 42* 31* 28*   CREATININE 0.92 0.83 0.88 0.84 0.88   EGFR 94.1 99.0 97.2 98.6 97.2   GLUCOSE 157* 96 124* 133* 132*   CALCIUM 9.3 8.8 9.0 8.8 9.3   MAGNESIUM 2.0 2.1 1.5* 2.0  --    PHOSPHORUS 4.8* 4.2 4.6* 4.9*  --          Lab 10/20/22  0419 10/17/22  0427 10/16/22  0712   TOTAL PROTEIN 6.3 6.0 7.0   ALBUMIN 3.50 3.20* 3.80   GLOBULIN 2.8 2.8 3.2   ALT (SGPT) 18 25 33   AST (SGOT) 16 26 40   BILIRUBIN 0.3 0.2 0.3   ALK PHOS 110 110 142*   LIPASE  --   --  34         Lab 10/17/22  0427 10/16/22  1817 10/16/22  1504 10/16/22  0712   PROBNP  --   --   --  13,718.0*   TROPONIN T 0.031* 0.028 0.036* 0.047*         Lab 10/17/22  0427   CHOLESTEROL 134   LDL CHOL 75   HDL CHOL 46   TRIGLYCERIDES 62       Brief Urine Lab Results     None        Microbiology Results (last 10 days)     Procedure Component Value - Date/Time    COVID PRE-OP / PRE-PROCEDURE SCREENING ORDER (NO ISOLATION) - Swab, Nasopharynx [[id number]]  (Normal) Collected: 10/16/22 0737    Lab Status: Final result Specimen: Swab from Nasopharynx Updated: 10/16/22 0826    Narrative:      The following orders were created for panel order COVID PRE-OP / PRE-PROCEDURE SCREENING ORDER (NO ISOLATION) - Swab, Nasopharynx.  Procedure                               Abnormality         Status                     ---------                               -----------         ------                     COVID-19 and FLU A/B PCR...[[id number]]  Normal              Final result                 Please view results for these tests on the individual orders.    COVID-19 and FLU A/B PCR - Swab, Nasopharynx [[id number]]  (Normal) Collected: 10/16/22 0737    Lab Status: Final result Specimen: Swab from  Nasopharynx Updated: 10/16/22 0826     COVID19 Not Detected     Influenza A PCR Not Detected     Influenza B PCR Not Detected    Narrative:      Fact sheet for providers: https://www.fda.gov/media/637487/download    Fact sheet for patients: https://www.fda.gov/media/705120/download    Test performed by PCR.        Adult Transthoracic Echo Complete W/ Cont if Necessary Per Protocol    Result Date: 10/16/2022  •  Estimated left ventricular EF = 18% Estimated left ventricular EF was in agreement with the calculated left ventricular EF. Left ventricular ejection fraction appears to be less than 20%. Left ventricular systolic function is severely decreased. •  The left ventricular cavity is moderately dilated. •  The following left ventricular wall segments are hypokinetic: mid anterior, apical anterior, basal anterolateral, mid anterolateral, apical lateral, basal inferolateral, mid inferolateral, apical inferior, mid inferior, apical septal, basal inferoseptal, mid inferoseptal, apex hypokinetic, mid anteroseptal, basal anterior, basal inferior and basal inferoseptal. •  The findings are consistent with dilated cardiomyopathy. •  Left ventricular diastolic function is consistent with (grade II w/high LAP) pseudonormalization. •  Estimated right ventricular systolic pressure from tricuspid regurgitation is mildly elevated (35-45 mmHg). •  Mild pulmonary hypertension is present. •  There is a trivial circumferential pericardial effusion. •  There is a moderate sized left pleural effusion. There is a moderate sized right pleural effusion. •  Normal right ventricular cavity size, wall thickness, systolic function and septal motion noted.     CT Abdomen Pelvis Without Contrast    Result Date: 10/16/2022  DATE OF EXAM: 10/16/2022 7:47 AM  PROCEDURE: CT CHEST WO CONTRAST DIAGNOSTIC-, CT ABDOMEN PELVIS WO CONTRAST-  INDICATIONS: cough, copd, hx pna, + sputum  COMPARISON: No comparisons available.  TECHNIQUE: Routine transaxial  slices were obtained through the chest, abdomen and pelvis without the administration of intravenous contrast. Reconstructed coronal and sagittal images were also obtained. Automated exposure control and iterative construction methods were used.  The radiation dose reduction device was turned on for each scan per the ALARA (As Low as Reasonably Achievable) protocol.  FINDINGS: CT chest: There is no pathologic axillary adenopathy or other worrisome body wall soft tissue finding in the chest. There are small to moderate right and trace left pleural effusions. There is no pericardial effusion. There is no pathologic mediastinal adenopathy. Mildly atherosclerotic, nonaneurysmal thoracic aorta. Evaluation of the osseous structures demonstrates no evidence of acute fracture or aggressive osseous lesion. Evaluation of the lung fields demonstrates diffuse moderate to severe centrilobular emphysema. There is evidence of some mild pulmonary edema, with interlobular septal thickening and intervening groundglass opacity present at the lung bases. Mild four-chamber cardiac enlargement is present. There is no evidence of acute infectious process or distinct suspicious pulmonary nodularity.  CT abdomen pelvis: The body wall soft tissues demonstrate no acute finding. The liver, spleen, pancreas and bilateral adrenal glands demonstrate homogeneous attenuation without evidence of suspicious focal lesion, accounting for limited noncontrast exam. Minimal cholelithiasis is present without acute inflammatory signs of cholecystitis. Simple appearing renal cysts are present, with the kidneys otherwise demonstrate no evidence of stones, hydronephrosis or nephropathy. Small and large bowel loops are nondilated, with evaluation limited due to noncontrast technique and paucity of intra-abdominal fat. Diverticulosis changes are noted, without acute inflammatory signs of diverticulitis. There is no free fluid or pneumoperitoneum. Atherosclerotic  abdominal aorta. No bulky retroperitoneal lymphadenopathy. The pelvic viscera are normal. There is moderate anasarca.      There is severe emphysema in addition to bilateral pleural effusions, larger on the right, with evidence of likely cardiogenic pulmonary edema present, most notably at the lung bases. There is otherwise no evidence of pneumonia or suspicious pulmonary nodularity.  Abdominal evaluation is significantly limited due to noncontrast technique and a paucity of intra-abdominal fat. There is no obvious pneumoperitoneum, free fluid, abscess or obstruction. There is moderate diffuse anasarca. Diverticulosis changes are present, without specific evidence of diverticulitis.  This report was finalized on 10/16/2022 8:44 AM by Franklin Chan.      CT Chest Without Contrast Diagnostic    Result Date: 10/16/2022  DATE OF EXAM: 10/16/2022 7:47 AM  PROCEDURE: CT CHEST WO CONTRAST DIAGNOSTIC-, CT ABDOMEN PELVIS WO CONTRAST-  INDICATIONS: cough, copd, hx pna, + sputum  COMPARISON: No comparisons available.  TECHNIQUE: Routine transaxial slices were obtained through the chest, abdomen and pelvis without the administration of intravenous contrast. Reconstructed coronal and sagittal images were also obtained. Automated exposure control and iterative construction methods were used.  The radiation dose reduction device was turned on for each scan per the ALARA (As Low as Reasonably Achievable) protocol.  FINDINGS: CT chest: There is no pathologic axillary adenopathy or other worrisome body wall soft tissue finding in the chest. There are small to moderate right and trace left pleural effusions. There is no pericardial effusion. There is no pathologic mediastinal adenopathy. Mildly atherosclerotic, nonaneurysmal thoracic aorta. Evaluation of the osseous structures demonstrates no evidence of acute fracture or aggressive osseous lesion. Evaluation of the lung fields demonstrates diffuse moderate to severe centrilobular  emphysema. There is evidence of some mild pulmonary edema, with interlobular septal thickening and intervening groundglass opacity present at the lung bases. Mild four-chamber cardiac enlargement is present. There is no evidence of acute infectious process or distinct suspicious pulmonary nodularity.  CT abdomen pelvis: The body wall soft tissues demonstrate no acute finding. The liver, spleen, pancreas and bilateral adrenal glands demonstrate homogeneous attenuation without evidence of suspicious focal lesion, accounting for limited noncontrast exam. Minimal cholelithiasis is present without acute inflammatory signs of cholecystitis. Simple appearing renal cysts are present, with the kidneys otherwise demonstrate no evidence of stones, hydronephrosis or nephropathy. Small and large bowel loops are nondilated, with evaluation limited due to noncontrast technique and paucity of intra-abdominal fat. Diverticulosis changes are noted, without acute inflammatory signs of diverticulitis. There is no free fluid or pneumoperitoneum. Atherosclerotic abdominal aorta. No bulky retroperitoneal lymphadenopathy. The pelvic viscera are normal. There is moderate anasarca.      There is severe emphysema in addition to bilateral pleural effusions, larger on the right, with evidence of likely cardiogenic pulmonary edema present, most notably at the lung bases. There is otherwise no evidence of pneumonia or suspicious pulmonary nodularity.  Abdominal evaluation is significantly limited due to noncontrast technique and a paucity of intra-abdominal fat. There is no obvious pneumoperitoneum, free fluid, abscess or obstruction. There is moderate diffuse anasarca. Diverticulosis changes are present, without specific evidence of diverticulitis.  This report was finalized on 10/16/2022 8:44 AM by Franklin Chan.      FL Esophagram Complete Single Contrast    Result Date: 10/18/2022  EXAMINATION: FL ESOPHAGRAM COMPLETE SINGLE-CONTRAST-   INDICATION: Dysphagia; I50.9-Heart failure, unspecified; G87-Aiscvbf effusion, not elsewhere classified; R06.00-Dyspnea, unspecified; R06.89-Other abnormalities of breathing; J44.9-Chronic obstructive pulmonary disease, unspecified  TECHNIQUE: 48 seconds of fluoroscopic time was used for this exam. 10 associated fluoroscopic series were saved.  imaging reveals a nonobstructive bowel gas pattern.  COMPARISON: NONE  FINDINGS: Under fluoroscopic observation, the patient ingested thin barium. The oral phase of deglutition appeared normal. The esophageal mucosa appeared grossly normal. There was no evidence of a focal esophageal stricture. Gastroesophageal reflux was not demonstrated during this exam. Examination of the stomach demonstrated grossly normal gastric mucosa and gastric folds.       Fluoroscopic guided esophagram series appeared within normal limits.    This report was finalized on 10/18/2022 3:35 PM by Rome Lambert.      Cardiac Catheterization/Vascular Study    Result Date: 10/18/2022  Table formatting from the original result was not included. Images from the original result were not included.  [facility] [address] [place] [address], [address] [place], [place], [facility]  [phone]  [email or web address]   CARDIAC CATHETERIZATION PROCEDURE NOTE     Normal coronaries LVEDP 9 mmHg RECOMMENDATIONS: Continue guideline directed medical therapy for nonischemic cardiomyopathy. --------------------------------------------------------------------------- ------------------------------------------- Indication(s) for this Procedure:  Severe cardiomyopathy Procedure(s) Performed: 1.  Right radial artery access . 2. Selective coronary angiography 3. Left heart catheterization.  Description of the Procedure:   Informed consent was obtained with the goals, rationale, alternatives, risks and benefits of the procedure explained to the patient.  A 6Fr Terumo slender sheath was placed in the right radial  artery. Selective angiography of the right coronary artery was performed with a 5Fr JR4 diagnostic catheter.  Selective angiography of the left coronary arteries was performed with a 5Fr JL3.5 diagnostic catheter.  Left heart catheterization was performed with a 5Fr JR4 catheter placed in the left ventricle.  The procedure was completed and the sheath was removed. A radial patent hemostasis band was placed for hemostasis.  The patient and their family were updated on the findings and plan at the conclusion of the case.  Angiographic Findings: Right coronary dominant circulation Left main artery:   Large-caliber vessel trifurcates into an LAD, ramus, and circumflex, normal. Left anterior descending artery: Large-caliber vessel gives rise to multiple septal perforators, normal. Ramus intermedius: Large-caliber vessel bifurcates into 2 branches supplying the lateral wall, normal. Left circumflex artery: Moderate caliber nondominant gives rise to 3 OM branches, normal Right coronary artery: Large-caliber dominant vessel gives rise to PDA and PLV, normal. Left Ventricle: LVEF 15% by echocardiogram Hemodynamic Findings: Ao pressure: 78/52 mmHg LVEDP: 9 mmHg LV to Ao pullback peak to peak gradient: 0 mmHg Estimated Blood Loss: Minimal Specimen(s): None obtained Sheath: Removed, radial patent hemostasis band was placed for hemostasis. Complications: There were no apparent early complications. Kd Roque MD, [facility]     XR Chest 1 View    Result Date: 10/19/2022  DATE OF EXAM: 10/19/2022 11:30 AM  PROCEDURE: XR CHEST 1 VW-  INDICATIONS: sob; I50.9-Heart failure, unspecified; W30-Hbyofnd effusion, not elsewhere classified; R06.00-Dyspnea, unspecified; R06.89-Other abnormalities of breathing; J44.9-Chronic obstructive pulmonary disease, unspecified; R13.12-Dysphagia, oropharyngeal phase  COMPARISON: 10/16/2022  TECHNIQUE: Single radiographic view of the chest was obtained.  FINDINGS: There is a  left base pleural effusion and mild left base consolidation. There is mild right base atelectasis, with a small right effusion. Superimposed emphysema. Cardiomediastinal contours are within normal limits. No evidence of pneumothorax.       1. Persistent small left effusion and left base consolidation, differential includes atelectasis or pneumonia. 2. Smaller right effusion and right base opacity either atelectasis or pneumonia.  This report was finalized on 10/19/2022 12:40 PM by Venancio Montgomery MD.      XR Chest 1 View    Result Date: 10/16/2022  XR CHEST 1 VW-  Date of Exam: 10/16/2022 7:24 AM  Indication: SOA triage protocol.  Comparison:?01/17/2018  Technique:?A single view of the chest was obtained.  FINDINGS:  ?There is mild cardiomegaly.  Pulmonary vessels are within normal limits.  There is emphysematous change of the lungs.  There is left basilar airspace disease which may be due to atelectasis or pneumonia. There is also some hazy right basilar airspace disease which may also be secondary to pneumonia.  There are small bilateral pleural effusions.          1.  Small bilateral pleural effusions and mild bibasilar airspace disease which may be secondary to atelectasis or pneumonia.   This report was finalized on 10/16/2022 7:53 AM by Tin Iverson MD.      Results for orders placed during the hospital encounter of 10/16/22    Adult Transthoracic Echo Complete W/ Cont if Necessary Per Protocol    Interpretation Summary  •  Estimated left ventricular EF = 18% Estimated left ventricular EF was in agreement with the calculated left ventricular EF. Left ventricular ejection fraction appears to be less than 20%. Left ventricular systolic function is severely decreased.  •  The left ventricular cavity is moderately dilated.  •  The following left ventricular wall segments are hypokinetic: mid anterior, apical anterior, basal anterolateral, mid anterolateral, apical lateral, basal inferolateral, mid inferolateral,  apical inferior, mid inferior, apical septal, basal inferoseptal, mid inferoseptal, apex hypokinetic, mid anteroseptal, basal anterior, basal inferior and basal inferoseptal.  •  The findings are consistent with dilated cardiomyopathy.  •  Left ventricular diastolic function is consistent with (grade II w/high LAP) pseudonormalization.  •  Estimated right ventricular systolic pressure from tricuspid regurgitation is mildly elevated (35-45 mmHg).  •  Mild pulmonary hypertension is present.  •  There is a trivial circumferential pericardial effusion.  •  There is a moderate sized left pleural effusion. There is a moderate sized right pleural effusion.  •  Normal right ventricular cavity size, wall thickness, systolic function and septal motion noted.    Plan for Follow-up of Pending Labs/Results:   Pending Labs     Order Current Status    Blood Culture - Blood, Arm, Left In process        Discharge Details        Discharge Medications      New Medications      Instructions Start Date   albuterol sulfate  (90 Base) MCG/ACT inhaler  Commonly known as: Proventil HFA   2 puffs, Inhalation, Every 4 Hours PRN      budesonide-formoterol 160-4.5 MCG/ACT inhaler  Commonly known as: SYMBICORT   2 puffs, Inhalation, 2 Times Daily - RT      bumetanide 0.5 MG tablet  Commonly known as: BUMEX   0.5 mg, Oral, Daily      carvedilol 6.25 MG tablet  Commonly known as: Coreg   6.25 mg, Oral, 2 Times Daily With Meals      cefdinir 300 MG capsule  Commonly known as: OMNICEF   300 mg, Oral, 2 Times Daily      doxycycline 100 MG capsule  Commonly known as: MONODOX   100 mg, Oral, 2 Times Daily      empagliflozin 10 MG tablet tablet  Commonly known as: JARDIANCE   10 mg, Oral, Daily      guaiFENesin-dextromethorphan 100-10 MG/5ML syrup  Commonly known as: ROBITUSSIN DM   5 mL, Oral, 3 Times Daily PRN      omeprazole 40 MG capsule  Commonly known as: priLOSEC   40 mg, Oral, Daily      predniSONE 20 MG tablet  Commonly known as:  DELTASONE   40 mg, Oral, Daily      sacubitril-valsartan 24-26 MG tablet  Commonly known as: ENTRESTO   1 tablet, Oral, Every 12 Hours Scheduled      spironolactone 25 MG tablet  Commonly known as: ALDACTONE   12.5 mg, Oral, Daily         Continue These Medications      Instructions Start Date   aspirin-acetaminophen-caffeine 250-250-65 MG per tablet  Commonly known as: EXCEDRIN MIGRAINE   1 tablet, Oral, Every 6 Hours PRN, OTC      EPINEPHrine 0.125 MG/ACT aerosol   2 puffs, Inhalation, 2 Times Daily, OTC      famotidine 20 MG tablet  Commonly known as: PEPCID   20 mg, Oral, 2 Times Daily PRN, OTC             No Known Allergies    Discharge Disposition:  Home or Self Care    Diet:  Hospital:  Diet Order   Procedures   • Diet Regular; Cardiac     Activity:  Activity Instructions     Activity as Tolerated      Activity as Tolerated          Restrictions or Other Recommendations:  None        CODE STATUS:    Code Status and Medical Interventions:   Ordered at: 10/16/22 1054     Code Status (Patient has no pulse and is not breathing):    CPR (Attempt to Resuscitate)     Medical Interventions (Patient has pulse or is breathing):    Full Support       Future Appointments   Date Time Provider Department Center   10/26/2022 11:00 AM Tania Burden APRN [facility]     Cleve Giang MD  10/20/22    Time Spent on Discharge:  I spent  22  minutes on this discharge activity which included: face-to-face encounter with the patient, reviewing the data in the system, coordination of the care with the nursing staff as well as consultants, documentation, and entering orders.

## 2022-10-21 ENCOUNTER — TRANSITIONAL CARE MANAGEMENT TELEPHONE ENCOUNTER (OUTPATIENT)
Dept: CALL CENTER | Facility: HOSPITAL | Age: 62
End: 2022-10-21

## 2022-10-21 NOTE — OUTREACH NOTE
Call Center TCM Note    Flowsheet Row Responses   [facility] patient discharged from? [place]   Does the patient have one of the following disease processes/diagnoses(primary or secondary)? CHF   TCM attempt successful? Yes   Call start time 1011   Call end time 1023   Discharge diagnosis CHF, heart cath, CP   Person spoke with today (if not patient) and relationship patient   Meds reviewed with patient/caregiver? Yes  [Multiple new meds. ]   Does the patient have all medications ordered at discharge? Yes   Is the patient taking all medications as directed (includes completed medication regime)? Yes   Comments New Patient with Charito Weiss, APRN Wednesday Oct 26, 2022 1:30 PM  [Patient has cardiology NP appt for 10/26/22  11am]   Does the patient have an appointment with their PCP within 7 days of discharge? Yes   Has home health visited the patient within 72 hours of discharge? N/A   What DME was ordered? recived a new pulse ox on d/c   Pulse Ox monitoring Intermittent   Pulse Ox device source Patient   Psychosocial issues? No   Comments Discharged with life vest   Did the patient receive a copy of their discharge instructions? Yes   Nursing interventions Reviewed instructions with patient  [Discussed daily weight monitoring and low sodium diet. ]   What is the patient's perception of their health status since discharge? Improving   Nursing interventions Nurse provided patient education   Is the patient able to teach back signs and symptoms of worsening condition? (i.e. weight gain, shortness of air, etc.) Yes   If the patient is a current smoker, are they able to teach back resources for cessation? --  [current smoker]   Is the patient/caregiver able to teach back the hierarchy of who to call/visit for symptoms/problems? PCP, Specialist, Home health nurse, Urgent Care, ED, 911 Yes   Notified Case Management Equipment/scale needs, Education issues  [Discussed patient getting a scale and monitoring daily  weight. Informed to contact physician with weight gain >2# overnight or worsening symptoms. ]   Is the patient able to teach back Heart Failure Zones? No   TCM call completed? Yes   Call end time 1023   Would this patient benefit from a Referral to [facility] Social Work? No   Is the patient interested in additional calls from an ambulatory ?  NOTE:  applies to high risk patients requiring additional follow-up. No           Shreya Wild RN    10/21/2022, 10:23 EDT

## 2022-10-21 NOTE — OUTREACH NOTE
Call Center TCM Note    Flowsheet Row Responses   [facility] patient discharged from? [place]   Does the patient have one of the following disease processes/diagnoses(primary or secondary)? CHF   TCM attempt successful? No   Unsuccessful attempts Attempt 1           Shreya Wild RN    10/21/2022, 09:13 EDT

## 2022-10-24 LAB — BACTERIA SPEC AEROBE CULT: NORMAL

## 2022-10-25 NOTE — PROGRESS NOTES
"[facility]  Heart and Valve Center    Chief Complaint  Congestive Heart Failure    Subjective    History of Present Illness {CC  Problem List  Visit  Diagnosis   Encounters  Notes  Medications  Labs  Result Review Imaging  Media :23}     Francisco Clark is a 61 y.o. male with Hypertension, dilated cardiomyopathy, possible COPD, who presents today as a hospital GERD and dysphagia referral for acute on chronic systolic heart failure.    Patient admitted 10/16-10/20 with acute on chronic systolic heart failure.  Echo showed severe systolic dysfunction with a EF of 15 to 20% (EF 45% in 2018).  Left heart cath showed normal coronaries.  He was started on Entresto, Jardiance, Coreg and Aldactone.  He was placed in a LifeVest.  Shortness of breath improved with IV diuresis, antibiotics and steroids.    Reports that his shortness of breath has improved significantly. Reports weight at home has been around 120 lb. Sleeps on 2 pillows, notes some mild orthopnea but has improved significantly.  Denies lower extremity edema.  He notes occasional orthostatic lightheadedness        Objective     Vital Signs:   Vitals:    10/26/22 1047   BP: 104/55   BP Location: Left arm   Patient Position: Sitting   Cuff Size: Adult   Pulse: 86   Resp: 16   Temp: 96.7 °F (35.9 °C)   TempSrc: Temporal   SpO2: 99%   Weight: 54 kg (119 lb)   Height: 177.8 cm (70\")     Body mass index is 17.07 kg/m².  Physical Exam  Vitals reviewed.   Constitutional:       Appearance: Normal appearance.   HENT:      Head: Normocephalic.   Neck:      Vascular: No carotid bruit.   Cardiovascular:      Rate and Rhythm: Normal rate and regular rhythm.      Pulses: Normal pulses.      Heart sounds: Normal heart sounds, S1 normal and S2 normal. No murmur heard.  Pulmonary:      Effort: Pulmonary effort is normal. No respiratory distress.      Breath sounds: Normal breath sounds.   Chest:      Chest wall: No tenderness.   Abdominal:      " General: Abdomen is flat.      Palpations: Abdomen is soft.   Musculoskeletal:      Cervical back: Neck supple.      Right lower leg: No edema.      Left lower leg: No edema.   Skin:     General: Skin is warm and dry.   Neurological:      General: No focal deficit present.      Mental Status: He is alert and oriented to person, place, and time. Mental status is at baseline.   Psychiatric:         Mood and Affect: Mood normal.         Behavior: Behavior normal.         Thought Content: Thought content normal.              Result Review  Data Reviewed:{ Labs  Result Review  Imaging  Med Tab  Media :23}   Adult Transthoracic Echo Complete W/ Cont if Necessary Per Protocol (10/16/2022 16:01)  ECG 12 Lead (10/17/2022 05:03)  Cardiac Catheterization/Vascular Study (10/18/2022 15:53)  POC Glucose Once (10/20/2022 10:59)  Phosphorus (10/20/2022 04:19)  CBC & Differential (10/20/2022 04:19)  Comprehensive Metabolic Panel (10/20/2022 04:19)  Magnesium (10/20/2022 04:19)  BNP (10/16/2022 07:12)    Hospital notes reviewed           Assessment and Plan {CC Problem List  Visit Diagnosis  ROS  Review (Popup)  Health Maintenance  Quality  BestPractice  Medications  SmartSets  SnapShot Encounters  Media :23}   1. Acute HFrEF (heart failure with reduced ejection fraction) (HCC)  Appears euvolemic  Heart failure education provided today including signs and symptoms, causes of heart failure, medications, daily weights, low sodium diet (less than 2000 mg per day), and daily physical activity as tolerated. Reviewed HF Zones with patient and family.  Continue Bumex, carvedilol, Jardiance, Entresto and spironolactone.  Unable to uptitrate medications secondary to hypotension   Has appt with PCP today, if labs not checked will recheck BMP at next office visit  He defers cardiac rehab for now. Plans to increase his walking at home    2. Essential hypertension  Prone to hypotension  Continue to monitor    Contacted [facility] to make  sure he has an appoint with Dr. Coreas      Follow Up {Instructions Charge Capture  Follow-up Communications :23}   Return in about 2 weeks (around 11/9/2022) for Office follow up, HF.    Patient was given instructions and counseling regarding his condition or for health maintenance advice. Please see specific information pulled into the AVS if appropriate.  Advised to call the Heart and Valve Center with any questions, concerns, or worsening symptoms.

## 2022-10-26 ENCOUNTER — OFFICE VISIT (OUTPATIENT)
Dept: CARDIOLOGY | Facility: HOSPITAL | Age: 62
End: 2022-10-26

## 2022-10-26 ENCOUNTER — OFFICE VISIT (OUTPATIENT)
Dept: INTERNAL MEDICINE | Facility: CLINIC | Age: 62
End: 2022-10-26

## 2022-10-26 ENCOUNTER — LAB (OUTPATIENT)
Dept: LAB | Facility: HOSPITAL | Age: 62
End: 2022-10-26

## 2022-10-26 VITALS
TEMPERATURE: 96.7 F | RESPIRATION RATE: 16 BRPM | DIASTOLIC BLOOD PRESSURE: 55 MMHG | HEIGHT: 70 IN | BODY MASS INDEX: 17.04 KG/M2 | SYSTOLIC BLOOD PRESSURE: 104 MMHG | OXYGEN SATURATION: 99 % | WEIGHT: 119 LBS | HEART RATE: 86 BPM

## 2022-10-26 VITALS
HEART RATE: 80 BPM | SYSTOLIC BLOOD PRESSURE: 90 MMHG | TEMPERATURE: 97 F | DIASTOLIC BLOOD PRESSURE: 60 MMHG | WEIGHT: 121 LBS | OXYGEN SATURATION: 96 % | HEIGHT: 70 IN | BODY MASS INDEX: 17.32 KG/M2

## 2022-10-26 DIAGNOSIS — I42.0 DILATED CARDIOMYOPATHY: ICD-10-CM

## 2022-10-26 DIAGNOSIS — Z72.0 TOBACCO ABUSE: ICD-10-CM

## 2022-10-26 DIAGNOSIS — I10 ESSENTIAL HYPERTENSION: ICD-10-CM

## 2022-10-26 DIAGNOSIS — Z09 HOSPITAL DISCHARGE FOLLOW-UP: Primary | ICD-10-CM

## 2022-10-26 DIAGNOSIS — I50.21 ACUTE HFREF (HEART FAILURE WITH REDUCED EJECTION FRACTION): Primary | ICD-10-CM

## 2022-10-26 DIAGNOSIS — I50.23 ACUTE ON CHRONIC HFREF (HEART FAILURE WITH REDUCED EJECTION FRACTION): ICD-10-CM

## 2022-10-26 DIAGNOSIS — J43.9 PULMONARY EMPHYSEMA, UNSPECIFIED EMPHYSEMA TYPE: ICD-10-CM

## 2022-10-26 PROCEDURE — 80069 RENAL FUNCTION PANEL: CPT | Performed by: NURSE PRACTITIONER

## 2022-10-26 PROCEDURE — 99495 TRANSJ CARE MGMT MOD F2F 14D: CPT | Performed by: NURSE PRACTITIONER

## 2022-10-26 PROCEDURE — 99214 OFFICE O/P EST MOD 30 MIN: CPT | Performed by: NURSE PRACTITIONER

## 2022-10-26 NOTE — PROGRESS NOTES
Transitional Care Follow Up Visit  Subjective     Francisco Clark is a 61 y.o. male who presents for a transitional care management visit.    Within 48 business hours after discharge our office contacted him via telephone to coordinate his care and needs.      I reviewed and discussed the details of that call along with the discharge summary, hospital problems, inpatient lab results, inpatient diagnostic studies, and consultation reports with Francisco.     Current outpatient and discharge medications have been reconciled for the patient.  Reviewed by: NATALIO Arroyo      Date of TCM Phone Call 10/20/2022   [facility]   Date of Admission 10/16/2022   Date of Discharge 10/20/2022   Discharge Disposition Home or Self Care     Risk for Readmission (LACE) Score: 8 (10/20/2022  6:00 AM)      History of Present Illness  Course During Hospital Stay: Mr. Clark is a pleasant 61-year-old male who was admitted at [facility] from 10/16-10/20.  He presented with complaints of worsening shortness of breath, lower extremity edema, and generalized weakness.  He was found to have decompensated systolic heart failure with severe systolic dysfunction.  His echo showed an estimated EF of 15 to 20% with mild pulmonary hypertension, mild aortic regurgitation, mild to moderate mitral valve regurgitation, moderate left pleural effusion and moderate right pleural effusion.  He did undergo a left heart cath which was negative coronary artery blockages.  Patient was started on Entresto, Jardiance, Coreg, and Aldactone.  LifeVest was placed on patient prior to discharge.  Patient did follow-up with cardiology today.  He does decline cardiac rehab at this time.  He has been weighing himself at home and his weight has been consistent around 120 pounds.  He reports his breathing is much improved and lower extremity edema has resolved.  He does have a follow-up appointment with the pulmonologist on 10/31.  Next  appointment with cardiology is 11/9.  Patient did quit smoking prior to arrival at the ED.  He has refrained from smoking for the past 2 weeks.  He reports he was a 1 pack/day smoker for 20 to 30 years.  He reports he is doing well at this time with not smoking.  Blood pressure is a little on the low side in the office today.  Patient denies any complaints of dizziness or lightheadedness.  Overall, he reports feeling better.  Had a pleasant visit with the patient today.     The following portions of the patient's history were reviewed and updated as appropriate: allergies, current medications, past family history, past medical history, past social history, past surgical history, and problem list.    Review of Systems   Constitutional: Positive for fatigue.   Respiratory: Positive for shortness of breath. Negative for chest tightness and wheezing.    Cardiovascular: Negative for chest pain and leg swelling.   Gastrointestinal: Negative.    Genitourinary: Negative.        Objective   Vitals:    10/26/22 1334   BP: 90/60   Pulse: 80   Temp: 97 °F (36.1 °C)   SpO2: 96%     Body mass index is 17.36 kg/m².     Physical Exam  Constitutional:       Appearance: Normal appearance. He is normal weight.   HENT:      Head: Normocephalic and atraumatic.      Nose: Nose normal.   Eyes:      Extraocular Movements: Extraocular movements intact.      Conjunctiva/sclera: Conjunctivae normal.      Pupils: Pupils are equal, round, and reactive to light.   Cardiovascular:      Rate and Rhythm: Normal rate and regular rhythm.   Pulmonary:      Effort: Pulmonary effort is normal.      Comments: Diminished breath sounds bilaterally with occasional expiratory wheeze  Abdominal:      General: Abdomen is flat.   Musculoskeletal:         General: Normal range of motion.      Cervical back: Normal range of motion and neck supple.      Right lower leg: No edema.      Left lower leg: No edema.   Skin:     General: Skin is warm and dry.    Neurological:      General: No focal deficit present.      Mental Status: He is alert and oriented to person, place, and time. Mental status is at baseline.   Psychiatric:         Mood and Affect: Mood normal.         Behavior: Behavior normal.         Thought Content: Thought content normal.         Judgment: Judgment normal.         Assessment & Plan   Diagnoses and all orders for this visit:    1. Hospital discharge follow-up (Primary)  [facility] to [facility] care and for a hospital follow-up for decompensated systolic heart failure with severe systolic dysfunction.  Patient reports feeling better with improved respiratory status and resolution of lower extremity edema.  Patient is tolerating new medications of Entresto, Jardiance, Coreg, and Aldactone.  LifeVest is in place.  Patient has upcoming follow-ups:   Pulmonology on 10/31.  Cardiology earlier today and then again on 11/9.  GI in 1 month.  Will send patient for renal function panel today to check kidney function, potassium, and recheck phosphorus which was borderline high during admission.  Will notify patient of lab results once received and reviewed.  Will follow-up with patient in about 6 weeks.    2. Acute on chronic HFrEF (heart failure with reduced ejection fraction) (HCC)  Followed by cardiology.  EF 15 to 20%.  Patient had follow-up visit earlier today.  Patient is weighing himself daily at home and consistent around 120 pounds.  Patient is adhering to low-sodium diet.  Patient is tolerating Entresto, Jardiance, Coreg, and Aldactone.  Will check renal function panel today.  LifeVest in place.  Holding on cardiac rehab at this time.  Next follow-up 11/9.    -     Renal Function Panel    3. Dilated cardiomyopathy (HCC)  Followed by cardiology.    4. Essential hypertension  Blood pressure on the low side in the office today.  Patient asymptomatic.  Advised to notify office if blood pressure continues to drop or if he becomes  symptomatic.    5. Pulmonary emphysema, unspecified emphysema type (HCC)  Noted on CT of chest.  Patient quit smoking 2 weeks ago.  He has an albuterol inhaler to use as needed at home along with Symbicort inhaler to use twice daily.  Pulmonology follow-up on 10/31.    6. Tobacco abuse  Patient quit smoking 2 weeks ago.  Patient feels he is doing okay on his own at this time.  Patient informed there is a smoking cessation program and resources available to him as needed.    7.  Prediabetes  Hemoglobin A1c 6.1 during admission.  Patient now on Jardiance.  Recheck A1c in 3 months.       Return in about 6 weeks (around 12/7/2022).    NATALIO Arroyo    Part of this note may be an electronic transcription/translation of spoken language to printed text using the Dragon Dictation System.

## 2022-10-27 ENCOUNTER — TELEPHONE (OUTPATIENT)
Dept: INTERNAL MEDICINE | Facility: CLINIC | Age: 62
End: 2022-10-27

## 2022-10-27 DIAGNOSIS — E87.5 HYPERKALEMIA: Primary | ICD-10-CM

## 2022-10-27 LAB
ALBUMIN SERPL-MCNC: 4.2 G/DL (ref 3.5–5.2)
ANION GAP SERPL CALCULATED.3IONS-SCNC: 7.9 MMOL/L (ref 5–15)
BUN SERPL-MCNC: 36 MG/DL (ref 8–23)
BUN/CREAT SERPL: 40.4 (ref 7–25)
CALCIUM SPEC-SCNC: 9.8 MG/DL (ref 8.6–10.5)
CHLORIDE SERPL-SCNC: 91 MMOL/L (ref 98–107)
CO2 SERPL-SCNC: 34.1 MMOL/L (ref 22–29)
CREAT SERPL-MCNC: 0.89 MG/DL (ref 0.76–1.27)
EGFRCR SERPLBLD CKD-EPI 2021: 96.9 ML/MIN/1.73
GLUCOSE SERPL-MCNC: 120 MG/DL (ref 65–99)
PHOSPHATE SERPL-MCNC: 4.8 MG/DL (ref 2.5–4.5)
POTASSIUM SERPL-SCNC: 6.2 MMOL/L (ref 3.5–5.2)
SODIUM SERPL-SCNC: 133 MMOL/L (ref 136–145)

## 2022-10-27 RX ORDER — SODIUM ZIRCONIUM CYCLOSILICATE 10 G/10G
10 POWDER, FOR SUSPENSION ORAL DAILY
Qty: 3 PACKET | Refills: 0 | Status: SHIPPED | OUTPATIENT
Start: 2022-10-27 | End: 2022-10-27

## 2022-10-27 RX ORDER — PATIROMER 8.4 G/1
8.4 POWDER, FOR SUSPENSION ORAL DAILY
Qty: 3 PACKET | Refills: 0 | Status: SHIPPED | OUTPATIENT
Start: 2022-10-27 | End: 2022-10-31

## 2022-10-27 NOTE — TELEPHONE ENCOUNTER
Unable to reach patient on cell phone.   not set up.  Left message on home phone to call office ASAP to discuss lab results.    Spoke with pharmacy and pharmacy states rx went through insurance but patient cost is $74.45.

## 2022-10-31 ENCOUNTER — OFFICE VISIT (OUTPATIENT)
Dept: PULMONOLOGY | Facility: CLINIC | Age: 62
End: 2022-10-31

## 2022-10-31 VITALS
BODY MASS INDEX: 17.18 KG/M2 | SYSTOLIC BLOOD PRESSURE: 102 MMHG | HEIGHT: 70 IN | RESPIRATION RATE: 12 BRPM | WEIGHT: 120 LBS | TEMPERATURE: 98.7 F | HEART RATE: 81 BPM | OXYGEN SATURATION: 98 % | DIASTOLIC BLOOD PRESSURE: 70 MMHG

## 2022-10-31 DIAGNOSIS — J43.2 CENTRILOBULAR EMPHYSEMA: Primary | ICD-10-CM

## 2022-10-31 DIAGNOSIS — Z23 IMMUNIZATION DUE: ICD-10-CM

## 2022-10-31 PROCEDURE — 94726 PLETHYSMOGRAPHY LUNG VOLUMES: CPT | Performed by: INTERNAL MEDICINE

## 2022-10-31 PROCEDURE — 82103 ALPHA-1-ANTITRYPSIN TOTAL: CPT | Performed by: INTERNAL MEDICINE

## 2022-10-31 PROCEDURE — 82104 ALPHA-1-ANTITRYPSIN PHENO: CPT | Performed by: INTERNAL MEDICINE

## 2022-10-31 PROCEDURE — 94729 DIFFUSING CAPACITY: CPT | Performed by: INTERNAL MEDICINE

## 2022-10-31 PROCEDURE — 99204 OFFICE O/P NEW MOD 45 MIN: CPT | Performed by: INTERNAL MEDICINE

## 2022-10-31 PROCEDURE — 90471 IMMUNIZATION ADMIN: CPT | Performed by: INTERNAL MEDICINE

## 2022-10-31 PROCEDURE — 90472 IMMUNIZATION ADMIN EACH ADD: CPT | Performed by: INTERNAL MEDICINE

## 2022-10-31 PROCEDURE — 90686 IIV4 VACC NO PRSV 0.5 ML IM: CPT | Performed by: INTERNAL MEDICINE

## 2022-10-31 PROCEDURE — 90677 PCV20 VACCINE IM: CPT | Performed by: INTERNAL MEDICINE

## 2022-10-31 PROCEDURE — 94060 EVALUATION OF WHEEZING: CPT | Performed by: INTERNAL MEDICINE

## 2022-10-31 RX ORDER — ALBUTEROL SULFATE 90 UG/1
4 AEROSOL, METERED RESPIRATORY (INHALATION) ONCE
Status: COMPLETED | OUTPATIENT
Start: 2022-10-31 | End: 2022-10-31

## 2022-10-31 RX ADMIN — ALBUTEROL SULFATE 4 PUFF: 90 AEROSOL, METERED RESPIRATORY (INHALATION) at 08:37

## 2022-10-31 NOTE — PATIENT INSTRUCTIONS
Continue Symbicort 2 puff twice a day. Rinse mouth afterwards.  Albuterol 2 puff as needed if wheezing or shortness of breath  Pneumonia vaccine  Flu shot  Continue tobacco abstinence  RTC in 3 months with jason and see if need to adjust medications. (ie: LAMA)

## 2022-10-31 NOTE — PROGRESS NOTES
PULMONARY CLINIC  Initial Office Evaluation     REFERRING PHYSICIAN:  Charito Weiss APRN    Reason for Visit     Francisco is a 62 y.o. male here for evaluation of: Obstructive Lung Disease        [facility]  Problem List  Media     Hospitalized form 10/16-10/20 2022 secondary to Heart Failure. His LHC showed normal coronaries. EF ~15% per ECHO.    Since discharged his symptoms have improved quite a lot. No significant dyspnea with activities.   No cough.   No sputum production.   No wheezing.  No chest pain.    His mother was a smoker and she had some kind of lung disease.     PMH: He  has a past medical history of History of stomach ulcers.    PSxH: He has a past surgical history that includes No past surgeries; Colonoscopy (03/05/2018); and Cardiac catheterization (N/A, 10/18/2022).      Immunization History   Administered Date(s) Administered   • Pneumococcal Polysaccharide (PPSV23) 02/01/2018         Medications:     Current Outpatient Medications:   •  albuterol sulfate HFA (Proventil HFA) 108 (90 Base) MCG/ACT inhaler, Inhale 2 puffs Every 4 (Four) Hours As Needed for Wheezing., Disp: 18 g, Rfl: 2  •  aspirin-acetaminophen-caffeine (EXCEDRIN MIGRAINE) 250-250-65 MG per tablet, Take 1 tablet by mouth Every 6 (Six) Hours As Needed for Headache (migraine). OTC, Disp: , Rfl:   •  budesonide-formoterol (SYMBICORT) 160-4.5 MCG/ACT inhaler, Inhale 2 puffs 2 (Two) Times a Day for 30 days., Disp: 10 g, Rfl: 2  •  bumetanide (BUMEX) 0.5 MG tablet, Take 1 tablet by mouth Daily for 30 days., Disp: 30 tablet, Rfl: 1  •  carvedilol (Coreg) 6.25 MG tablet, Take 1 tablet by mouth 2 (Two) Times a Day With Meals for 30 days., Disp: 60 tablet, Rfl: 1  •  empagliflozin (JARDIANCE) 10 MG tablet tablet, Take 1 tablet by mouth Daily., Disp: 30 tablet, Rfl: 2  •  omeprazole (priLOSEC) 40 MG capsule, Take 1 capsule by mouth Daily for 30 days., Disp: 30 capsule, Rfl: 0  •  sacubitril-valsartan (ENTRESTO) 24-26 MG tablet, Take  "1 tablet by mouth Every 12 (Twelve) Hours., Disp: 60 tablet, Rfl: 1  •  spironolactone (ALDACTONE) 25 MG tablet, Take 0.5 tablets by mouth Daily for 30 days., Disp: 15 tablet, Rfl: 1  No current facility-administered medications for this visit.    Allergies: He has No Known Allergies.    FH: He family history includes Aneurysm in his sister; Emphysema in his mother; Heart attack in his father; Heart disease in his brother and father; Migraines in his brother and father; No Known Problems in his daughter, maternal grandfather, maternal grandmother, paternal grandfather, paternal grandmother, son, and son; Other in his brother.    SH: He  reports that he quit smoking about 9 months ago. His smoking use included cigarettes. He has a 17.50 pack-year smoking history. He has never used smokeless tobacco. He reports that he does not drink alcohol and does not use drugs.         The following portions of the chart were reviewed this encounter and updated as appropriate.    History     Last Reviewed by Anatoly Laird MD on 10/31/2022 at  9:32 AM    Sections Reviewed    Medical, Family, Tobacco, Surgical      Problem list reviewed by Anatoly Laird MD on 10/31/2022 at  9:32 AM  Medicines reviewed by Anatoly Laird MD on 10/31/2022 at  9:32 AM  Allergies reviewed by Anatoly Laird MD on 10/31/2022 at  9:32 AM      ROS:   Review of Systems   Constitutional: Positive for fatigue. Negative for fever.   Respiratory: Positive for shortness of breath.    Cardiovascular: Negative for chest pain.   Gastrointestinal: Negative for nausea and vomiting.   Psychiatric/Behavioral: Negative for confusion.   All other systems reviewed and are negative.          O     Visit Vitals  /70   Pulse 81   Temp 98.7 °F (37.1 °C) (Infrared)   Resp 12   Ht 177.8 cm (70\")   Wt 54.4 kg (120 lb)   SpO2 98%   BMI 17.22 kg/m²        Physical Examination    Constitutional:  No acute distress.   Cardiovascular: Normal rate, regular and " rhythm.  No murmurs, gallop or rub.   Respiratory: No adventitious sounds.   Extremities: No Edema     Diagnostic Data    Labs  Imaging    CT Abdomen Pelvis Without Contrast    Result Date: 10/16/2022  There is severe emphysema in addition to bilateral pleural effusions, larger on the right, with evidence of likely cardiogenic pulmonary edema present, most notably at the lung bases. There is otherwise no evidence of pneumonia or suspicious pulmonary nodularity.  Abdominal evaluation is significantly limited due to noncontrast technique and a paucity of intra-abdominal fat. There is no obvious pneumoperitoneum, free fluid, abscess or obstruction. There is moderate diffuse anasarca. Diverticulosis changes are present, without specific evidence of diverticulitis.  This report was finalized on 10/16/2022 8:44 AM by Franklin Chan.      CT Chest Without Contrast Diagnostic    Result Date: 10/16/2022  There is severe emphysema in addition to bilateral pleural effusions, larger on the right, with evidence of likely cardiogenic pulmonary edema present, most notably at the lung bases. There is otherwise no evidence of pneumonia or suspicious pulmonary nodularity.  Abdominal evaluation is significantly limited due to noncontrast technique and a paucity of intra-abdominal fat. There is no obvious pneumoperitoneum, free fluid, abscess or obstruction. There is moderate diffuse anasarca. Diverticulosis changes are present, without specific evidence of diverticulitis.  This report was finalized on 10/16/2022 8:44 AM by Franklin Chan.      FL Esophagram Complete Single Contrast    Result Date: 10/18/2022  Fluoroscopic guided esophagram series appeared within normal limits.    This report was finalized on 10/18/2022 3:35 PM by Rome Lambert.      Cardiac Catheterization/Vascular Study    Result Date: 10/18/2022  Normal coronaries LVEDP 9 mmHg RECOMMENDATIONS: Continue guideline directed medical therapy for nonischemic  cardiomyopathy. ---  Left Ventricle: LVEF 15% by echocardiogram Kd Roque MD, [facility] Interventional Cardiology     XR Chest 1 View    Result Date: 10/19/2022   1. Persistent small left effusion and left base consolidation, differential includes atelectasis or pneumonia. 2. Smaller right effusion and right base opacity either atelectasis or pneumonia.  This report was finalized on 10/19/2022 12:40 PM by Venancio Montgomery MD.      XR Chest 1 View    Result Date: 10/16/2022    1.  Small bilateral pleural effusions and mild bibasilar airspace disease which may be secondary to atelectasis or pneumonia.   This report was finalized on 10/16/2022 7:53 AM by Tin Iverson MD.       Results for orders placed during the hospital encounter of 10/16/22    Adult Transthoracic Echo Complete W/ Cont if Necessary Per Protocol    Interpretation Summary  •  Estimated left ventricular EF = 18% Estimated left ventricular EF was in agreement with the calculated left ventricular EF. Left ventricular ejection fraction appears to be less than 20%. Left ventricular systolic function is severely decreased.  •  The left ventricular cavity is moderately dilated.  •  The following left ventricular wall segments are hypokinetic: mid anterior, apical anterior, basal anterolateral, mid anterolateral, apical lateral, basal inferolateral, mid inferolateral, apical inferior, mid inferior, apical septal, basal inferoseptal, mid inferoseptal, apex hypokinetic, mid anteroseptal, basal anterior, basal inferior and basal inferoseptal.  •  The findings are consistent with dilated cardiomyopathy.  •  Left ventricular diastolic function is consistent with (grade II w/high LAP) pseudonormalization.  •  Estimated right ventricular systolic pressure from tricuspid regurgitation is mildly elevated (35-45 mmHg).  •  Mild pulmonary hypertension is present.  •  There is a trivial circumferential pericardial effusion.  •  There is a moderate sized left pleural  effusion. There is a moderate sized right pleural effusion.  •  Normal right ventricular cavity size, wall thickness, systolic function and septal motion noted.      PFT 10/31/2022:  Severe airway obstruction. (FEV1 35-49% pred.).  The maximum voluntary ventilation (MVV) is reduced.  Following the inhalation of a bronchodilator, there is NO significant change in airway obstruction.  The lack of a bronchodilator response in the laboratory does not preclude a clinical response to bronchodilator therapy.  The elevated Residual Volume is consistent with Air Trapping.  The diffusing capacity is borderline reduced. (16 ml/mmHg/min)      SpO2 Readings from Last 3 Encounters:   10/31/22 98%   10/26/22 96%   10/26/22 99%        Assessment & Plan    A / P         Problem List  Visit Diagnosis  BestPractice  Med Tab  Medications  SmartSets    1. Centrilobular emphysema (HCC)    2. Immunization due      Patient Active Problem List   Diagnosis Code   • Centrilobular emphysema ([facility]) J43.2   • Tobacco abuse Z72.0   • Essential hypertension I10   • Dilated cardiomyopathy ([facility]) I42.0   • Severe malnutrition ([facility]) E43   • Acute on chronic HFrEF (heart failure with reduced ejection fraction) ([facility]) I50.23       1. Emphysema  a. GOLD spirometry grade: 3, group: A   i. Recent hospitalization seems to have been secondary to his severe cardiomyopathy.  ii. Dyspnea and HUNTER have improved remarkedly just with few days of therapy and how he is able to do almost as much as he wants.   iii. Last time he smoked was few days prior to admission to the hospital, ~ 2 weeks ago.        2. Non-ischemic Cardiomyopathy, EF 15%  a. Life Vest  b. Continue with Sacubitril-Valsartan, ß-blockers, Empagliflozin, Spirinolactone and diuretics as per cardiology  3. HTN, medications reviewed.  4. GERD ? Treatment with PPI  5. At risk for DM (pre-diabetes) based on his HbA1c. [HbA1C 5.7%-6.4%, eA-139 mg/dL].   a. Based on a A1c of 6.2, done in  2018    Management Plan/Treatment:  Orders Placed This Encounter   Procedures   • FluLaval/Fluzone >6 mos ([id number])   • Pneumococcal Conjugate Vaccine 20-Valent (PCV20)   • Alpha - 1 - Antitrypsin   • Alpha - 1 - Antitrypsin Phenotype   • Pulmonary Function Test     Patient Instructions   1. Continue Symbicort 2 puff twice a day. Rinse mouth afterwards.  2. Albuterol 2 puff as needed if wheezing or shortness of breath  3. Pneumonia vaccine  4. Flu shot  5. Continue tobacco abstinence  6. RTC in 3 months with jason and see if need to adjust medications. (ie: LAMA)    Return in about 3 months (around 1/31/2023) for Recheck with Spirometry.    I discussed the diagnosis, evaluation, and  treatment options with the patient and/or appropriate family members.    Thank you very much for allowing me to participate in the care of your patient.    I spent 50 minutes on this date of service. This time includes time spent by me in the following activities:preparing for the visit, reviewing tests, obtaining and/or reviewing a separately obtained history, performing a medically appropriate examination, counseling the patient/family/caregiver, ordering medications, tests, or procedures, and/or documenting information in the medical record.(Excluding time spent on other separate services such as performing procedures or test interpretation, if applicable.)    C.C.: Charito Weiss APRN N.B.  Diagnoses and all orders for this visit:    1. Centrilobular emphysema (HCC) (Primary)  -     Pulmonary Function Test  -     albuterol sulfate HFA (PROVENTIL HFA;VENTOLIN HFA;PROAIR HFA) inhaler 4 puff  -     Alpha - 1 - Antitrypsin; Future  -     Alpha - 1 - Antitrypsin Phenotype; Future    2. Immunization due  -     FluLaval/Fluzone >6 mos ([id number])  -     Pneumococcal Conjugate Vaccine 20-Valent (PCV20)

## 2022-11-01 ENCOUNTER — LAB (OUTPATIENT)
Dept: LAB | Facility: HOSPITAL | Age: 62
End: 2022-11-01

## 2022-11-01 DIAGNOSIS — E87.5 HYPERKALEMIA: ICD-10-CM

## 2022-11-01 LAB
ALBUMIN SERPL-MCNC: 3.6 G/DL (ref 3.5–5.2)
ANION GAP SERPL CALCULATED.3IONS-SCNC: 8.1 MMOL/L (ref 5–15)
BUN SERPL-MCNC: 33 MG/DL (ref 8–23)
BUN/CREAT SERPL: 25 (ref 7–25)
CALCIUM SPEC-SCNC: 10 MG/DL (ref 8.6–10.5)
CHLORIDE SERPL-SCNC: 96 MMOL/L (ref 98–107)
CO2 SERPL-SCNC: 31.9 MMOL/L (ref 22–29)
CREAT SERPL-MCNC: 1.32 MG/DL (ref 0.76–1.27)
EGFRCR SERPLBLD CKD-EPI 2021: 60.6 ML/MIN/1.73
GLUCOSE SERPL-MCNC: 93 MG/DL (ref 65–99)
PHOSPHATE SERPL-MCNC: 4.7 MG/DL (ref 2.5–4.5)
POTASSIUM SERPL-SCNC: 5.5 MMOL/L (ref 3.5–5.2)
SODIUM SERPL-SCNC: 136 MMOL/L (ref 136–145)

## 2022-11-01 PROCEDURE — 80069 RENAL FUNCTION PANEL: CPT

## 2022-11-02 ENCOUNTER — TELEPHONE (OUTPATIENT)
Dept: CARDIOLOGY | Facility: HOSPITAL | Age: 62
End: 2022-11-02

## 2022-11-02 ENCOUNTER — READMISSION MANAGEMENT (OUTPATIENT)
Dept: CALL CENTER | Facility: HOSPITAL | Age: 62
End: 2022-11-02

## 2022-11-02 DIAGNOSIS — E87.5 HYPERKALEMIA: ICD-10-CM

## 2022-11-02 DIAGNOSIS — I50.21 ACUTE HFREF (HEART FAILURE WITH REDUCED EJECTION FRACTION): Primary | ICD-10-CM

## 2022-11-02 DIAGNOSIS — N28.9 ACUTE RENAL INSUFFICIENCY: ICD-10-CM

## 2022-11-02 NOTE — OUTREACH NOTE
CHF Week 2 Survey    Flowsheet Row Responses   [facility] patient discharged from? [place]   Does the patient have one of the following disease processes/diagnoses(primary or secondary)? CHF   Week 2 attempt successful? Yes   Call start time 1354   Call end time 1356   Discharge diagnosis CHF, heart cath, CP   Meds reviewed with patient/caregiver? Yes   Is the patient having any side effects they believe may be caused by any medication additions or changes? No   Does the patient have all medications ordered at discharge? Yes   Is the patient taking all medications as directed (includes completed medication regime)? Yes   Medication comments PATIENT HAS BEEN TO THREE FOLLOW UP APPOINTMENTS AND HE STATES THERE HAVE BEEN SOME ADJUSTMENTS TO HIS MEDICATIONS.    Does the patient have a primary care provider?  Yes   Comments regarding PCP PATIENT HAS SEEN PCP, CARDIOLOGY, AND PULMONOLOGY   Has the patient kept scheduled appointments due by today? Yes   Has home health visited the patient within 72 hours of discharge? N/A   Pulse Ox monitoring Intermittent   Pulse Ox device source Patient   O2 Sat: education provided Sat levels, Monitoring frequency   Psychosocial issues? No   Comments Discharged with life vest   Did the patient receive a copy of their discharge instructions? Yes   Nursing interventions Reviewed instructions with patient   What is the patient's perception of their health status since discharge? Improving   Nursing interventions Nurse provided patient education   Is the patient able to teach back signs and symptoms of worsening condition? (i.e. weight gain, shortness of air, etc.) Yes   If the patient is a current smoker, are they able to teach back resources for cessation? Not a smoker   Is the patient/caregiver able to teach back the hierarchy of who to call/visit for symptoms/problems? PCP, Specialist, Home health nurse, Urgent Care, ED, 911 Yes   Is the patient able to teach back Heart Failure  Zones? Yes   CHF Nursing Interventions Education provided on various zones   CHF Zone this Call Green Zone   Green Zone Patient reports doing well, No new or worsening shortness of breath, Physical activity level is normal for you, No new swelling -  feet, ankles and legs look normal for you, Weight check stable, No chest pain   Green Zone Interventions Daily weight check, Meds as directed, Low sodium diet, Follow up visits planned   CHF Week 2 call completed? Yes   Call end time [id number]          YUMI GAMING - Licensed Nurse

## 2022-11-02 NOTE — TELEPHONE ENCOUNTER
Called patient with lab results. Recommended to stop spironolactone. He admits that he drinks a lot of orange juice (1/2 gallon every 2 days). Recommended to stop orange juice for now.  He also appears to be hypovolemic.  Advised to hold Bumex for 2 days and then resume at normal dose.  Patient to have repeat labs next week.  He verbalized understanding with no further questions or concerns

## 2022-11-02 NOTE — TELEPHONE ENCOUNTER
----- Message from NATALIO Arroyo sent at 11/2/2022  8:33 AM EDT -----  Hi, I have seen Mr. Clark in the office and rechecked his labs.  On 10/26 his potassium was 6.2, creatinine was 0.89.  I had him take 3 doses of Veltassa and he came back yesterday for lab recheck.  I received his labs this morning and his potassium is now 5.5, and creatinine is 1.32.  I do not think he has a follow-up with you until 11/9.  These advise myself or the patient on what he needs to do regarding the new medications he was discharged home on.  Thank you!  Charito  ----- Message -----  From: Lab, Background User  Sent: 11/1/2022  11:23 PM EDT  To: NATALIO Arroyo

## 2022-11-07 LAB
A1AT PHENOTYP SERPL IFE: ABNORMAL
A1AT SERPL-MCNC: 268 MG/DL (ref 101–187)

## 2022-11-08 ENCOUNTER — LAB (OUTPATIENT)
Dept: LAB | Facility: HOSPITAL | Age: 62
End: 2022-11-08

## 2022-11-08 DIAGNOSIS — N28.9 ACUTE RENAL INSUFFICIENCY: ICD-10-CM

## 2022-11-08 DIAGNOSIS — I50.21 ACUTE HFREF (HEART FAILURE WITH REDUCED EJECTION FRACTION): ICD-10-CM

## 2022-11-08 DIAGNOSIS — E87.5 HYPERKALEMIA: ICD-10-CM

## 2022-11-08 PROCEDURE — 80048 BASIC METABOLIC PNL TOTAL CA: CPT

## 2022-11-08 PROCEDURE — 83880 ASSAY OF NATRIURETIC PEPTIDE: CPT

## 2022-11-08 PROCEDURE — 36415 COLL VENOUS BLD VENIPUNCTURE: CPT

## 2022-11-08 NOTE — PROGRESS NOTES
"[facility]  Heart and Valve Center    Chief Complaint  Congestive Heart Failure and Follow-up    Subjective    History of Present Illness {CC  Problem List  Visit  Diagnosis   Encounters  Notes  Medications  Labs  Result Review Imaging  Media :23}     Francisco Clark is a 62 y.o. male with Hypertension, dilated cardiomyopathy, possible COPD, who presents today to follow up on HFrEF.    Patient admitted 10/16-10/20 with acute on chronic systolic heart failure.  Echo showed severe systolic dysfunction with a EF of 15 to 20% (EF 45% in 2018).  Left heart cath showed normal coronaries.  He was started on Entresto, Jardiance, Coreg and Aldactone.  He was placed in a LifeVest. He had recent repeat labs and was found to be significantly hyperkalemic and was given 3 doses of Veltassa from his PCP. Spironolactone was stopped and bumex held for a couple of days due to potential hypovolemia.  He reports that he is feeling well.  He denies any shortness of breath, orthopnea or PND.  Denies any lower extremity edema.  He does report some occasional heart palpitations.  He notes occasional orthostatic dizziness, lightheadedness            Objective     Vital Signs:   Vitals:    11/09/22 1101   BP: 122/73   BP Location: Left arm   Patient Position: Sitting   Cuff Size: Adult   Pulse: 88   Resp: 20   Temp: 97.2 °F (36.2 °C)   TempSrc: Temporal   SpO2: 98%   Weight: 54.9 kg (121 lb)   Height: 177.8 cm (70\")     Body mass index is 17.36 kg/m².  Physical Exam  Vitals reviewed.   Constitutional:       Appearance: Normal appearance.   HENT:      Head: Normocephalic.   Neck:      Vascular: No carotid bruit.   Cardiovascular:      Rate and Rhythm: Normal rate and regular rhythm. Frequent extrasystoles are present.     Pulses: Normal pulses.      Heart sounds: Normal heart sounds, S1 normal and S2 normal. No murmur heard.  Pulmonary:      Effort: Pulmonary effort is normal. No respiratory distress.      Breath " sounds: Normal breath sounds.   Chest:      Chest wall: No tenderness.   Abdominal:      General: Abdomen is flat.      Palpations: Abdomen is soft.   Musculoskeletal:      Cervical back: Neck supple.      Right lower leg: No edema.      Left lower leg: No edema.   Skin:     General: Skin is warm and dry.   Neurological:      General: No focal deficit present.      Mental Status: He is alert and oriented to person, place, and time. Mental status is at baseline.   Psychiatric:         Mood and Affect: Mood normal.         Behavior: Behavior normal.         Thought Content: Thought content normal.              Result Review  Data Reviewed:{ Labs  Result Review  Imaging  Med Tab  Media :23}   Adult Transthoracic Echo Complete W/ Cont if Necessary Per Protocol (10/16/2022 16:01)  ECG 12 Lead (10/17/2022 05:03)  Cardiac Catheterization/Vascular Study (10/18/2022 15:53)  proBNP (11/02/2022 09:31)  Basic Metabolic Panel (11/02/2022 09:26)  Renal Function Panel (11/01/2022 12:27)  Renal Function Panel (10/26/2022 14:18)             Assessment and Plan {CC Problem List  Visit Diagnosis  ROS  Review (Popup)  Health Maintenance  Quality  BestPractice  Medications  SmartSets  SnapShot Encounters  Media :23}   1. Acute HFrEF (heart failure with reduced ejection fraction) (HCC)  Appears euvolemic  Increase carvedilol to 12.5 mg twice daily. Continue entresto and jardiance.  Spironolactone recently stopped due to hyperkalemia.  Possibly induced by significant intake of orange juice and perhaps some hypovolemia.  If blood pressures and symptoms are stable, would consider changing Bumex to as needed and resuming spironolactone with close lab monitoring  Continue low-sodium diet and daily weights    2. Essential hypertension  Prone to hypotension  Continue to monitor    3. Hyperkalemia  Continue to avoid excessive potassium in the diet. Okay to have one glass of orange juice  Continue to hold spironolactone for  now      Follow Up {Instructions Charge Capture  Follow-up Communications :23}   Return in about 1 week (around 11/16/2022) for Office follow up, HF.    Patient was given instructions and counseling regarding his condition or for health maintenance advice. Please see specific information pulled into the AVS if appropriate.  Advised to call the Heart and Valve Center with any questions, concerns, or worsening symptoms.

## 2022-11-09 ENCOUNTER — OFFICE VISIT (OUTPATIENT)
Dept: CARDIOLOGY | Facility: HOSPITAL | Age: 62
End: 2022-11-09

## 2022-11-09 VITALS
TEMPERATURE: 97.2 F | OXYGEN SATURATION: 98 % | BODY MASS INDEX: 17.32 KG/M2 | SYSTOLIC BLOOD PRESSURE: 122 MMHG | DIASTOLIC BLOOD PRESSURE: 73 MMHG | RESPIRATION RATE: 20 BRPM | HEART RATE: 88 BPM | WEIGHT: 121 LBS | HEIGHT: 70 IN

## 2022-11-09 DIAGNOSIS — E87.5 HYPERKALEMIA: Primary | ICD-10-CM

## 2022-11-09 DIAGNOSIS — I10 ESSENTIAL HYPERTENSION: ICD-10-CM

## 2022-11-09 DIAGNOSIS — I50.21 ACUTE HFREF (HEART FAILURE WITH REDUCED EJECTION FRACTION): ICD-10-CM

## 2022-11-09 LAB
ANION GAP SERPL CALCULATED.3IONS-SCNC: 14 MMOL/L (ref 5–15)
BUN SERPL-MCNC: 34 MG/DL (ref 8–23)
BUN/CREAT SERPL: 31.8 (ref 7–25)
CALCIUM SPEC-SCNC: 9.8 MG/DL (ref 8.6–10.5)
CHLORIDE SERPL-SCNC: 95 MMOL/L (ref 98–107)
CO2 SERPL-SCNC: 27 MMOL/L (ref 22–29)
CREAT SERPL-MCNC: 1.07 MG/DL (ref 0.76–1.27)
EGFRCR SERPLBLD CKD-EPI 2021: 78 ML/MIN/1.73
GLUCOSE SERPL-MCNC: 108 MG/DL (ref 65–99)
NT-PROBNP SERPL-MCNC: 1507 PG/ML (ref 0–900)
POTASSIUM SERPL-SCNC: 4.6 MMOL/L (ref 3.5–5.2)
SODIUM SERPL-SCNC: 136 MMOL/L (ref 136–145)

## 2022-11-09 PROCEDURE — 99214 OFFICE O/P EST MOD 30 MIN: CPT | Performed by: NURSE PRACTITIONER

## 2022-11-09 RX ORDER — CARVEDILOL 12.5 MG/1
12.5 TABLET ORAL 2 TIMES DAILY WITH MEALS
Qty: 60 TABLET | Refills: 3 | Status: SHIPPED | OUTPATIENT
Start: 2022-11-09 | End: 2022-11-16 | Stop reason: SDUPTHER

## 2022-11-10 ENCOUNTER — READMISSION MANAGEMENT (OUTPATIENT)
Dept: CALL CENTER | Facility: HOSPITAL | Age: 62
End: 2022-11-10

## 2022-11-10 NOTE — OUTREACH NOTE
CHF Week 3 Survey    Flowsheet Row Responses   [facility] patient discharged from? [place]   Does the patient have one of the following disease processes/diagnoses(primary or secondary)? CHF   Week 3 attempt successful? Yes   Call start time 1522   Call end time 1534   Discharge diagnosis CHF, heart cath, CP   Person spoke with today (if not patient) and relationship patient   Meds reviewed with patient/caregiver? Yes   Does the patient have all medications ordered at discharge? Yes   Is the patient taking all medications as directed (includes completed medication regime)? Yes   Does the patient have a primary care provider?  Yes   Has the patient kept scheduled appointments due by today? Yes   Has home health visited the patient within 72 hours of discharge? N/A   Pulse Ox monitoring Intermittent   Pulse Ox device source Patient   O2 Sat comments 96%   O2 Sat: education provided Sat levels, Monitoring frequency, When to seek care   Psychosocial issues? No   Did the patient receive a copy of their discharge instructions? Yes   Nursing interventions Reviewed instructions with patient   What is the patient's perception of their health status since discharge? Improving   Nursing interventions Nurse provided patient education   Is the patient able to teach back signs and symptoms of worsening condition? (i.e. weight gain, shortness of air, etc.) Yes   If the patient is a current smoker, are they able to teach back resources for cessation? Not a smoker   Is the patient/caregiver able to teach back the hierarchy of who to call/visit for symptoms/problems? PCP, Specialist, Home health nurse, Urgent Care, ED, 911 Yes   CHF Zone this Call Green Zone   Green Zone Patient reports doing well, No new swelling -  feet, ankles and legs look normal for you, Weight check stable   Green Zone Interventions Daily weight check, Meds as directed, Follow up visits planned   CHF Week 3 call completed? Yes   Call end time 1534           NAT LOPEZ - Registered Nurse

## 2022-11-15 NOTE — PROGRESS NOTES
"[facility]  Heart and Valve Center    Chief Complaint  Congestive Heart Failure, Establish Care, and Heart Murmur    Subjective    History of Present Illness {CC  Problem List  Visit  Diagnosis   Encounters  Notes  Medications  Labs  Result Review Imaging  Media :23}     Francisco Clark is a 62 y.o. male with Hypertension, dilated cardiomyopathy, possible COPD, who presents today to follow up on HFrEF.    Patient admitted 10/16-10/20 with acute on chronic systolic heart failure.  Echo showed severe systolic dysfunction with a EF of 15 to 20% (EF 45% in 2018).  Left heart cath showed normal coronaries.  He was started on Entresto, Jardiance, Coreg and Aldactone.  He was placed in a LifeVest. He had recent repeat labs and was found to be significantly hyperkalemic and was given 3 doses of Veltassa from his PCP. Spironolactone was stopped and bumex held for a couple of days due to potential hypovolemia.  At his last visit, his carvedilol was increased. He does feel the palpitations/PVCs are better. Denies shortness of breath unless he walks long distances. No edema.             Objective     Vital Signs:   Vitals:    11/16/22 0836   BP: 145/56   BP Location: Left arm   Patient Position: Sitting   Cuff Size: Small Adult   Pulse: 86   Resp: 20   Temp: 97.5 °F (36.4 °C)   TempSrc: Temporal   SpO2: 96%   Weight: 55.8 kg (123 lb 2 oz)   Height: 177.8 cm (70\")     Body mass index is 17.67 kg/m².  Physical Exam  Vitals reviewed.   Constitutional:       Appearance: Normal appearance.   HENT:      Head: Normocephalic.   Neck:      Vascular: No carotid bruit.   Cardiovascular:      Rate and Rhythm: Normal rate and regular rhythm.      Pulses: Normal pulses.      Heart sounds: Normal heart sounds, S1 normal and S2 normal. No murmur heard.  Pulmonary:      Effort: Pulmonary effort is normal. No respiratory distress.      Breath sounds: Normal breath sounds.   Chest:      Chest wall: No tenderness. "   Abdominal:      General: Abdomen is flat.      Palpations: Abdomen is soft.   Musculoskeletal:      Cervical back: Neck supple.      Right lower leg: No edema.      Left lower leg: No edema.   Skin:     General: Skin is warm and dry.   Neurological:      General: No focal deficit present.      Mental Status: He is alert and oriented to person, place, and time. Mental status is at baseline.   Psychiatric:         Mood and Affect: Mood normal.         Behavior: Behavior normal.         Thought Content: Thought content normal.              Result Review  Data Reviewed:{ Labs  Result Review  Imaging  Med Tab  Media :23}   Adult Transthoracic Echo Complete W/ Cont if Necessary Per Protocol (10/16/2022 16:01)  ECG 12 Lead (10/17/2022 05:03)  Cardiac Catheterization/Vascular Study (10/18/2022 15:53)  proBNP (11/02/2022 09:31)  Basic Metabolic Panel (11/02/2022 09:26)  Renal Function Panel (11/01/2022 12:27)  Renal Function Panel (10/26/2022 14:18)             Assessment and Plan {CC Problem List  Visit Diagnosis  ROS  Review (Popup)  Health Maintenance  Quality  BestPractice  Medications  SmartSets  SnapShot Encounters  Media :23}   1. Acute HFrEF (heart failure with reduced ejection fraction) (HCC)  Appears euvolemic  Continue carvedilol, Entresto and Jardiance.  Spironolactone recently stopped due to hyperkalemia.  This was in the setting of a high potassium diet and recent steroids.  We will recheck labs today and make sure the potassium is staying stable and restart if it is.  May also consider changing Bumex to as needed since he appeared slightly dry on his last labs    Continue low-sodium diet and daily weights    2. Essential hypertension  Prone to hypotension.  Blood pressure unusually elevated today  Continue to monitor    3. Hyperkalemia  Continue to avoid excessive potassium in the diet. Okay to have one glass of orange juice  Possibly secondary to recent steroids    Keep upcoming appt with  Dr. Coreas      Follow Up {Instructions Charge Capture  Follow-up Communications :23}   Return in about 2 months (around 1/16/2023) for HF, Office follow up.    Patient was given instructions and counseling regarding his condition or for health maintenance advice. Please see specific information pulled into the AVS if appropriate.  Advised to call the Heart and Valve Center with any questions, concerns, or worsening symptoms.

## 2022-11-16 ENCOUNTER — OFFICE VISIT (OUTPATIENT)
Dept: CARDIOLOGY | Facility: HOSPITAL | Age: 62
End: 2022-11-16

## 2022-11-16 ENCOUNTER — LAB (OUTPATIENT)
Dept: LAB | Facility: HOSPITAL | Age: 62
End: 2022-11-16

## 2022-11-16 ENCOUNTER — TELEPHONE (OUTPATIENT)
Dept: CARDIOLOGY | Facility: HOSPITAL | Age: 62
End: 2022-11-16

## 2022-11-16 VITALS
BODY MASS INDEX: 17.63 KG/M2 | SYSTOLIC BLOOD PRESSURE: 145 MMHG | RESPIRATION RATE: 20 BRPM | HEART RATE: 86 BPM | TEMPERATURE: 97.5 F | DIASTOLIC BLOOD PRESSURE: 56 MMHG | OXYGEN SATURATION: 96 % | HEIGHT: 70 IN | WEIGHT: 123.13 LBS

## 2022-11-16 DIAGNOSIS — E87.5 HYPERKALEMIA: Primary | ICD-10-CM

## 2022-11-16 DIAGNOSIS — E87.5 HYPERKALEMIA: ICD-10-CM

## 2022-11-16 DIAGNOSIS — I50.21 ACUTE HFREF (HEART FAILURE WITH REDUCED EJECTION FRACTION): ICD-10-CM

## 2022-11-16 LAB
ANION GAP SERPL CALCULATED.3IONS-SCNC: 8.1 MMOL/L (ref 5–15)
BUN SERPL-MCNC: 33 MG/DL (ref 8–23)
BUN/CREAT SERPL: 33.7 (ref 7–25)
CALCIUM SPEC-SCNC: 9.8 MG/DL (ref 8.6–10.5)
CHLORIDE SERPL-SCNC: 101 MMOL/L (ref 98–107)
CO2 SERPL-SCNC: 28.9 MMOL/L (ref 22–29)
CREAT SERPL-MCNC: 0.98 MG/DL (ref 0.76–1.27)
EGFRCR SERPLBLD CKD-EPI 2021: 86.6 ML/MIN/1.73
GLUCOSE SERPL-MCNC: 107 MG/DL (ref 65–99)
NT-PROBNP SERPL-MCNC: 1787 PG/ML (ref 0–900)
POTASSIUM SERPL-SCNC: 5 MMOL/L (ref 3.5–5.2)
SODIUM SERPL-SCNC: 138 MMOL/L (ref 136–145)

## 2022-11-16 PROCEDURE — 80048 BASIC METABOLIC PNL TOTAL CA: CPT

## 2022-11-16 PROCEDURE — 99214 OFFICE O/P EST MOD 30 MIN: CPT | Performed by: NURSE PRACTITIONER

## 2022-11-16 PROCEDURE — 83880 ASSAY OF NATRIURETIC PEPTIDE: CPT

## 2022-11-16 PROCEDURE — 36415 COLL VENOUS BLD VENIPUNCTURE: CPT

## 2022-11-16 RX ORDER — CARVEDILOL 25 MG/1
25 TABLET ORAL 2 TIMES DAILY WITH MEALS
Qty: 60 TABLET | Refills: 3 | Status: SHIPPED | OUTPATIENT
Start: 2022-11-16 | End: 2023-02-14 | Stop reason: SDUPTHER

## 2022-11-16 NOTE — TELEPHONE ENCOUNTER
Results for orders placed or performed in visit on 11/16/22   Basic Metabolic Panel    Specimen: Blood   Result Value Ref Range    Glucose 107 (H) 65 - 99 mg/dL    BUN 33 (H) 8 - 23 mg/dL    Creatinine 0.98 0.76 - 1.27 mg/dL    Sodium 138 136 - 145 mmol/L    Potassium 5.0 3.5 - 5.2 mmol/L    Chloride 101 98 - 107 mmol/L    CO2 28.9 22.0 - 29.0 mmol/L    Calcium 9.8 8.6 - 10.5 mg/dL    BUN/Creatinine Ratio 33.7 (H) 7.0 - 25.0    Anion Gap 8.1 5.0 - 15.0 mmol/L    eGFR 86.6 >60.0 mL/min/1.73   proBNP    Specimen: Blood   Result Value Ref Range    proBNP 1,787.0 (H) 0.0 - 900.0 pg/mL     Called patient with lab results. Labs stable except potassium is high end of normal.  Will not resume spironolactone at this time.  Advised to increase carvedilol to 25 mg twice a day.  Advised to have labs rechecked again next month with PCP.  Patient verbalized understanding with no further questions or concerns

## 2022-11-21 ENCOUNTER — PREP FOR SURGERY (OUTPATIENT)
Dept: OTHER | Facility: HOSPITAL | Age: 62
End: 2022-11-21

## 2022-11-21 DIAGNOSIS — R13.12 OROPHARYNGEAL DYSPHAGIA: Primary | ICD-10-CM

## 2022-11-29 ENCOUNTER — OFFICE VISIT (OUTPATIENT)
Dept: CARDIOLOGY | Facility: CLINIC | Age: 62
End: 2022-11-29

## 2022-11-29 VITALS
HEIGHT: 70 IN | SYSTOLIC BLOOD PRESSURE: 102 MMHG | BODY MASS INDEX: 17.32 KG/M2 | HEART RATE: 84 BPM | DIASTOLIC BLOOD PRESSURE: 62 MMHG | WEIGHT: 121 LBS | OXYGEN SATURATION: 95 %

## 2022-11-29 DIAGNOSIS — I50.9 CONGESTIVE HEART FAILURE, UNSPECIFIED HF CHRONICITY, UNSPECIFIED HEART FAILURE TYPE: Primary | ICD-10-CM

## 2022-11-29 DIAGNOSIS — I42.0 DILATED CARDIOMYOPATHY: ICD-10-CM

## 2022-11-29 DIAGNOSIS — I10 ESSENTIAL HYPERTENSION: ICD-10-CM

## 2022-11-29 PROCEDURE — 99213 OFFICE O/P EST LOW 20 MIN: CPT | Performed by: INTERNAL MEDICINE

## 2022-11-29 RX ORDER — SACUBITRIL AND VALSARTAN 49; 51 MG/1; MG/1
1 TABLET, FILM COATED ORAL 2 TIMES DAILY
Qty: 180 TABLET | Refills: 1 | Status: SHIPPED | OUTPATIENT
Start: 2022-11-29 | End: 2022-12-08

## 2022-11-29 NOTE — PROGRESS NOTES
[facility] at [facility]  Office visit  Francisco Clark  1960  [phone]    VISIT DATE:  02/12/2018    PCP: Charito Weiss, APRN  [id number] [facility] [id number]    CC:  Chief Complaint   Patient presents with   • Hyperkalemia       ASSESSMENT:   Diagnosis Plan   1. Congestive heart failure, unspecified HF chronicity, unspecified heart failure type (HCC)        2. Dilated cardiomyopathy (HCC)        3. Essential hypertension          Previous cardiac status and procedures:  October 2022  TTE  •  Estimated left ventricular EF = 18% Estimated left ventricular EF was in agreement with the calculated left ventricular EF. Left ventricular ejection fraction appears to be less than 20%. Left ventricular systolic function is severely decreased.  •  The left ventricular cavity is moderately dilated.  •  The following left ventricular wall segments are hypokinetic: mid anterior, apical anterior, basal anterolateral, mid anterolateral, apical lateral, basal inferolateral, mid inferolateral, apical inferior, mid inferior, apical septal, basal inferoseptal, mid inferoseptal, apex hypokinetic, mid anteroseptal, basal anterior, basal inferior and basal inferoseptal.  •  The findings are consistent with dilated cardiomyopathy.  •  Left ventricular diastolic function is consistent with (grade II w/high LAP) pseudonormalization.  •  Estimated right ventricular systolic pressure from tricuspid regurgitation is mildly elevated (35-45 mmHg).  •  There is a moderate sized left pleural effusion. There is a moderate sized right pleural effusion.    Cardiac catheterization: Normal coronary arteries, LVEDP 9 mmHg.    PLAN:  Dilated cardiomyopathy, nonischemic: [place] heart class III.  Currently euvolemic and compensated.  Functional capacity also limited by underlying moderate to severe COPD.  Continue carvedilol 25 mg p.o. twice daily.  Continue Jardiance 10 mg p.o. daily.  Increasing Entresto to 49-51 mg p.o.  "twice daily.  Developed hyperkalemia with spironolactone.  Continue LifeVest, arranging for 9-day follow-up echo.  Discussed complete smoking cessation.    Subjective  Interval assessment: Intermittent orthostasis.  Shortness of breath in a class III pattern.  Pulmonary evaluation consistent with centrilobular emphysema.  He is compliant with medical therapy.  Currently smoking 3 cigarettes/day, has significantly cut back.    Initial evaluation: 57-year-old active smoker who Recently presented with chest discomfort and hypertensive urgency in the setting of influenza.   transthoracic echocardiogram revealed an EF of 45% grade 1 diastolic dysfunction and mild aortic insufficiency.He still having intermittent episodes of mild chest tightness.  Often triggered by exertion.  Continues to smoke less than a pack per day.  Blood pressures were running less than 130/80 mmHg and intermittently less than 100/60 mmHg he had routine follow-up.  He is compliant with medical therapy.  Reports generalized fatigue on current medical therapy.    PHYSICAL EXAMINATION:  Vitals:    11/29/22 1027   BP: 102/62   BP Location: Right arm   Patient Position: Sitting   Pulse: 84   SpO2: 95%   Weight: 54.9 kg (121 lb)   Height: 177.8 cm (70\")     General Appearance:    Alert, cooperative, no distress, appears stated age   Head:    Normocephalic, without obvious abnormality, atraumatic   Eyes:    conjunctiva/corneas clear   Nose:   Nares normal, septum midline, mucosa normal, no drainage   Throat:   Lips, teeth and gums normal   Neck:   Supple, symmetrical, trachea midline, no carotid    bruit or JVD   Lungs:     Clear to auscultation bilaterally, respirations unlabored   Chest Wall:    No tenderness or deformity    Heart:    Regular rate and rhythm, S1 and S2 normal, no murmur, rub   or gallop, normal carotid impulse bilaterally without bruit.   Abdomen:     Soft, non-tender   Extremities:   Extremities normal, atraumatic, no cyanosis or edema "   Pulses:   2+ and symmetric all extremities   Skin:   Skin color, texture, turgor normal, no rashes or lesions       Diagnostic Data:  Procedures  Lab Results   Component Value Date    TRIG 62 10/17/2022    HDL 46 10/17/2022     Lab Results   Component Value Date    GLUCOSE 107 (H) 11/16/2022    BUN 33 (H) 11/16/2022    CREATININE 0.98 11/16/2022     11/16/2022    K 5.0 11/16/2022     11/16/2022    CO2 28.9 11/16/2022     Lab Results   Component Value Date    HGBA1C 6.20 (H) 01/08/2018     Lab Results   Component Value Date    WBC 8.24 10/20/2022    HGB 13.7 10/20/2022    HCT 46.7 10/20/2022     10/20/2022       Allergies  No Known Allergies    Current Medications    Current Outpatient Medications:   •  albuterol sulfate HFA (Proventil HFA) 108 (90 Base) MCG/ACT inhaler, Inhale 2 puffs Every 4 (Four) Hours As Needed for Wheezing., Disp: 18 g, Rfl: 2  •  bumetanide (BUMEX) 0.5 MG tablet, Take 1 tablet by mouth Daily for 30 days., Disp: 30 tablet, Rfl: 1  •  carvedilol (Coreg) 25 MG tablet, Take 1 tablet by mouth 2 (Two) Times a Day With Meals for 30 days., Disp: 60 tablet, Rfl: 3  •  empagliflozin (JARDIANCE) 10 MG tablet tablet, Take 1 tablet by mouth Daily., Disp: 30 tablet, Rfl: 2  •  sacubitril-valsartan (ENTRESTO) 24-26 MG tablet, Take 1 tablet by mouth Every 12 (Twelve) Hours., Disp: 60 tablet, Rfl: 1  •  budesonide-formoterol (SYMBICORT) 160-4.5 MCG/ACT inhaler, Inhale 2 puffs 2 (Two) Times a Day for 30 days., Disp: 10 g, Rfl: 2          ROS  Review of Systems   Cardiovascular: Positive for chest pain and dyspnea on exertion. Negative for irregular heartbeat and palpitations.   Respiratory: Positive for cough and shortness of breath. Negative for sputum production.          SOCIAL HX  Social History     Socioeconomic History   • Marital status:    Tobacco Use   • Smoking status: Every Day     Packs/day: 0.50     Years: 35.00     Pack years: 17.50     Types: Cigarettes   • Smokeless  tobacco: Never   • Tobacco comments:     less than 1/2 ppd since getting sick   Vaping Use   • Vaping Use: Never used   Substance and Sexual Activity   • Alcohol use: No   • Drug use: No   • Sexual activity: Defer     Comment:        FAMILY HX  Family History   Problem Relation Age of Onset   • Emphysema Mother    • Heart disease Father    • Migraines Father    • Heart attack Father    • Aneurysm Sister    • Heart disease Brother         50's   • Migraines Brother    • No Known Problems Maternal Grandmother    • No Known Problems Maternal Grandfather    • No Known Problems Paternal Grandmother    • No Known Problems Paternal Grandfather    • Other Brother         killed in [place]   • No Known Problems Son    • No Known Problems Son    • No Known Problems Daughter              Amador Coreas III, MD, FACC

## 2022-11-30 PROBLEM — R13.12 OROPHARYNGEAL DYSPHAGIA: Status: ACTIVE | Noted: 2022-11-30

## 2022-12-07 ENCOUNTER — OFFICE VISIT (OUTPATIENT)
Dept: INTERNAL MEDICINE | Facility: CLINIC | Age: 62
End: 2022-12-07

## 2022-12-07 ENCOUNTER — LAB (OUTPATIENT)
Dept: LAB | Facility: HOSPITAL | Age: 62
End: 2022-12-07

## 2022-12-07 VITALS
TEMPERATURE: 96.9 F | HEART RATE: 71 BPM | BODY MASS INDEX: 17.47 KG/M2 | WEIGHT: 122 LBS | DIASTOLIC BLOOD PRESSURE: 60 MMHG | OXYGEN SATURATION: 97 % | SYSTOLIC BLOOD PRESSURE: 88 MMHG | HEIGHT: 70 IN

## 2022-12-07 DIAGNOSIS — R55 NEAR SYNCOPE: ICD-10-CM

## 2022-12-07 DIAGNOSIS — E87.5 HYPERKALEMIA: ICD-10-CM

## 2022-12-07 DIAGNOSIS — I50.23 ACUTE ON CHRONIC HFREF (HEART FAILURE WITH REDUCED EJECTION FRACTION): ICD-10-CM

## 2022-12-07 DIAGNOSIS — Z09 FOLLOW-UP EXAM: Primary | ICD-10-CM

## 2022-12-07 DIAGNOSIS — R53.83 FATIGUE, UNSPECIFIED TYPE: ICD-10-CM

## 2022-12-07 DIAGNOSIS — I95.9 HYPOTENSION, UNSPECIFIED HYPOTENSION TYPE: ICD-10-CM

## 2022-12-07 DIAGNOSIS — I42.0 DILATED CARDIOMYOPATHY: ICD-10-CM

## 2022-12-07 PROCEDURE — 36415 COLL VENOUS BLD VENIPUNCTURE: CPT

## 2022-12-07 PROCEDURE — 80048 BASIC METABOLIC PNL TOTAL CA: CPT

## 2022-12-07 PROCEDURE — 99214 OFFICE O/P EST MOD 30 MIN: CPT | Performed by: NURSE PRACTITIONER

## 2022-12-08 ENCOUNTER — TELEPHONE (OUTPATIENT)
Dept: CARDIOLOGY | Facility: HOSPITAL | Age: 62
End: 2022-12-08

## 2022-12-08 LAB
ANION GAP SERPL CALCULATED.3IONS-SCNC: 11 MMOL/L (ref 5–15)
BUN SERPL-MCNC: 41 MG/DL (ref 8–23)
BUN/CREAT SERPL: 35.7 (ref 7–25)
CALCIUM SPEC-SCNC: 10.4 MG/DL (ref 8.6–10.5)
CHLORIDE SERPL-SCNC: 98 MMOL/L (ref 98–107)
CO2 SERPL-SCNC: 29 MMOL/L (ref 22–29)
CREAT SERPL-MCNC: 1.15 MG/DL (ref 0.76–1.27)
EGFRCR SERPLBLD CKD-EPI 2021: 71.5 ML/MIN/1.73
GLUCOSE SERPL-MCNC: 113 MG/DL (ref 65–99)
POTASSIUM SERPL-SCNC: 5.1 MMOL/L (ref 3.5–5.2)
SODIUM SERPL-SCNC: 138 MMOL/L (ref 136–145)

## 2022-12-08 RX ORDER — SACUBITRIL AND VALSARTAN 24; 26 MG/1; MG/1
1 TABLET, FILM COATED ORAL 2 TIMES DAILY
Qty: 60 TABLET | Refills: 3 | Status: SHIPPED | OUTPATIENT
Start: 2022-12-08 | End: 2023-02-14 | Stop reason: SDUPTHER

## 2022-12-08 NOTE — TELEPHONE ENCOUNTER
"Called patient and he reports since increasing Entresto to 49/51mg BID he has had dizziness and almost \"blacked out\". His SBP was in the 80s at PCP appt. Recent potassium 5.1.  Recommend to decrease Entresto back to 24/26 mg twice daily.  He verbalized understanding with no further questions or concerns  "

## 2022-12-08 NOTE — TELEPHONE ENCOUNTER
----- Message from NATALIO Arroyo sent at 12/8/2022  9:23 AM EST -----  I saw Mr. Clark in the office yesterday.  I sent him for a BMP.  His potassium came back at 5.1.  He was hypotensive in the office yesterday at 88/60 and reports having a near syncopal episode in the grocery store on Monday.  He is not feeling well and any recommendations or advice would be appreciated.  Thank you!

## 2022-12-08 NOTE — PROGRESS NOTES
Office Note     Name: Francisco Clark    : 1960     MRN: [id number]     Chief Complaint  Hypertension (6 weeks F/U), Congestive Heart Failure, and Hyperkalemia    Subjective     History of Present Illness:  Francisco Clark is a 62 y.o. male who presents today for a follow-up visit on congestive heart failure, hyperkalemia, and hypertension.  Patient was admitted to [facility] in October for decompensated systolic heart failure with severe systolic dysfunction and dilated cardiomyopathy.  At that time, his EF was approximately 15 to 20% with mild pulmonary hypertension, mild aortic regurgitation, mild to moderate mitral valve regurgitation, moderate left pleural effusion and moderate right pleural effusion.  His heart cath was negative.  He was discharged with a LifeVest and on Entresto, Jardiance, Coreg, and Aldactone.  He was taken off of the Aldactone secondary to hyperkalemia.  He saw NATALIO Laws with cardiology on  at which time the Aldactone was stopped.  He then saw Dr. Coreas with interventional cardiology on , at which time he continued his carvedilol and Jardiance at the same dose and increased his Entresto to 49-51 mg twice daily.  Patient reports feeling very fatigued.  He is noted to be hypotensive in the office today.  He does report he got out on Monday and try to go grocery shopping.  After about 20 minutes in the store he had a near syncopal episode and had to leave.  He denies feeling lightheaded or dizzy or having any syncopal episodes today, but just reports he does not like the way he feels.  He does have his LifeVest in place.  He was supposed to get a repeat echo, but reports he has not been notified regarding his appointment.  He does take a small dose of Bumex daily and monitors for any swelling.  His weight has remained stable.  He denies further complaints or concerns at this time.  Had a pleasant visit with the patient today.    Review of  Systems   Constitutional: Positive for fatigue.   Respiratory: Negative.    Cardiovascular: Negative.    Neurological: Positive for syncope.       Past Medical History:   Diagnosis Date   • History of stomach ulcers        Past Surgical History:   Procedure Laterality Date   • CARDIAC CATHETERIZATION N/A 10/18/2022    Procedure: LEFT HEART CATH;  Surgeon: Kd Roque MD;  Location: [facility] CATH INVASIVE LOC[facility];  Service: Cardiovascular;  Laterality: N/A;   • COLONOSCOPY  03/05/2018    Dr. AGNES Olea. Normal. Repeat 1- yrs if wihtout significant family history or 5 years if first degree relative younger than age 60 with high rish polyp or colorectal cancer.    • NO PAST SURGERIES         Social History     Socioeconomic History   • Marital status:    Tobacco Use   • Smoking status: Every Day     Packs/day: 0.50     Years: 35.00     Pack years: 17.50     Types: Cigarettes   • Smokeless tobacco: Never   • Tobacco comments:     less than 1/2 ppd since getting sick   Vaping Use   • Vaping Use: Never used   Substance and Sexual Activity   • Alcohol use: No   • Drug use: No   • Sexual activity: Defer     Comment:          Current Outpatient Medications:   •  albuterol sulfate HFA (Proventil HFA) 108 (90 Base) MCG/ACT inhaler, Inhale 2 puffs Every 4 (Four) Hours As Needed for Wheezing., Disp: 18 g, Rfl: 2  •  carvedilol (Coreg) 25 MG tablet, Take 1 tablet by mouth 2 (Two) Times a Day With Meals for 30 days., Disp: 60 tablet, Rfl: 3  •  empagliflozin (JARDIANCE) 10 MG tablet tablet, Take 1 tablet by mouth Daily., Disp: 30 tablet, Rfl: 2  •  sacubitril-valsartan (Entresto) 49-51 MG tablet, Take 1 tablet by mouth 2 (Two) Times a Day., Disp: 180 tablet, Rfl: 1  •  budesonide-formoterol (SYMBICORT) 160-4.5 MCG/ACT inhaler, Inhale 2 puffs 2 (Two) Times a Day for 30 days., Disp: 10 g, Rfl: 2  •  bumetanide (BUMEX) 0.5 MG tablet, Take 1 tablet by mouth Daily for 30 days., Disp: 30 tablet, Rfl:  "1    Objective     Vital Signs  BP (!) 88/60   Pulse 71   Temp 96.9 °F (36.1 °C)   Ht 177.8 cm (70\")   Wt 55.3 kg (122 lb)   SpO2 97%   BMI 17.51 kg/m²   Estimated body mass index is 17.51 kg/m² as calculated from the following:    Height as of this encounter: 177.8 cm (70\").    Weight as of this encounter: 55.3 kg (122 lb).          Physical Exam  Constitutional:       Appearance: Normal appearance.   HENT:      Head: Normocephalic and atraumatic.      Nose: Nose normal.   Eyes:      Extraocular Movements: Extraocular movements intact.      Conjunctiva/sclera: Conjunctivae normal.      Pupils: Pupils are equal, round, and reactive to light.   Cardiovascular:      Rate and Rhythm: Normal rate and regular rhythm.      Heart sounds: Murmur heard.   Pulmonary:      Effort: Pulmonary effort is normal.      Breath sounds: Normal breath sounds.      Comments: Breath sounds diminished throughout  Musculoskeletal:         General: Normal range of motion.   Skin:     General: Skin is warm and dry.   Neurological:      General: No focal deficit present.      Mental Status: He is alert and oriented to person, place, and time. Mental status is at baseline.   Psychiatric:         Mood and Affect: Mood normal.         Behavior: Behavior normal.         Thought Content: Thought content normal.         Judgment: Judgment normal.          Assessment and Plan     Diagnoses and all orders for this visit:    1. Follow-up exam (Primary)    2. Hyperkalemia  -     Basic metabolic panel; Future    3. Hypotension, unspecified hypotension type  -     Adult Transthoracic Echo Complete W/ Cont if Necessary Per Protocol; Future    4. Near syncope  -     Adult Transthoracic Echo Complete W/ Cont if Necessary Per Protocol; Future    5. Fatigue, unspecified type    6. Acute on chronic HFrEF (heart failure with reduced ejection fraction) ([facility])  -     Adult Transthoracic Echo Complete W/ Cont if Necessary Per Protocol; Future    7. Dilated " cardiomyopathy (HCC)  -     Adult Transthoracic Echo Complete W/ Cont if Necessary Per Protocol; Future    Plan:  Will send patient to lab today for BMP to recheck potassium.  Last potassium level was 5.0.  He is no longer taking Aldactone, but his Entresto was recently increased by interventional cardiology.  Patient is hypotensive in the office today.  He is taking carvedilol 25 mg twice daily.  Will discuss with Dr. Coreas regarding possible reduction in carvedilol with hypotension and recent near syncopal episode.  Will reorder echo.  We will try to get scheduled as soon as possible.  Will notify patient of lab results once received.  Keep scheduled follow-up appointments with cardiology and interventional cardiology.  Will follow-up with patient in 3 months.  Follow-up sooner if needed.    Addendum:    Latest Reference Range & Units 12/07/22 12:06   Glucose 65 - 99 mg/dL 113 (H)   Sodium 136 - 145 mmol/L 138   Potassium 3.5 - 5.2 mmol/L 5.1   CO2 22.0 - 29.0 mmol/L 29.0   Chloride 98 - 107 mmol/L 98   Anion Gap 5.0 - 15.0 mmol/L 11.0   Creatinine 0.76 - 1.27 mg/dL 1.15   BUN 8 - 23 mg/dL 41 (H)   BUN/Creatinine Ratio 7.0 - 25.0  35.7 (H)   Calcium 8.6 - 10.5 mg/dL 10.4   eGFR >60.0 mL/min/1.73 71.5   (H): Data is abnormally high    Lab results reviewed.  Will consult with cardiology for further recommendations.    Follow Up  Return in about 3 months (around 3/7/2023).    NATALIO Arroyo    Part of this note may be an electronic transcription/translation of spoken language to printed text using the Dragon Dictation System.

## 2022-12-27 ENCOUNTER — TELEPHONE (OUTPATIENT)
Dept: GASTROENTEROLOGY | Facility: CLINIC | Age: 62
End: 2022-12-27

## 2022-12-27 NOTE — TELEPHONE ENCOUNTER
Francisco Davis [facility]  [address]  [place] [address]  1960        Patient will be having a EGD by Dr. Ming Olea on January 17, 2023. The patient is needing cardiac clearance due to being on blood thinners. Please complete the following and fax back to the office.     Patient's was last seen in the office on:_____________________    Procedure/Test Performed:    _____ Stress Test       _____ Echocardiogram    _____ EKG     _____ Heart Cath    Based on the above test results and/or clinical evaluation it is my recommendation:    ____ Patient may proceed with the scheduled surgical procedure with an acceptable cardiac risk.    ____ Patient CAN NOT stop Plavix, Effient, Ticlid, Aspirin, Coumadin, Pradaxa, Xarelto, Eliquis, Savaysa, or Brilinta prior to procedure.     ____ Patient CAN stop Plavix, Effient, Ticlid, Aspirin, Coumadin, Pradaxa, Xarelto, Eliquis, Savaysa, or Brilinta  ______ days prior to procedure.    Please sign and date below.    Signature________________________________________   Date:_________________     Thank You    Mark I. Brunner, M.D.  CARY Kramer M.D.  CARY Walters M.D. Laura Travis, NATALIO Allen, ELENA Merrill

## 2023-01-06 DIAGNOSIS — Z01.818 PREOP TESTING: Primary | ICD-10-CM

## 2023-01-10 ENCOUNTER — HOSPITAL ENCOUNTER (OUTPATIENT)
Dept: CARDIOLOGY | Facility: HOSPITAL | Age: 63
Discharge: HOME | End: 2023-01-10
Admitting: NURSE PRACTITIONER
Payer: COMMERCIAL

## 2023-01-10 ENCOUNTER — APPOINTMENT (OUTPATIENT)
Dept: PREADMISSION TESTING | Facility: HOSPITAL | Age: 63
End: 2023-01-10
Payer: COMMERCIAL

## 2023-01-10 VITALS — WEIGHT: 122 LBS | BODY MASS INDEX: 17.47 KG/M2 | HEIGHT: 70 IN

## 2023-01-10 DIAGNOSIS — I50.23 ACUTE ON CHRONIC HFREF (HEART FAILURE WITH REDUCED EJECTION FRACTION): ICD-10-CM

## 2023-01-10 DIAGNOSIS — R55 NEAR SYNCOPE: ICD-10-CM

## 2023-01-10 DIAGNOSIS — I42.0 DILATED CARDIOMYOPATHY: ICD-10-CM

## 2023-01-10 DIAGNOSIS — I95.9 HYPOTENSION, UNSPECIFIED HYPOTENSION TYPE: ICD-10-CM

## 2023-01-10 PROCEDURE — 93306 TTE W/DOPPLER COMPLETE: CPT | Performed by: INTERNAL MEDICINE

## 2023-01-10 PROCEDURE — 93306 TTE W/DOPPLER COMPLETE: CPT

## 2023-01-11 LAB
BH CV ECHO MEAS - AI P1/2T: 564.6 MSEC
BH CV ECHO MEAS - AO MAX PG: 5.5 MMHG
BH CV ECHO MEAS - AO MEAN PG: 3 MMHG
BH CV ECHO MEAS - AO ROOT DIAM: 3.6 CM
BH CV ECHO MEAS - AO V2 MAX: 117 CM/SEC
BH CV ECHO MEAS - AO V2 VTI: 19.5 CM
BH CV ECHO MEAS - AVA(I,D): 2.6 CM2
BH CV ECHO MEAS - EDV(CUBED): 114.8 ML
BH CV ECHO MEAS - EDV(MOD-SP2): 112 ML
BH CV ECHO MEAS - EDV(MOD-SP4): 103 ML
BH CV ECHO MEAS - EF(MOD-BP): 53 %
BH CV ECHO MEAS - EF(MOD-SP2): 49.1 %
BH CV ECHO MEAS - EF(MOD-SP4): 56.3 %
BH CV ECHO MEAS - ESV(CUBED): 63 ML
BH CV ECHO MEAS - ESV(MOD-SP2): 57 ML
BH CV ECHO MEAS - ESV(MOD-SP4): 45 ML
BH CV ECHO MEAS - FS: 18.1 %
BH CV ECHO MEAS - IVS/LVPW: 0.95 CM
BH CV ECHO MEAS - IVSD: 1.07 CM
BH CV ECHO MEAS - LA DIMENSION: 3.2 CM
BH CV ECHO MEAS - LAT PEAK E' VEL: 5.4 CM/SEC
BH CV ECHO MEAS - LV DIASTOLIC VOL/BSA (35-75): 60.9 CM2
BH CV ECHO MEAS - LV MASS(C)D: 197.9 GRAMS
BH CV ECHO MEAS - LV MAX PG: 3.3 MMHG
BH CV ECHO MEAS - LV MEAN PG: 1 MMHG
BH CV ECHO MEAS - LV SYSTOLIC VOL/BSA (12-30): 26.6 CM2
BH CV ECHO MEAS - LV V1 MAX: 90.2 CM/SEC
BH CV ECHO MEAS - LV V1 VTI: 14.6 CM
BH CV ECHO MEAS - LVIDD: 4.9 CM
BH CV ECHO MEAS - LVIDS: 4 CM
BH CV ECHO MEAS - LVOT AREA: 3.5 CM2
BH CV ECHO MEAS - LVOT DIAM: 2.1 CM
BH CV ECHO MEAS - LVPWD: 1.13 CM
BH CV ECHO MEAS - MED PEAK E' VEL: 5.92 CM/SEC
BH CV ECHO MEAS - MV A MAX VEL: 63.3 CM/SEC
BH CV ECHO MEAS - MV DEC TIME: 0.4 MSEC
BH CV ECHO MEAS - MV E MAX VEL: 55.7 CM/SEC
BH CV ECHO MEAS - MV E/A: 0.88
BH CV ECHO MEAS - PA ACC SLOPE: 542 CM/SEC2
BH CV ECHO MEAS - PA ACC TIME: 0.13 SEC
BH CV ECHO MEAS - PA PR(ACCEL): 18.7 MMHG
BH CV ECHO MEAS - PAPD(PI EDV): 3 MMHG
BH CV ECHO MEAS - PI END-D VEL: 82.6 CM/SEC
BH CV ECHO MEAS - RAP SYSTOLE: 3 MMHG
BH CV ECHO MEAS - RVSP: 25 MMHG
BH CV ECHO MEAS - SI(MOD-SP2): 32.5 ML/M2
BH CV ECHO MEAS - SI(MOD-SP4): 34.3 ML/M2
BH CV ECHO MEAS - SV(LVOT): 50.6 ML
BH CV ECHO MEAS - SV(MOD-SP2): 55 ML
BH CV ECHO MEAS - SV(MOD-SP4): 58 ML
BH CV ECHO MEAS - TAPSE (>1.6): 2.1 CM
BH CV ECHO MEAS - TR MAX PG: 22.2 MMHG
BH CV ECHO MEAS - TR MAX VEL: 235.5 CM/SEC
BH CV ECHO MEASUREMENTS AVERAGE E/E' RATIO: 9.84
BH CV VAS BP LEFT ARM: NORMAL MMHG
BH CV XLRA - RV BASE: 4.3 CM
BH CV XLRA - RV LENGTH: 7.3 CM
BH CV XLRA - RV MID: 3.6 CM
BH CV XLRA - TDI S': 11.7 CM/SEC
IVRT: 144 MSEC
LEFT ATRIUM VOLUME INDEX: 30.2 ML/M2
LV EF 2D ECHO EST: 53 %
MAXIMAL PREDICTED HEART RATE: 157 BPM
STRESS TARGET HR: 133 BPM

## 2023-01-13 NOTE — PROGRESS NOTES
"[facility]  [facility]    Chief Complaint  Congestive Heart Failure and Follow-up    Subjective    History of Present Illness {CC  Problem List  Visit  Diagnosis   Encounters  Notes  Medications  Labs  Result Review Imaging  Media :23}     Francisco Clark is a 62 y.o. male with Hypertension, dilated cardiomyopathy, possible COPD, who presents today to follow up on HFrEF.    He had an echo 1/10 and EF has improved to 46-50%, still wearing LifeVest. He reports that symptoms have been stable. Denies shortness of breath or edema.  Has occasional mild dizziness, but not often.  Denies any near syncope or palpitations.        Objective     Vital Signs:   Vitals:    01/16/23 1052   BP: 100/55   BP Location: Left arm   Patient Position: Sitting   Cuff Size: Adult   Pulse: 73   Resp: 20   Temp: 97.8 °F (36.6 °C)   TempSrc: Temporal   SpO2: 96%   Height: 177.8 cm (70\")     Body mass index is 17.51 kg/m².  Physical Exam  Vitals reviewed.   Constitutional:       Appearance: Normal appearance.   HENT:      Head: Normocephalic.   Neck:      Vascular: No carotid bruit.   Cardiovascular:      Rate and Rhythm: Normal rate and regular rhythm.      Pulses: Normal pulses.      Heart sounds: Normal heart sounds, S1 normal and S2 normal. No murmur heard.  Pulmonary:      Effort: Pulmonary effort is normal. No respiratory distress.      Breath sounds: Normal breath sounds.   Chest:      Chest wall: No tenderness.   Abdominal:      General: Abdomen is flat.      Palpations: Abdomen is soft.   Musculoskeletal:      Cervical back: Neck supple.      Right lower leg: No edema.      Left lower leg: No edema.   Skin:     General: Skin is warm and dry.   Neurological:      General: No focal deficit present.      Mental Status: He is alert and oriented to person, place, and time. Mental status is at baseline.   Psychiatric:         Mood and Affect: Mood normal.         Behavior: Behavior normal.         " Thought Content: Thought content normal.              Result Review  Data Reviewed:{ Labs  Result Review  Imaging  Med Tab  Media :23}   Adult Transthoracic Echo Complete W/ Cont if Necessary Per Protocol (10/16/2022 16:01)  Cardiac Catheterization/Vascular Study (10/18/2022 15:53)  Basic metabolic panel (12/07/2022 12:06)               Assessment and Plan {CC Problem List  Visit Diagnosis  ROS  Review (Popup)  Health Maintenance  Quality  BestPractice  Medications  SmartSets  SnapShot Encounters  Media :23}   1.  Chronic HFrEF (heart failure with reduced ejection fraction) (HCC)  LVEF has improved to 46 to 50%.  Advised he can remove the LifeVest.  Appears euvolemic  Continue carvedilol, Entresto and Jardiance.  Did not tolerate spironolactone due to hyperkalemia.  Did not tolerate higher doses of Entresto due to hypotension.  Discussed the importance of continuing medications to maintain EF  He has to go back to work, will clear with Dr. Coreas    2. Essential hypertension  Prone to hypotension.  Currently asymptomatic with this.  Advised to call with any worsening dizziness.  Continue to monitor        Keep upcoming appt with Dr. Coreas      Follow Up {Instructions Charge Capture  Follow-up Communications :23}   Return if symptoms worsen or fail to improve.    Patient was given instructions and counseling regarding his condition or for health maintenance advice. Please see specific information pulled into the AVS if appropriate.  Advised to call the [facility] Center with any questions, concerns, or worsening symptoms.

## 2023-01-16 ENCOUNTER — OFFICE VISIT (OUTPATIENT)
Dept: CARDIOLOGY | Facility: HOSPITAL | Age: 63
End: 2023-01-16
Payer: COMMERCIAL

## 2023-01-16 VITALS
HEIGHT: 70 IN | TEMPERATURE: 97.8 F | BODY MASS INDEX: 17.51 KG/M2 | RESPIRATION RATE: 20 BRPM | DIASTOLIC BLOOD PRESSURE: 55 MMHG | HEART RATE: 73 BPM | OXYGEN SATURATION: 96 % | SYSTOLIC BLOOD PRESSURE: 100 MMHG

## 2023-01-16 DIAGNOSIS — I42.0 DILATED CARDIOMYOPATHY: ICD-10-CM

## 2023-01-16 DIAGNOSIS — I50.22 CHRONIC HFREF (HEART FAILURE WITH REDUCED EJECTION FRACTION): Primary | ICD-10-CM

## 2023-01-16 DIAGNOSIS — I10 ESSENTIAL HYPERTENSION: ICD-10-CM

## 2023-01-16 PROCEDURE — 99213 OFFICE O/P EST LOW 20 MIN: CPT | Performed by: NURSE PRACTITIONER

## 2023-02-14 ENCOUNTER — OFFICE VISIT (OUTPATIENT)
Dept: CARDIOLOGY | Facility: CLINIC | Age: 63
End: 2023-02-14
Payer: COMMERCIAL

## 2023-02-14 VITALS
HEIGHT: 69 IN | SYSTOLIC BLOOD PRESSURE: 118 MMHG | BODY MASS INDEX: 17.77 KG/M2 | DIASTOLIC BLOOD PRESSURE: 70 MMHG | OXYGEN SATURATION: 95 % | WEIGHT: 120 LBS | HEART RATE: 72 BPM

## 2023-02-14 DIAGNOSIS — I10 ESSENTIAL HYPERTENSION: ICD-10-CM

## 2023-02-14 DIAGNOSIS — I50.9 CONGESTIVE HEART FAILURE, UNSPECIFIED HF CHRONICITY, UNSPECIFIED HEART FAILURE TYPE: Primary | ICD-10-CM

## 2023-02-14 PROCEDURE — 99213 OFFICE O/P EST LOW 20 MIN: CPT | Performed by: INTERNAL MEDICINE

## 2023-02-14 RX ORDER — CARVEDILOL 25 MG/1
25 TABLET ORAL 2 TIMES DAILY WITH MEALS
Qty: 180 TABLET | Refills: 3 | Status: SHIPPED | OUTPATIENT
Start: 2023-02-14 | End: 2023-03-16

## 2023-02-14 RX ORDER — BUMETANIDE 0.5 MG/1
0.5 TABLET ORAL AS NEEDED
Qty: 30 TABLET | Refills: 1 | Status: SHIPPED | OUTPATIENT
Start: 2023-02-14

## 2023-02-14 RX ORDER — SACUBITRIL AND VALSARTAN 24; 26 MG/1; MG/1
1 TABLET, FILM COATED ORAL 2 TIMES DAILY
Qty: 180 TABLET | Refills: 3 | Status: SHIPPED | OUTPATIENT
Start: 2023-02-14

## 2023-02-14 NOTE — PROGRESS NOTES
[facility]  Office visit  Francisco Clark  1960  [phone]    VISIT DATE:  02/12/2018    PCP: Charito Weiss, APRN  [id number] [facility] [address]    CC:  Chief Complaint   Patient presents with   • Congestive heart failure, unspecified HF chronicity, unspec       ASSESSMENT:   Diagnosis Plan   1. Congestive heart failure, unspecified HF chronicity, unspecified heart failure type (HCC)        2. Essential hypertension          Previous cardiac status and procedures:  October 2022  TTE  •  Estimated left ventricular EF = 18% Estimated left ventricular EF was in agreement with the calculated left ventricular EF. Left ventricular ejection fraction appears to be less than 20%. Left ventricular systolic function is severely decreased.  •  The left ventricular cavity is moderately dilated.  •  The following left ventricular wall segments are hypokinetic: mid anterior, apical anterior, basal anterolateral, mid anterolateral, apical lateral, basal inferolateral, mid inferolateral, apical inferior, mid inferior, apical septal, basal inferoseptal, mid inferoseptal, apex hypokinetic, mid anteroseptal, basal anterior, basal inferior and basal inferoseptal.  •  The findings are consistent with dilated cardiomyopathy.  •  Left ventricular diastolic function is consistent with (grade II w/high LAP) pseudonormalization.  •  Estimated right ventricular systolic pressure from tricuspid regurgitation is mildly elevated (35-45 mmHg).  •  There is a moderate sized left pleural effusion. There is a moderate sized right pleural effusion.    Cardiac catheterization: Normal coronary arteries, LVEDP 9 mmHg.    January 2023 TTE  •  Left ventricular systolic function is mildly decreased. Left ventricular ejection fraction appears to be 46 - 50%.  •  Left ventricular wall thickness is consistent with mild concentric hypertrophy.  •  Left ventricular diastolic function is consistent with (grade II w/high  "LAP) pseudonormalization.  •  Estimated right ventricular systolic pressure from tricuspid regurgitation is normal (<35 mmHg).    PLAN:  Dilated cardiomyopathy, nonischemic: Recovery from an EF of 20% up to 46 to 50% on medical therapy.  [place] heart class II.  Currently euvolemic and compensated.  Functional capacity also limited by underlying moderate to severe COPD.  Continue carvedilol 25 mg p.o. twice daily, Jardiance 10 mg p.o. daily and Entresto to 24-26 mg p.o. twice daily.  Developed hyperkalemia with spironolactone and developed symptomatic hypotension on higher doses of Entresto.    Subjective  Interval assessment: Intermittent orthostasis.  Gradually improving functional capacity, he is back to work.  Pulmonary evaluation consistent with centrilobular emphysema.  He is compliant with medical therapy.     Initial evaluation: 57-year-old active smoker who Recently presented with chest discomfort and hypertensive urgency in the setting of influenza.   transthoracic echocardiogram revealed an EF of 45% grade 1 diastolic dysfunction and mild aortic insufficiency.He still having intermittent episodes of mild chest tightness.  Often triggered by exertion.  Continues to smoke less than a pack per day.  Blood pressures were running less than 130/80 mmHg and intermittently less than 100/60 mmHg he had routine follow-up.  He is compliant with medical therapy.  Reports generalized fatigue on current medical therapy.    PHYSICAL EXAMINATION:  Vitals:    02/14/23 1436   BP: 118/70   BP Location: Right arm   Patient Position: Sitting   Cuff Size: Adult   Pulse: 72   SpO2: 95%   Weight: 54.4 kg (120 lb)   Height: 175.3 cm (69\")     General Appearance:    Alert, cooperative, no distress, appears stated age   Head:    Normocephalic, without obvious abnormality, atraumatic   Eyes:    conjunctiva/corneas clear   Nose:   Nares normal, septum midline, mucosa normal, no drainage   Throat:   Lips, teeth and gums normal "   Neck:   Supple, symmetrical, trachea midline, no carotid    bruit or JVD   Lungs:     Clear to auscultation bilaterally, respirations unlabored   Chest Wall:    No tenderness or deformity    Heart:    Regular rate and rhythm, S1 and S2 normal, no murmur, rub   or gallop, normal carotid impulse bilaterally without bruit.   Abdomen:     Soft, non-tender   Extremities:   Extremities normal, atraumatic, no cyanosis or edema   Pulses:   2+ and symmetric all extremities   Skin:   Skin color, texture, turgor normal, no rashes or lesions       Diagnostic Data:  Procedures  Lab Results   Component Value Date    TRIG 62 10/17/2022    HDL 46 10/17/2022     Lab Results   Component Value Date    GLUCOSE 113 (H) 12/07/2022    BUN 41 (H) 12/07/2022    CREATININE 1.15 12/07/2022     12/07/2022    K 5.1 12/07/2022    CL 98 12/07/2022    CO2 29.0 12/07/2022     Lab Results   Component Value Date    HGBA1C 6.20 (H) 01/08/2018     Lab Results   Component Value Date    WBC 8.24 10/20/2022    HGB 13.7 10/20/2022    HCT 46.7 10/20/2022     10/20/2022       Allergies  No Known Allergies    Current Medications    Current Outpatient Medications:   •  albuterol sulfate HFA (Proventil HFA) 108 (90 Base) MCG/ACT inhaler, Inhale 2 puffs Every 4 (Four) Hours As Needed for Wheezing., Disp: 18 g, Rfl: 2  •  budesonide-formoterol (SYMBICORT) 160-4.5 MCG/ACT inhaler, Inhale 2 puffs 2 (Two) Times a Day for 30 days., Disp: 10 g, Rfl: 2  •  carvedilol (Coreg) 25 MG tablet, Take 1 tablet by mouth 2 (Two) Times a Day With Meals for 30 days., Disp: 60 tablet, Rfl: 3  •  sacubitril-valsartan (Entresto) 24-26 MG tablet, Take 1 tablet by mouth 2 (Two) Times a Day., Disp: 60 tablet, Rfl: 3  •  bumetanide (BUMEX) 0.5 MG tablet, Take 1 tablet by mouth Daily for 30 days. (Patient taking differently: Take 0.5 mg by mouth Daily. Pt stated need refills if needed for this medication.), Disp: 30 tablet, Rfl: 1          ROS  Review of Systems    Cardiovascular: Positive for chest pain and dyspnea on exertion. Negative for irregular heartbeat and palpitations.   Respiratory: Positive for cough and shortness of breath. Negative for sputum production.          SOCIAL HX  Social History     Socioeconomic History   • Marital status: Single   Tobacco Use   • Smoking status: Every Day     Packs/day: 0.25     Years: 35.00     Pack years: 8.75     Types: Cigarettes   • Smokeless tobacco: Never   • Tobacco comments:     less than 1/2 ppd since getting sick   Vaping Use   • Vaping Use: Never used   Substance and Sexual Activity   • Alcohol use: No   • Drug use: No   • Sexual activity: Defer     Comment:        FAMILY HX  Family History   Problem Relation Age of Onset   • Emphysema Mother    • Heart disease Father    • Migraines Father    • Heart attack Father    • Aneurysm Sister    • Heart disease Brother         50's   • Migraines Brother    • No Known Problems Maternal Grandmother    • No Known Problems Maternal Grandfather    • No Known Problems Paternal Grandmother    • No Known Problems Paternal Grandfather    • Other Brother         killed in [place]   • No Known Problems Son    • No Known Problems Son    • No Known Problems Daughter              Amador Coreas III, MD, FACC

## 2023-03-07 ENCOUNTER — OFFICE VISIT (OUTPATIENT)
Dept: INTERNAL MEDICINE | Facility: CLINIC | Age: 63
End: 2023-03-07
Payer: COMMERCIAL

## 2023-03-07 VITALS
HEART RATE: 66 BPM | BODY MASS INDEX: 17.04 KG/M2 | WEIGHT: 119 LBS | TEMPERATURE: 96.9 F | HEIGHT: 70 IN | DIASTOLIC BLOOD PRESSURE: 70 MMHG | SYSTOLIC BLOOD PRESSURE: 122 MMHG | OXYGEN SATURATION: 96 %

## 2023-03-07 DIAGNOSIS — D22.9 ENLARGED SKIN MOLE: ICD-10-CM

## 2023-03-07 DIAGNOSIS — R53.83 OTHER FATIGUE: ICD-10-CM

## 2023-03-07 DIAGNOSIS — I50.23 ACUTE ON CHRONIC HFREF (HEART FAILURE WITH REDUCED EJECTION FRACTION): Primary | ICD-10-CM

## 2023-03-07 DIAGNOSIS — I10 ESSENTIAL HYPERTENSION: ICD-10-CM

## 2023-03-07 DIAGNOSIS — R53.81 PHYSICAL DECONDITIONING: ICD-10-CM

## 2023-03-07 DIAGNOSIS — J44.9 CHRONIC OBSTRUCTIVE PULMONARY DISEASE, UNSPECIFIED COPD TYPE: ICD-10-CM

## 2023-03-07 DIAGNOSIS — R42 DIZZINESS: ICD-10-CM

## 2023-03-07 DIAGNOSIS — I42.0 DILATED CARDIOMYOPATHY: ICD-10-CM

## 2023-03-07 PROCEDURE — 99213 OFFICE O/P EST LOW 20 MIN: CPT | Performed by: NURSE PRACTITIONER

## 2023-03-07 RX ORDER — BUDESONIDE AND FORMOTEROL FUMARATE DIHYDRATE 160; 4.5 UG/1; UG/1
2 AEROSOL RESPIRATORY (INHALATION)
Qty: 10.2 G | Refills: 5 | Status: SHIPPED | OUTPATIENT
Start: 2023-03-07

## 2023-03-14 NOTE — PROGRESS NOTES
Office Note     Name: Francisco Clark    : 1960     MRN: [id number]     Chief Complaint  Hypertension (3 months f/u) and Dizziness    Subjective     History of Present Illness:  Francisco Clark is a 62 y.o. male who presents today for a follow-up exam.  Patient is being followed by cardiology since last hospital admission for nonischemic, dilated cardiomyopathy and acute on chronic heart failure.  Patient did have recovery with medication therapy from an EF of 20% up to an EF of 46-50%.  Patient is currently on carvedilol 25 mg twice a day, Jardiance 10 mg daily, and Entresto 24-26 twice a day.  Patient was on spironolactone but developed hyperkalemia.  He was on a higher dose of Entresto but was experiencing hypotension.  He was supposed to have an EGD with dilation in January which he canceled.  He has gone back to smoking some, about a half a pack per day.  He is complaining of lots of dizziness especially with position changes.  He has no longer needing to wear his LifeVest.  He did not do any cardiac rehab after his hospitalization.  He may benefit from physical therapy and dietary assistance from a nutritionist.  This is something that he will consider and let me know if he is agreeable.  He also has a large, dime sized dark brown, raised mole to the right side of his chest/neck area.  Otherwise, he denies further complaints or concerns at this time.  He has not noted any swelling and has not had to use any Bumex.  Had a pleasant visit with the patient today.    Review of Systems   Constitutional: Positive for fatigue.   Neurological: Positive for dizziness and weakness.       Past Medical History:   Diagnosis Date   • History of stomach ulcers        Past Surgical History:   Procedure Laterality Date   • CARDIAC CATHETERIZATION N/A 10/18/2022    Procedure: LEFT HEART CATH;  Surgeon: Kd Roque MD;  [facility];  Service: Cardiovascular;  Laterality: N/A;  "  • COLONOSCOPY  03/05/2018    Dr. AGNES Olea. Normal. Repeat 1- yrs if wihtout significant family history or 5 years if first degree relative younger than age 60 with high rish polyp or colorectal cancer.    • NO PAST SURGERIES         Social History     Socioeconomic History   • Marital status: Single   Tobacco Use   • Smoking status: Every Day     Packs/day: 0.25     Years: 35.00     Pack years: 8.75     Types: Cigarettes   • Smokeless tobacco: Never   • Tobacco comments:     less than 1/2 ppd since getting sick   Vaping Use   • Vaping Use: Never used   Substance and Sexual Activity   • Alcohol use: No   • Drug use: No   • Sexual activity: Defer     Comment:          Current Outpatient Medications:   •  albuterol sulfate HFA (Proventil HFA) 108 (90 Base) MCG/ACT inhaler, Inhale 2 puffs Every 4 (Four) Hours As Needed for Wheezing., Disp: 18 g, Rfl: 2  •  budesonide-formoterol (SYMBICORT) 160-4.5 MCG/ACT inhaler, Inhale 2 puffs 2 (Two) Times a Day., Disp: 10.2 g, Rfl: 5  •  bumetanide (BUMEX) 0.5 MG tablet, Take 1 tablet by mouth As Needed (daily as needed for edema) for up to 1 dose., Disp: 30 tablet, Rfl: 1  •  carvedilol (Coreg) 25 MG tablet, Take 1 tablet by mouth 2 (Two) Times a Day With Meals for 30 days., Disp: 180 tablet, Rfl: 3  •  empagliflozin (JARDIANCE) 10 MG tablet tablet, Take 1 tablet by mouth Daily., Disp: 90 tablet, Rfl: 3  •  sacubitril-valsartan (Entresto) 24-26 MG tablet, Take 1 tablet by mouth 2 (Two) Times a Day., Disp: 180 tablet, Rfl: 3    Objective     Vital Signs  /70   Pulse 66   Temp 96.9 °F (36.1 °C)   Ht 177.8 cm (70\")   Wt 54 kg (119 lb)   SpO2 96%   BMI 17.07 kg/m²   Estimated body mass index is 17.07 kg/m² as calculated from the following:    Height as of this encounter: 177.8 cm (70\").    Weight as of this encounter: 54 kg (119 lb).    BMI is below normal parameters (malnutrition). Recommendations: Patient will consider in the future      Physical " Exam  Constitutional:       Appearance: Normal appearance.   HENT:      Head: Normocephalic and atraumatic.      Nose: Nose normal.   Eyes:      Extraocular Movements: Extraocular movements intact.      Conjunctiva/sclera: Conjunctivae normal.      Pupils: Pupils are equal, round, and reactive to light.   Cardiovascular:      Rate and Rhythm: Normal rate and regular rhythm.   Pulmonary:      Effort: Pulmonary effort is normal. No respiratory distress.      Breath sounds: Normal breath sounds.   Musculoskeletal:         General: Normal range of motion.      Cervical back: Normal range of motion.   Skin:     General: Skin is warm and dry.   Neurological:      General: No focal deficit present.      Mental Status: He is alert and oriented to person, place, and time. Mental status is at baseline.   Psychiatric:         Mood and Affect: Mood normal.         Behavior: Behavior normal.         Thought Content: Thought content normal.         Judgment: Judgment normal.          Assessment and Plan     Diagnoses and all orders for this visit:    1. Acute on chronic HFrEF (heart failure with reduced ejection fraction) ([facility]) (Primary)    2. Dilated cardiomyopathy ([facility])    3. Essential hypertension  -     Duplex Carotid Ultrasound CAR; Future    4. Chronic obstructive pulmonary disease, unspecified COPD type ([facility])  -     budesonide-formoterol (SYMBICORT) 160-4.5 MCG/ACT inhaler; Inhale 2 puffs 2 (Two) Times a Day.  Dispense: 10.2 g; Refill: 5    5. Dizziness  -     Duplex Carotid Ultrasound CAR; Future    6. Other fatigue    7. Physical deconditioning    8. Enlarged skin mole  -     Ambulatory Referral to Dermatology    Plan:  Will order a carotid duplex to further evaluate complaints of dizziness.  Will refer to dermatology for evaluation of large skin mole.  Will refill Symbicort inhaler.  Will review duplex results with patient once received.  Encouraged patient to consider physical therapy for strengthening and mobility and  nutrition for dietary guidance.  Keep all scheduled follow-up appointments with specialist.  Plan for 3-month follow-up on chronic conditions.  Return to clinic sooner if needed.    Follow Up  Return in about 3 months (around 6/7/2023) for Recheck.    NATALIO Arroyo    Part of this note may be an electronic transcription/translation of spoken language to printed text using the Dragon Dictation System.

## 2023-08-10 ENCOUNTER — TELEPHONE (OUTPATIENT)
Dept: INTERNAL MEDICINE | Facility: CLINIC | Age: 63
End: 2023-08-10

## 2023-08-10 NOTE — TELEPHONE ENCOUNTER
Caller: Francisco Clark    Relationship: Self    Best call back number: [phone]    Requested Prescriptions:   Requested Prescriptions      No prescriptions requested or ordered in this encounter        Pharmacy where request should be sent:    [facility] [phone]  Last office visit with prescribing clinician: 6/12/2023   Last telemedicine visit with prescribing clinician: Visit date not found   Next office visit with prescribing clinician: 9/11/2023     Additional details provided by patient: PATIENT NEEDS REFILL ON SYMBICORT 4.5MCG HOWEVER IS NOT ON MEDICATION LIST    Does the patient have less than a 3 day supply:  [x] Yes  [] No    Would you like a call back once the refill request has been completed: [x] Yes [] No    If the office needs to give you a call back, can they leave a voicemail: [x] Yes [] No    Haroon Pablo Rep   08/10/23 09:15 EDT

## 2023-09-11 ENCOUNTER — OFFICE VISIT (OUTPATIENT)
Dept: INTERNAL MEDICINE | Facility: CLINIC | Age: 63
End: 2023-09-11
Payer: COMMERCIAL

## 2023-09-11 VITALS
SYSTOLIC BLOOD PRESSURE: 142 MMHG | OXYGEN SATURATION: 96 % | HEIGHT: 70 IN | BODY MASS INDEX: 17.47 KG/M2 | DIASTOLIC BLOOD PRESSURE: 82 MMHG | WEIGHT: 122 LBS | HEART RATE: 68 BPM | TEMPERATURE: 97.6 F

## 2023-09-11 DIAGNOSIS — I10 ESSENTIAL HYPERTENSION: ICD-10-CM

## 2023-09-11 DIAGNOSIS — I50.23 ACUTE ON CHRONIC HFREF (HEART FAILURE WITH REDUCED EJECTION FRACTION): ICD-10-CM

## 2023-09-11 DIAGNOSIS — Z09 FOLLOW-UP EXAM: Primary | ICD-10-CM

## 2023-09-11 DIAGNOSIS — I42.0 DILATED CARDIOMYOPATHY: ICD-10-CM

## 2023-09-11 DIAGNOSIS — Z72.0 TOBACCO ABUSE: ICD-10-CM

## 2023-09-11 PROCEDURE — 99214 OFFICE O/P EST MOD 30 MIN: CPT | Performed by: NURSE PRACTITIONER

## 2023-09-11 NOTE — PROGRESS NOTES
Office Note     Name: Francisco Clark    : 1960     MRN: [id number]     Chief Complaint  Hypertension and Follow-up    Subjective     History of Present Illness:  Francisco Clark is a 62 y.o. male who presents today for a routine follow-up visit on chronic conditions.  Patient had to cancel his appointment with cardiology and does not have an appointment until after the first of next year.  Patient reports feeling well without current complaints or concerns.  Patient denies any chest discomfort.  Patient reports that his dizziness he was previously experiencing has almost completely resolved.  He reports when it does occur it is way less frequent.  His blood pressure is a little higher in the office today than it has been.  Blood pressure is 142/82 in the office today.  Patient is taking carvedilol 25 mg twice daily and Entresto 24-26 mg twice daily.  He is also taking Jardiance 10 mg daily.  He has not had to use any as needed Bumex for a while now.  He has not had any recent issues with swelling.  He continues to smoke about a half a pack per day.  He has been using the Dulera inhaler which is helpful but not as helpful as the previous inhaler he was on.  This was a formulary substitution per his insurance request.  Overall, patient is doing well.  He reports feeling well without current complaints or concerns.  Had a pleasant visit with the patient today.      Past Medical History:   Diagnosis Date    History of stomach ulcers        Past Surgical History:   Procedure Laterality Date    CARDIAC CATHETERIZATION N/A 10/18/2022    Procedure: LEFT HEART CATH;  Surgeon: Kd Roque MD;  Location: [facility] CATH [facility]VASIVE LOC[facility];  Service: Cardiovascular;  Laterality: N/A;    COLONOSCOPY  2018    Dr. AGNES Olea. Normal. Repeat 1- yrs if wihtout significant family history or 5 years if first degree relative younger than age 60 with high rish polyp or colorectal cancer.     NO PAST  "SURGERIES         Social History     Socioeconomic History    Marital status: Single   Tobacco Use    Smoking status: Every Day     Packs/day: 0.25     Years: 35.00     Pack years: 8.75     Types: Cigarettes    Smokeless tobacco: Never    Tobacco comments:     less than 1/2 ppd since getting sick   Vaping Use    Vaping Use: Never used   Substance and Sexual Activity    Alcohol use: No    Drug use: No    Sexual activity: Defer     Comment:          Current Outpatient Medications:     albuterol sulfate HFA (Proventil HFA) 108 (90 Base) MCG/ACT inhaler, Inhale 2 puffs Every 4 (Four) Hours As Needed for Wheezing., Disp: 18 g, Rfl: 2    bumetanide (BUMEX) 0.5 MG tablet, Take 1 tablet by mouth As Needed (daily as needed for edema) for up to 1 dose., Disp: 30 tablet, Rfl: 1    empagliflozin (JARDIANCE) 10 MG tablet tablet, Take 1 tablet by mouth Daily., Disp: 90 tablet, Rfl: 3    mometasone-formoterol (DULERA 200) 200-5 MCG/ACT inhaler, Inhale 2 puffs 2 (Two) Times a Day., Disp: 13 g, Rfl: 11    sacubitril-valsartan (Entresto) 24-26 MG tablet, Take 1 tablet by mouth 2 (Two) Times a Day., Disp: 180 tablet, Rfl: 3    carvedilol (Coreg) 25 MG tablet, Take 1 tablet by mouth 2 (Two) Times a Day With Meals for 30 days., Disp: 180 tablet, Rfl: 3    Objective     Vital Signs  /82   Pulse 68   Temp 97.6 °F (36.4 °C)   Ht 177.8 cm (70\")   Wt 55.3 kg (122 lb)   SpO2 96%   BMI 17.51 kg/m²   Estimated body mass index is 17.51 kg/m² as calculated from the following:    Height as of this encounter: 177.8 cm (70\").    Weight as of this encounter: 55.3 kg (122 lb).           Physical Exam  Constitutional:       Appearance: Normal appearance.   HENT:      Head: Normocephalic and atraumatic.      Nose: Nose normal.   Eyes:      Extraocular Movements: Extraocular movements intact.      Conjunctiva/sclera: Conjunctivae normal.      Pupils: Pupils are equal, round, and reactive to light.   Cardiovascular:      Rate and " Rhythm: Normal rate and regular rhythm.      Heart sounds: Murmur heard.   Pulmonary:      Effort: Pulmonary effort is normal. No respiratory distress.      Breath sounds: Normal breath sounds.   Musculoskeletal:         General: Normal range of motion.      Cervical back: Normal range of motion and neck supple.   Skin:     General: Skin is warm and dry.   Neurological:      General: No focal deficit present.      Mental Status: He is alert and oriented to person, place, and time. Mental status is at baseline.   Psychiatric:         Mood and Affect: Mood normal.         Behavior: Behavior normal.         Thought Content: Thought content normal.         Judgment: Judgment normal.        Assessment and Plan     Diagnoses and all orders for this visit:    1. Follow-up exam (Primary)    2. Acute on chronic HFrEF (heart failure with reduced ejection fraction)    3. Dilated cardiomyopathy    4. Essential hypertension    5. Tobacco abuse    Plan:  Patient agreeable to start checking his blood pressure at work periodically.  He will notify me if his blood pressures are staying above 130/80.  Continue with current medications at the current doses.  Weight is currently stable.  Keep scheduled follow-up appointment with cardiology.  Smoking cessation as able.  Plan to follow-up in 3 months on chronic conditions.  Return to clinic sooner if needed.    Follow Up  Return in about 3 months (around 12/11/2023) for Recheck.    NATALIO Arroyo    Part of this note may be an electronic transcription/translation of spoken language to printed text using the Dragon Dictation System.

## 2024-01-04 ENCOUNTER — APPOINTMENT (OUTPATIENT)
Dept: GENERAL RADIOLOGY | Facility: HOSPITAL | Age: 64
End: 2024-01-04
Payer: COMMERCIAL

## 2024-01-04 ENCOUNTER — HOSPITAL ENCOUNTER (INPATIENT)
Facility: HOSPITAL | Age: 64
LOS: 6 days | Discharge: HOME | End: 2024-01-10
Attending: EMERGENCY MEDICINE | Admitting: HOSPITALIST
Payer: COMMERCIAL

## 2024-01-04 DIAGNOSIS — F17.200 TOBACCO DEPENDENCE: ICD-10-CM

## 2024-01-04 DIAGNOSIS — U07.1 COVID-19 VIRUS INFECTION: Primary | ICD-10-CM

## 2024-01-04 DIAGNOSIS — J44.1 COPD WITH ACUTE EXACERBATION: ICD-10-CM

## 2024-01-04 DIAGNOSIS — Z86.79 HISTORY OF HYPERTENSION: ICD-10-CM

## 2024-01-04 DIAGNOSIS — J96.21 ACUTE ON CHRONIC RESPIRATORY FAILURE WITH HYPOXIA AND HYPERCAPNIA: ICD-10-CM

## 2024-01-04 DIAGNOSIS — J98.4 CAVITARY LESION OF LUNG: ICD-10-CM

## 2024-01-04 DIAGNOSIS — Z87.11 HISTORY OF PEPTIC ULCER DISEASE: ICD-10-CM

## 2024-01-04 DIAGNOSIS — R05.8 COUGH PRODUCTIVE OF PURULENT SPUTUM: ICD-10-CM

## 2024-01-04 DIAGNOSIS — J96.22 ACUTE ON CHRONIC RESPIRATORY FAILURE WITH HYPOXIA AND HYPERCAPNIA: ICD-10-CM

## 2024-01-04 DIAGNOSIS — J98.4 PULMONARY CAVITARY LESION: ICD-10-CM

## 2024-01-04 DIAGNOSIS — R09.02 HYPOXIA: ICD-10-CM

## 2024-01-04 DIAGNOSIS — Z86.79 HISTORY OF CHF (CONGESTIVE HEART FAILURE): ICD-10-CM

## 2024-01-04 DIAGNOSIS — I50.9 CONGESTIVE HEART FAILURE, UNSPECIFIED HF CHRONICITY, UNSPECIFIED HEART FAILURE TYPE: ICD-10-CM

## 2024-01-04 LAB
ALBUMIN SERPL-MCNC: 3.5 G/DL (ref 3.5–5.2)
ALBUMIN/GLOB SERPL: 0.9 G/DL
ALP SERPL-CCNC: 66 U/L (ref 39–117)
ALT SERPL W P-5'-P-CCNC: 12 U/L (ref 1–41)
ANION GAP SERPL CALCULATED.3IONS-SCNC: 9 MMOL/L (ref 5–15)
ARTERIAL PATENCY WRIST A: ABNORMAL
AST SERPL-CCNC: 16 U/L (ref 1–40)
ATMOSPHERIC PRESS: ABNORMAL MM[HG]
B PARAPERT DNA SPEC QL NAA+PROBE: NOT DETECTED
B PERT DNA SPEC QL NAA+PROBE: NOT DETECTED
BASE EXCESS BLDA CALC-SCNC: 3.1 MMOL/L (ref 0–2)
BASOPHILS # BLD AUTO: 0.03 10*3/MM3 (ref 0–0.2)
BASOPHILS NFR BLD AUTO: 0.3 % (ref 0–1.5)
BDY SITE: ABNORMAL
BILIRUB SERPL-MCNC: 0.3 MG/DL (ref 0–1.2)
BODY TEMPERATURE: 37
BUN SERPL-MCNC: 25 MG/DL (ref 8–23)
BUN/CREAT SERPL: 32.5 (ref 7–25)
C PNEUM DNA NPH QL NAA+NON-PROBE: NOT DETECTED
CALCIUM SPEC-SCNC: 9.4 MG/DL (ref 8.6–10.5)
CHLORIDE SERPL-SCNC: 97 MMOL/L (ref 98–107)
CO2 BLDA-SCNC: 31.3 MMOL/L (ref 22–33)
CO2 SERPL-SCNC: 28 MMOL/L (ref 22–29)
COHGB MFR BLD: 1.8 % (ref 0–2)
CREAT SERPL-MCNC: 0.77 MG/DL (ref 0.76–1.27)
CRP SERPL-MCNC: 8.2 MG/DL (ref 0–0.5)
D-LACTATE SERPL-SCNC: 0.7 MMOL/L (ref 0.5–2)
DEPRECATED RDW RBC AUTO: 48.9 FL (ref 37–54)
EGFRCR SERPLBLD CKD-EPI 2021: 100 ML/MIN/1.73
EOSINOPHIL # BLD AUTO: 0.01 10*3/MM3 (ref 0–0.4)
EOSINOPHIL NFR BLD AUTO: 0.1 % (ref 0.3–6.2)
EPAP: 0
ERYTHROCYTE [DISTWIDTH] IN BLOOD BY AUTOMATED COUNT: 13.6 % (ref 12.3–15.4)
FLUAV SUBTYP SPEC NAA+PROBE: NOT DETECTED
FLUBV RNA ISLT QL NAA+PROBE: NOT DETECTED
GLOBULIN UR ELPH-MCNC: 3.9 GM/DL
GLUCOSE SERPL-MCNC: 118 MG/DL (ref 65–99)
HADV DNA SPEC NAA+PROBE: NOT DETECTED
HCO3 BLDA-SCNC: 29.7 MMOL/L (ref 20–26)
HCOV 229E RNA SPEC QL NAA+PROBE: NOT DETECTED
HCOV HKU1 RNA SPEC QL NAA+PROBE: NOT DETECTED
HCOV NL63 RNA SPEC QL NAA+PROBE: NOT DETECTED
HCOV OC43 RNA SPEC QL NAA+PROBE: DETECTED
HCT VFR BLD AUTO: 48.3 % (ref 37.5–51)
HCT VFR BLD CALC: 46.4 % (ref 38–51)
HGB BLD-MCNC: 15.4 G/DL (ref 13–17.7)
HGB BLDA-MCNC: 15.1 G/DL (ref 13.5–17.5)
HMPV RNA NPH QL NAA+NON-PROBE: NOT DETECTED
HOLD SPECIMEN: NORMAL
HPIV1 RNA ISLT QL NAA+PROBE: NOT DETECTED
HPIV2 RNA SPEC QL NAA+PROBE: NOT DETECTED
HPIV3 RNA NPH QL NAA+PROBE: NOT DETECTED
HPIV4 P GENE NPH QL NAA+PROBE: NOT DETECTED
IMM GRANULOCYTES # BLD AUTO: 0.04 10*3/MM3 (ref 0–0.05)
IMM GRANULOCYTES NFR BLD AUTO: 0.4 % (ref 0–0.5)
INHALED O2 CONCENTRATION: 32 %
IPAP: 0
LDH SERPL-CCNC: 324 U/L (ref 135–225)
LYMPHOCYTES # BLD AUTO: 1 10*3/MM3 (ref 0.7–3.1)
LYMPHOCYTES NFR BLD AUTO: 9.7 % (ref 19.6–45.3)
M PNEUMO IGG SER IA-ACNC: NOT DETECTED
MCH RBC QN AUTO: 31 PG (ref 26.6–33)
MCHC RBC AUTO-ENTMCNC: 31.9 G/DL (ref 31.5–35.7)
MCV RBC AUTO: 97.4 FL (ref 79–97)
METHGB BLD QL: 0.3 % (ref 0–1.5)
MODALITY: ABNORMAL
MONOCYTES # BLD AUTO: 1.27 10*3/MM3 (ref 0.1–0.9)
MONOCYTES NFR BLD AUTO: 12.4 % (ref 5–12)
NEUTROPHILS NFR BLD AUTO: 7.92 10*3/MM3 (ref 1.7–7)
NEUTROPHILS NFR BLD AUTO: 77.1 % (ref 42.7–76)
NRBC BLD AUTO-RTO: 0 /100 WBC (ref 0–0.2)
NT-PROBNP SERPL-MCNC: 294.2 PG/ML (ref 0–900)
OXYHGB MFR BLDV: 94 % (ref 94–99)
PAW @ PEAK INSP FLOW SETTING VENT: 0 CMH2O
PCO2 BLDA: 52.1 MM HG (ref 35–45)
PCO2 TEMP ADJ BLD: 52.1 MM HG (ref 35–48)
PH BLDA: 7.37 PH UNITS (ref 7.35–7.45)
PH, TEMP CORRECTED: 7.37 PH UNITS
PLATELET # BLD AUTO: 258 10*3/MM3 (ref 140–450)
PMV BLD AUTO: 9.4 FL (ref 6–12)
PO2 BLDA: 83.9 MM HG (ref 83–108)
PO2 TEMP ADJ BLD: 83.9 MM HG (ref 83–108)
POTASSIUM SERPL-SCNC: 5.1 MMOL/L (ref 3.5–5.2)
PROCALCITONIN SERPL-MCNC: 0.1 NG/ML (ref 0–0.25)
PROT SERPL-MCNC: 7.4 G/DL (ref 6–8.5)
RBC # BLD AUTO: 4.96 10*6/MM3 (ref 4.14–5.8)
RHINOVIRUS RNA SPEC NAA+PROBE: NOT DETECTED
RSV RNA NPH QL NAA+NON-PROBE: NOT DETECTED
SARS-COV-2 RNA NPH QL NAA+NON-PROBE: DETECTED
SODIUM SERPL-SCNC: 134 MMOL/L (ref 136–145)
TOTAL RATE: 0 BREATHS/MINUTE
TROPONIN T SERPL HS-MCNC: 32 NG/L
WBC NRBC COR # BLD AUTO: 10.27 10*3/MM3 (ref 3.4–10.8)
WHOLE BLOOD HOLD COAG: NORMAL
WHOLE BLOOD HOLD SPECIMEN: NORMAL

## 2024-01-04 PROCEDURE — 85025 COMPLETE CBC W/AUTO DIFF WBC: CPT | Performed by: EMERGENCY MEDICINE

## 2024-01-04 PROCEDURE — 93005 ELECTROCARDIOGRAM TRACING: CPT

## 2024-01-04 PROCEDURE — 82805 BLOOD GASES W/O2 SATURATION: CPT

## 2024-01-04 PROCEDURE — 84484 ASSAY OF TROPONIN QUANT: CPT | Performed by: EMERGENCY MEDICINE

## 2024-01-04 PROCEDURE — 83615 LACTATE (LD) (LDH) ENZYME: CPT | Performed by: PHYSICIAN ASSISTANT

## 2024-01-04 PROCEDURE — 36600 WITHDRAWAL OF ARTERIAL BLOOD: CPT

## 2024-01-04 PROCEDURE — 99285 EMERGENCY DEPT VISIT HI MDM: CPT

## 2024-01-04 PROCEDURE — 86140 C-REACTIVE PROTEIN: CPT | Performed by: PHYSICIAN ASSISTANT

## 2024-01-04 PROCEDURE — 84145 PROCALCITONIN (PCT): CPT | Performed by: PHYSICIAN ASSISTANT

## 2024-01-04 PROCEDURE — 85379 FIBRIN DEGRADATION QUANT: CPT | Performed by: STUDENT IN AN ORGANIZED HEALTH CARE EDUCATION/TRAINING PROGRAM

## 2024-01-04 PROCEDURE — 83050 HGB METHEMOGLOBIN QUAN: CPT

## 2024-01-04 PROCEDURE — 0202U NFCT DS 22 TRGT SARS-COV-2: CPT | Performed by: PHYSICIAN ASSISTANT

## 2024-01-04 PROCEDURE — 83880 ASSAY OF NATRIURETIC PEPTIDE: CPT | Performed by: EMERGENCY MEDICINE

## 2024-01-04 PROCEDURE — 93005 ELECTROCARDIOGRAM TRACING: CPT | Performed by: EMERGENCY MEDICINE

## 2024-01-04 PROCEDURE — 80053 COMPREHEN METABOLIC PANEL: CPT | Performed by: EMERGENCY MEDICINE

## 2024-01-04 PROCEDURE — 82375 ASSAY CARBOXYHB QUANT: CPT

## 2024-01-04 PROCEDURE — 25010000002 DEXAMETHASONE PER 1 MG: Performed by: PHYSICIAN ASSISTANT

## 2024-01-04 PROCEDURE — 83605 ASSAY OF LACTIC ACID: CPT | Performed by: PHYSICIAN ASSISTANT

## 2024-01-04 PROCEDURE — 87205 SMEAR GRAM STAIN: CPT | Performed by: PHYSICIAN ASSISTANT

## 2024-01-04 PROCEDURE — 94799 UNLISTED PULMONARY SVC/PX: CPT

## 2024-01-04 PROCEDURE — 94640 AIRWAY INHALATION TREATMENT: CPT

## 2024-01-04 PROCEDURE — 71045 X-RAY EXAM CHEST 1 VIEW: CPT

## 2024-01-04 RX ORDER — BENZONATATE 100 MG/1
200 CAPSULE ORAL ONCE
Status: COMPLETED | OUTPATIENT
Start: 2024-01-04 | End: 2024-01-04

## 2024-01-04 RX ORDER — IPRATROPIUM BROMIDE AND ALBUTEROL SULFATE 2.5; .5 MG/3ML; MG/3ML
3 SOLUTION RESPIRATORY (INHALATION) ONCE
Status: COMPLETED | OUTPATIENT
Start: 2024-01-04 | End: 2024-01-04

## 2024-01-04 RX ORDER — SODIUM CHLORIDE 0.9 % (FLUSH) 0.9 %
10 SYRINGE (ML) INJECTION AS NEEDED
Status: DISCONTINUED | OUTPATIENT
Start: 2024-01-04 | End: 2024-01-10 | Stop reason: HOSPADM

## 2024-01-04 RX ORDER — SODIUM CHLORIDE 0.9 % (FLUSH) 0.9 %
10 SYRINGE (ML) INJECTION AS NEEDED
Status: DISCONTINUED | OUTPATIENT
Start: 2024-01-04 | End: 2024-01-05 | Stop reason: SDUPTHER

## 2024-01-04 RX ORDER — DEXAMETHASONE SODIUM PHOSPHATE 4 MG/ML
8 INJECTION, SOLUTION INTRA-ARTICULAR; INTRALESIONAL; INTRAMUSCULAR; INTRAVENOUS; SOFT TISSUE ONCE
Status: COMPLETED | OUTPATIENT
Start: 2024-01-04 | End: 2024-01-04

## 2024-01-04 RX ADMIN — BENZONATATE 200 MG: 100 CAPSULE ORAL at 21:30

## 2024-01-04 RX ADMIN — DEXAMETHASONE SODIUM PHOSPHATE 8 MG: 4 INJECTION INTRA-ARTICULAR; INTRALESIONAL; INTRAMUSCULAR; INTRAVENOUS; SOFT TISSUE at 21:30

## 2024-01-04 RX ADMIN — IPRATROPIUM BROMIDE AND ALBUTEROL SULFATE 3 ML: 2.5; .5 SOLUTION RESPIRATORY (INHALATION) at 21:22

## 2024-01-04 NOTE — Clinical Note
Level of Care: Telemetry [5]   Diagnosis: Hypoxia [136019]   Admitting Physician: JUANA GAMIBNO [[id number]]   Attending Physician: JUANA GAMBINO [[id number]]

## 2024-01-05 ENCOUNTER — APPOINTMENT (OUTPATIENT)
Dept: CT IMAGING | Facility: HOSPITAL | Age: 64
End: 2024-01-05
Payer: COMMERCIAL

## 2024-01-05 PROBLEM — J98.4 CAVITARY LESION OF LUNG: Status: ACTIVE | Noted: 2024-01-05

## 2024-01-05 PROBLEM — J44.9 COPD (CHRONIC OBSTRUCTIVE PULMONARY DISEASE): Status: ACTIVE | Noted: 2024-01-05

## 2024-01-05 PROBLEM — U07.1 COVID-19 VIRUS INFECTION: Status: ACTIVE | Noted: 2024-01-05

## 2024-01-05 PROBLEM — J44.1 COPD WITH ACUTE EXACERBATION: Status: ACTIVE | Noted: 2024-01-05

## 2024-01-05 PROBLEM — R79.89 ELEVATED TROPONIN: Status: ACTIVE | Noted: 2024-01-05

## 2024-01-05 LAB
ALBUMIN SERPL-MCNC: 3.5 G/DL (ref 3.5–5.2)
ALBUMIN/GLOB SERPL: 0.8 G/DL
ALP SERPL-CCNC: 69 U/L (ref 39–117)
ALT SERPL W P-5'-P-CCNC: 12 U/L (ref 1–41)
ANION GAP SERPL CALCULATED.3IONS-SCNC: 13 MMOL/L (ref 5–15)
AST SERPL-CCNC: 21 U/L (ref 1–40)
BACTERIA SPEC RESP CULT: NORMAL
BILIRUB SERPL-MCNC: 0.2 MG/DL (ref 0–1.2)
BUN SERPL-MCNC: 25 MG/DL (ref 8–23)
BUN/CREAT SERPL: 31.6 (ref 7–25)
CALCIUM SPEC-SCNC: 9.5 MG/DL (ref 8.6–10.5)
CHLORIDE SERPL-SCNC: 97 MMOL/L (ref 98–107)
CO2 SERPL-SCNC: 23 MMOL/L (ref 22–29)
CREAT SERPL-MCNC: 0.79 MG/DL (ref 0.76–1.27)
D DIMER PPP FEU-MCNC: 0.38 MCGFEU/ML (ref 0–0.64)
EGFRCR SERPLBLD CKD-EPI 2021: 99.2 ML/MIN/1.73
GEN 5 2HR TROPONIN T REFLEX: 23 NG/L
GLOBULIN UR ELPH-MCNC: 4.2 GM/DL
GLUCOSE SERPL-MCNC: 149 MG/DL (ref 65–99)
GRAM STN SPEC: NORMAL
HOLD SPECIMEN: NORMAL
HOLD SPECIMEN: NORMAL
MRSA DNA SPEC QL NAA+PROBE: NEGATIVE
POTASSIUM SERPL-SCNC: 5 MMOL/L (ref 3.5–5.2)
PROT SERPL-MCNC: 7.7 G/DL (ref 6–8.5)
SODIUM SERPL-SCNC: 133 MMOL/L (ref 136–145)
TROPONIN T DELTA: -3 NG/L
TROPONIN T SERPL HS-MCNC: 26 NG/L

## 2024-01-05 PROCEDURE — 99222 1ST HOSP IP/OBS MODERATE 55: CPT | Performed by: INTERNAL MEDICINE

## 2024-01-05 PROCEDURE — 87040 BLOOD CULTURE FOR BACTERIA: CPT | Performed by: INTERNAL MEDICINE

## 2024-01-05 PROCEDURE — XW033E5 INTRODUCTION OF REMDESIVIR ANTI-INFECTIVE INTO PERIPHERAL VEIN, PERCUTANEOUS APPROACH, NEW TECHNOLOGY GROUP 5: ICD-10-PCS | Performed by: HOSPITALIST

## 2024-01-05 PROCEDURE — 25010000002 PIPERACILLIN SOD-TAZOBACTAM PER 1 G: Performed by: INTERNAL MEDICINE

## 2024-01-05 PROCEDURE — 71275 CT ANGIOGRAPHY CHEST: CPT

## 2024-01-05 PROCEDURE — 87641 MR-STAPH DNA AMP PROBE: CPT

## 2024-01-05 PROCEDURE — 25010000002 ENOXAPARIN PER 10 MG: Performed by: INTERNAL MEDICINE

## 2024-01-05 PROCEDURE — 99223 1ST HOSP IP/OBS HIGH 75: CPT | Performed by: INTERNAL MEDICINE

## 2024-01-05 PROCEDURE — 25810000003 SODIUM CHLORIDE 0.9 % SOLUTION 250 ML FLEX CONT

## 2024-01-05 PROCEDURE — 97116 GAIT TRAINING THERAPY: CPT

## 2024-01-05 PROCEDURE — 94799 UNLISTED PULMONARY SVC/PX: CPT

## 2024-01-05 PROCEDURE — 97110 THERAPEUTIC EXERCISES: CPT

## 2024-01-05 PROCEDURE — 84484 ASSAY OF TROPONIN QUANT: CPT | Performed by: INTERNAL MEDICINE

## 2024-01-05 PROCEDURE — 25510000001 IOPAMIDOL PER 1 ML: Performed by: STUDENT IN AN ORGANIZED HEALTH CARE EDUCATION/TRAINING PROGRAM

## 2024-01-05 PROCEDURE — 25010000002 REMDESIVIR 100 MG/20ML SOLUTION 1 EACH VIAL

## 2024-01-05 PROCEDURE — 63710000001 DEXAMETHASONE PER 0.25 MG: Performed by: INTERNAL MEDICINE

## 2024-01-05 PROCEDURE — 80053 COMPREHEN METABOLIC PANEL: CPT | Performed by: INTERNAL MEDICINE

## 2024-01-05 PROCEDURE — 97162 PT EVAL MOD COMPLEX 30 MIN: CPT

## 2024-01-05 RX ORDER — BUDESONIDE AND FORMOTEROL FUMARATE DIHYDRATE 160; 4.5 UG/1; UG/1
2 AEROSOL RESPIRATORY (INHALATION)
Status: DISCONTINUED | OUTPATIENT
Start: 2024-01-05 | End: 2024-01-10 | Stop reason: HOSPADM

## 2024-01-05 RX ORDER — ENOXAPARIN SODIUM 100 MG/ML
40 INJECTION SUBCUTANEOUS EVERY 24 HOURS
Status: DISCONTINUED | OUTPATIENT
Start: 2024-01-05 | End: 2024-01-10 | Stop reason: HOSPADM

## 2024-01-05 RX ORDER — DEXAMETHASONE SODIUM PHOSPHATE 4 MG/ML
6 INJECTION, SOLUTION INTRA-ARTICULAR; INTRALESIONAL; INTRAMUSCULAR; INTRAVENOUS; SOFT TISSUE DAILY
Status: DISCONTINUED | OUTPATIENT
Start: 2024-01-05 | End: 2024-01-10 | Stop reason: HOSPADM

## 2024-01-05 RX ORDER — BUMETANIDE 0.5 MG/1
0.5 TABLET ORAL AS NEEDED
Status: DISCONTINUED | OUTPATIENT
Start: 2024-01-05 | End: 2024-01-10 | Stop reason: HOSPADM

## 2024-01-05 RX ORDER — CARVEDILOL 12.5 MG/1
25 TABLET ORAL 2 TIMES DAILY WITH MEALS
Status: DISCONTINUED | OUTPATIENT
Start: 2024-01-05 | End: 2024-01-10 | Stop reason: HOSPADM

## 2024-01-05 RX ORDER — VANCOMYCIN HYDROCHLORIDE 1 G/200ML
20 INJECTION, SOLUTION INTRAVENOUS ONCE
Status: DISCONTINUED | OUTPATIENT
Start: 2024-01-05 | End: 2024-01-05

## 2024-01-05 RX ORDER — NITROGLYCERIN 0.4 MG/1
0.4 TABLET SUBLINGUAL
Status: DISCONTINUED | OUTPATIENT
Start: 2024-01-05 | End: 2024-01-10 | Stop reason: HOSPADM

## 2024-01-05 RX ORDER — SODIUM CHLORIDE FOR INHALATION 7 %
3 VIAL, NEBULIZER (ML) INHALATION AS NEEDED
Status: DISCONTINUED | OUTPATIENT
Start: 2024-01-06 | End: 2024-01-10 | Stop reason: HOSPADM

## 2024-01-05 RX ORDER — ALBUTEROL SULFATE 90 UG/1
2 AEROSOL, METERED RESPIRATORY (INHALATION) EVERY 4 HOURS PRN
Status: DISCONTINUED | OUTPATIENT
Start: 2024-01-05 | End: 2024-01-06

## 2024-01-05 RX ADMIN — DOXYCYCLINE 100 MG: 100 INJECTION, POWDER, LYOPHILIZED, FOR SOLUTION INTRAVENOUS at 00:03

## 2024-01-05 RX ADMIN — PIPERACILLIN SODIUM AND TAZOBACTAM SODIUM 3.38 G: 3; .375 INJECTION, SOLUTION INTRAVENOUS at 21:55

## 2024-01-05 RX ADMIN — EMPAGLIFLOZIN 10 MG: 10 TABLET, FILM COATED ORAL at 08:36

## 2024-01-05 RX ADMIN — PIPERACILLIN SODIUM AND TAZOBACTAM SODIUM 3.38 G: 3; .375 INJECTION, SOLUTION INTRAVENOUS at 12:25

## 2024-01-05 RX ADMIN — SACUBITRIL AND VALSARTAN 1 TABLET: 24; 26 TABLET, FILM COATED ORAL at 21:56

## 2024-01-05 RX ADMIN — DOXYCYCLINE 100 MG: 100 INJECTION, POWDER, LYOPHILIZED, FOR SOLUTION INTRAVENOUS at 16:03

## 2024-01-05 RX ADMIN — CARVEDILOL 25 MG: 12.5 TABLET, FILM COATED ORAL at 08:36

## 2024-01-05 RX ADMIN — BUDESONIDE AND FORMOTEROL FUMARATE DIHYDRATE 2 PUFF: 160; 4.5 AEROSOL RESPIRATORY (INHALATION) at 19:19

## 2024-01-05 RX ADMIN — REMDESIVIR 200 MG: 100 INJECTION, POWDER, LYOPHILIZED, FOR SOLUTION INTRAVENOUS at 01:37

## 2024-01-05 RX ADMIN — SACUBITRIL AND VALSARTAN 1 TABLET: 24; 26 TABLET, FILM COATED ORAL at 08:38

## 2024-01-05 RX ADMIN — DEXAMETHASONE 6 MG: 4 TABLET ORAL at 08:36

## 2024-01-05 RX ADMIN — IOPAMIDOL 100 ML: 755 INJECTION, SOLUTION INTRAVENOUS at 00:28

## 2024-01-05 RX ADMIN — CARVEDILOL 25 MG: 12.5 TABLET, FILM COATED ORAL at 18:25

## 2024-01-05 RX ADMIN — ENOXAPARIN SODIUM 40 MG: 100 INJECTION SUBCUTANEOUS at 04:42

## 2024-01-05 NOTE — PROGRESS NOTES
"                  Clinical Nutrition   Nutrition Support Assessment  Reason for Visit: Identified at risk by screening criteria, MST score 2+      Patient Name: Francisco Clark  YOB: 1960  MRN: [id number]  Date of Encounter: 01/05/24 16:43 EST  Admission date: 1/4/2024    Comments: Appetite depressed w COVID. Suppl provided.    Nutrition Assessment   Admission Diagnosis:  Hypoxia [R09.02]      Problem List:    Hypoxia    Essential hypertension    Congestive heart failure, unspecified HF chronicity, unspecified heart failure type    COVID-19 virus infection    COPD (chronic obstructive pulmonary disease)    Cavitary lesion of lung    Elevated troponin    COPD with acute exacerbation        PMH:   He  has a past medical history of CHF (congestive heart failure), COPD (chronic obstructive pulmonary disease), History of stomach ulcers, and Hypertension.    PSH:  He  has a past surgical history that includes No past surgeries; Colonoscopy (03/05/2018); and Cardiac catheterization (N/A, 10/18/2022).    Applicable Nutrition Concerns:   Skin:  Oral:  GI:    Applicable Interval History:         Reported/Observed/Food/Nutrition Related History:     1/5  Pt rpt was 142 lbs yrs ago. Rpt poor intake 2 days PTA. Allows uses at least 2 Boost/da at home.    Anthropometrics     Flowsheet Rows      Flowsheet Row First Filed Value   Admission Height 175.3 cm (69\") Documented at 01/04/2024 2038   Admission Weight 54.4 kg (120 lb) Documented at 01/04/2024 2038     Height: Height: 175.3 cm (69\")  Last Filed Weight: Weight: 54.1 kg (119 lb 3.2 oz) (01/05/24 0239)  Method:    BMI: BMI (Calculated): 17.6  BMI classification: Underweight:<18.5kg/m2    UBW:  Pt per was 142 lbs yrs ago Per EMR Standing wt of 112 lbs ion 10/19/22 Highest wt since rpt of 122 lbs on 9/11/23.  Weight change:       Nutrition Focused Physical Exam     Date:  1/5       Unable to perform exam due to: COVID Isolation     Current Nutrition " Prescription   PO: Diet: Cardiac Diets; Healthy Heart (2-3 Na+); Texture: Regular Texture (IDDSI 7); Fluid Consistency: Thin (IDDSI 0)  Oral Nutrition Supplement: Boost Glucose Control 3x/da added per Rd  Intake: 1 Day: 88% intake x 2 meals documented RN rpt pt taking 30-40%      Nutrition Diagnosis   Date: 1/5             Updated:    Problem Predicted suboptimal energy intake   Etiology    Signs/Symptoms Rpt poor intake 2 da PTA and RN rpt taking 30-40%    Status:     Date:  1/5     Updated:     Problem Underweight   Etiology Chr now after historical wt of 142 lbs    Signs/Symptoms BMI 17.6   Status:     Goal:   General: Nutrition to support treatment  PO: Increase intake from RN rpt  EN/PN: N/A    Nutrition Intervention      Follow treatment progress, Care plan reviewed, Advise alternate selection, Menu provided, Supplement provided        Monitoring/Evaluation:   Per protocol, I&O, PO intake, Supplement intake, Pertinent labs, Weight, Symptoms      Lore Perry RD  Time Spent: 25 min

## 2024-01-05 NOTE — CONSULTS
Pulmonary Consult     LOS: 1 day   Patient Care Team:  Charito Weiss APRN as PCP - General (Internal Medicine)  Anatoly Laird MD as Consulting Physician (Pulmonary Disease)    Chief Complaint: Dyspnea's/possible cavitary lesion      Subjective     63 y.o. 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  He had cough and fever about 10 days ago which resolved quickly however developed progressive shortness of breath over the previous 10 days.  He is not on home oxygen at baseline but is currently requiring 5 L.  He has ongoing dyspnea.  Patient tested positive for COVID and is being treated with remdesivir and Decadron.  He is also on vancomycin and Zosyn.  MSRA PCR was negative.    CTA of the chest showed no evidence of pulmonary embolism but did show a small area of density in the right upper lobe which seems most likely to be a focal area of infiltrate with surrounding emphysema.  The predominant finding is severe emphysema.  Radiologist was concerned this was a cavitary lesion with the possibilities of infection versus malignancy.  Of note, patient had a CT scan of the chest on October 16, 2022 that did not show any density in this area but did have significant emphysema in this area as well as throughout the lungs.    History taken from: Patient    PMH/FH/Social History were reviewed and updated appropriately in the electronic medical record.     Past Medical History:   Diagnosis Date    CHF (congestive heart failure)     COPD (chronic obstructive pulmonary disease)     History of stomach ulcers     Hypertension      Past Surgical History:   Procedure Laterality Date    CARDIAC CATHETERIZATION N/A 10/18/2022    Procedure: LEFT HEART CATH;  Surgeon: Kd Roque MD;  Location: [facility] CATH INVASIVE LOCATION;   Service: Cardiovascular;  Laterality: N/A;    COLONOSCOPY  03/05/2018    Dr. AGNES Olea. Normal. Repeat 1- yrs if wihtout significant family history or 5 years if first degree relative younger than age 60 with high rish polyp or colorectal cancer.     NO PAST SURGERIES       Family History   Problem Relation Age of Onset    Emphysema Mother     Heart disease Father     Migraines Father     Heart attack Father     Aneurysm Sister     Heart disease Brother         50's    Migraines Brother     No Known Problems Maternal Grandmother     No Known Problems Maternal Grandfather     No Known Problems Paternal Grandmother     No Known Problems Paternal Grandfather     Other Brother         killed in [place]    No Known Problems Son     No Known Problems Son     No Known Problems Daughter      Social History     Socioeconomic History    Marital status: Single   Tobacco Use    Smoking status: Every Day     Packs/day: 0.25     Years: 35.00     Additional pack years: 0.00     Total pack years: 8.75     Types: Cigarettes    Smokeless tobacco: Never    Tobacco comments:     less than 1/2 ppd since getting sick   Vaping Use    Vaping Use: Never used   Substance and Sexual Activity    Alcohol use: No    Drug use: No    Sexual activity: Defer     Comment:          Review of Systems:    Review of 14 systems was completed with positives and pertinent negatives noted in the subjective section.  All other systems reviewed and are negative.       Objective     Vital Signs  Temp:  [97.9 °F (36.6 °C)-98.4 °F (36.9 °C)] 97.9 °F (36.6 °C)  Heart Rate:  [80-90] 90  Resp:  [18-20] 18  BP: (122-165)/(67-86) 133/73  01/04 0701 - 01/05 0700  In: 100   Out: -   Body mass index is 17.6 kg/m².     IV drips:  Pharmacy to dose vancomycin       Physical Exam:     Constitutional:   Alert, thin male, in no acute distress   Head:   Normocephalic, atraumatic   Eyes:           Lids and lashes normal, conjunctivae and sclerae normal.  PER   ENMT:   Ears appear intact with no abnormalities noted     Lips normal.     Neck:  Trachea midline, no JVD   Lungs/Resp:    Mildly increased effort, symmetric chest rise, no crepitus, diminished breath sounds diffusely and bilaterally.               Heart/CV:   Regular rhythm and normal rate, no murmur   Abdomen/GI:    Soft, nontender, nondistended   :    Deferred   Extremities/MSK:  No clubbing or cyanosis.  No edema.     Pulses:  Pulses palpable and equal bilaterally   Skin:  No bleeding, bruising or rash   Heme/Lymph:  No cervical or supraclavicular adenopathy.   Neurologic:    Psychiatric:    Moves all extremities with no obvious focal motor deficit.  Cranial nerves 2 - 12 grossly intact  Non-agitated, normal affect.    The above physical exam findings were reviewed and reflect my exam findings as of today's exam.   Electronically signed by:  Bakari Mcgrath MD  01/05/24  11:30 EST      Results Review:     I reviewed the patient's new clinical results.   Results from last 7 days   Lab Units 01/05/24  0514 01/04/24  2319   SODIUM mmol/L 133* 134*   POTASSIUM mmol/L 5.0 5.1   CHLORIDE mmol/L 97* 97*   CO2 mmol/L 23.0 28.0   BUN mg/dL 25* 25*   CREATININE mg/dL 0.79 0.77   CALCIUM mg/dL 9.5 9.4   BILIRUBIN mg/dL 0.2 0.3   ALK PHOS U/L 69 66   ALT (SGPT) U/L 12 12   AST (SGOT) U/L 21 16   GLUCOSE mg/dL 149* 118*     Results from last 7 days   Lab Units 01/04/24  2124   WBC 10*3/mm3 10.27   HEMOGLOBIN g/dL 15.4   HEMATOCRIT % 48.3   PLATELETS 10*3/mm3 258     Results from last 7 days   Lab Units 01/04/24  2134   PH, ARTERIAL pH units 7.365   PO2 ART mm Hg 83.9   PCO2, ARTERIAL mm Hg 52.1*   HCO3 ART mmol/L 29.7*           I reviewed the patient's new imaging including images and reports.    CTA chest reviewed with my personal findings and impression as noted in HPI.  Radiologist's impression follows:    Impression:  1.No evidence of pulmonary embolism.  2.25 mm cavitary lesion in the right upper lobe with internal  debris or septations. This could be related to an infectious or inflammatory process. Malignancy cannot be excluded. PET/CT follow-up could provide more information.  3.Moderate emphysema. Emphysema on CT is an independent risk factor for lung cancer. Low dose lung cancer screening should be considered if patient qualifies based on smoking history.  4.Mild coronary artery calcification and mild aortic atherosclerosis.          Medication Review:   budesonide-formoterol, 2 puff, Inhalation, BID - RT  carvedilol, 25 mg, Oral, BID With Meals  dexAMETHasone, 6 mg, Oral, Daily   Or  dexAMETHasone, 6 mg, Intravenous, Daily  doxycycline, 100 mg, Intravenous, Q12H  empagliflozin, 10 mg, Oral, Daily  enoxaparin, 40 mg, Subcutaneous, Q24H  piperacillin-tazobactam, 3.375 g, Intravenous, Once  piperacillin-tazobactam, 3.375 g, Intravenous, Q8H  [START ON 1/6/2024] remdesivir, 100 mg, Intravenous, Q24H  sacubitril-valsartan, 1 tablet, Oral, BID  vancomycin, 20 mg/kg, Intravenous, Once  vancomycin, 750 mg, Intravenous, Q12H      Pharmacy to dose vancomycin,         Assessment & Plan         Hypoxia    Essential hypertension    Congestive heart failure, unspecified HF chronicity, unspecified heart failure type    COVID-19 virus infection    COPD (chronic obstructive pulmonary disease)    Cavitary lesion of lung    Elevated troponin    COPD with acute exacerbation    63 y.o. 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  He had cough and fever about 10 days ago which resolved quickly however developed progressive shortness of breath over the previous 10 days.  He is not on home oxygen at baseline but is currently requiring 5 L.  He has ongoing dyspnea.  Patient tested positive for COVID and is being treated with remdesivir and  Decadron.  He is also on vancomycin and Zosyn.  MSRA PCR was negative.    CTA of the chest showed no evidence of pulmonary embolism but did show a small area of density in the right upper lobe which seems most likely to be a focal area of infiltrate with surrounding emphysema.  The predominant finding is severe emphysema.  Radiologist was concerned this was a cavitary lesion with the possibilities of infection versus malignancy.  Of note, patient had a CT scan of the chest on October 16, 2022 that did not show any density in this area but did have significant emphysema in this area as well as throughout the lungs.    As far as the patient's lung lesion, it appears to be a small area of infiltrate/pneumonia overlying an area of emphysema.  Cannot rule out malignancy, and he will need to have a repeat CT scan of the chest in 3 months.  Otherwise I would treat with antibiotics to cover community-acquired pneumonia.  I am going to add doxycycline.  He received a dose in the ER.  He is currently on Zosyn which can continue.  Obtain cultures.    Plan:    1.  For right upper lobe lung lesion: Appears to be a small area of infiltrate, as described above.  Cannot rule out malignancy and he will need to have a repeat CT scan of the chest in 3 months.  Otherwise treat for community-acquired pneumonia and COVID-pneumonia.  I will add doxycycline, continue Zosyn for now.  Obtain cultures.  2.  For acute hypoxemic respiratory failure: 86% room air oxygen saturation at presentation.  He is not on home oxygen.  Supplemental oxygen as needed.  3.  For history of tobacco use: Recently quit.  Smoking cessation counseling done.  4.  For COVID-pneumonia: Agree with current COVID treatment, remdesivir and Decadron complete course.  5.  For elevated troponin: Per hospitalist.  6.  For CHF with LVEF 15%: Per hospitalist or appropriate consultant.  7.  For acute COPD exacerbation: Bronchodilators and inhaled corticosteroids.  Steroids  (currently Decadron) complete course.  8.  Please have patient follow-up in the office in 2 to 4 weeks after discharge.    Call if needed over the weekend.    Electronically signed by:    Bakari Mcgrath MD  01/05/24  11:30 EST      *. Please note that portions of this note were completed with [employer] - a voice recognition program.

## 2024-01-05 NOTE — PLAN OF CARE
Problem: Adult Inpatient Plan of Care  Goal: Absence of Hospital-Acquired Illness or Injury  Intervention: Identify and Manage Fall Risk  Recent Flowsheet Documentation  Taken 1/5/2024 0452 by Amalia Bullard RN  Safety Promotion/Fall Prevention:   activity supervised   assistive device/personal items within reach   clutter free environment maintained   fall prevention program maintained   lighting adjusted   nonskid shoes/slippers when out of bed   room organization consistent   safety round/check completed  Taken 1/5/2024 0252 by Amalia Bullard RN  Safety Promotion/Fall Prevention:   activity supervised   assistive device/personal items within reach   clutter free environment maintained   fall prevention program maintained   lighting adjusted   nonskid shoes/slippers when out of bed   room organization consistent   safety round/check completed  Intervention: Prevent Skin Injury  Recent Flowsheet Documentation  Taken 1/5/2024 0452 by Amalia Bullard RN  Body Position: position changed independently  Taken 1/5/2024 0252 by Amalia Bullard RN  Body Position: position changed independently  Intervention: Prevent and Manage VTE (Venous Thromboembolism) Risk  Recent Flowsheet Documentation  Taken 1/5/2024 0252 by Amalia Bullard RN  Activity Management: activity encouraged  Intervention: Prevent Infection  Recent Flowsheet Documentation  Taken 1/5/2024 0452 by Amalia Bullard RN  Infection Prevention:   environmental surveillance performed   hand hygiene promoted   rest/sleep promoted   single patient room provided  Taken 1/5/2024 0252 by Amalia Bullard RN  Infection Prevention:   environmental surveillance performed   hand hygiene promoted   rest/sleep promoted   single patient room provided  Goal: Optimal Comfort and Wellbeing  Intervention: Provide Person-Centered Care  Recent Flowsheet Documentation  Taken 1/5/2024 0252 by Amalia Bullard RN  Trust Relationship/Rapport:   care explained   choices  provided   emotional support provided   empathic listening provided   questions answered   questions encouraged   reassurance provided   thoughts/feelings acknowledged  Goal: Readiness for Transition of Care  Intervention: Mutually Develop Transition Plan  Recent Flowsheet Documentation  Taken 1/5/2024 0245 by Amalia Bullard, RN  Equipment Currently Used at Home: none  Taken 1/5/2024 0244 by Amalia Bullard, RN  Transportation Anticipated: family or friend will provide  Patient/Family Anticipated Services at Transition: none  Patient/Family Anticipates Transition to: home with family   Goal Outcome Evaluation:   Pt A&O, VSS, 3L nc. Pt had no complaints during shift. Denies any needs at this time.  Pt cough is non productive, unable to obtain sputum sample.

## 2024-01-05 NOTE — PAYOR COMM NOTE
"Annette Marks (63 y.o. Male)       Date of Birth   1960    Social Security Number       Address   [address] [place] [address]    Home Phone   [phone]    MRN   [id number]       Denominational   None    Marital Status   Single                            Admission Date   24    Admission Type   Emergency    Admitting Provider   Bakari Mireles Jr., MD    Attending Provider   Pastora Padron MD    Department, Room/Bed   [address], S[address]/1       Discharge Date       Discharge Disposition       Discharge Destination                                 Attending Provider: Pastora Padron MD    Allergies: No Known Allergies    Isolation: Airborne, Contact   Infection: COVID (confirmed) (24), Tuberculosis (rule out) (24)   Code Status: CPR    Ht: 175.3 cm (69\")   Wt: 54.1 kg (119 lb 3.2 oz)    Admission Cmt: None   Principal Problem: Hypoxia [R09.02]                   Active Insurance as of 2024       Primary Coverage       Payor Plan Insurance Group Employer/Plan Group    [employer]TH BLUE CROSS BLUE [facility] PPO [id number]       Payor Plan Address Payor Plan Phone Number Payor Plan Fax Number Effective Dates    [address] [phone]  2014 - None Entered    [place] [address]         Subscriber Name Subscriber Birth Date Member ID       ANNETTE MARKS 1960 [id number]                     Emergency Contacts        (Rel.) Home Phone Work Phone Mobile Phone    Alley Webb (Daughter) [phone] -- --    Maximiliano Marks (Son) [phone] -- [phone]                 History & Physical        Bakari Mireles Jr., MD at 24 0110           [facility]  [facility]   Patient Name: Annette Marks  : 1960  MRN: [id number]  Primary Care Physician:  Charito Weiss APRN  Date of admission: 2024    Subjective  Subjective     Chief Complaint: Shortness of " breath    HPI:    Francisco Clark is a 63 y.o. male past medical history congestive heart failure reduced ejection fraction, COPD presented to the emergency department for evaluation of shortness of breath.  Patient states he was initially symptomatic 10 days ago with chills and fever although he did not take his temperature.  He does endorse some malaise but states his main complaint is shortness of breath.  States he has been mildly short of breath over the past 10 days which got acutely worse yesterday prompting him to seek further medical attention.  He denies any other nausea, vomiting, chest pain, palpitations, abdominal pain diarrhea constipation dysuria, weakness.  He does also endorse decreased p.o. intake on review of systems.    In the emergency department with multiple findings.  He is COVID-positive and requiring oxygen 3 L to maintain oxygen saturation greater than 90% which is not his baseline.  High-sensitivity troponin mildly elevated at 32.  CTA reveals no PE but does show a cavitary lung lesion.  Patient received respiratory treatments, Decadron, doxycycline and will be admitted for ongoing monitoring and management.      Personal History     Past Medical History:  Past Medical History:   Diagnosis Date    CHF (congestive heart failure)     COPD (chronic obstructive pulmonary disease)     History of stomach ulcers     Hypertension          Past Surgical History:  Past Surgical History:   Procedure Laterality Date    CARDIAC CATHETERIZATION N/A 10/18/2022    Procedure: LEFT HEART CATH;  Surgeon: Kd Roque MD;  Location: [facility] CATH INVASIVE LOCATION;  Service: Cardiovascular;  Laterality: N/A;    COLONOSCOPY  03/05/2018    Dr. AGNES Olea. Normal. Repeat 1- yrs if wihtout significant family history or 5 years if first degree relative younger than age 60 with high rish polyp or colorectal cancer.     NO PAST SURGERIES           Family History:   Family History   Problem Relation Age of  Onset    Emphysema Mother     Heart disease Father     Migraines Father     Heart attack Father     Aneurysm Sister     Heart disease Brother         50's    Migraines Brother     No Known Problems Maternal Grandmother     No Known Problems Maternal Grandfather     No Known Problems Paternal Grandmother     No Known Problems Paternal Grandfather     Other Brother         killed in [place]    No Known Problems Son     No Known Problems Son     No Known Problems Daughter          Social History:   Social History     Tobacco Use    Smoking status: Every Day     Packs/day: 0.25     Years: 35.00     Additional pack years: 0.00     Total pack years: 8.75     Types: Cigarettes    Smokeless tobacco: Never    Tobacco comments:     less than 1/2 ppd since getting sick   Vaping Use    Vaping Use: Never used   Substance Use Topics    Alcohol use: No    Drug use: No         Home Medications:  albuterol sulfate HFA, bumetanide, carvedilol, empagliflozin, mometasone-formoterol, and sacubitril-valsartan    Allergies:  No Known Allergies    Review of Systems   All systems were reviewed and negative except for: Shortness of breath    Objective  Objective     Vitals:   Temp:  [98.4 °F (36.9 °C)] 98.4 °F (36.9 °C)  Heart Rate:  [80-87] 85  Resp:  [20] 20  BP: (122-146)/(67-80) 146/76  Flow (L/min):  [3] 3    Physical Exam    Constitutional: Awake, alert, no acute distress   Eyes: Pupils equal, sclerae anicteric, no conjunctival injection   HENT: NCAT, mucous membranes moist   Neck: Supple, no thyromegaly, no lymphadenopathy, trachea midline   Respiratory: Clear to auscultation bilaterally, nonlabored respirations    Cardiovascular: RRR, no murmurs, rubs, or gallops, palpable pedal pulses bilaterally   Gastrointestinal: Positive bowel sounds, soft, nontender, nondistended   Musculoskeletal: No bilateral ankle edema, no clubbing or cyanosis to extremities   Psychiatric: Appropriate affect, cooperative   Neurologic: Oriented x 3, strength  symmetric in all extremities, Cranial Nerves grossly intact to confrontation, speech clear   Skin: No rashes     Result Review   Result Review:  I have personally reviewed the results from the time of this admission to 1/5/2024 01:11 EST and agree with these findings:  [x]  Laboratory  []  Microbiology  [x]  Radiology  []  EKG/Telemetry   []  Cardiology/Vascular   []  Pathology  []  Old records  []  Other:      Assessment & Plan  Assessment / Plan     Assessment/Plan:   COVID-19: Patient requiring 3 L nasal cannula to maintain oxygen saturations greater than 90%.  Initiated on steroids in the emergency department, will continue.  Will place patient on remdesivir as well.  Supportive care and supplemental oxygen as needed.  Trend inflammatory markers.  Serial labs.  Cavitary lung lesion: Consult pulmonology appreciate recommendations.  In the interim we will begin patient on broad-spectrum antibiotics with vancomycin and Zosyn.  Check blood cultures.  Supportive care as above.  Chronic congestive heart failure reduced ejection fraction: Appears euvolemic.  Monitor volume status.  Continue home regimen with Entresto, carvedilol, Jardiance  COPD without acute exacerbation: Continue respiratory treatments and patient placed on steroids although no significant wheeze on my exam due to COVID.  History of hypertension: Continue home regimen as above  Minimally elevated troponin: Will repeat.  Monitor on telemetry.  No chest pain.      DVT prophylaxis:  Lovenox    CODE STATUS:    Code Status (Patient has no pulse and is not breathing): CPR (Attempt to Resuscitate)  Medical Interventions (Patient has pulse or is breathing): Full Support      Admission Status:  I believe this patient meets inpatient status.    Electronically signed by Bakari Mireles Jr, MD, 01/05/24, 1:11 AM EST.             Electronically signed by Bakari Mireles Jr., MD at 01/05/24 0117          Consult Notes (all)        Bakari Mcgrath MD at 01/05/24  1119        Consult Orders    1. Inpatient Pulmonology Consult [[id number]] ordered by Bakari Mireles Jr., MD at 01/05/24 0105                 Pulmonary Consult     LOS: 1 day   Patient Care Team:  Charito Weiss APRN as PCP - General (Internal Medicine)  Anatoly Laird MD as Consulting Physician (Pulmonary Disease)    Chief Complaint: Dyspnea's/possible cavitary lesion      Subjective     63 y.o. 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  He had cough and fever about 10 days ago which resolved quickly however developed progressive shortness of breath over the previous 10 days.  He is not on home oxygen at baseline but is currently requiring 5 L.  He has ongoing dyspnea.  Patient tested positive for COVID and is being treated with remdesivir and Decadron.  He is also on vancomycin and Zosyn.  MSRA PCR was negative.    CTA of the chest showed no evidence of pulmonary embolism but did show a small area of density in the right upper lobe which seems most likely to be a focal area of infiltrate with surrounding emphysema.  The predominant finding is severe emphysema.  Radiologist was concerned this was a cavitary lesion with the possibilities of infection versus malignancy.  Of note, patient had a CT scan of the chest on October 16, 2022 that did not show any density in this area but did have significant emphysema in this area as well as throughout the lungs.    History taken from: Patient    PMH/FH/Social History were reviewed and updated appropriately in the electronic medical record.     Past Medical History:   Diagnosis Date    CHF (congestive heart failure)     COPD (chronic obstructive pulmonary disease)     History of stomach ulcers     Hypertension      Past Surgical History:   Procedure Laterality Date     CARDIAC CATHETERIZATION N/A 10/18/2022    Procedure: LEFT HEART CATH;  Surgeon: Kd Roque MD;  Location:  [facility] CATH INVASIVE LOCATION;  Service: Cardiovascular;  Laterality: N/A;    COLONOSCOPY  03/05/2018    Dr. AGNES Olea. Normal. Repeat 1- yrs if wihtout significant family history or 5 years if first degree relative younger than age 60 with high rish polyp or colorectal cancer.     NO PAST SURGERIES       Family History   Problem Relation Age of Onset    Emphysema Mother     Heart disease Father     Migraines Father     Heart attack Father     Aneurysm Sister     Heart disease Brother         50's    Migraines Brother     No Known Problems Maternal Grandmother     No Known Problems Maternal Grandfather     No Known Problems Paternal Grandmother     No Known Problems Paternal Grandfather     Other Brother         killed in [place]    No Known Problems Son     No Known Problems Son     No Known Problems Daughter      Social History     Socioeconomic History    Marital status: Single   Tobacco Use    Smoking status: Every Day     Packs/day: 0.25     Years: 35.00     Additional pack years: 0.00     Total pack years: 8.75     Types: Cigarettes    Smokeless tobacco: Never    Tobacco comments:     less than 1/2 ppd since getting sick   Vaping Use    Vaping Use: Never used   Substance and Sexual Activity    Alcohol use: No    Drug use: No    Sexual activity: Defer     Comment:          Review of Systems:    Review of 14 systems was completed with positives and pertinent negatives noted in the subjective section.  All other systems reviewed and are negative.       Objective     Vital Signs  Temp:  [97.9 °F (36.6 °C)-98.4 °F (36.9 °C)] 97.9 °F (36.6 °C)  Heart Rate:  [80-90] 90  Resp:  [18-20] 18  BP: (122-165)/(67-86) 133/73  01/04 0701 - 01/05 0700  In: 100   Out: -   Body mass index is 17.6 kg/m².     IV drips:  Pharmacy to dose vancomycin       Physical Exam:     Constitutional:   Alert, thin male,  in no acute distress   Head:   Normocephalic, atraumatic   Eyes:           Lids and lashes normal, conjunctivae and sclerae normal.  PER   ENMT:  Ears appear intact with no abnormalities noted     Lips normal.     Neck:  Trachea midline, no JVD   Lungs/Resp:    Mildly increased effort, symmetric chest rise, no crepitus, diminished breath sounds diffusely and bilaterally.               Heart/CV:   Regular rhythm and normal rate, no murmur   Abdomen/GI:    Soft, nontender, nondistended   :    Deferred   Extremities/MSK:  No clubbing or cyanosis.  No edema.     Pulses:  Pulses palpable and equal bilaterally   Skin:  No bleeding, bruising or rash   Heme/Lymph:  No cervical or supraclavicular adenopathy.   Neurologic:    Psychiatric:    Moves all extremities with no obvious focal motor deficit.  Cranial nerves 2 - 12 grossly intact  Non-agitated, normal affect.    The above physical exam findings were reviewed and reflect my exam findings as of today's exam.   Electronically signed by:  Bakari Mcgrath MD  01/05/24  11:30 EST      Results Review:     I reviewed the patient's new clinical results.   Results from last 7 days   Lab Units 01/05/24  0514 01/04/24  2319   SODIUM mmol/L 133* 134*   POTASSIUM mmol/L 5.0 5.1   CHLORIDE mmol/L 97* 97*   CO2 mmol/L 23.0 28.0   BUN mg/dL 25* 25*   CREATININE mg/dL 0.79 0.77   CALCIUM mg/dL 9.5 9.4   BILIRUBIN mg/dL 0.2 0.3   ALK PHOS U/L 69 66   ALT (SGPT) U/L 12 12   AST (SGOT) U/L 21 16   GLUCOSE mg/dL 149* 118*     Results from last 7 days   Lab Units 01/04/24  2124   WBC 10*3/mm3 10.27   HEMOGLOBIN g/dL 15.4   HEMATOCRIT % 48.3   PLATELETS 10*3/mm3 258     Results from last 7 days   Lab Units 01/04/24  2134   PH, ARTERIAL pH units 7.365   PO2 ART mm Hg 83.9   PCO2, ARTERIAL mm Hg 52.1*   HCO3 ART mmol/L 29.7*           I reviewed the patient's new imaging including images and reports.    CTA chest reviewed with my personal findings and impression as noted in HPI.   Radiologist's impression follows:    Impression:  1.No evidence of pulmonary embolism.  2.25 mm cavitary lesion in the right upper lobe with internal debris or septations. This could be related to an infectious or inflammatory process. Malignancy cannot be excluded. PET/CT follow-up could provide more information.  3.Moderate emphysema. Emphysema on CT is an independent risk factor for lung cancer. Low dose lung cancer screening should be considered if patient qualifies based on smoking history.  4.Mild coronary artery calcification and mild aortic atherosclerosis.          Medication Review:   budesonide-formoterol, 2 puff, Inhalation, BID - RT  carvedilol, 25 mg, Oral, BID With Meals  dexAMETHasone, 6 mg, Oral, Daily   Or  dexAMETHasone, 6 mg, Intravenous, Daily  doxycycline, 100 mg, Intravenous, Q12H  empagliflozin, 10 mg, Oral, Daily  enoxaparin, 40 mg, Subcutaneous, Q24H  piperacillin-tazobactam, 3.375 g, Intravenous, Once  piperacillin-tazobactam, 3.375 g, Intravenous, Q8H  [START ON 1/6/2024] remdesivir, 100 mg, Intravenous, Q24H  sacubitril-valsartan, 1 tablet, Oral, BID  vancomycin, 20 mg/kg, Intravenous, Once  vancomycin, 750 mg, Intravenous, Q12H      Pharmacy to dose vancomycin,         Assessment & Plan         Hypoxia    Essential hypertension    Congestive heart failure, unspecified HF chronicity, unspecified heart failure type    COVID-19 virus infection    COPD (chronic obstructive pulmonary disease)    Cavitary lesion of lung    Elevated troponin    COPD with acute exacerbation    63 y.o. 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  He had cough and fever about 10 days ago which resolved quickly however developed progressive shortness of breath over the previous 10 days.  He is not on home oxygen at  baseline but is currently requiring 5 L.  He has ongoing dyspnea.  Patient tested positive for COVID and is being treated with remdesivir and Decadron.  He is also on vancomycin and Zosyn.  MSRA PCR was negative.    CTA of the chest showed no evidence of pulmonary embolism but did show a small area of density in the right upper lobe which seems most likely to be a focal area of infiltrate with surrounding emphysema.  The predominant finding is severe emphysema.  Radiologist was concerned this was a cavitary lesion with the possibilities of infection versus malignancy.  Of note, patient had a CT scan of the chest on October 16, 2022 that did not show any density in this area but did have significant emphysema in this area as well as throughout the lungs.    As far as the patient's lung lesion, it appears to be a small area of infiltrate/pneumonia overlying an area of emphysema.  Cannot rule out malignancy, and he will need to have a repeat CT scan of the chest in 3 months.  Otherwise I would treat with antibiotics to cover community-acquired pneumonia.  I am going to add doxycycline.  He received a dose in the ER.  He is currently on Zosyn which can continue.  Obtain cultures.    Plan:    1.  For right upper lobe lung lesion: Appears to be a small area of infiltrate, as described above.  Cannot rule out malignancy and he will need to have a repeat CT scan of the chest in 3 months.  Otherwise treat for community-acquired pneumonia and COVID-pneumonia.  I will add doxycycline, continue Zosyn for now.  Obtain cultures.  2.  For acute hypoxemic respiratory failure: 86% room air oxygen saturation at presentation.  He is not on home oxygen.  Supplemental oxygen as needed.  3.  For history of tobacco use: Recently quit.  Smoking cessation counseling done.  4.  For COVID-pneumonia: Agree with current COVID treatment, remdesivir and Decadron complete course.  5.  For elevated troponin: Per hospitalist.  6.  For CHF with LVEF  15%: Per hospitalist or appropriate consultant.  7.  For acute COPD exacerbation: Bronchodilators and inhaled corticosteroids.  Steroids (currently Decadron) complete course.  8.  Please have patient follow-up in the office in 2 to 4 weeks after discharge.    Call if needed over the weekend.    Electronically signed by:    Bakari Mcgrath MD  01/05/24  11:30 EST      *. Please note that portions of this note were completed with [employer] - a voice recognition program.     Electronically signed by Bakari Mcgrath MD at 01/05/24 7023

## 2024-01-05 NOTE — PROGRESS NOTES
Pharmacy Consult-Vancomycin Dosing  Francisco Clark is a  63 y.o. male receiving vancomycin therapy.     Indication: Pneumonia  Consulting Provider: Hospitalist  ID Consult: No    Goal AUC: 400 - 600 mg/L*hr    Current Antimicrobial Therapy  Anti-Infectives (From admission, onward)      Ordered     Dose/Rate Route Frequency Start Stop    01/05/24 0015  remdesivir 100 mg in sodium chloride 0.9 % 250 mL IVPB (powder vial)        Ordering Provider: Bakari Mireles Jr., MD   See Hyperspace for full Linked Orders Report.    100 mg  over 60 Minutes Intravenous Every 24 Hours 01/06/24 0100 01/10/24 0059    01/05/24 0319  piperacillin-tazobactam (ZOSYN) 3.375 g in iso-osmotic dextrose 50 ml (premix)        Ordering Provider: Bakari Mireles Jr., MD    3.375 g  over 4 Hours Intravenous Every 8 Hours 01/05/24 1000 01/12/24 0959    01/05/24 0319  piperacillin-tazobactam (ZOSYN) 3.375 g in iso-osmotic dextrose 50 ml (premix)        Ordering Provider: Bakari Mireles Jr., MD    3.375 g  over 30 Minutes Intravenous Once 01/05/24 0415      01/05/24 0324  vancomycin (VANCOCIN) 1000 mg/200 mL dextrose 5% IVPB        Ordering Provider: Neela Quintero RPH    20 mg/kg × 54.1 kg  over 60 Minutes Intravenous Once 01/05/24 0415      01/05/24 0319  Pharmacy to dose vancomycin        Ordering Provider: Bakari Mireles Jr., MD     Does not apply Continuous PRN 01/05/24 0318 01/12/24 0317    01/05/24 0015  remdesivir 200 mg in sodium chloride 0.9 % 290 mL IVPB (powder vial)        Ordering Provider: Neela Quintero RPH   See Hyperspace for full Linked Orders Report.    200 mg  over 60 Minutes Intravenous Every 24 Hours 01/05/24 0100 01/05/24 0237    01/04/24 2333  doxycycline (VIBRAMYCIN) 100 mg in sodium chloride 0.9 % 100 mL IVPB        Ordering Provider: Yolande Knapp PA-C    100 mg  over 60 Minutes Intravenous Once 01/04/24 2349 01/05/24 0117            Allergies  Allergies as of 01/04/2024    (No Known Allergies)  "      Labs    Results from last 7 days   Lab Units 01/04/24  2319   BUN mg/dL 25*   CREATININE mg/dL 0.77       Results from last 7 days   Lab Units 01/04/24  2124   WBC 10*3/mm3 10.27       Evaluation of Dosing     Last Dose Received in the ED/Outside Facility: N/A  Is Patient on Dialysis or Renal Replacement: No    Ht - 175.3 cm (69\")  Wt - 54.1 kg (119 lb 3.2 oz)    Estimated Creatinine Clearance: 74.2 mL/min (by C-G formula based on SCr of 0.77 mg/dL).    Intake & Output (last 3 days)         01/02 0701  01/03 0700 01/03 0701  01/04 0700 01/04 0701 01/05 0700    IV Piggyback   100    Total Intake(mL/kg)   100 (1.8)    Net   +100                   Microbiology and Radiology  Microbiology Results (last 10 days)       Procedure Component Value - Date/Time    COVID PRE-OP / PRE-PROCEDURE SCREENING ORDER (NO ISOLATION) - Swab, Nasopharynx [[id number]]  (Abnormal) Collected: 01/04/24 2135    Lab Status: Final result Specimen: Swab from Nasopharynx Updated: 01/04/24 2324    Narrative:      The following orders were created for panel order COVID PRE-OP / PRE-PROCEDURE SCREENING ORDER (NO ISOLATION) - Swab, Nasopharynx.  Procedure                               Abnormality         Status                     ---------                               -----------         ------                     Respiratory Panel PCR w/...[[id number]]  Abnormal            Final result                 Please view results for these tests on the individual orders.    Respiratory Panel PCR w/COVID-19(SARS-CoV-2) [facility] In-House, NP Swab in UTM/VTM, 2 HR TAT - Swab, Nasopharynx [[id number]]  (Abnormal) Collected: 01/04/24 2135    Lab Status: Final result Specimen: Swab from Nasopharynx Updated: 01/04/24 2324     ADENOVIRUS, PCR Not Detected     Coronavirus 229E Not Detected     Coronavirus HKU1 Not Detected     Coronavirus NL63 Not Detected     Coronavirus OC43 Detected     COVID19 Detected     Human Metapneumovirus Not Detected    "  Human Rhinovirus/Enterovirus Not Detected     Influenza A PCR Not Detected     Influenza B PCR Not Detected     Parainfluenza Virus 1 Not Detected     Parainfluenza Virus 2 Not Detected     Parainfluenza Virus 3 Not Detected     Parainfluenza Virus 4 Not Detected     RSV, PCR Not Detected     Bordetella pertussis pcr Not Detected     Bordetella parapertussis PCR Not Detected     Chlamydophila pneumoniae PCR Not Detected     Mycoplasma pneumo by PCR Not Detected    Narrative:      In the setting of a positive respiratory panel with a viral infection PLUS a negative procalcitonin without other underlying concern for bacterial infection, consider observing off antibiotics or discontinuation of antibiotics and continue supportive care. If the respiratory panel is positive for atypical bacterial infection (Bordetella pertussis, Chlamydophila pneumoniae, or Mycoplasma pneumoniae), consider antibiotic de-escalation to target atypical bacterial infection.            Reported Vancomycin Levels                         [employer] AUC Calculation:    Current AUC:   -- mg/L*hr    Predicted Steady State AUC on Current Dose: -- mg/L*hr  _________________________________    Predicted Steady State AUC on New Dose:   480 mg/L*hr    Assessment/Plan:  Pharmacy to dose vancomycin for pneumonia. Goal -600 mg/L*hr.  Ordered a loading dose of vancomycin 1000 mg (20 mg/kg) IV x 1. As patient's renal function appears to be stable and at baseline, will initiate a maintenance dose of vancomycin 750 mg IV q12h, which predicts a steady state AUC of 480 mg/L*hr. .   Plan for vancomycin level in 2-3 days as patient approaches steady state.  Pharmacy will continue to monitor renal function, cultures and sensitivities, and clinical status to adjust regimen as necessary.    Neela Quintero, PharmD, BCPS  03:33 EST

## 2024-01-05 NOTE — THERAPY EVALUATION
Patient Name: Francisco Clark  : 1960    MRN: [id number]                              Today's Date: 2024       Admit Date: 2024    Visit Dx:     ICD-10-CM ICD-9-CM   1. COVID-19 virus infection  U07.1 079.89   2. Acute on chronic respiratory failure with hypoxia and hypercapnia  J96.21 518.84    J96.22 786.09     799.02   3. COPD with acute exacerbation  J44.1 491.21   4. Cough productive of purulent sputum  R05.8 786.2   5. Pulmonary cavitary lesion  J98.4 518.89   6. Tobacco dependence  F17.200 305.1   7. History of hypertension  Z86.79 V12.59   8. History of CHF (congestive heart failure)  Z86.79 V12.59   9. History of peptic ulcer disease  Z87.11 V12.71     Patient Active Problem List   Diagnosis    Chest pain    Centrilobular emphysema    Tobacco abuse    Essential hypertension    Precordial pain    Dilated cardiomyopathy    Congestive heart failure, unspecified HF chronicity, unspecified heart failure type    Severe malnutrition    Acute on chronic HFrEF (heart failure with reduced ejection fraction)    Oropharyngeal dysphagia    Hypoxia    COVID-19 virus infection    COPD (chronic obstructive pulmonary disease)    Cavitary lesion of lung    Elevated troponin    COPD with acute exacerbation     Past Medical History:   Diagnosis Date    CHF (congestive heart failure)     COPD (chronic obstructive pulmonary disease)     History of stomach ulcers     Hypertension      Past Surgical History:   Procedure Laterality Date    CARDIAC CATHETERIZATION N/A 10/18/2022    Procedure: LEFT HEART CATH;  Surgeon: Kd Roque MD;  Location: [facility] CATH INVASIVE LOCATION;  Service: Cardiovascular;  Laterality: N/A;    COLONOSCOPY  2018    Dr. AGNES Olea. Normal. Repeat 1- yrs if wihtout significant family history or 5 years if first degree relative younger than age 60 with high rish polyp or colorectal cancer.     NO PAST SURGERIES        General Information       Row Name 24 [id number]           Physical Therapy Time and Intention    Document Type evaluation  -DM     Mode of Treatment physical therapy  -DM       Row Name 01/05/24 1535          General Information    Patient Profile Reviewed yes  -DM     Prior Level of Function independent:;all household mobility;community mobility;gait;transfer;bed mobility;ADL's;home management;cooking;cleaning;driving;shopping;work  indep gt w/o AD;works@[employer] (on feet all day);uses [place].  -DM     Existing Precautions/Restrictions oxygen therapy device and L/min;other (see comments)  Covid (contact/airborne); r/o ? TB; lung lesion; elev trop  -DM     Barriers to Rehab medically complex  -DM       Row Name 01/05/24 1535          Living Environment    People in Home child(jose martin), adult  son lives w/ pt. & works basically the same hrs that pt works  -DM       Row Name 01/05/24 1535          Home Main Entrance    Number of Stairs, Main Entrance none  -DM       Row Name 01/05/24 1535          Stairs Within Home, Primary    Stairs, Within Home, Primary 1-st apt (tub/show. w/o seat or gr bar, but has elev toilet)  -DM     Number of Stairs, Within Home, Primary none  -DM       Row Name 01/05/24 1535          Cognition    Orientation Status (Cognition) oriented x 4  -DM       Row Name 01/05/24 1535          Safety Issues, Functional Mobility    Safety Issues Affecting Function (Mobility) insight into deficits/self-awareness;safety precaution awareness;safety precautions follow-through/compliance  -DM     Impairments Affecting Function (Mobility) balance;endurance/activity tolerance;pain;shortness of breath;strength  -DM               User Key  (r) = Recorded By, (t) = Taken By, (c) = Cosigned By      Initials Name Provider Type    DM Domi Cortez, PT Physical Therapist                   Mobility       Row Name 01/05/24 1535          Bed Mobility    Bed Mobility rolling right;scooting/bridging;supine-sit  -DM     Rolling Right [place] (Bed Mobility) independent  -DM      Scooting/Bridging [place] (Bed Mobility) independent  -DM     Supine-Sit [place] (Bed Mobility) modified independence  -DM     Assistive Device (Bed Mobility) head of bed elevated;bed rails  -DM     Comment, (Bed Mobility) pt has been up to BR w/ nsg & PCT on RA, but o2 Sat.is 91% on 3L, therefore PT issued o2 ext tubing, new pulse oxim sensor (pt's malfxn), & mask; occ coughing (cautioned to wear mask when staff present, [place]. d/t lung lesion & ? TB, as well as Covid);nsg wanting sputum speci.; instructed in PLB exer once EOB; nsg in to hang new IV/ABX; pt c/o incr SOB &  6/10 lung pain w/ inspirations  -DM       Row Name 01/05/24 153          Transfers    Comment, (Transfers) cues for HP utilizing bedrail (pt sl.wobbly w/ STS)  -DM       Row Name 01/05/24 1533          Sit-Stand Transfer    Sit-Stand [place] (Transfers) verbal cues;minimum assist (75% patient effort)  decl. AD  -DM       Row Name 01/05/24 1535          Gait/Stairs (Locomotion)    [place] Level (Gait) verbal cues;minimum assist (75% patient effort)  Min HHA d/t init.wobbly (Decl. AD); 2 laps in room;3 stand.rests;desat 91% on 3L; HR 94  -DM     Distance in Feet (Gait) 60  -DM     Deviations/Abnormal Patterns (Gait) bilateral deviations;base of support, narrow;cathryn decreased;stride length decreased;weight shifting decreased  decr step length; init LOB to R  -DM     Bilateral Gait Deviations forward flexed posture;heel strike decreased  flexed trunk & gazing@ floor  -DM     Comment, (Gait/Stairs) Cues for incr step length, maintaining midline, trunk ext/focusing ahead, PLB; o2 Sat recovered to 93% w/ 2 min sit rest  -DM               User Key  (r) = Recorded By, (t) = Taken By, (c) = Cosigned By      Initials Name Provider Type    DM Domi Cortez, PT Physical Therapist                   Obj/Interventions       Row Name 01/05/24 3615          Range of Motion Comprehensive    General Range of Motion lower extremity range of  motion deficits identified  -DM     Comment, General Range of Motion B hip flex isaac. 10-15 deg d/t incr SOB/ Lung pain  -DM       Row Name 01/05/24 1535          Strength Comprehensive (MMT)    General Manual Muscle Testing (MMT) Assessment lower extremity strength deficits identified  -DM     Comment, General Manual Muscle Testing (MMT) Assessment BLE grossly 4 to 4+/5  -DM       Row Name 01/05/24 1535          Motor Skills    Therapeutic Exercise shoulder;hip;knee;ankle  issued HEP & instructed  -DM       Row Name 01/05/24 1535          Shoulder (Therapeutic Exercise)    Shoulder (Therapeutic Exercise) AROM (active range of motion)  -DM     Shoulder AROM (Therapeutic Exercise) bilateral;flexion;extension;aBduction;aDduction;horizontal aBduction/aDduction;sitting;10 repetitions;other (see comments)  deep inspirations w/ sh. exer; biceps curls  -DM       Row Name 01/05/24 1535          Hip (Therapeutic Exercise)    Hip (Therapeutic Exercise) AROM (active range of motion);isometric exercises  -DM     Hip AROM (Therapeutic Exercise) bilateral;flexion;extension;aBduction;aDduction;external rotation;internal rotation;sitting;10 repetitions  -DM     Hip Isometrics (Therapeutic Exercise) gluteal sets;sitting;10 repetitions;other (see comments)  abdom sets  -DM       Row Name 01/05/24 1535          Knee (Therapeutic Exercise)    Knee (Therapeutic Exercise) AROM (active range of motion);isometric exercises  -DM     Knee AROM (Therapeutic Exercise) bilateral;flexion;extension;SAQ (short arc quad);LAQ (long arc quad);heel slides;10 repetitions  -DM     Knee Isometrics (Therapeutic Exercise) bilateral;hamstring sets;quad sets;sitting;10 repetitions  -DM       Row Name 01/05/24 1535          Ankle (Therapeutic Exercise)    Ankle (Therapeutic Exercise) AROM (active range of motion)  -DM     Ankle AROM (Therapeutic Exercise) bilateral;dorsiflexion;plantarflexion;sitting;10 repetitions;2 sets;other (see comments)  AP  -DM        Row Name 01/05/24 1535          Balance    Balance Assessment sitting static balance;sitting dynamic balance;standing static balance;standing dynamic balance  -DM     Static Sitting Balance independent  -DM     Dynamic Sitting Balance supervision  -DM     Position, Sitting Balance unsupported;sitting in chair;sitting edge of bed  -DM     Static Standing Balance verbal cues;contact guard  -DM     Dynamic Standing Balance verbal cues;minimal assist  -DM     Position/Device Used, Standing Balance supported;other (see comments)  RUE supp.  -DM     Balance Interventions sitting;standing;static;dynamic;weight shifting activity  -DM               User Key  (r) = Recorded By, (t) = Taken By, (c) = Cosigned By      Initials Name Provider Type    DM Domi Cortez, PT Physical Therapist                   Goals/Plan       Row Name 01/05/24 1535          Bed Mobility Goal 1 (PT)    Activity/Assistive Device (Bed Mobility Goal 1, PT) bed mobility activities, all  -DM     [place] Level/Cues Needed (Bed Mobility Goal 1, PT) independent  -DM     Time Frame (Bed Mobility Goal 1, PT) 1 week;short term goal (STG);3 days  -DM       Row Name 01/05/24 1539          Transfer Goal 1 (PT)    Activity/Assistive Device (Transfer Goal 1, PT) sit-to-stand/stand-to-sit;bed-to-chair/chair-to-bed  -DM     [place] Level/Cues Needed (Transfer Goal 1, PT) independent  -DM     Time Frame (Transfer Goal 1, PT) short term goal (STG);3 days  -DM       Row Name 01/05/24 1539          Gait Training Goal 1 (PT)    Activity/Assistive Device (Gait Training Goal 1, PT) gait (walking locomotion)  stable VS; o2 Sat > 92%  -DM     [place] Level (Gait Training Goal 1, PT) supervision required  -DM     Distance (Gait Training Goal 1, PT) 150  -DM     Time Frame (Gait Training Goal 1, PT) long term goal (LTG);1 week  -DM       Row Name 01/05/24 1537          Patient Education Goal (PT)    Activity (Patient Education Goal, PT) HEP exer  -DM      [place]/Cues/Accuracy (Memory Goal 2, PT) demonstrates adequately;verbalizes understanding  -DM     Time Frame (Patient Education Goal, PT) long term goal (LTG);1 week  -DM       Row Name 01/05/24 2059          Therapy Assessment/Plan (PT)    Planned Therapy Interventions (PT) bed mobility training;gait training;home exercise program;patient/family education;strengthening;transfer training  -DM               User Key  (r) = Recorded By, (t) = Taken By, (c) = Cosigned By      Initials Name Provider Type    DM Domi Cortez, PT Physical Therapist                   Clinical Impression       Row Name 01/05/24 3058          Pain    Pretreatment Pain Rating 6/10  -DM     Posttreatment Pain Rating 7/10  -DM     Pain Location - Side/Orientation Bilateral  -DM     Pain Location - chest  -DM     Pre/Posttreatment Pain Comment incr pain w/ deep inspirations  -DM     Pain Intervention(s) Repositioned;Elevated;Rest  -DM     Additional Documentation Pain Scale: Numbers Pre/Post-Treatment (Group)  -DM       Row Name 01/05/24 9459          Plan of Care Review    Plan of Care Reviewed With patient  -DM     Progress improving  -DM     Outcome Evaluation PT eval completed. Presents w/ Covid/hypox, lung lesion/? TB, malnut, decr. strength/endurance, & impaired funct mobil below baseline. Amb 60 ft in room w/ min HHA on R (init wobbly, w/ LOB to R, but decl. AD) w/ 3 stand.rests, + ther exer EOB & UIC per issued HEP. Limited by mod SOB/desat 91% on 3L, incr pain to 7/10 w/ deep inspirations, incr /75, & mod.fatigue. Recommend home w/ son's assist (do not anticipate pt will req. f/u PT at d/c).  -DM       Row Name 01/05/24 2059          Therapy Assessment/Plan (PT)    Patient/Family Therapy Goals Statement (PT) improved funct mobil  -DM     Rehab Potential (PT) good, to achieve stated therapy goals  -DM     Criteria for Skilled Interventions Met (PT) yes;meets criteria;skilled treatment is necessary  -DM     Therapy  Frequency (PT) daily  -DM       Row Name 01/05/24 1535          Vital Signs    Pre Systolic BP Rehab 135  -DM     Pre Treatment Diastolic BP 74  -DM     Post Systolic BP Rehab 155  -DM     Post Treatment Diastolic BP 75  -DM     Pretreatment Heart Rate (beats/min) 85  -DM     Intratreatment Heart Rate (beats/min) 94  -DM     Posttreatment Heart Rate (beats/min) 73  -DM     Pre SpO2 (%) 92  -DM     O2 Delivery Pre Treatment nasal cannula  -DM     Intra SpO2 (%) 91  -DM     O2 Delivery Intra Treatment nasal cannula  -DM     Post SpO2 (%) 93  -DM     O2 Delivery Post Treatment nasal cannula  -DM     Pre Patient Position Supine  -DM     Intra Patient Position Standing  -DM     Post Patient Position Sitting  -DM     Rest Breaks  3  -DM       Row Name 01/05/24 1535          Positioning and Restraints    Pre-Treatment Position in bed  -DM     Post Treatment Position chair  -DM     In Chair notified nsg;reclined;call light within reach;encouraged to call for assist;with other staff;legs elevated  -DM               User Key  (r) = Recorded By, (t) = Taken By, (c) = Cosigned By      Initials Name Provider Type    DM Domi Cortez, PT Physical Therapist                   Outcome Measures       Row Name 01/05/24 1535          How much help from another person do you currently need...    Turning from your back to your side while in flat bed without using bedrails? 4  -DM     Moving from lying on back to sitting on the side of a flat bed without bedrails? 3  -DM     Moving to and from a bed to a chair (including a wheelchair)? 3  -DM     Standing up from a chair using your arms (e.g., wheelchair, bedside chair)? 3  -DM     Climbing 3-5 steps with a railing? 3  -DM     To walk in hospital room? 3  -DM     AM-PAC 6 Clicks Score (PT) 19  -DM     Highest Level of Mobility Goal 6 --> Walk 10 steps or more  -DM       Row Name 01/05/24 1535          Functional Assessment    Outcome Measure Options AM-PAC 6 Clicks Basic Mobility (PT)   -DM               User Key  (r) = Recorded By, (t) = Taken By, (c) = Cosigned By      Initials Name Provider Type    DM Domi Cortez, PT Physical Therapist                                 Physical Therapy Education       Title: PT OT SLP Therapies (Done)       Topic: Physical Therapy (Done)       Point: Mobility training (Done)       Learning Progress Summary             Patient Eager, E,D,H, VU by DM at 1/5/2024 1701                         Point: Home exercise program (Done)       Learning Progress Summary             Patient Eager, E,D,H, VU by DM at 1/5/2024 1701                         Point: Body mechanics (Done)       Learning Progress Summary             Patient Eager, E,D,H, VU by DM at 1/5/2024 1701                         Point: Precautions (Done)       Learning Progress Summary             Patient Eager, E,D,H, VU by DM at 1/5/2024 1701                                         User Key       Initials Effective Dates Name Provider Type Discipline    [place]IL[place] 02/03/23 -  Domi Cortez, PT Physical Therapist PT                  PT Recommendation and Plan  Planned Therapy Interventions (PT): bed mobility training, gait training, home exercise program, patient/family education, strengthening, transfer training  Plan of Care Reviewed With: patient  Progress: improving  Outcome Evaluation: PT eval completed. Presents w/ Covid/hypox, lung lesion/? TB, malnut, decr. strength/endurance, & impaired funct mobil below baseline. Amb 60 ft in room w/ min HHA on R (init wobbly, w/ LOB to R, but decl. AD) w/ 3 stand.rests, + ther exer EOB & UIC per issued HEP. Limited by mod SOB/desat 91% on 3L, incr pain to 7/10 w/ deep inspirations, incr /75, & mod.fatigue. Recommend home w/ son's assist (do not anticipate pt will req. f/u PT at d/c).     Time Calculation:   PT Evaluation Complexity  History, PT Evaluation Complexity: 3 or more personal factors and/or comorbidities  Examination of Body Systems (PT Eval  Complexity): total of 3 or more elements  Clinical Presentation (PT Evaluation Complexity): evolving  Clinical Decision Making (PT Evaluation Complexity): moderate complexity  Overall Complexity (PT Evaluation Complexity): moderate complexity     PT Charges       Row Name 01/05/24 1702             Time Calculation    Start Time 1535  -DM      PT Received On 01/05/24  -DM      PT Goal Re-Cert Due Date 01/15/24  -DM         Timed Charges    37948 - PT Therapeutic Exercise Minutes 15  -DM      90904 - Gait Training Minutes  11  -DM         Untimed Charges    PT Eval/Re-eval Minutes 60  -DM         Total Minutes    Timed Charges Total Minutes 26  -DM      Untimed Charges Total Minutes 60  -DM       Total Minutes 86  -DM                User Key  (r) = Recorded By, (t) = Taken By, (c) = Cosigned By      Initials Name Provider Type    Domi Stark, PT Physical Therapist                  Therapy Charges for Today       Code Description Service Date Service Provider Modifiers Qty    23089009869 HC PT THER PROC EA 15 MIN 1/5/2024 Domi Cortez, PT GP 1    02101579529 HC GAIT TRAINING EA 15 MIN 1/5/2024 Domi Cortez, PT GP 1    79919387358 HC PT EVAL MOD COMPLEXITY 4 1/5/2024 Domi Cortez, PT GP 1            PT G-Codes  Outcome Measure Options: AM-PAC 6 Clicks Basic Mobility (PT)  AM-PAC 6 Clicks Score (PT): 19  PT Discharge Summary  Anticipated Discharge Disposition (PT): home with assist    Domi Cortez, PT  1/5/2024

## 2024-01-05 NOTE — PLAN OF CARE
Goal Outcome Evaluation:  Plan of Care Reviewed With: patient        Progress: improving  Outcome Evaluation: PT eval completed. Presents w/ Covid/hypox, lung lesion/? TB, malnut, decr. strength/endurance, & impaired funct mobil below baseline. Amb 60 ft in room w/ min HHA on R (init wobbly, w/ LOB to R, but decl. AD) w/ 3 stand.rests, + ther exer EOB & UIC per issued HEP. Limited by mod SOB/desat 91% on 3L, incr pain to 7/10 w/ deep inspirations, incr /75, & mod.fatigue. Recommend home w/ son's assist (do not anticipate pt will req. f/u PT at d/c).      Anticipated Discharge Disposition (PT): home with assist

## 2024-01-05 NOTE — ED PROVIDER NOTES
Subjective   History of Present Illness  This is a pleasant 63-year-old male that presents the ER with shortness of breath, chest tightness, and hypoxia.  Patient says he developed URI symptoms 10 days ago which settled into his lungs.  He has history of COPD and continues to smoke 1 pack of cigarettes every 3 to 4 days.  Patient says he has progressively gotten more short of breath with any type of activity.  He has cough with productive thick yellow sputum.  He has had intermittent wheezing and chest tightness.  He denies any pleuritic type chest pain.  He does not have any home oxygen supplementation.  O2 sat upon arrival was 86% on room air.  Patient denies any known sick contacts.  He denies lower extremity pedal edema.  He said when symptoms started 10 days ago he did have a subjective fever with chills, body aches, and malaise/fatigue.  He has not had a fever for several days.  Past medical history is significant for COPD, tobacco dependence, peptic ulcer disease, hypertension, CHF.  Patient used one of his family members nebulizer treatments prior to arrival which seemed to help some of his chest tightness.  He also utilizes Dulera inhaler and Proventil inhaler at home.  Patient has had COVID in 2020 and got quite ill.  He was never hospitalized.  He does not have any vaccinations for COVID-19.  No other concerns at this time.  Patient says that he does not follow routinely with pulmonology.    History provided by:  Patient  Shortness of Breath  Onset quality:  Gradual  Duration:  10 days  Timing:  Constant  Progression:  Worsening  Chronicity:  New  Context: URI and weather changes    Context comment:  Sudden onset of fever, chills, cough, congestion 10 days ago. No fever now, but congestion has moved to lungs. H/o COPD. No Oxygen at home. Still smokes, about 1 pack every 3-4 days.  Relieved by:  Nothing  Worsened by:  Deep breathing, activity and coughing  Ineffective treatments:  None tried  Associated  symptoms: chest pain (chest tightness), cough, sputum production (yellow, thick) and wheezing    Associated symptoms: no abdominal pain, no ear pain, no fever, no headaches, no hemoptysis, no neck pain, no PND, no sore throat, no syncope and no vomiting    Risk factors: tobacco use (Smokes 1 pack every 3-4 days.)        Review of Systems   Constitutional:  Positive for activity change, appetite change and fatigue. Negative for fever.   HENT: Negative.  Negative for congestion, ear pain and sore throat.         No loss of taste or smell.  History of COVID-19 in 2020 and patient became quite ill.  Not vaccinated for COVID-19.  Patient did not require hospitalization in 2020.   Respiratory:  Positive for cough, sputum production (yellow, thick), chest tightness, shortness of breath and wheezing. Negative for hemoptysis.         Smokes 1 pack every 3-4 days. No home Oxygen. No pulmonologist that he follows with. History of COPD. Productive cough with yellow, thick sputum.   Cardiovascular:  Positive for chest pain (chest tightness). Negative for palpitations, leg swelling, syncope and PND.   Gastrointestinal: Negative.  Negative for abdominal pain, diarrhea, nausea and vomiting.   Genitourinary: Negative.  Negative for dysuria, flank pain, frequency and urgency.   Musculoskeletal: Negative.  Negative for back pain and neck pain.   Neurological:  Positive for weakness (generally weak). Negative for syncope and headaches.   All other systems reviewed and are negative.      Past Medical History:   Diagnosis Date    CHF (congestive heart failure)     COPD (chronic obstructive pulmonary disease)     History of stomach ulcers     Hypertension        No Known Allergies    Past Surgical History:   Procedure Laterality Date    CARDIAC CATHETERIZATION N/A 10/18/2022    Procedure: LEFT HEART CATH;  Surgeon: Kd Roque MD;  Location: [facility] CATH INVASIVE LOCATION;  Service: Cardiovascular;  Laterality: N/A;    COLONOSCOPY   03/05/2018    Dr. AGNES Olea. Normal. Repeat 1- yrs if wihtout significant family history or 5 years if first degree relative younger than age 60 with high rish polyp or colorectal cancer.     NO PAST SURGERIES         Family History   Problem Relation Age of Onset    Emphysema Mother     Heart disease Father     Migraines Father     Heart attack Father     Aneurysm Sister     Heart disease Brother         50's    Migraines Brother     No Known Problems Maternal Grandmother     No Known Problems Maternal Grandfather     No Known Problems Paternal Grandmother     No Known Problems Paternal Grandfather     Other Brother         killed in [place]    No Known Problems Son     No Known Problems Son     No Known Problems Daughter        Social History     Socioeconomic History    Marital status: Single   Tobacco Use    Smoking status: Every Day     Packs/day: 0.25     Years: 35.00     Additional pack years: 0.00     Total pack years: 8.75     Types: Cigarettes    Smokeless tobacco: Never    Tobacco comments:     less than 1/2 ppd since getting sick   Vaping Use    Vaping Use: Never used   Substance and Sexual Activity    Alcohol use: No    Drug use: No    Sexual activity: Defer     Comment:            Objective   Physical Exam  Vitals and nursing note reviewed.   Constitutional:       General: He is not in acute distress.     Appearance: Normal appearance. He is ill-appearing. He is not toxic-appearing or diaphoretic.      Comments: Thin, cachectic, elderly male.  Nontoxic.  Patient appears moderately ill.   HENT:      Head: Normocephalic and atraumatic.      Nose: Nose normal. No congestion or rhinorrhea.      Mouth/Throat:      Mouth: Mucous membranes are moist.      Pharynx: Oropharynx is clear. No pharyngeal swelling, oropharyngeal exudate or posterior oropharyngeal erythema.      Comments: Oral mucous membranes are still moist  Eyes:      Extraocular Movements: Extraocular movements intact.       Conjunctiva/sclera: Conjunctivae normal.      Pupils: Pupils are equal, round, and reactive to light.   Cardiovascular:      Rate and Rhythm: Normal rate and regular rhythm. No extrasystoles are present.     Pulses: Normal pulses.      Heart sounds: Normal heart sounds.      Comments: Regular rate and rhythm.  No ectopy.  No pedal edema to lower extremities  Pulmonary:      Effort: Pulmonary effort is normal. Tachypnea present. No accessory muscle usage, respiratory distress or retractions.      Breath sounds: Decreased breath sounds present. No wheezing, rhonchi or rales.      Comments: Tachypnea with conversation.  No accessory muscle use or retractions.  O2 placed in triage and patient seems to be breathing better.  Globally diminished breath sounds secondary to COPD.  No wheezes, rhonchi, or rales.  Abdominal:      General: Bowel sounds are normal. There is no distension.      Palpations: Abdomen is soft.      Tenderness: There is no abdominal tenderness. There is no right CVA tenderness, left CVA tenderness or guarding.      Comments: Abdomen soft and nontender.  No distention.   Musculoskeletal:         General: Normal range of motion.      Cervical back: Normal range of motion and neck supple.      Right lower leg: No edema.      Left lower leg: No edema.   Skin:     General: Skin is warm and dry.   Neurological:      General: No focal deficit present.      Mental Status: He is alert and oriented to person, place, and time.      Cranial Nerves: Cranial nerves 2-12 are intact.      Sensory: Sensation is intact.      Motor: Weakness present.      Coordination: Coordination is intact.      Comments: Alert and oriented x 3.  No confusion.  Neuro intact and nonfocal.  Overall weakness with malaise/fatigue.   Psychiatric:         Mood and Affect: Mood and affect normal.         Speech: Speech normal.         Behavior: Behavior normal. Behavior is cooperative.         Thought Content: Thought content normal.          Cognition and Memory: Cognition normal.         Judgment: Judgment normal.      Comments: Normal mood and affect.         Procedures           ED Course  ED Course as of 01/05/24 0241   Fri Jan 05, 2024 0237 Patient is afebrile upon arrival.  O2 sat was 86% on room air.  We applied 2 L of O2 per nasal cannula.  ABG showed pH 7.36, pCO2 52, pO2 83 and bicarb is 29.  CBC reveals normal white blood cell count at 10,000 with stable differential.  Chemistries were within normal limits.  High-sensitivity troponin was 32.  Respiratory PCR panel tested positive for COVID-19 as well as coronavirus strain OC43.  I personally interpreted chest x-ray which revealed chronic findings but no acute pneumonia or overt failure.  We proceeded with CTA of the chest due to shortness of breath and hypoxia to rule out PE.  Impression of CT imaging revealed no PE.  Patient had 25 mm cavitary lesion in the right upper lobe with internal debris or septations.  This could be related to infectious or inflammatory process but malignancy cannot be excluded.  Radiologist recommended PET/CT follow-up to provide further information.  Moderate emphysema.  Mild coronary artery calcifications.  Patient given DuoNeb treatment, Decadron 8 mg IV, Tessalon 200 mg Perel and we initiated doxycycline 100 mg IV due to productive green cough.  Suspect acute COPD exacerbation on top of COVID-19.  Patient needs hospital admission for aggressive pulmonary toilet and treatment for COVID-19.  Procalcitonin was within normal limits and D-dimer was normal.  Lactic acid was normal, as well, which is reassuring.  I updated patient on all results and need for admission.  He is agreeable and appreciative.  Vital signs are stable. [FC]      ED Course User Index  [FC] Yolande Knapp PA-C                                   Recent Results (from the past 24 hour(s))   ECG 12 Lead ED Triage Standing Order; SOA    Collection Time: 01/04/24  8:49 PM   Result Value Ref Range     QT Interval 344 ms    QTC Interval 406 ms   BNP    Collection Time: 01/04/24  9:24 PM    Specimen: Blood   Result Value Ref Range    proBNP 294.2 0.0 - 900.0 pg/mL   Lavender Top    Collection Time: 01/04/24  9:24 PM   Result Value Ref Range    Extra Tube hold for add-on    Gold Top - SST    Collection Time: 01/04/24  9:24 PM   Result Value Ref Range    Extra Tube Hold for add-ons.    Gray Top    Collection Time: 01/04/24  9:24 PM   Result Value Ref Range    Extra Tube Hold for add-ons.    Light Blue Top    Collection Time: 01/04/24  9:24 PM   Result Value Ref Range    Extra Tube Hold for add-ons.    CBC Auto Differential    Collection Time: 01/04/24  9:24 PM    Specimen: Blood   Result Value Ref Range    WBC 10.27 3.40 - 10.80 10*3/mm3    RBC 4.96 4.14 - 5.80 10*6/mm3    Hemoglobin 15.4 13.0 - 17.7 g/dL    Hematocrit 48.3 37.5 - 51.0 %    MCV 97.4 (H) 79.0 - 97.0 fL    MCH 31.0 26.6 - 33.0 pg    MCHC 31.9 31.5 - 35.7 g/dL    RDW 13.6 12.3 - 15.4 %    RDW-SD 48.9 37.0 - 54.0 fl    MPV 9.4 6.0 - 12.0 fL    Platelets 258 140 - 450 10*3/mm3    Neutrophil % 77.1 (H) 42.7 - 76.0 %    Lymphocyte % 9.7 (L) 19.6 - 45.3 %    Monocyte % 12.4 (H) 5.0 - 12.0 %    Eosinophil % 0.1 (L) 0.3 - 6.2 %    Basophil % 0.3 0.0 - 1.5 %    Immature Grans % 0.4 0.0 - 0.5 %    Neutrophils, Absolute 7.92 (H) 1.70 - 7.00 10*3/mm3    Lymphocytes, Absolute 1.00 0.70 - 3.10 10*3/mm3    Monocytes, Absolute 1.27 (H) 0.10 - 0.90 10*3/mm3    Eosinophils, Absolute 0.01 0.00 - 0.40 10*3/mm3    Basophils, Absolute 0.03 0.00 - 0.20 10*3/mm3    Immature Grans, Absolute 0.04 0.00 - 0.05 10*3/mm3    nRBC 0.0 0.0 - 0.2 /100 WBC   Lactate Dehydrogenase    Collection Time: 01/04/24  9:24 PM    Specimen: Blood   Result Value Ref Range     (H) 135 - 225 U/L   Procalcitonin    Collection Time: 01/04/24  9:24 PM    Specimen: Blood   Result Value Ref Range    Procalcitonin 0.10 0.00 - 0.25 ng/mL   C-reactive Protein    Collection Time: 01/04/24  9:24 PM     Specimen: Blood   Result Value Ref Range    C-Reactive Protein 8.20 (H) 0.00 - 0.50 mg/dL   Lactic Acid, Plasma    Collection Time: 01/04/24  9:24 PM    Specimen: Blood   Result Value Ref Range    Lactate 0.7 0.5 - 2.0 mmol/L   D-dimer, Quantitative    Collection Time: 01/04/24  9:24 PM    Specimen: Blood   Result Value Ref Range    D-Dimer, Quantitative 0.38 0.00 - 0.64 MCGFEU/mL   Blood Gas, Arterial With Co-Ox    Collection Time: 01/04/24  9:34 PM    Specimen: Arterial Blood   Result Value Ref Range    Site Left Radial     Ted's Test N/A     pH, Arterial 7.365 7.350 - 7.450 pH units    pCO2, Arterial 52.1 (H) 35.0 - 45.0 mm Hg    pO2, Arterial 83.9 83.0 - 108.0 mm Hg    HCO3, Arterial 29.7 (H) 20.0 - 26.0 mmol/L    Base Excess, Arterial 3.1 (H) 0.0 - 2.0 mmol/L    Hemoglobin, Blood Gas 15.1 13.5 - 17.5 g/dL    Hematocrit, Blood Gas 46.4 38.0 - 51.0 %    Oxyhemoglobin 94.0 94 - 99 %    Methemoglobin 0.30 0.00 - 1.50 %    Carboxyhemoglobin 1.8 0 - 2 %    CO2 Content 31.3 22 - 33 mmol/L    Temperature 37.0     Barometric Pressure for Blood Gas      Modality Nasal Cannula     FIO2 32 %    Rate 0 Breaths/minute    PIP 0 cmH2O    IPAP 0     EPAP 0     pH, Temp Corrected 7.365 pH Units    pCO2, Temperature Corrected 52.1 (H) 35 - 48 mm Hg    pO2, Temperature Corrected 83.9 83 - 108 mm Hg   Respiratory Panel PCR w/COVID-19(SARS-CoV-2) TREVA/ROCKY/BETINA/PAD/COR/YSABEL In-House, NP Swab in [facility], 2 HR TAT - Swab, Nasopharynx    Collection Time: 01/04/24  9:35 PM    Specimen: Nasopharynx; Swab   Result Value Ref Range    ADENOVIRUS, PCR Not Detected Not Detected    Coronavirus 229E Not Detected Not Detected    Coronavirus HKU1 Not Detected Not Detected    Coronavirus NL63 Not Detected Not Detected    Coronavirus OC43 Detected (A) Not Detected    COVID19 Detected (C) Not Detected - Ref. Range    Human Metapneumovirus Not Detected Not Detected    Human Rhinovirus/Enterovirus Not Detected Not Detected    Influenza A PCR Not  Detected Not Detected    Influenza B PCR Not Detected Not Detected    Parainfluenza Virus 1 Not Detected Not Detected    Parainfluenza Virus 2 Not Detected Not Detected    Parainfluenza Virus 3 Not Detected Not Detected    Parainfluenza Virus 4 Not Detected Not Detected    RSV, PCR Not Detected Not Detected    Bordetella pertussis pcr Not Detected Not Detected    Bordetella parapertussis PCR Not Detected Not Detected    Chlamydophila pneumoniae PCR Not Detected Not Detected    Mycoplasma pneumo by PCR Not Detected Not Detected   Comprehensive Metabolic Panel    Collection Time: 01/04/24 11:19 PM    Specimen: Blood   Result Value Ref Range    Glucose 118 (H) 65 - 99 mg/dL    BUN 25 (H) 8 - 23 mg/dL    Creatinine 0.77 0.76 - 1.27 mg/dL    Sodium 134 (L) 136 - 145 mmol/L    Potassium 5.1 3.5 - 5.2 mmol/L    Chloride 97 (L) 98 - 107 mmol/L    CO2 28.0 22.0 - 29.0 mmol/L    Calcium 9.4 8.6 - 10.5 mg/dL    Total Protein 7.4 6.0 - 8.5 g/dL    Albumin 3.5 3.5 - 5.2 g/dL    ALT (SGPT) 12 1 - 41 U/L    AST (SGOT) 16 1 - 40 U/L    Alkaline Phosphatase 66 39 - 117 U/L    Total Bilirubin 0.3 0.0 - 1.2 mg/dL    Globulin 3.9 gm/dL    A/G Ratio 0.9 g/dL    BUN/Creatinine Ratio 32.5 (H) 7.0 - 25.0    Anion Gap 9.0 5.0 - 15.0 mmol/L    eGFR 100.0 >60.0 mL/min/1.73   Single High Sensitivity Troponin T    Collection Time: 01/04/24 11:19 PM    Specimen: Blood   Result Value Ref Range    HS Troponin T 32 (H) <22 ng/L   Green Top (Gel)    Collection Time: 01/04/24 11:19 PM   Result Value Ref Range    Extra Tube Hold for add-ons.      Note: In addition to lab results from this visit, the labs listed above may include labs taken at another facility or during a different encounter within the last 24 hours. Please correlate lab times with ED admission and discharge times for further clarification of the services performed during this visit.    CT Angiogram Chest   Final Result   Impression:   1.No evidence of pulmonary embolism.   2.25 mm  cavitary lesion in the right upper lobe with internal debris or septations. This could be related to an infectious or inflammatory process. Malignancy cannot be excluded. PET/CT follow-up could provide more information.   3.Moderate emphysema. Emphysema on CT is an independent risk factor for lung cancer. Low dose lung cancer screening should be considered if patient qualifies based on smoking history.   4.Mild coronary artery calcification and mild aortic atherosclerosis.            Electronically Signed: Tres Chau MD     1/5/2024 12:50 AM EST     Workstation ID: [id number]      XR Chest 1 View   Final Result   Impression:   No evidence of acute disease.         Electronically Signed: Vishnu Chan MD     1/4/2024 9:10 PM EST     Workstation ID: [id number]        Vitals:    01/04/24 2230 01/04/24 2300 01/05/24 0000 01/05/24 0030   BP: 122/67 132/75 137/80 146/76   Pulse: 87 85 80 85   Resp: 20 20 20 20   Temp:       TempSrc:       SpO2: 93% 91% 92% 92%   Weight:       Height:         Medications   sodium chloride 0.9 % flush 10 mL (has no administration in time range)   sodium chloride 0.9 % flush 10 mL (has no administration in time range)   Pharmacy Consult - Remdesivir for Mild to Moderate COVID-19 (Within 5 days of symptom onset)- only if contraindicated to Paxlovid (has no administration in time range)   remdesivir 200 mg in sodium chloride 0.9 % 290 mL IVPB (powder vial) (200 mg Intravenous New Bag 1/5/24 0137)     Followed by   remdesivir 100 mg in sodium chloride 0.9 % 250 mL IVPB (powder vial) (has no administration in time range)   ipratropium-albuterol (DUO-NEB) nebulizer solution 3 mL (3 mL Nebulization Given 1/4/24 2122)   dexAMETHasone (DECADRON) injection 8 mg (8 mg Intravenous Given 1/4/24 2130)   benzonatate (TESSALON) capsule 200 mg (200 mg Oral Given 1/4/24 2130)   doxycycline (VIBRAMYCIN) 100 mg in sodium chloride 0.9 % 100 mL IVPB (0 mg Intravenous Stopped 1/5/24 0117)   iopamidol  (ISOVUE-370) 76 % injection 100 mL (100 mL Intravenous Given 1/5/24 0028)     ECG/EMG Results (last 24 hours)       Procedure Component Value Units Date/Time    ECG 12 Lead ED Triage Standing Order; SOA [[id number]] Collected: 01/04/24 2049     Updated: 01/04/24 2050     QT Interval 344 ms      QTC Interval 406 ms     Narrative:      Test Reason : SOA  Blood Pressure :   */*   mmHG  Vent. Rate :  84 BPM     Atrial Rate :  84 BPM     P-R Int : 146 ms          QRS Dur :  98 ms      QT Int : 344 ms       P-R-T Axes :  81 -70  80 degrees     QTc Int : 406 ms    Normal sinus rhythm  Possible Left atrial enlargement  Left anterior fascicular block  Anterior infarct , age undetermined  Abnormal ECG  When compared with ECG of 17-OCT-2022 05:03,  Significant changes have occurred    Referred By: EDMD           Confirmed By:           ECG 12 Lead ED Triage Standing Order; SOA   Preliminary Result   Test Reason : SOA   Blood Pressure :   */*   mmHG   Vent. Rate :  84 BPM     Atrial Rate :  84 BPM      P-R Int : 146 ms          QRS Dur :  98 ms       QT Int : 344 ms       P-R-T Axes :  81 -70  80 degrees      QTc Int : 406 ms      Normal sinus rhythm   Possible Left atrial enlargement   Left anterior fascicular block   Anterior infarct , age undetermined   Abnormal ECG   When compared with ECG of 17-OCT-2022 05:03,   Significant changes have occurred      Referred By: EDMD           Confirmed By:                     Medical Decision Making  Amount and/or Complexity of Data Reviewed  Labs: ordered.  Radiology: ordered.  ECG/medicine tests: ordered.    Risk  Prescription drug management.  Decision regarding hospitalization.        Final diagnoses:   COVID-19 virus infection   Acute on chronic respiratory failure with hypoxia and hypercapnia   COPD with acute exacerbation   Cough productive of purulent sputum   Pulmonary cavitary lesion   Tobacco dependence   History of hypertension   History of CHF (congestive heart failure)    History of peptic ulcer disease       ED Disposition  ED Disposition       ED Disposition   Decision to Admit    Condition   --    Comment   Level of Care: Telemetry [5]   Diagnosis: Hypoxia [954777]   Admitting Physician: JUANA GAMBINO [[id number]]   Attending Physician: JUANA GAMBINO [[id number]]   Certification: I Certify That Inpatient Hospital Services Are Medically Necessary For Greater Than 2 Midnights                 No follow-up provider specified.       Medication List      No changes were made to your prescriptions during this visit.            Yolande Knapp PA-C  01/05/24 0241

## 2024-01-05 NOTE — H&P
[facility]  [facility]: 2024   Patient Name: Francisco Clark  : 1960  MRN: [id number]  Primary Care Physician:  Charito Weiss APRN  Date of admission: 2024    Subjective   Subjective     Chief Complaint: Shortness of breath    HPI:    Francisco Clark is a 63 y.o. male past medical history congestive heart failure reduced ejection fraction, COPD presented to the emergency department for evaluation of shortness of breath.  Patient states he was initially symptomatic 10 days ago with chills and fever although he did not take his temperature.  He does endorse some malaise but states his main complaint is shortness of breath.  States he has been mildly short of breath over the past 10 days which got acutely worse yesterday prompting him to seek further medical attention.  He denies any other nausea, vomiting, chest pain, palpitations, abdominal pain diarrhea constipation dysuria, weakness.  He does also endorse decreased p.o. intake on review of systems.    In the emergency department with multiple findings.  He is COVID-positive and requiring oxygen 3 L to maintain oxygen saturation greater than 90% which is not his baseline.  High-sensitivity troponin mildly elevated at 32.  CTA reveals no PE but does show a cavitary lung lesion.  Patient received respiratory treatments, Decadron, doxycycline and will be admitted for ongoing monitoring and management.      Personal History     Past Medical History:  Past Medical History:   Diagnosis Date   • CHF (congestive heart failure)    • COPD (chronic obstructive pulmonary disease)    • History of stomach ulcers    • Hypertension          Past Surgical History:  Past Surgical History:   Procedure Laterality Date   • CARDIAC CATHETERIZATION N/A 10/18/2022    Procedure: LEFT HEART CATH;  Surgeon: Kd Roque MD;  Location: [facility] CATH INVASIVE LOCATION;  Service: Cardiovascular;  Laterality: N/A;   • COLONOSCOPY   03/05/2018    Dr. AGNES Olea. Normal. Repeat 1- yrs if wihtout significant family history or 5 years if first degree relative younger than age 60 with high rish polyp or colorectal cancer.    • NO PAST SURGERIES           Family History:   Family History   Problem Relation Age of Onset   • Emphysema Mother    • Heart disease Father    • Migraines Father    • Heart attack Father    • Aneurysm Sister    • Heart disease Brother         50's   • Migraines Brother    • No Known Problems Maternal Grandmother    • No Known Problems Maternal Grandfather    • No Known Problems Paternal Grandmother    • No Known Problems Paternal Grandfather    • Other Brother         killed in [place]   • No Known Problems Son    • No Known Problems Son    • No Known Problems Daughter          Social History:   Social History     Tobacco Use   • Smoking status: Every Day     Packs/day: 0.25     Years: 35.00     Additional pack years: 0.00     Total pack years: 8.75     Types: Cigarettes   • Smokeless tobacco: Never   • Tobacco comments:     less than 1/2 ppd since getting sick   Vaping Use   • Vaping Use: Never used   Substance Use Topics   • Alcohol use: No   • Drug use: No         Home Medications:  albuterol sulfate HFA, bumetanide, carvedilol, empagliflozin, mometasone-formoterol, and sacubitril-valsartan    Allergies:  No Known Allergies    Review of Systems   All systems were reviewed and negative except for: Shortness of breath    Objective   Objective     Vitals:   Temp:  [98.4 °F (36.9 °C)] 98.4 °F (36.9 °C)  Heart Rate:  [80-87] 85  Resp:  [20] 20  BP: (122-146)/(67-80) 146/76  Flow (L/min):  [3] 3    Physical Exam    Constitutional: Awake, alert, no acute distress   Eyes: Pupils equal, sclerae anicteric, no conjunctival injection   HENT: NCAT, mucous membranes moist   Neck: Supple, no thyromegaly, no lymphadenopathy, trachea midline   Respiratory: Clear to auscultation bilaterally, nonlabored respirations    Cardiovascular:  RRR, no murmurs, rubs, or gallops, palpable pedal pulses bilaterally   Gastrointestinal: Positive bowel sounds, soft, nontender, nondistended   Musculoskeletal: No bilateral ankle edema, no clubbing or cyanosis to extremities   Psychiatric: Appropriate affect, cooperative   Neurologic: Oriented x 3, strength symmetric in all extremities, Cranial Nerves grossly intact to confrontation, speech clear   Skin: No rashes     Result Review    Result Review:  I have personally reviewed the results from the time of this admission to 1/5/2024 01:11 EST and agree with these findings:  [x]  Laboratory  []  Microbiology  [x]  Radiology  []  EKG/Telemetry   []  Cardiology/Vascular   []  Pathology  []  Old records  []  Other:      Assessment & Plan   Assessment / Plan     Assessment/Plan:   COVID-19: Patient requiring 3 L nasal cannula to maintain oxygen saturations greater than 90%.  Initiated on steroids in the emergency department, will continue.  Will place patient on remdesivir as well.  Supportive care and supplemental oxygen as needed.  Trend inflammatory markers.  Serial labs.  Cavitary lung lesion: Consult pulmonology appreciate recommendations.  In the interim we will begin patient on broad-spectrum antibiotics with vancomycin and Zosyn.  Check blood cultures.  Supportive care as above.  Chronic congestive heart failure reduced ejection fraction: Appears euvolemic.  Monitor volume status.  Continue home regimen with Entresto, carvedilol, Jardiance  COPD without acute exacerbation: Continue respiratory treatments and patient placed on steroids although no significant wheeze on my exam due to COVID.  History of hypertension: Continue home regimen as above  Minimally elevated troponin: Will repeat.  Monitor on telemetry.  No chest pain.      DVT prophylaxis:  Lovenox    CODE STATUS:    Code Status (Patient has no pulse and is not breathing): CPR (Attempt to Resuscitate)  Medical Interventions (Patient has pulse or is  breathing): Full Support      Admission Status:  I believe this patient meets inpatient status.    Electronically signed by Bakari Mireles Jr, MD, 01/05/24, 1:11 AM EST.

## 2024-01-05 NOTE — PROGRESS NOTES
Discharge Planning Assessment  [facility]     Patient Name: Francisco Clark  MRN: [id number]  Today's Date: 1/5/2024    Admit Date: 1/4/2024        Discharge Needs Assessment       Row Name 01/05/24 0956       Living Environment    Current Living Arrangements apartment    Potentially Unsafe Housing Conditions none    Primary Care Provided by self    Provides Primary Care For no one    Family Caregiver if Needed child(jose martin), adult    Quality of Family Relationships helpful       Resource/Environmental Concerns    Resource/Environmental Concerns none       Transition Planning    Patient/Family Anticipates Transition to home    Patient/Family Anticipated Services at Transition none    Transportation Anticipated car, drives self       Discharge Needs Assessment    Readmission Within the Last 30 Days no previous admission in last 30 days    Equipment Currently Used at Home none    Concerns to be Addressed discharge planning    Anticipated Changes Related to Illness none                   Discharge Plan    Mr. Clark is in isolation for Covid. He lives in an apartment in [place]. His insurance coverage is [employer]. His primary care provider is Charito Mclaughlin. Case management is following for any discharge needs when he is is medically ready.                 Continued Care and Services - Admitted Since 1/4/2024    Coordination has not been started for this encounter.          Demographic Summary       Row Name 01/05/24 0956       General Information    Admission Type inpatient    Arrived From home    Reason for Consult discharge planning    Preferred Language English       Contact Information    Permission Granted to Share Info With     Contact Information Obtained for                    Functional Status       Row Name 01/05/24 0956       Functional Status    Usual Activity Tolerance fair    Current Activity Tolerance fair       Functional Status, IADL    Medications independent     Meal Preparation independent    Housekeeping independent    Laundry independent    Shopping independent                   Psychosocial    No documentation.                  Abuse/Neglect    No documentation.                  Legal    No documentation.                  Substance Abuse    No documentation.                  Patient Forms    No documentation.                     Dia Lemus MSW

## 2024-01-05 NOTE — ED NOTES
[facility]    Nursing Report ED to Floor:  Mental status: A/O x4  Ambulatory status: Ambulatory  Oxygen Therapy:  O2 @ 3lpm via n/c  Cardiac Rhythm: NSR  Admitted from: ED  Safety Concerns:  hypoxia  Social Issues: na  ED Room #:  04    COVID +. Pt walked to restroom with no issues, after returning pt's O2 sat 79%, pt placed back onto O2 and sats increased to 91% on 3lpm via n/c    ED Nurse Phone Extension - [phone] or may call [phone].      HPI:   Chief Complaint   Patient presents with    Shortness of Breath       Past Medical History:  Past Medical History:   Diagnosis Date    CHF (congestive heart failure)     COPD (chronic obstructive pulmonary disease)     History of stomach ulcers     Hypertension         Past Surgical History:  Past Surgical History:   Procedure Laterality Date    CARDIAC CATHETERIZATION N/A 10/18/2022    Procedure: LEFT HEART CATH;  Surgeon: Kd Roque MD;  [facility];  Service: Cardiovascular;  Laterality: N/A;    COLONOSCOPY  03/05/2018    Dr. AGNES Olea. Normal. Repeat 1- yrs if wihtout significant family history or 5 years if first degree relative younger than age 60 with high rish polyp or colorectal cancer.     NO PAST SURGERIES          Admitting Doctor:   Amador Lugo MD    Consulting Provider(s):  Consults       No orders found for last 30 day(s).             Admitting Diagnosis:   There were no encounter diagnoses.    Most Recent Vitals:   Vitals:    01/04/24 2122 01/04/24 2230 01/04/24 2300 01/05/24 0000   BP:  122/67 132/75 137/80   Pulse: 84 87 85 80   Resp: 20 20 20 20   Temp:       TempSrc:       SpO2: 93% 93% 91% 92%   Weight:       Height:           Active LDAs/IV Access:   Lines, Drains & Airways       Active LDAs       Name Placement date Placement time Site Days    Peripheral IV 01/04/24 2124 Anterior;Right Forearm 01/04/24 2124  Forearm  less than 1                    Labs (abnormal labs have a star):   Labs Reviewed    RESPIRATORY PANEL PCR W/ COVID-19 (SARS-COV-2), NP SWAB IN UTM/VTP, 2 HR TAT - Abnormal; Notable for the following components:       Result Value    Coronavirus OC43 Detected (*)     COVID19 Detected (*)     All other components within normal limits    Narrative:     In the setting of a positive respiratory panel with a viral infection PLUS a negative procalcitonin without other underlying concern for bacterial infection, consider observing off antibiotics or discontinuation of antibiotics and continue supportive care. If the respiratory panel is positive for atypical bacterial infection (Bordetella pertussis, Chlamydophila pneumoniae, or Mycoplasma pneumoniae), consider antibiotic de-escalation to target atypical bacterial infection.   COMPREHENSIVE METABOLIC PANEL - Abnormal; Notable for the following components:    Glucose 118 (*)     BUN 25 (*)     Sodium 134 (*)     Chloride 97 (*)     BUN/Creatinine Ratio 32.5 (*)     All other components within normal limits    Narrative:     GFR Normal >60  Chronic Kidney Disease <60  Kidney Failure <15     SINGLE HSTROPONIN T - Abnormal; Notable for the following components:    HS Troponin T 32 (*)     All other components within normal limits    Narrative:     High Sensitive Troponin T Reference Range:  <14.0 ng/L- Negative Female for AMI  <22.0 ng/L- Negative Male for AMI  >=14 - Abnormal Female indicating possible myocardial injury.  >=22 - Abnormal Male indicating possible myocardial injury.   Clinicians would have to utilize clinical acumen, EKG, Troponin, and serial changes to determine if it is an Acute Myocardial Infarction or myocardial injury due to an underlying chronic condition.        CBC WITH AUTO DIFFERENTIAL - Abnormal; Notable for the following components:    MCV 97.4 (*)     Neutrophil % 77.1 (*)     Lymphocyte % 9.7 (*)     Monocyte % 12.4 (*)     Eosinophil % 0.1 (*)     Neutrophils, Absolute 7.92 (*)     Monocytes, Absolute 1.27 (*)     All other  components within normal limits   LACTATE DEHYDROGENASE - Abnormal; Notable for the following components:     (*)     All other components within normal limits   C-REACTIVE PROTEIN - Abnormal; Notable for the following components:    C-Reactive Protein 8.20 (*)     All other components within normal limits   BLOOD GAS, ARTERIAL W/CO-OXIMETRY - Abnormal; Notable for the following components:    pCO2, Arterial 52.1 (*)     HCO3, Arterial 29.7 (*)     Base Excess, Arterial 3.1 (*)     pCO2, Temperature Corrected 52.1 (*)     All other components within normal limits   BNP (IN-HOUSE) - Normal    Narrative:     This assay is used as an aid in the diagnosis of individuals suspected of having heart failure. It can be used as an aid in the diagnosis of acute decompensated heart failure (ADHF) in patients presenting with signs and symptoms of ADHF to the emergency department (ED). In addition, NT-proBNP of <300 pg/mL indicates ADHF is not likely.    Age Range Result Interpretation  NT-proBNP Concentration (pg/mL:      <50             Positive            >450                   Gray                 300-450                    Negative             <300    50-75           Positive            >900                  Gray                300-900                  Negative            <300      >75             Positive            >1800                  Gray                300-1800                  Negative            <300   PROCALCITONIN - Normal    Narrative:     As a Marker for Sepsis (Non-Neonates):    1. <0.5 ng/mL represents a low risk of severe sepsis and/or septic shock.  2. >2 ng/mL represents a high risk of severe sepsis and/or septic shock.    As a Marker for Lower Respiratory Tract Infections that require antibiotic therapy:    PCT on Admission    Antibiotic Therapy       6-12 Hrs later    >0.5                Strongly Recommended  >0.25 - <0.5        Recommended   0.1 - 0.25          Discouraged               "Remeasure/reassess PCT  <0.1                Strongly Discouraged     Remeasure/reassess PCT    As 28 day mortality risk marker: \"Change in Procalcitonin Result\" (>80% or <=80%) if Day 0 (or Day 1) and Day 4 values are available. Refer to http://[email or web address]-pct-calculator.com    Change in PCT <=80%  A decrease of PCT levels below or equal to 80% defines a positive change in PCT test result representing a higher risk for 28-day all-cause mortality of patients diagnosed with severe sepsis for septic shock.    Change in PCT >80%  A decrease of PCT levels of more than 80% defines a negative change in PCT result representing a lower risk for 28-day all-cause mortality of patients diagnosed with severe sepsis or septic shock.      LACTIC ACID, PLASMA - Normal   COVID PRE-OP / PRE-PROCEDURE SCREENING ORDER (NO ISOLATION)    Narrative:     The following orders were created for panel order COVID PRE-OP / PRE-PROCEDURE SCREENING ORDER (NO ISOLATION) - Swab, Nasopharynx.  Procedure                               Abnormality         Status                     ---------                               -----------         ------                     Respiratory Panel PCR w/...[[id number]]  Abnormal            Final result                 Please view results for these tests on the individual orders.   RESPIRATORY CULTURE   RAINBOW DRAW    Narrative:     The following orders were created for panel order [place] Draw.  Procedure                               Abnormality         Status                     ---------                               -----------         ------                     Green Top (Gel)[[id number]]                                  In process                 Lavender Top[[id number]]                                     Final result               Gold Top - SST[[id number]]                                   Final result               Ramirez Top[[id number]]                                         In process                 Light " Blue Top[[id number]]                                   Final result                 Please view results for these tests on the individual orders.   BLOOD GAS, ARTERIAL   COMPREHENSIVE METABOLIC PANEL   D-DIMER, QUANTITATIVE   CBC AND DIFFERENTIAL    Narrative:     The following orders were created for panel order CBC & Differential.  Procedure                               Abnormality         Status                     ---------                               -----------         ------                     CBC Auto Differential[[id number]]        Abnormal            Final result                 Please view results for these tests on the individual orders.   LAVENDER TOP   GOLD TOP - SST   LIGHT BLUE TOP   GREEN TOP   GRAY TOP       Meds Given in ED:   Medications   sodium chloride 0.9 % flush 10 mL (has no administration in time range)   sodium chloride 0.9 % flush 10 mL (has no administration in time range)   doxycycline (VIBRAMYCIN) 100 mg in sodium chloride 0.9 % 100 mL IVPB (100 mg Intravenous New Bag 1/5/24 0003)   Pharmacy Consult - Remdesivir for Mild to Moderate COVID-19 (Within 5 days of symptom onset)- only if contraindicated to Paxlovid (has no administration in time range)   remdesivir 200 mg in sodium chloride 0.9 % 290 mL IVPB (powder vial) (has no administration in time range)     Followed by   remdesivir 100 mg in sodium chloride 0.9 % 250 mL IVPB (powder vial) (has no administration in time range)   ipratropium-albuterol (DUO-NEB) nebulizer solution 3 mL (3 mL Nebulization Given 1/4/24 2122)   dexAMETHasone (DECADRON) injection 8 mg (8 mg Intravenous Given 1/4/24 2130)   benzonatate (TESSALON) capsule 200 mg (200 mg Oral Given 1/4/24 2130)     Pharmacy Consult - Remdesivir,

## 2024-01-05 NOTE — PROGRESS NOTES
[facility] Medicine Services  ADMISSION FOLLOW-UP NOTE          Patient admitted after midnight, H&P by my partner performed earlier on today's date reviewed.  Interim findings, labs, and charting also reviewed.        The [facility] Hospital Problem List has been managed and updated to include any new diagnoses:  Active Hospital Problems    Diagnosis  POA    **Hypoxia [R09.02]  Yes    COVID-19 virus infection [U07.1]  Unknown    COPD (chronic obstructive pulmonary disease) [J44.9]  Unknown    Cavitary lesion of lung [J98.4]  Unknown    Elevated troponin [R79.89]  Unknown    COPD with acute exacerbation [J44.1]  Yes    Congestive heart failure, unspecified HF chronicity, unspecified heart failure type [I50.9]  Yes    Essential hypertension [I10]  Yes      Resolved Hospital Problems   No resolved problems to display.     62 yo M with hx of HFrEF, COPD, HTN, and tobacco abuse who presents due to worsening SOA over the past 10 days.     COVID-19  Coronavirus OC43  Hypoxia  COPD without exacerbation  --Hypoxic to 86% on room air, does not wear home oxygen  --Currently requiring 3-4 liters, wean as tolerated  --CTA chest with RUL cavitary lesion, no other infiltrates noted  --Respiratory PCR panel positive for COVID-19 as well as Coronavirus OC43  --Symptoms present x 10 days, may be out of window for benefit from Remdesivir but given presentation we will continue (Pulmonary in agreement)  --Continue Remdesivir and Decadron  --Continue home inhaler, formulary substitute  --PRN albuterol MDI    Cavitary lung lesion of right upper lobe  --CTA chest: 25 mm cavitary lesion in right upper lobe with internal debris or septations, moderate emphysema   --Pulmonary/Dr. Mcgrath has evaluated, appears to be a small area of infiltrate, but cannot rule out malignancy: recommends treat as community acquired pneumonia and repeat CT chest in 3 months  --MRSA PCR negative, stopped Vanc  --Continue Zosyn, add  Doxycycline  --F/U cultures    HFrEF with recovery   Dilated nonischemic cardiomyopathy  --EF previously <20% in October 2022, seems to have recovered on Echo January 2023 (46-50%)  --Followed by Dr. Coreas/Cardiology  --Continue home meds Coreg, Entresto, Jardiance, Bumex    Expected Discharge   Expected Discharge Date: 1/8/2024; Expected Discharge Time:      Pastora Padron MD  01/05/24

## 2024-01-06 ENCOUNTER — APPOINTMENT (OUTPATIENT)
Dept: GENERAL RADIOLOGY | Facility: HOSPITAL | Age: 64
End: 2024-01-06
Payer: COMMERCIAL

## 2024-01-06 LAB
ALBUMIN SERPL-MCNC: 4 G/DL (ref 3.5–5.2)
ALBUMIN/GLOB SERPL: 1.2 G/DL
ALP SERPL-CCNC: 71 U/L (ref 39–117)
ALT SERPL W P-5'-P-CCNC: 12 U/L (ref 1–41)
ANION GAP SERPL CALCULATED.3IONS-SCNC: 7 MMOL/L (ref 5–15)
AST SERPL-CCNC: 16 U/L (ref 1–40)
BASOPHILS # BLD AUTO: 0.01 10*3/MM3 (ref 0–0.2)
BASOPHILS NFR BLD AUTO: 0.1 % (ref 0–1.5)
BILIRUB SERPL-MCNC: 0.2 MG/DL (ref 0–1.2)
BUN SERPL-MCNC: 37 MG/DL (ref 8–23)
BUN/CREAT SERPL: 34.9 (ref 7–25)
CALCIUM SPEC-SCNC: 11.3 MG/DL (ref 8.6–10.5)
CHLORIDE SERPL-SCNC: 98 MMOL/L (ref 98–107)
CO2 SERPL-SCNC: 34 MMOL/L (ref 22–29)
CREAT SERPL-MCNC: 1.06 MG/DL (ref 0.76–1.27)
CRP SERPL-MCNC: 4.2 MG/DL (ref 0–0.5)
DEPRECATED RDW RBC AUTO: 50.6 FL (ref 37–54)
EGFRCR SERPLBLD CKD-EPI 2021: 78.4 ML/MIN/1.73
EOSINOPHIL # BLD AUTO: 0 10*3/MM3 (ref 0–0.4)
EOSINOPHIL NFR BLD AUTO: 0 % (ref 0.3–6.2)
ERYTHROCYTE [DISTWIDTH] IN BLOOD BY AUTOMATED COUNT: 13.6 % (ref 12.3–15.4)
GLOBULIN UR ELPH-MCNC: 3.3 GM/DL
GLUCOSE SERPL-MCNC: 149 MG/DL (ref 65–99)
HCT VFR BLD AUTO: 51.6 % (ref 37.5–51)
HGB BLD-MCNC: 16.1 G/DL (ref 13–17.7)
IMM GRANULOCYTES # BLD AUTO: 0.04 10*3/MM3 (ref 0–0.05)
IMM GRANULOCYTES NFR BLD AUTO: 0.5 % (ref 0–0.5)
LYMPHOCYTES # BLD AUTO: 1.12 10*3/MM3 (ref 0.7–3.1)
LYMPHOCYTES NFR BLD AUTO: 13.1 % (ref 19.6–45.3)
MCH RBC QN AUTO: 31.1 PG (ref 26.6–33)
MCHC RBC AUTO-ENTMCNC: 31.2 G/DL (ref 31.5–35.7)
MCV RBC AUTO: 99.6 FL (ref 79–97)
MONOCYTES # BLD AUTO: 0.51 10*3/MM3 (ref 0.1–0.9)
MONOCYTES NFR BLD AUTO: 6 % (ref 5–12)
NEUTROPHILS NFR BLD AUTO: 6.86 10*3/MM3 (ref 1.7–7)
NEUTROPHILS NFR BLD AUTO: 80.3 % (ref 42.7–76)
NRBC BLD AUTO-RTO: 0 /100 WBC (ref 0–0.2)
NT-PROBNP SERPL-MCNC: 160.9 PG/ML (ref 0–900)
PLATELET # BLD AUTO: 281 10*3/MM3 (ref 140–450)
PMV BLD AUTO: 9.4 FL (ref 6–12)
POTASSIUM SERPL-SCNC: 5.1 MMOL/L (ref 3.5–5.2)
PROT SERPL-MCNC: 7.3 G/DL (ref 6–8.5)
RBC # BLD AUTO: 5.18 10*6/MM3 (ref 4.14–5.8)
SODIUM SERPL-SCNC: 139 MMOL/L (ref 136–145)
WBC NRBC COR # BLD AUTO: 8.54 10*3/MM3 (ref 3.4–10.8)

## 2024-01-06 PROCEDURE — 86140 C-REACTIVE PROTEIN: CPT | Performed by: INTERNAL MEDICINE

## 2024-01-06 PROCEDURE — 25810000003 SODIUM CHLORIDE 0.9 % SOLUTION 250 ML FLEX CONT: Performed by: INTERNAL MEDICINE

## 2024-01-06 PROCEDURE — 94799 UNLISTED PULMONARY SVC/PX: CPT

## 2024-01-06 PROCEDURE — 85025 COMPLETE CBC W/AUTO DIFF WBC: CPT | Performed by: INTERNAL MEDICINE

## 2024-01-06 PROCEDURE — 94664 DEMO&/EVAL PT USE INHALER: CPT

## 2024-01-06 PROCEDURE — 25010000002 ENOXAPARIN PER 10 MG: Performed by: INTERNAL MEDICINE

## 2024-01-06 PROCEDURE — 71045 X-RAY EXAM CHEST 1 VIEW: CPT

## 2024-01-06 PROCEDURE — 87116 MYCOBACTERIA CULTURE: CPT | Performed by: INTERNAL MEDICINE

## 2024-01-06 PROCEDURE — 87205 SMEAR GRAM STAIN: CPT | Performed by: HOSPITALIST

## 2024-01-06 PROCEDURE — 83880 ASSAY OF NATRIURETIC PEPTIDE: CPT | Performed by: HOSPITALIST

## 2024-01-06 PROCEDURE — 99232 SBSQ HOSP IP/OBS MODERATE 35: CPT | Performed by: HOSPITALIST

## 2024-01-06 PROCEDURE — 87206 SMEAR FLUORESCENT/ACID STAI: CPT | Performed by: INTERNAL MEDICINE

## 2024-01-06 PROCEDURE — 25010000002 PIPERACILLIN SOD-TAZOBACTAM PER 1 G: Performed by: INTERNAL MEDICINE

## 2024-01-06 PROCEDURE — 25010000002 REMDESIVIR 100 MG/20ML SOLUTION 1 EACH VIAL: Performed by: INTERNAL MEDICINE

## 2024-01-06 PROCEDURE — 87070 CULTURE OTHR SPECIMN AEROBIC: CPT | Performed by: HOSPITALIST

## 2024-01-06 PROCEDURE — 25010000002 DEXAMETHASONE PER 1 MG: Performed by: INTERNAL MEDICINE

## 2024-01-06 PROCEDURE — 80053 COMPREHEN METABOLIC PANEL: CPT | Performed by: INTERNAL MEDICINE

## 2024-01-06 RX ORDER — ALBUTEROL SULFATE 90 UG/1
2 AEROSOL, METERED RESPIRATORY (INHALATION)
Status: DISCONTINUED | OUTPATIENT
Start: 2024-01-06 | End: 2024-01-10 | Stop reason: HOSPADM

## 2024-01-06 RX ADMIN — PIPERACILLIN SODIUM AND TAZOBACTAM SODIUM 3.38 G: 3; .375 INJECTION, SOLUTION INTRAVENOUS at 22:18

## 2024-01-06 RX ADMIN — DOXYCYCLINE 100 MG: 100 INJECTION, POWDER, LYOPHILIZED, FOR SOLUTION INTRAVENOUS at 04:19

## 2024-01-06 RX ADMIN — REMDESIVIR 100 MG: 100 INJECTION, POWDER, LYOPHILIZED, FOR SOLUTION INTRAVENOUS at 01:57

## 2024-01-06 RX ADMIN — ENOXAPARIN SODIUM 40 MG: 100 INJECTION SUBCUTANEOUS at 04:59

## 2024-01-06 RX ADMIN — DEXAMETHASONE SODIUM PHOSPHATE 6 MG: 4 INJECTION, SOLUTION INTRAMUSCULAR; INTRAVENOUS at 10:25

## 2024-01-06 RX ADMIN — PIPERACILLIN SODIUM AND TAZOBACTAM SODIUM 3.38 G: 3; .375 INJECTION, SOLUTION INTRAVENOUS at 05:54

## 2024-01-06 RX ADMIN — SACUBITRIL AND VALSARTAN 1 TABLET: 24; 26 TABLET, FILM COATED ORAL at 22:18

## 2024-01-06 RX ADMIN — DOXYCYCLINE 100 MG: 100 INJECTION, POWDER, LYOPHILIZED, FOR SOLUTION INTRAVENOUS at 16:00

## 2024-01-06 RX ADMIN — EMPAGLIFLOZIN 10 MG: 10 TABLET, FILM COATED ORAL at 10:25

## 2024-01-06 RX ADMIN — CARVEDILOL 25 MG: 12.5 TABLET, FILM COATED ORAL at 10:25

## 2024-01-06 RX ADMIN — SACUBITRIL AND VALSARTAN 1 TABLET: 24; 26 TABLET, FILM COATED ORAL at 10:25

## 2024-01-06 RX ADMIN — PIPERACILLIN SODIUM AND TAZOBACTAM SODIUM 3.38 G: 3; .375 INJECTION, SOLUTION INTRAVENOUS at 14:17

## 2024-01-06 RX ADMIN — BUDESONIDE AND FORMOTEROL FUMARATE DIHYDRATE 2 PUFF: 160; 4.5 AEROSOL RESPIRATORY (INHALATION) at 08:59

## 2024-01-06 RX ADMIN — ALBUTEROL SULFATE 2 PUFF: 90 AEROSOL, METERED RESPIRATORY (INHALATION) at 17:26

## 2024-01-06 NOTE — PLAN OF CARE
Problem: Adult Inpatient Plan of Care  Goal: Plan of Care Review  Outcome: Ongoing, Progressing  Goal: Patient-Specific Goal (Individualized)  Outcome: Ongoing, Progressing  Goal: Absence of Hospital-Acquired Illness or Injury  Outcome: Ongoing, Progressing  Goal: Optimal Comfort and Wellbeing  Outcome: Ongoing, Progressing  Goal: Readiness for Transition of Care  Outcome: Ongoing, Progressing     Problem: COPD (Chronic Obstructive Pulmonary Disease) Comorbidity  Goal: Maintenance of COPD Symptom Control  Outcome: Ongoing, Progressing     Problem: Hypertension Comorbidity  Goal: Blood Pressure in Desired Range  Outcome: Ongoing, Progressing   Goal Outcome Evaluation:      Patient had no acute events during shift  Covid +, TB rule out  3L NC, unproductive cough unable to obtain specimen currently  A&Ox4, NSR w st depression

## 2024-01-06 NOTE — PROGRESS NOTES
[facility] Medicine Services  PROGRESS NOTE    Patient Name: Francisco Clark  : 1960  MRN: [id number]    Date of Admission: 2024  Primary Care Physician: Charito Weiss APRN    Subjective   Subjective     CC:  F/U SOA    HPI:  Seen this morning. Reports feeling a little stronger today. Still on oxygen and has been turned up to 4 liters. His cough is breaking loose and he was able to provide a sputum sample today.      Objective   Objective     Vital Signs:   Temp:  [97.6 °F (36.4 °C)-98 °F (36.7 °C)] 97.6 °F (36.4 °C)  Heart Rate:  [61-90] 78  Resp:  [17-20] 18  BP: (116-134)/(65-78) 116/65  Flow (L/min):  [3-4] 4     Physical Exam:  Gen-no acute distress  HENT-NCAT, mucous membranes moist  CV-RRR, S1 S2 normal, no m/r/g  Resp-diminished bilaterally, +wheezes, nonlabored on 4 liters saturating 92%  Abd-soft, NT, ND, +BS  Ext-no edema  Neuro-A&Ox3, no focal deficits  Skin-no rashes  Psych-appropriate mood      Results Reviewed:  LAB RESULTS:      Lab 24  0838 24  2124   WBC 8.54 10.27   HEMOGLOBIN 16.1 15.4   HEMATOCRIT 51.6* 48.3   PLATELETS 281 258   NEUTROS ABS 6.86 7.92*   IMMATURE GRANS (ABS) 0.04 0.04   LYMPHS ABS 1.12 1.00   MONOS ABS 0.51 1.27*   EOS ABS 0.00 0.01   MCV 99.6* 97.4*   CRP 4.20* 8.20*   PROCALCITONIN  --  0.10   LACTATE  --  0.7   LDH  --  324*   D DIMER QUANT  --  0.38         Lab 24  0838 24  0514 24  2319   SODIUM 139 133* 134*   POTASSIUM 5.1 5.0 5.1   CHLORIDE 98 97* 97*   CO2 34.0* 23.0 28.0   ANION GAP 7.0 13.0 9.0   BUN 37* 25* 25*   CREATININE 1.06 0.79 0.77   EGFR 78.4 99.2 100.0   GLUCOSE 149* 149* 118*   CALCIUM 11.3* 9.5 9.4         Lab 24  0838 24  0514 24  2319   TOTAL PROTEIN 7.3 7.7 7.4   ALBUMIN 4.0 3.5 3.5   GLOBULIN 3.3 4.2 3.9   ALT (SGPT) 12 12 12   AST (SGOT) 16 21 16   BILIRUBIN 0.2 0.2 0.3   ALK PHOS 71 69 66         Lab 24  0707 24  0514 24  2319 24  4872    PROBNP  --   --   --  294.2   HSTROP T 23* 26* 32*  --                  Lab 01/04/24  2134   PH, ARTERIAL 7.365   PCO2, ARTERIAL 52.1*   PO2 ART 83.9   FIO2 32   HCO3 ART 29.7*   BASE EXCESS ART 3.1*   CARBOXYHEMOGLOBIN 1.8     Brief Urine Lab Results       None            Microbiology Results Abnormal       Procedure Component Value - Date/Time    Respiratory Culture - Sputum, Cough [[id number]] Collected: 01/06/24 1050    Lab Status: Preliminary result Specimen: Sputum from Cough Updated: 01/06/24 1320     Gram Stain Moderate (3+) WBCs per low power field      Rare (1+) Epithelial cells per low power field      Few (2+) Gram positive cocci in pairs    Blood Culture - Blood, Hand, Right [[id number]]  (Normal) Collected: 01/05/24 0514    Lab Status: Preliminary result Specimen: Blood from Hand, Right Updated: 01/06/24 0600     Blood Culture No growth at 24 hours    Blood Culture - Blood, Arm, Left [[id number]]  (Normal) Collected: 01/05/24 0508    Lab Status: Preliminary result Specimen: Blood from Arm, Left Updated: 01/06/24 0600     Blood Culture No growth at 24 hours    MRSA Screen, PCR (Inpatient) - Swab, Nares [[id number]]  (Normal) Collected: 01/05/24 0546    Lab Status: Final result Specimen: Swab from Nares Updated: 01/05/24 0843     MRSA PCR Negative    Narrative:      The negative predictive value of this diagnostic test is high and should only be used to consider de-escalating anti-MRSA therapy. A positive result may indicate colonization with MRSA and must be correlated clinically.  MRSA Negative    Respiratory Culture - Sputum, Cough [[id number]] Collected: 01/04/24 2319    Lab Status: Final result Specimen: Sputum from Cough Updated: 01/05/24 0654     Respiratory Culture Rejected     Gram Stain Many (4+) Gram positive cocci in pairs, chains and clusters      Moderate (3+) Gram negative bacilli      Moderate (3+) Epithelial cells per low power field      Rare (1+) WBCs per low power field    Narrative:       Specimen rejected due to oropharyngeal contamination. Please reorder and recollect specimen if clinically necessary.  Specimen rejected due to oropharyngeal contamination.            CT Angiogram Chest    Result Date: 1/5/2024  CT ANGIOGRAM CHEST Date of Exam: 1/5/2024 12:24 AM EST Indication: cough, SOA, COPD, hypoxia,. Comparison: None available. Technique: CTA of the chest was performed after the uneventful intravenous administration of 80 mL of Isovue-370. Reconstructed coronal and sagittal images were also obtained. In addition, a 3-D volume rendered image was created for interpretation. Automated exposure control and iterative reconstruction methods were used. Findings: The thyroid, trachea and esophagus appear within normal limits. Heart size is normal. There is mild aortic atherosclerotic disease. No aneurysm. The main pulmonary artery is normal diameter. There is no evidence of pulmonary embolism. No pericardial effusion or mediastinal lymphadenopathy. There is mild coronary artery calcification. There is moderate emphysema. Emphysema on CT is an independent risk factor for lung cancer. Low dose lung cancer screening should be considered if patient qualifies based on smoking history or if not already enrolled in a screening program. There is an irregular cavitary lesion in the right perihilar aspect of the right upper lobe measuring 25 mm total diameter. There is internal debris or septations in this semisolid lesion. There is mild adjacent peribronchial thickening. There is a small amount of debris in the right bronchus intermedius. There is additional peribronchial thickening in the right lower lobe dependently. There is no pneumothorax or pleural effusion. No lobar consolidation. There are no acute findings in the superficial soft tissues. No acute findings in the upper abdomen. There are no acute osseous abnormalities or destructive bone lesions. There are mild to moderate lower cervical degenerative  changes.     Impression: Impression: 1.No evidence of pulmonary embolism. 2.25 mm cavitary lesion in the right upper lobe with internal debris or septations. This could be related to an infectious or inflammatory process. Malignancy cannot be excluded. PET/CT follow-up could provide more information. 3.Moderate emphysema. Emphysema on CT is an independent risk factor for lung cancer. Low dose lung cancer screening should be considered if patient qualifies based on smoking history. 4.Mild coronary artery calcification and mild aortic atherosclerosis. Electronically Signed: Tres Chau MD  1/5/2024 12:50 AM EST  Workstation ID: [id number]    XR Chest 1 View    Result Date: 1/4/2024  XR CHEST 1 VW Date of Exam: 1/4/2024 8:44 PM EST Indication: SOA triage protocol Comparison: 10/19/2022. Findings: Chronic interstitial and emphysematous changes are present. There is no focal consolidation. There is no effusion or pneumothorax. Unremarkable heart and mediastinal contours.     Impression: Impression: No evidence of acute disease. Electronically Signed: Vishnu Chan MD  1/4/2024 9:10 PM EST  Workstation ID: [id number]     Results for orders placed during the hospital encounter of 01/10/23    Adult Transthoracic Echo Complete W/ Cont if Necessary Per Protocol    Interpretation Summary    Left ventricular systolic function is mildly decreased. Left ventricular ejection fraction appears to be 46 - 50%.    Left ventricular wall thickness is consistent with mild concentric hypertrophy.    Left ventricular diastolic function is consistent with (grade II w/high LAP) pseudonormalization.    Estimated right ventricular systolic pressure from tricuspid regurgitation is normal (<35 mmHg).      Current medications:  Scheduled Meds:albuterol sulfate HFA, 2 puff, Inhalation, 4x Daily - RT  budesonide-formoterol, 2 puff, Inhalation, BID - RT  carvedilol, 25 mg, Oral, BID With Meals  dexAMETHasone, 6 mg, Oral, Daily   Or  dexAMETHasone, 6  mg, Intravenous, Daily  doxycycline, 100 mg, Intravenous, Q12H  empagliflozin, 10 mg, Oral, Daily  enoxaparin, 40 mg, Subcutaneous, Q24H  piperacillin-tazobactam, 3.375 g, Intravenous, Q8H  remdesivir, 100 mg, Intravenous, Q24H  sacubitril-valsartan, 1 tablet, Oral, BID      Continuous Infusions:   PRN Meds:.  bumetanide    nitroglycerin    sodium chloride    sodium chloride    Assessment & Plan   Assessment & Plan     Active Hospital Problems    Diagnosis  POA    **Hypoxia [R09.02]  Yes    COVID-19 virus infection [U07.1]  Unknown    COPD (chronic obstructive pulmonary disease) [J44.9]  Unknown    Cavitary lesion of lung [J98.4]  Unknown    Elevated troponin [R79.89]  Unknown    COPD with acute exacerbation [J44.1]  Yes    Congestive heart failure, unspecified HF chronicity, unspecified heart failure type [I50.9]  Yes    Essential hypertension [I10]  Yes      Resolved Hospital Problems   No resolved problems to display.        Brief Hospital Course to date:  Francisco Clark is a 63 y.o. male with hx of HFrEF, COPD, HTN, and tobacco abuse who presents due to worsening SOA over the past 10 days.      COVID-19  Coronavirus OC43  Hypoxia  COPD without exacerbation  --Hypoxic to 86% on room air, does not wear home oxygen  --Currently requiring 3-4 liters, wean as tolerated  --CTA chest with RUL cavitary lesion, no other infiltrates noted  --Respiratory PCR panel positive for COVID-19 as well as Coronavirus OC43  --Symptoms present x 10 days, may be out of window for benefit from Remdesivir but given presentation we will continue (Pulmonary in agreement)  --Continue Remdesivir and Decadron  --Continue home inhaler, formulary substitute  --Will scheduled albuterol MDI 4x daily due to wheezing on exam today  --Repeat CXR today     Cavitary lung lesion of right upper lobe  --CTA chest: 25 mm cavitary lesion in right upper lobe with internal debris or septations, moderate emphysema   --Pulmonary/Dr. Mcgrath has evaluated,  appears to be a small area of infiltrate, but cannot rule out malignancy: recommends treat as community acquired pneumonia and repeat CT chest in 3 months  --MRSA PCR negative, stopped Vanc  --Continue Zosyn and Doxycycline  --F/U cultures     HFrEF with recovery   Dilated nonischemic cardiomyopathy  --EF previously <20% in October 2022, seems to have recovered on Echo January 2023 (46-50%)  --Followed by Dr. Coreas/Cardiology  --Continue home meds Coreg, Entresto, Jardiance, Bumex    Expected Discharge Location and Transportation: home  Expected Discharge   Expected Discharge Date: 1/8/2024; Expected Discharge Time:      DVT prophylaxis:  Medical DVT prophylaxis orders are present.     AM-PAC 6 Clicks Score (PT): 24 (01/06/24 0800)    CODE STATUS:   Code Status and Medical Interventions:   Ordered at: 01/05/24 0053     Code Status (Patient has no pulse and is not breathing):    CPR (Attempt to Resuscitate)     Medical Interventions (Patient has pulse or is breathing):    Full Support       Pastora Padron MD  01/06/24

## 2024-01-07 LAB
ALBUMIN SERPL-MCNC: 3.4 G/DL (ref 3.5–5.2)
ALBUMIN/GLOB SERPL: 1.2 G/DL
ALP SERPL-CCNC: 56 U/L (ref 39–117)
ALT SERPL W P-5'-P-CCNC: 13 U/L (ref 1–41)
ANION GAP SERPL CALCULATED.3IONS-SCNC: 9 MMOL/L (ref 5–15)
AST SERPL-CCNC: 16 U/L (ref 1–40)
BASOPHILS # BLD AUTO: 0.01 10*3/MM3 (ref 0–0.2)
BASOPHILS NFR BLD AUTO: 0.1 % (ref 0–1.5)
BILIRUB SERPL-MCNC: <0.2 MG/DL (ref 0–1.2)
BUN SERPL-MCNC: 37 MG/DL (ref 8–23)
BUN/CREAT SERPL: 48.7 (ref 7–25)
CALCIUM SPEC-SCNC: 10.4 MG/DL (ref 8.6–10.5)
CHLORIDE SERPL-SCNC: 101 MMOL/L (ref 98–107)
CO2 SERPL-SCNC: 30 MMOL/L (ref 22–29)
CREAT SERPL-MCNC: 0.76 MG/DL (ref 0.76–1.27)
CRP SERPL-MCNC: 1.97 MG/DL (ref 0–0.5)
DEPRECATED RDW RBC AUTO: 47.6 FL (ref 37–54)
EGFRCR SERPLBLD CKD-EPI 2021: 100.4 ML/MIN/1.73
EOSINOPHIL # BLD AUTO: 0 10*3/MM3 (ref 0–0.4)
EOSINOPHIL NFR BLD AUTO: 0 % (ref 0.3–6.2)
ERYTHROCYTE [DISTWIDTH] IN BLOOD BY AUTOMATED COUNT: 13.3 % (ref 12.3–15.4)
GLOBULIN UR ELPH-MCNC: 2.9 GM/DL
GLUCOSE SERPL-MCNC: 104 MG/DL (ref 65–99)
HCT VFR BLD AUTO: 46.5 % (ref 37.5–51)
HGB BLD-MCNC: 14.7 G/DL (ref 13–17.7)
IMM GRANULOCYTES # BLD AUTO: 0.03 10*3/MM3 (ref 0–0.05)
IMM GRANULOCYTES NFR BLD AUTO: 0.3 % (ref 0–0.5)
LYMPHOCYTES # BLD AUTO: 1.23 10*3/MM3 (ref 0.7–3.1)
LYMPHOCYTES NFR BLD AUTO: 14 % (ref 19.6–45.3)
MCH RBC QN AUTO: 30.7 PG (ref 26.6–33)
MCHC RBC AUTO-ENTMCNC: 31.6 G/DL (ref 31.5–35.7)
MCV RBC AUTO: 97.1 FL (ref 79–97)
MONOCYTES # BLD AUTO: 0.68 10*3/MM3 (ref 0.1–0.9)
MONOCYTES NFR BLD AUTO: 7.7 % (ref 5–12)
NEUTROPHILS NFR BLD AUTO: 6.86 10*3/MM3 (ref 1.7–7)
NEUTROPHILS NFR BLD AUTO: 77.9 % (ref 42.7–76)
NRBC BLD AUTO-RTO: 0 /100 WBC (ref 0–0.2)
PLATELET # BLD AUTO: 232 10*3/MM3 (ref 140–450)
PMV BLD AUTO: 9.1 FL (ref 6–12)
POTASSIUM SERPL-SCNC: 4.5 MMOL/L (ref 3.5–5.2)
PROT SERPL-MCNC: 6.3 G/DL (ref 6–8.5)
RBC # BLD AUTO: 4.79 10*6/MM3 (ref 4.14–5.8)
SODIUM SERPL-SCNC: 140 MMOL/L (ref 136–145)
WBC NRBC COR # BLD AUTO: 8.81 10*3/MM3 (ref 3.4–10.8)

## 2024-01-07 PROCEDURE — 25010000002 ENOXAPARIN PER 10 MG: Performed by: INTERNAL MEDICINE

## 2024-01-07 PROCEDURE — 63710000001 DEXAMETHASONE PER 0.25 MG: Performed by: INTERNAL MEDICINE

## 2024-01-07 PROCEDURE — 85025 COMPLETE CBC W/AUTO DIFF WBC: CPT | Performed by: INTERNAL MEDICINE

## 2024-01-07 PROCEDURE — 25810000003 SODIUM CHLORIDE 0.9 % SOLUTION 250 ML FLEX CONT: Performed by: INTERNAL MEDICINE

## 2024-01-07 PROCEDURE — 94799 UNLISTED PULMONARY SVC/PX: CPT

## 2024-01-07 PROCEDURE — 25010000002 REMDESIVIR 100 MG/20ML SOLUTION 1 EACH VIAL: Performed by: INTERNAL MEDICINE

## 2024-01-07 PROCEDURE — 99232 SBSQ HOSP IP/OBS MODERATE 35: CPT | Performed by: HOSPITALIST

## 2024-01-07 PROCEDURE — 86140 C-REACTIVE PROTEIN: CPT | Performed by: INTERNAL MEDICINE

## 2024-01-07 PROCEDURE — 80053 COMPREHEN METABOLIC PANEL: CPT | Performed by: INTERNAL MEDICINE

## 2024-01-07 PROCEDURE — 25010000002 PIPERACILLIN SOD-TAZOBACTAM PER 1 G: Performed by: INTERNAL MEDICINE

## 2024-01-07 RX ORDER — PANTOPRAZOLE SODIUM 40 MG/1
40 TABLET, DELAYED RELEASE ORAL
Status: DISCONTINUED | OUTPATIENT
Start: 2024-01-07 | End: 2024-01-10 | Stop reason: HOSPADM

## 2024-01-07 RX ADMIN — BUDESONIDE AND FORMOTEROL FUMARATE DIHYDRATE 2 PUFF: 160; 4.5 AEROSOL RESPIRATORY (INHALATION) at 20:40

## 2024-01-07 RX ADMIN — DEXAMETHASONE 6 MG: 4 TABLET ORAL at 09:36

## 2024-01-07 RX ADMIN — ALBUTEROL SULFATE 2 PUFF: 90 AEROSOL, METERED RESPIRATORY (INHALATION) at 20:40

## 2024-01-07 RX ADMIN — EMPAGLIFLOZIN 10 MG: 10 TABLET, FILM COATED ORAL at 09:36

## 2024-01-07 RX ADMIN — DOXYCYCLINE 100 MG: 100 INJECTION, POWDER, LYOPHILIZED, FOR SOLUTION INTRAVENOUS at 04:01

## 2024-01-07 RX ADMIN — CARVEDILOL 25 MG: 12.5 TABLET, FILM COATED ORAL at 09:36

## 2024-01-07 RX ADMIN — PIPERACILLIN SODIUM AND TAZOBACTAM SODIUM 3.38 G: 3; .375 INJECTION, SOLUTION INTRAVENOUS at 05:32

## 2024-01-07 RX ADMIN — PIPERACILLIN SODIUM AND TAZOBACTAM SODIUM 3.38 G: 3; .375 INJECTION, SOLUTION INTRAVENOUS at 20:26

## 2024-01-07 RX ADMIN — REMDESIVIR 100 MG: 100 INJECTION, POWDER, LYOPHILIZED, FOR SOLUTION INTRAVENOUS at 01:48

## 2024-01-07 RX ADMIN — PANTOPRAZOLE SODIUM 40 MG: 40 TABLET, DELAYED RELEASE ORAL at 04:04

## 2024-01-07 RX ADMIN — PIPERACILLIN SODIUM AND TAZOBACTAM SODIUM 3.38 G: 3; .375 INJECTION, SOLUTION INTRAVENOUS at 13:58

## 2024-01-07 RX ADMIN — CARVEDILOL 25 MG: 12.5 TABLET, FILM COATED ORAL at 17:11

## 2024-01-07 RX ADMIN — ALBUTEROL SULFATE 2 PUFF: 90 AEROSOL, METERED RESPIRATORY (INHALATION) at 06:23

## 2024-01-07 RX ADMIN — DOXYCYCLINE 100 MG: 100 INJECTION, POWDER, LYOPHILIZED, FOR SOLUTION INTRAVENOUS at 17:12

## 2024-01-07 RX ADMIN — SACUBITRIL AND VALSARTAN 1 TABLET: 24; 26 TABLET, FILM COATED ORAL at 20:26

## 2024-01-07 RX ADMIN — ENOXAPARIN SODIUM 40 MG: 100 INJECTION SUBCUTANEOUS at 04:01

## 2024-01-07 RX ADMIN — ALBUTEROL SULFATE 2 PUFF: 90 AEROSOL, METERED RESPIRATORY (INHALATION) at 10:52

## 2024-01-07 RX ADMIN — SACUBITRIL AND VALSARTAN 1 TABLET: 24; 26 TABLET, FILM COATED ORAL at 09:36

## 2024-01-07 RX ADMIN — BUDESONIDE AND FORMOTEROL FUMARATE DIHYDRATE 2 PUFF: 160; 4.5 AEROSOL RESPIRATORY (INHALATION) at 06:24

## 2024-01-07 NOTE — PROGRESS NOTES
[facility] Medicine Services  PROGRESS NOTE    Patient Name: Francisco Clark  : 1960  MRN: [id number]    Date of Admission: 2024  Primary Care Physician: Charito Weiss APRN    Subjective   Subjective     CC:  F/U SOA    HPI:  Seen this morning. Reports feeling better. On 3 liters.      Objective   Objective     Vital Signs:   Temp:  [97.7 °F (36.5 °C)-98.2 °F (36.8 °C)] 97.8 °F (36.6 °C)  Heart Rate:  [54-76] 72  Resp:  [18-20] 18  BP: (103-136)/(54-74) 125/61  Flow (L/min):  [3-4] 3     Physical Exam:  Gen-no acute distress  HENT-NCAT, mucous membranes moist  CV-RRR, S1 S2 normal, no m/r/g  Resp-diminished bilaterally, improved wheezes almost none today, nonlabored on 3 liters saturating 92%  Abd-soft, NT, ND, +BS  Ext-no edema  Neuro-A&Ox3, no focal deficits  Skin-no rashes  Psych-appropriate mood      Results Reviewed:  LAB RESULTS:      Lab 24  0557 24  0838 24  2124   WBC 8.81 8.54 10.27   HEMOGLOBIN 14.7 16.1 15.4   HEMATOCRIT 46.5 51.6* 48.3   PLATELETS 232 281 258   NEUTROS ABS 6.86 6.86 7.92*   IMMATURE GRANS (ABS) 0.03 0.04 0.04   LYMPHS ABS 1.23 1.12 1.00   MONOS ABS 0.68 0.51 1.27*   EOS ABS 0.00 0.00 0.01   MCV 97.1* 99.6* 97.4*   CRP 1.97* 4.20* 8.20*   PROCALCITONIN  --   --  0.10   LACTATE  --   --  0.7   LDH  --   --  324*   D DIMER QUANT  --   --  0.38         Lab 24  0557 24  0838 24  0514 24  2319   SODIUM 140 139 133* 134*   POTASSIUM 4.5 5.1 5.0 5.1   CHLORIDE 101 98 97* 97*   CO2 30.0* 34.0* 23.0 28.0   ANION GAP 9.0 7.0 13.0 9.0   BUN 37* 37* 25* 25*   CREATININE 0.76 1.06 0.79 0.77   EGFR 100.4 78.4 99.2 100.0   GLUCOSE 104* 149* 149* 118*   CALCIUM 10.4 11.3* 9.5 9.4         Lab 24  0557 24  0838 24  0514 24  2319   TOTAL PROTEIN 6.3 7.3 7.7 7.4   ALBUMIN 3.4* 4.0 3.5 3.5   GLOBULIN 2.9 3.3 4.2 3.9   ALT (SGPT) 13 12 12 12   AST (SGOT) 16 16 21 16   BILIRUBIN <0.2 0.2 0.2 0.3   ALK  PHOS 56 71 69 66         Lab 01/06/24  0838 01/05/24  0707 01/05/24  0514 01/04/24  2319 01/04/24  2124   PROBNP 160.9  --   --   --  294.2   HSTROP T  --  23* 26* 32*  --                  Lab 01/04/24 2134   PH, ARTERIAL 7.365   PCO2, ARTERIAL 52.1*   PO2 ART 83.9   FIO2 32   HCO3 ART 29.7*   BASE EXCESS ART 3.1*   CARBOXYHEMOGLOBIN 1.8     Brief Urine Lab Results       None            Microbiology Results Abnormal       Procedure Component Value - Date/Time    Respiratory Culture - Sputum, Cough [[id number]] Collected: 01/06/24 1050    Lab Status: Preliminary result Specimen: Sputum from Cough Updated: 01/07/24 0924     Respiratory Culture Scant growth (1+) The culture consists of normal respiratory [place]. This is a preliminary report; final report to follow.     Gram Stain Moderate (3+) WBCs per low power field      Rare (1+) Epithelial cells per low power field      Few (2+) Gram positive cocci in pairs    Blood Culture - Blood, Hand, Right [[id number]]  (Normal) Collected: 01/05/24 0514    Lab Status: Preliminary result Specimen: Blood from Hand, Right Updated: 01/07/24 0600     Blood Culture No growth at 2 days    Blood Culture - Blood, Arm, Left [[id number]]  (Normal) Collected: 01/05/24 0508    Lab Status: Preliminary result Specimen: Blood from Arm, Left Updated: 01/07/24 0600     Blood Culture No growth at 2 days    MRSA Screen, PCR (Inpatient) - Swab, Nares [[id number]]  (Normal) Collected: 01/05/24 0546    Lab Status: Final result Specimen: Swab from Nares Updated: 01/05/24 0843     MRSA PCR Negative    Narrative:      The negative predictive value of this diagnostic test is high and should only be used to consider de-escalating anti-MRSA therapy. A positive result may indicate colonization with MRSA and must be correlated clinically.  MRSA Negative    Respiratory Culture - Sputum, Cough [[id number]] Collected: 01/04/24 2319    Lab Status: Final result Specimen: Sputum from Cough Updated: 01/05/24 0679      Respiratory Culture Rejected     Gram Stain Many (4+) Gram positive cocci in pairs, chains and clusters      Moderate (3+) Gram negative bacilli      Moderate (3+) Epithelial cells per low power field      Rare (1+) WBCs per low power field    Narrative:      Specimen rejected due to oropharyngeal contamination. Please reorder and recollect specimen if clinically necessary.  Specimen rejected due to oropharyngeal contamination.            XR Chest 1 View    Result Date: 1/6/2024  XR CHEST 1 VW Date of Exam: 1/6/2024 1:42 PM EST Indication: F/U hypoxia Comparison: Chest x-ray 1/4/2024 Findings: Stable cardiomediastinal silhouette within normal limits. There is background emphysema. No focal consolidation. No pleural effusion or pneumothorax.     Impression: Impression: No acute cardiopulmonary findings. Unchanged emphysema. Electronically Signed: Thomas Allen MD  1/6/2024 2:36 PM EST  Workstation ID: [id number]     Results for orders placed during the hospital encounter of 01/10/23    Adult Transthoracic Echo Complete W/ Cont if Necessary Per Protocol    Interpretation Summary    Left ventricular systolic function is mildly decreased. Left ventricular ejection fraction appears to be 46 - 50%.    Left ventricular wall thickness is consistent with mild concentric hypertrophy.    Left ventricular diastolic function is consistent with (grade II w/high LAP) pseudonormalization.    Estimated right ventricular systolic pressure from tricuspid regurgitation is normal (<35 mmHg).      Current medications:  Scheduled Meds:albuterol sulfate HFA, 2 puff, Inhalation, 4x Daily - RT  budesonide-formoterol, 2 puff, Inhalation, BID - RT  carvedilol, 25 mg, Oral, BID With Meals  dexAMETHasone, 6 mg, Oral, Daily   Or  dexAMETHasone, 6 mg, Intravenous, Daily  doxycycline, 100 mg, Intravenous, Q12H  empagliflozin, 10 mg, Oral, Daily  enoxaparin, 40 mg, Subcutaneous, Q24H  pantoprazole, 40 mg, Oral, Q AM  piperacillin-tazobactam, 3.375  g, Intravenous, Q8H  remdesivir, 100 mg, Intravenous, Q24H  sacubitril-valsartan, 1 tablet, Oral, BID      Continuous Infusions:   PRN Meds:.  !Patient Home Medications Stored in Pharmacy    bumetanide    nitroglycerin    sodium chloride    sodium chloride    Assessment & Plan   Assessment & Plan     Active Hospital Problems    Diagnosis  POA    **Hypoxia [R09.02]  Yes    COVID-19 virus infection [U07.1]  Unknown    COPD (chronic obstructive pulmonary disease) [J44.9]  Unknown    Cavitary lesion of lung [J98.4]  Unknown    Elevated troponin [R79.89]  Unknown    COPD with acute exacerbation [J44.1]  Yes    Congestive heart failure, unspecified HF chronicity, unspecified heart failure type [I50.9]  Yes    Essential hypertension [I10]  Yes      Resolved Hospital Problems   No resolved problems to display.        Brief Hospital Course to date:  Francisco Clark is a 63 y.o. male with hx of HFrEF, COPD, HTN, and tobacco abuse who presents due to worsening SOA over the past 10 days.      COVID-19  Coronavirus OC43  Hypoxia  COPD without exacerbation  --Hypoxic to 86% on room air, does not wear home oxygen  --Currently requiring 3 liters, wean as tolerated - may need home oxygen at discharge  --CTA chest with RUL cavitary lesion, no other infiltrates noted  --Respiratory PCR panel positive for COVID-19 as well as Coronavirus OC43  --Symptoms present x 10 days, may be out of window for benefit from Remdesivir but given presentation we will continue (Pulmonary in agreement)  --Continue Remdesivir and Decadron - Remdesivir through 1/9/24  --Continue home inhaler, formulary substitute  --Scheduled albuterol MDI 4x daily   --Repeat CXR 1/6/24 no acute findings     Cavitary lung lesion of right upper lobe  --CTA chest: 25 mm cavitary lesion in right upper lobe with internal debris or septations, moderate emphysema   --Pulmonary/Dr. Mcgrath has evaluated, appears to be a small area of infiltrate, but cannot rule out malignancy:  recommends treat as community acquired pneumonia and repeat CT chest in 3 months; has F/U with Kayla Ross APRN 1/19/24  --MRSA PCR negative, stopped Vanc  --Continue Zosyn and Doxycycline  --Blood cultures NGTD, sputum culture with normal respiratory [place] thus far     HFrEF with recovery   Dilated nonischemic cardiomyopathy  --EF previously <20% in October 2022, seems to have recovered on Echo January 2023 (46-50%)  --Followed by Dr. Coreas/Cardiology  --Continue home meds Coreg, Entresto, Jardiance, Bumex    Expected Discharge Location and Transportation: home  Expected Discharge   Expected Discharge Date: 1/9/2024; Expected Discharge Time:      DVT prophylaxis:  Medical DVT prophylaxis orders are present.     AM-PAC 6 Clicks Score (PT): 24 (01/06/24 2000)    CODE STATUS:   Code Status and Medical Interventions:   Ordered at: 01/05/24 0053     Code Status (Patient has no pulse and is not breathing):    CPR (Attempt to Resuscitate)     Medical Interventions (Patient has pulse or is breathing):    Full Support       Pastora Padron MD  01/07/24

## 2024-01-07 NOTE — NURSING NOTE
Patient had an IV infiltrate around 0300 while remdesivir infusion in progress. Remdesivir stopped and IV removed. Right forearm with 1+-2+ edema. Patient states no pain present and slept through the infiltration. Patient given cold ice pack and stocking to cover and keep in place. Checked with pharmacy to ensure that no antidote was needed, and Kameron the pharmacist stated that there was not a lot of research/studies in place for this medication yet. Patient educated on the signs/symptoms of infection and told to notify nursing staff if he notices any progressing symptoms or pain.

## 2024-01-07 NOTE — PLAN OF CARE
Problem: Adult Inpatient Plan of Care  Goal: Plan of Care Review  Outcome: Ongoing, Progressing  Goal: Patient-Specific Goal (Individualized)  Outcome: Ongoing, Progressing  Goal: Absence of Hospital-Acquired Illness or Injury  Outcome: Ongoing, Progressing  Intervention: Identify and Manage Fall Risk  Description: Perform standard risk assessment on admission using a validated tool or comprehensive approach appropriate to the patient; reassess fall risk frequently, with change in status or transfer to another level of care.  Communicate fall injury risk to interprofessional healthcare team.  Determine need for increased observation, equipment and environmental modification, such as low bed, signage and supportive, nonskid footwear.  Adjust safety measures to individual developmental age, stage and identified risk factors.  Reinforce the importance of safety and physical activity with patient and family.  Perform regular intentional rounding to assess need for position change, pain assessment and personal needs, including assistance with toileting.  Recent Flowsheet Documentation  Taken 1/6/2024 2000 by Marilyn Turcios RN  Safety Promotion/Fall Prevention:   assistive device/personal items within reach   activity supervised   fall prevention program maintained   clutter free environment maintained   lighting adjusted   room organization consistent   safety round/check completed   nonskid shoes/slippers when out of bed  Intervention: Prevent Skin Injury  Description: Perform a screening for skin injury risk, such as pressure or moisture associated skin damage on admission and at regular intervals throughout hospital stay.  Keep all areas of skin (especially folds) clean and dry.  Maintain adequate skin hydration.  Relieve and redistribute pressure and protect bony prominences; implement measures based on patient-specific risk factors.  Match turning and repositioning schedule to clinical condition.  Encourage  weight shift frequently; assist with reposition if unable to complete independently.  Float heels off bed; avoid pressure on the Achilles tendon.  Keep skin free from extended contact with medical devices.  Encourage functional activity and mobility, as early as tolerated.  Use aids (e.g., slide boards, mechanical lift) during transfer.  Recent Flowsheet Documentation  Taken 1/6/2024 2000 by Marilyn Turcios RN  Body Position: position changed independently  Intervention: Prevent and Manage VTE (Venous Thromboembolism) Risk  Description: Assess for VTE (venous thromboembolism) risk.  Encourage and assist with early ambulation.  Initiate and maintain compression or other therapy, as indicated, based on identified risk in accordance with organizational protocol and provider order.  Encourage both active and passive leg exercises while in bed, if unable to ambulate.  Recent Flowsheet Documentation  Taken 1/6/2024 2000 by Marilyn Turcios RN  Activity Management: activity encouraged  VTE Prevention/Management: (See MAR) --  Range of Motion: active ROM (range of motion) encouraged  Intervention: Prevent Infection  Description: Maintain skin and mucous membrane integrity; promote hand, oral and pulmonary hygiene.  Optimize fluid balance, nutrition, sleep and glycemic control to maximize infection resistance.  Identify potential sources of infection early to prevent or mitigate progression of infection (e.g., wound, lines, devices).  Evaluate ongoing need for invasive devices; remove promptly when no longer indicated.  Recent Flowsheet Documentation  Taken 1/6/2024 2000 by Marilyn Turcios RN  Infection Prevention: environmental surveillance performed  Goal: Optimal Comfort and Wellbeing  Outcome: Ongoing, Progressing  Goal: Readiness for Transition of Care  Outcome: Ongoing, Progressing     Problem: COPD (Chronic Obstructive Pulmonary Disease) Comorbidity  Goal: Maintenance of COPD Symptom Control  Outcome: Ongoing,  Progressing  Intervention: Maintain COPD-Symptom Control  Description: Evaluate adherence to management plan (e.g., medication, trigger avoidance, infection prevention, self-monitoring).  Advocate for continuation of home regimen, including medication, method of delivery, schedule and symptom monitoring.  Anticipate the need for breathing techniques and activity pacing to minimize fatigue and breathlessness.  Assess for proper use of inhaled medication and delivery technique; assist or reinstruct if needed.  Evaluate effectiveness of coping skills; encourage expression of feelings, expectations and concerns related to disease management and quality of life; reinforce education to enhance management plan and wellbeing.  Recent Flowsheet Documentation  Taken 1/6/2024 2000 by Marilyn Turcios RN  Medication Review/Management: medications reviewed     Problem: Hypertension Comorbidity  Goal: Blood Pressure in Desired Range  Outcome: Ongoing, Progressing  Intervention: Maintain Blood Pressure Management  Description: Evaluate adherence to home antihypertensive regimen (e.g., exercise and activity, diet modification, medication).  Provide scheduled antihypertensive medication; consider administration time and effects (e.g., avoid giving diuretic prior to bedtime).  Monitor response to antihypertensive medication therapy (e.g., blood pressure, electrolyte levels, medication effects).  Minimize risk of orthostatic hypotension; encourage caution with position changes, particularly if elderly.  Recent Flowsheet Documentation  Taken 1/6/2024 2000 by Marilyn Turcios RN  Medication Review/Management: medications reviewed   Goal Outcome Evaluation:

## 2024-01-08 LAB
ALBUMIN SERPL-MCNC: 3.7 G/DL (ref 3.5–5.2)
ALBUMIN/GLOB SERPL: 1.3 G/DL
ALP SERPL-CCNC: 59 U/L (ref 39–117)
ALT SERPL W P-5'-P-CCNC: 15 U/L (ref 1–41)
ANION GAP SERPL CALCULATED.3IONS-SCNC: 12 MMOL/L (ref 5–15)
AST SERPL-CCNC: 17 U/L (ref 1–40)
BACTERIA SPEC RESP CULT: NORMAL
BASOPHILS # BLD AUTO: 0.02 10*3/MM3 (ref 0–0.2)
BASOPHILS NFR BLD AUTO: 0.3 % (ref 0–1.5)
BILIRUB SERPL-MCNC: 0.2 MG/DL (ref 0–1.2)
BUN SERPL-MCNC: 41 MG/DL (ref 8–23)
BUN/CREAT SERPL: 46.1 (ref 7–25)
CALCIUM SPEC-SCNC: 9.5 MG/DL (ref 8.6–10.5)
CHLORIDE SERPL-SCNC: 99 MMOL/L (ref 98–107)
CO2 SERPL-SCNC: 26 MMOL/L (ref 22–29)
CREAT SERPL-MCNC: 0.89 MG/DL (ref 0.76–1.27)
CRP SERPL-MCNC: 1.24 MG/DL (ref 0–0.5)
DEPRECATED RDW RBC AUTO: 48.5 FL (ref 37–54)
EGFRCR SERPLBLD CKD-EPI 2021: 95.7 ML/MIN/1.73
EOSINOPHIL # BLD AUTO: 0 10*3/MM3 (ref 0–0.4)
EOSINOPHIL NFR BLD AUTO: 0 % (ref 0.3–6.2)
ERYTHROCYTE [DISTWIDTH] IN BLOOD BY AUTOMATED COUNT: 13.4 % (ref 12.3–15.4)
GLOBULIN UR ELPH-MCNC: 2.8 GM/DL
GLUCOSE SERPL-MCNC: 135 MG/DL (ref 65–99)
GRAM STN SPEC: NORMAL
HCT VFR BLD AUTO: 47.4 % (ref 37.5–51)
HGB BLD-MCNC: 15.1 G/DL (ref 13–17.7)
IMM GRANULOCYTES # BLD AUTO: 0.14 10*3/MM3 (ref 0–0.05)
IMM GRANULOCYTES NFR BLD AUTO: 1.8 % (ref 0–0.5)
LYMPHOCYTES # BLD AUTO: 1.47 10*3/MM3 (ref 0.7–3.1)
LYMPHOCYTES NFR BLD AUTO: 18.6 % (ref 19.6–45.3)
MCH RBC QN AUTO: 31.3 PG (ref 26.6–33)
MCHC RBC AUTO-ENTMCNC: 31.9 G/DL (ref 31.5–35.7)
MCV RBC AUTO: 98.3 FL (ref 79–97)
MONOCYTES # BLD AUTO: 0.49 10*3/MM3 (ref 0.1–0.9)
MONOCYTES NFR BLD AUTO: 6.2 % (ref 5–12)
NEUTROPHILS NFR BLD AUTO: 5.78 10*3/MM3 (ref 1.7–7)
NEUTROPHILS NFR BLD AUTO: 73.1 % (ref 42.7–76)
NRBC BLD AUTO-RTO: 0 /100 WBC (ref 0–0.2)
PLATELET # BLD AUTO: 220 10*3/MM3 (ref 140–450)
PMV BLD AUTO: 9.3 FL (ref 6–12)
POTASSIUM SERPL-SCNC: 4.5 MMOL/L (ref 3.5–5.2)
PROT SERPL-MCNC: 6.5 G/DL (ref 6–8.5)
QT INTERVAL: 344 MS
QTC INTERVAL: 406 MS
RBC # BLD AUTO: 4.82 10*6/MM3 (ref 4.14–5.8)
SODIUM SERPL-SCNC: 137 MMOL/L (ref 136–145)
WBC NRBC COR # BLD AUTO: 7.9 10*3/MM3 (ref 3.4–10.8)

## 2024-01-08 PROCEDURE — 80053 COMPREHEN METABOLIC PANEL: CPT | Performed by: INTERNAL MEDICINE

## 2024-01-08 PROCEDURE — 99232 SBSQ HOSP IP/OBS MODERATE 35: CPT | Performed by: HOSPITALIST

## 2024-01-08 PROCEDURE — 25010000002 PIPERACILLIN SOD-TAZOBACTAM PER 1 G: Performed by: INTERNAL MEDICINE

## 2024-01-08 PROCEDURE — 86606 ASPERGILLUS ANTIBODY: CPT | Performed by: INTERNAL MEDICINE

## 2024-01-08 PROCEDURE — 94664 DEMO&/EVAL PT USE INHALER: CPT

## 2024-01-08 PROCEDURE — 94799 UNLISTED PULMONARY SVC/PX: CPT

## 2024-01-08 PROCEDURE — 97530 THERAPEUTIC ACTIVITIES: CPT

## 2024-01-08 PROCEDURE — 85025 COMPLETE CBC W/AUTO DIFF WBC: CPT | Performed by: INTERNAL MEDICINE

## 2024-01-08 PROCEDURE — 63710000001 DEXAMETHASONE PER 0.25 MG: Performed by: INTERNAL MEDICINE

## 2024-01-08 PROCEDURE — 25010000002 ENOXAPARIN PER 10 MG: Performed by: INTERNAL MEDICINE

## 2024-01-08 PROCEDURE — 99232 SBSQ HOSP IP/OBS MODERATE 35: CPT | Performed by: INTERNAL MEDICINE

## 2024-01-08 PROCEDURE — 86612 BLASTOMYCES ANTIBODY: CPT | Performed by: INTERNAL MEDICINE

## 2024-01-08 PROCEDURE — 86140 C-REACTIVE PROTEIN: CPT | Performed by: INTERNAL MEDICINE

## 2024-01-08 PROCEDURE — 25010000002 REMDESIVIR 100 MG/20ML SOLUTION 1 EACH VIAL: Performed by: INTERNAL MEDICINE

## 2024-01-08 PROCEDURE — 25810000003 SODIUM CHLORIDE 0.9 % SOLUTION 250 ML FLEX CONT: Performed by: INTERNAL MEDICINE

## 2024-01-08 PROCEDURE — 97110 THERAPEUTIC EXERCISES: CPT

## 2024-01-08 PROCEDURE — 97116 GAIT TRAINING THERAPY: CPT

## 2024-01-08 RX ADMIN — DOXYCYCLINE 100 MG: 100 INJECTION, POWDER, LYOPHILIZED, FOR SOLUTION INTRAVENOUS at 03:34

## 2024-01-08 RX ADMIN — BUDESONIDE AND FORMOTEROL FUMARATE DIHYDRATE 2 PUFF: 160; 4.5 AEROSOL RESPIRATORY (INHALATION) at 21:05

## 2024-01-08 RX ADMIN — REMDESIVIR 100 MG: 100 INJECTION, POWDER, LYOPHILIZED, FOR SOLUTION INTRAVENOUS at 00:26

## 2024-01-08 RX ADMIN — PIPERACILLIN SODIUM AND TAZOBACTAM SODIUM 3.38 G: 3; .375 INJECTION, SOLUTION INTRAVENOUS at 11:32

## 2024-01-08 RX ADMIN — PIPERACILLIN SODIUM AND TAZOBACTAM SODIUM 3.38 G: 3; .375 INJECTION, SOLUTION INTRAVENOUS at 05:52

## 2024-01-08 RX ADMIN — EMPAGLIFLOZIN 10 MG: 10 TABLET, FILM COATED ORAL at 08:21

## 2024-01-08 RX ADMIN — DOXYCYCLINE 100 MG: 100 INJECTION, POWDER, LYOPHILIZED, FOR SOLUTION INTRAVENOUS at 16:12

## 2024-01-08 RX ADMIN — PANTOPRAZOLE SODIUM 40 MG: 40 TABLET, DELAYED RELEASE ORAL at 05:53

## 2024-01-08 RX ADMIN — ALBUTEROL SULFATE 2 PUFF: 90 AEROSOL, METERED RESPIRATORY (INHALATION) at 21:05

## 2024-01-08 RX ADMIN — DEXAMETHASONE 6 MG: 4 TABLET ORAL at 08:21

## 2024-01-08 RX ADMIN — SACUBITRIL AND VALSARTAN 1 TABLET: 24; 26 TABLET, FILM COATED ORAL at 08:21

## 2024-01-08 RX ADMIN — CARVEDILOL 25 MG: 12.5 TABLET, FILM COATED ORAL at 08:21

## 2024-01-08 RX ADMIN — ALBUTEROL SULFATE 2 PUFF: 90 AEROSOL, METERED RESPIRATORY (INHALATION) at 16:17

## 2024-01-08 RX ADMIN — ENOXAPARIN SODIUM 40 MG: 100 INJECTION SUBCUTANEOUS at 03:34

## 2024-01-08 RX ADMIN — PIPERACILLIN SODIUM AND TAZOBACTAM SODIUM 3.38 G: 3; .375 INJECTION, SOLUTION INTRAVENOUS at 19:49

## 2024-01-08 NOTE — THERAPY TREATMENT NOTE
Patient Name: Francisco Clark  : 1960    MRN: [id number]                              Today's Date: 2024       Admit Date: 2024    Visit Dx:     ICD-10-CM ICD-9-CM   1. COVID-19 virus infection  U07.1 079.89   2. Acute on chronic respiratory failure with hypoxia and hypercapnia  J96.21 518.84    J96.22 786.09     799.02   3. COPD with acute exacerbation  J44.1 491.21   4. Cough productive of purulent sputum  R05.8 786.2   5. Pulmonary cavitary lesion  J98.4 518.89   6. Tobacco dependence  F17.200 305.1   7. History of hypertension  Z86.79 V12.59   8. History of CHF (congestive heart failure)  Z86.79 V12.59   9. History of peptic ulcer disease  Z87.11 V12.71     Patient Active Problem List   Diagnosis    Chest pain    Centrilobular emphysema    Tobacco abuse    Essential hypertension    Precordial pain    Dilated cardiomyopathy    Congestive heart failure, unspecified HF chronicity, unspecified heart failure type    Severe malnutrition    Acute on chronic HFrEF (heart failure with reduced ejection fraction)    Oropharyngeal dysphagia    Hypoxia    COVID-19 virus infection    COPD (chronic obstructive pulmonary disease)    Cavitary lesion of lung    Elevated troponin    COPD with acute exacerbation     Past Medical History:   Diagnosis Date    CHF (congestive heart failure)     COPD (chronic obstructive pulmonary disease)     History of stomach ulcers     Hypertension      Past Surgical History:   Procedure Laterality Date    CARDIAC CATHETERIZATION N/A 10/18/2022    Procedure: LEFT HEART CATH;  Surgeon: Kd Roque MD;  Location: [facility] C[facility] INVASIVE [facility];  Service: Cardiovascular;  Laterality: N/A;    COLONOSCOPY  2018    Dr. AGNES Olea. Normal. Repeat 1- yrs if wihtout significant family history or 5 years if first degree relative younger than age 60 with high rish polyp or colorectal cancer.     NO PAST SURGERIES        General Information       Row Name 24 5230           Physical Therapy Time and Intention    Document Type therapy note (daily note)  -LO     Mode of Treatment individual therapy;physical therapy  -LO       Row Name 01/08/24 1442          General Information    Patient Profile Reviewed yes  -LO     Existing Precautions/Restrictions oxygen therapy device and L/min  -LO       Row Name 01/08/24 1442          Cognition    Orientation Status (Cognition) oriented x 4  -LO       Row Name 01/08/24 1442          Safety Issues, Functional Mobility    Impairments Affecting Function (Mobility) balance;endurance/activity tolerance;pain;shortness of breath;strength  -LO               User Key  (r) = Recorded By, (t) = Taken By, (c) = Cosigned By      Initials Name Provider Type    LO Rosa M Burns, PT Physical Therapist                   Mobility       Row Name 01/08/24 1443          Bed Mobility    Bed Mobility supine-sit;sit-supine  -LO     Supine-Sit [place] (Bed Mobility) modified independence  -LO     Sit-Supine [place] (Bed Mobility) modified independence  -LO     Assistive Device (Bed Mobility) head of bed elevated;bed rails  -LO     Comment, (Bed Mobility) no physical assistance required, extra time for line management  -       Row Name 01/08/24 1443          Transfers    Comment, (Transfers) EOB>stand>EOB>stand>EOB>stand; no physical assistance required  -       Row Name 01/08/24 1443          Sit-Stand Transfer    Sit-Stand [place] (Transfers) supervision  -     Assistive Device (Sit-Stand Transfers) other (see comments)  none  -       Row Name 01/08/24 1443          Gait/Stairs (Locomotion)    [place] Level (Gait) standby assist  -LO     Assistive Device (Gait) other (see comments)  no AD  -LO     Distance in Feet (Gait) 20+45+70  -LO     Deviations/Abnormal Patterns (Gait) bilateral deviations;base of support, narrow;stride length decreased  -LO     Bilateral Gait Deviations forward flexed posture;heel strike decreased  -LO     Comment,  (Gait/Stairs) Patient ambulates without AD with supervision  with vc for PLB and slow controled pace during gait. SpO2 levels maintain > 90% on 3 liters of O2 throughout activity.  -LO               User Key  (r) = Recorded By, (t) = Taken By, (c) = Cosigned By      Initials Name Provider Type    Rosa M Mena PT Physical Therapist                   Obj/Interventions       Row Name 01/08/24 1447          Motor Skills    Therapeutic Exercise other (see comments)  standing heel raises, standing toe raises, standing hip extensions, standing hip abductions, standing alternating marches, standing mini squats x 10 each  -LO       Row Name 01/08/24 1447          Balance    Balance Assessment sitting static balance;sitting dynamic balance;standing static balance;standing dynamic balance  -LO     Static Sitting Balance independent  -LO     Dynamic Sitting Balance independent  -LO     Position, Sitting Balance supported;sitting edge of bed  -LO     Static Standing Balance supervision  -LO     Dynamic Standing Balance supervision  -LO     Position/Device Used, Standing Balance unsupported  -LO     Comment, Balance supervision in standing without AD  -LO               User Key  (r) = Recorded By, (t) = Taken By, (c) = Cosigned By      Initials Name Provider Type    Rosa M Mena PT Physical Therapist                   Goals/Plan    No documentation.                  Clinical Impression       Row Name 01/08/24 1448          Pain    Pretreatment Pain Rating 0/10 - no pain  -LO     Posttreatment Pain Rating 0/10 - no pain  -LO       Row Name 01/08/24 1448          Plan of Care Review    Plan of Care Reviewed With patient  -LO     Progress improving  -     Outcome Evaluation Patient continuing to demonstrate improvements in functional endurance this date. Able to progress gait to 80' with supervision and requiring less physical assistance for all mobility. Patient also able to progress to TE in standing position with vc for  breathing techniques. SpO2 levels maintain > 90% on 3 liters of O2 throughout activity.Patient will continue to benefit from skilled IP PT services to address impairments for return to PLOF. Cont IP PT POC.  -LO       Row Name 01/08/24 1448          Therapy Assessment/Plan (PT)    Rehab Potential (PT) good, to achieve stated therapy goals  -LO     Criteria for Skilled Interventions Met (PT) yes;meets criteria;skilled treatment is necessary  -LO     Therapy Frequency (PT) daily  -LO       Row Name 01/08/24 1448          Vital Signs    Pre SpO2 (%) 91  -LO     O2 Delivery Pre Treatment nasal cannula  -LO     Intra SpO2 (%) 90  -LO     O2 Delivery Intra Treatment nasal cannula  -LO     Post SpO2 (%) 94  -LO     O2 Delivery Post Treatment nasal cannula  -LO     Pre Patient Position Supine  -LO     Intra Patient Position Standing  -LO     Post Patient Position Supine  -LO     Rest Breaks  4  -LO       Row Name 01/08/24 1448          Positioning and Restraints    Pre-Treatment Position in bed  -LO     Post Treatment Position bed  -LO     In Bed notified nsg;supine;call light within reach;encouraged to call for assist  -LO               User Key  (r) = Recorded By, (t) = Taken By, (c) = Cosigned By      Initials Name Provider Type    Rosa M Mena, PT Physical Therapist                   Outcome Measures       Row Name 01/08/24 1451 01/08/24 0815       How much help from another person do you currently need...    Turning from your back to your side while in flat bed without using bedrails? 4  -LO 4  -CB    Moving from lying on back to sitting on the side of a flat bed without bedrails? 4  -LO 4  -CB    Moving to and from a bed to a chair (including a wheelchair)? 4  -LO 4  -CB    Standing up from a chair using your arms (e.g., wheelchair, bedside chair)? 4  -LO 4  -CB    Climbing 3-5 steps with a railing? 3  -LO 4  -CB    To walk in hospital room? 4  -LO 4  -CB    AM-PAC 6 Clicks Score (PT) 23  -LO 24  -CB    Highest  Level of Mobility Goal 7 --> Walk 25 feet or more  -LO 8 --> Walked 250 feet or more  -CB      Row Name 01/08/24 1451          Functional Assessment    Outcome Measure Options AM-PAC 6 Clicks Basic Mobility (PT)  -               User Key  (r) = Recorded By, (t) = Taken By, (c) = Cosigned By      Initials Name Provider Type    Deyanira Cain, RN Registered Nurse    Rosa M Mena, PT Physical Therapist                                 Physical Therapy Education       Title: PT OT SLP Therapies (Done)       Topic: Physical Therapy (Done)       Point: Mobility training (Done)       Learning Progress Summary             Patient Acceptance, E, VU,NR by LO at 1/8/2024 1403    Comment: PT POC, review of HEP    Eager, E,D,H, VU by DM at 1/5/2024 1701                         Point: Home exercise program (Done)       Learning Progress Summary             Patient Acceptance, E, VU,NR by LO at 1/8/2024 1403    Comment: PT POC, review of HEP    Eager, E,D,H, VU by DM at 1/5/2024 1701                         Point: Body mechanics (Done)       Learning Progress Summary             Patient Acceptance, E, VU,NR by LO at 1/8/2024 1403    Comment: PT POC, review of HEP    Eager, E,D,H, VU by DM at 1/5/2024 1701                         Point: Precautions (Done)       Learning Progress Summary             Patient Acceptance, E, VU,NR by  at 1/8/2024 1403    Comment: PT POC, review of HEP    Eager, E,D,H, VU by DM at 1/5/2024 1701                                         User Key       Initials Effective Dates Name Provider Type Discipline    DM 02/03/23 -  Domi Cortez, PT Physical Therapist PT    CHELA 06/16/21 -  Rosa M Burns, LUCY Physical Therapist PT                  PT Recommendation and Plan     Plan of Care Reviewed With: patient  Progress: improving  Outcome Evaluation: Patient continuing to demonstrate improvements in functional endurance this date. Able to progress gait to 80' with supervision and requiring less physical  assistance for all mobility. Patient also able to progress to TE in standing position with vc for breathing techniques. SpO2 levels maintain > 90% on 3 liters of O2 throughout activity.Patient will continue to benefit from skilled IP PT services to address impairments for return to PLOF. Cont IP PT POC.     Time Calculation:         PT Charges       Row Name 01/08/24 1316             Time Calculation    Start Time 1316  -LO      PT Received On 01/08/24  -LO      PT Goal Re-Cert Due Date 01/08/24  -LO         Timed Charges    72835 - PT Therapeutic Exercise Minutes 16  -LO      13764 - Gait Training Minutes  14  -LO      04092 - PT Therapeutic Activity Minutes 10  -LO         Total Minutes    Timed Charges Total Minutes 40  -LO       Total Minutes 40  -LO                User Key  (r) = Recorded By, (t) = Taken By, (c) = Cosigned By      Initials Name Provider Type    Rosa M Mena, PT Physical Therapist                  Therapy Charges for Today       Code Description Service Date Service Provider Modifiers Qty    14720543000 HC PT THER PROC EA 15 MIN 1/8/2024 Rosa M Burns, PT GP 1    10129885092 HC GAIT TRAINING EA 15 MIN 1/8/2024 Rosa M Burns, PT GP 1    73641108562 HC PT THERAPEUTIC ACT EA 15 MIN 1/8/2024 Rosa M Burns, PT GP 1            PT G-Codes  Outcome Measure Options: AM-PAC 6 Clicks Basic Mobility (PT)  AM-PAC 6 Clicks Score (PT): 23  PT Discharge Summary  Anticipated Discharge Disposition (PT): home with assist, home with home health    Rosa M Burns, LUCY  1/8/2024

## 2024-01-08 NOTE — PLAN OF CARE
Goal Outcome Evaluation:  Plan of Care Reviewed With: patient        Progress: no change  Outcome Evaluation: Attempted to wean to 2 L NC, but unable to as O2 sat decreased to 88%; increased O2 back to 3 L NC.

## 2024-01-08 NOTE — PROGRESS NOTES
[facility] Medicine Services  PROGRESS NOTE    Patient Name: Francisco Clark  : 1960  MRN: [id number]    Date of Admission: 2024  Primary Care Physician: Charito Weiss APRN    Subjective   Subjective     CC:  F/U SOA    HPI:  Seen this morning. No complaints.       Objective   Objective     Vital Signs:   Temp:  [97.7 °F (36.5 °C)-97.9 °F (36.6 °C)] 97.7 °F (36.5 °C)  Heart Rate:  [51-73] 63  Resp:  [16-20] 18  BP: ()/(56-67) 111/56  Flow (L/min):  [2-3] 3     Physical Exam:  Gen-no acute distress  HENT-NCAT, mucous membranes moist  CV-RRR, S1 S2 normal, no m/r/g  Resp-diminished bilaterally, no wheezes today, nonlabored on 3 liters   Abd-soft, NT, ND, +BS  Ext-no edema  Neuro-A&Ox3, no focal deficits  Skin-no rashes  Psych-appropriate mood      Results Reviewed:  LAB RESULTS:      Lab 24  0642 24  0557 24  0838 24  2124   WBC 7.90 8.81 8.54 10.27   HEMOGLOBIN 15.1 14.7 16.1 15.4   HEMATOCRIT 47.4 46.5 51.6* 48.3   PLATELETS 220 232 281 258   NEUTROS ABS 5.78 6.86 6.86 7.92*   IMMATURE GRANS (ABS) 0.14* 0.03 0.04 0.04   LYMPHS ABS 1.47 1.23 1.12 1.00   MONOS ABS 0.49 0.68 0.51 1.27*   EOS ABS 0.00 0.00 0.00 0.01   MCV 98.3* 97.1* 99.6* 97.4*   CRP 1.24* 1.97* 4.20* 8.20*   PROCALCITONIN  --   --   --  0.10   LACTATE  --   --   --  0.7   LDH  --   --   --  324*   D DIMER QUANT  --   --   --  0.38         Lab 24  0642 24  0557 24  0838 24  0514 24  2319   SODIUM 137 140 139 133* 134*   POTASSIUM 4.5 4.5 5.1 5.0 5.1   CHLORIDE 99 101 98 97* 97*   CO2 26.0 30.0* 34.0* 23.0 28.0   ANION GAP 12.0 9.0 7.0 13.0 9.0   BUN 41* 37* 37* 25* 25*   CREATININE 0.89 0.76 1.06 0.79 0.77   EGFR 95.7 100.4 78.4 99.2 100.0   GLUCOSE 135* 104* 149* 149* 118*   CALCIUM 9.5 10.4 11.3* 9.5 9.4         Lab 24  0642 24  0557 24  0838 24  0514 24  2319   TOTAL PROTEIN 6.5 6.3 7.3 7.7 7.4   ALBUMIN 3.7 3.4* 4.0  3.5 3.5   GLOBULIN 2.8 2.9 3.3 4.2 3.9   ALT (SGPT) 15 13 12 12 12   AST (SGOT) 17 16 16 21 16   BILIRUBIN 0.2 <0.2 0.2 0.2 0.3   ALK PHOS 59 56 71 69 66         Lab 01/06/24  0838 01/05/24  0707 01/05/24  0514 01/04/24  2319 01/04/24  2124   PROBNP 160.9  --   --   --  294.2   HSTROP T  --  23* 26* 32*  --                  Lab 01/04/24  2134   PH, ARTERIAL 7.365   PCO2, ARTERIAL 52.1*   PO2 ART 83.9   FIO2 32   HCO3 ART 29.7*   BASE EXCESS ART 3.1*   CARBOXYHEMOGLOBIN 1.8     Brief Urine Lab Results       None            Microbiology Results Abnormal       Procedure Component Value - Date/Time    Respiratory Culture - Sputum, Cough [[id number]] Collected: 01/06/24 1050    Lab Status: Final result Specimen: Sputum from Cough Updated: 01/08/24 1014     Respiratory Culture Scant growth (1+) Normal respiratory [place]. No S. aureus or Pseudomonas aeruginosa detected. Final report.     Gram Stain Moderate (3+) WBCs per low power field      Rare (1+) Epithelial cells per low power field      Few (2+) Gram positive cocci in pairs    Blood Culture - Blood, Hand, Right [[id number]]  (Normal) Collected: 01/05/24 0514    Lab Status: Preliminary result Specimen: Blood from Hand, Right Updated: 01/08/24 0600     Blood Culture No growth at 3 days    Blood Culture - Blood, Arm, Left [[id number]]  (Normal) Collected: 01/05/24 0508    Lab Status: Preliminary result Specimen: Blood from Arm, Left Updated: 01/08/24 0600     Blood Culture No growth at 3 days    AFB Culture - Sputum, Cough [[id number]] Collected: 01/06/24 1050    Lab Status: Preliminary result Specimen: Sputum from Cough Updated: 01/07/24 1209     AFB Stain No acid fast bacilli seen on concentrated smear    MRSA Screen, PCR (Inpatient) - Swab, Nares [[id number]]  (Normal) Collected: 01/05/24 0546    Lab Status: Final result Specimen: Swab from Nares Updated: 01/05/24 0843     MRSA PCR Negative    Narrative:      The negative predictive value of this diagnostic test is high  and should only be used to consider de-escalating anti-MRSA therapy. A positive result may indicate colonization with MRSA and must be correlated clinically.  MRSA Negative    Respiratory Culture - Sputum, Cough [[id number]] Collected: 01/04/24 2909    Lab Status: Final result Specimen: Sputum from Cough Updated: 01/05/24 0673     Respiratory Culture Rejected     Gram Stain Many (4+) Gram positive cocci in pairs, chains and clusters      Moderate (3+) Gram negative bacilli      Moderate (3+) Epithelial cells per low power field      Rare (1+) WBCs per low power field    Narrative:      Specimen rejected due to oropharyngeal contamination. Please reorder and recollect specimen if clinically necessary.  Specimen rejected due to oropharyngeal contamination.            XR Chest 1 View    Result Date: 1/6/2024  XR CHEST 1 VW Date of Exam: 1/6/2024 1:42 PM EST Indication: F/U hypoxia Comparison: Chest x-ray 1/4/2024 Findings: Stable cardiomediastinal silhouette within normal limits. There is background emphysema. No focal consolidation. No pleural effusion or pneumothorax.     Impression: Impression: No acute cardiopulmonary findings. Unchanged emphysema. Electronically Signed: Thomas Allen MD  1/6/2024 2:36 PM EST  Workstation ID: [id number]     Results for orders placed during the hospital encounter of 01/10/23    Adult Transthoracic Echo Complete W/ Cont if Necessary Per Protocol    Interpretation Summary    Left ventricular systolic function is mildly decreased. Left ventricular ejection fraction appears to be 46 - 50%.    Left ventricular wall thickness is consistent with mild concentric hypertrophy.    Left ventricular diastolic function is consistent with (grade II w/high LAP) pseudonormalization.    Estimated right ventricular systolic pressure from tricuspid regurgitation is normal (<35 mmHg).      Current medications:  Scheduled Meds:albuterol sulfate HFA, 2 puff, Inhalation, 4x Daily -  RT  budesonide-formoterol, 2 puff, Inhalation, BID - RT  carvedilol, 25 mg, Oral, BID With Meals  dexAMETHasone, 6 mg, Oral, Daily   Or  dexAMETHasone, 6 mg, Intravenous, Daily  doxycycline, 100 mg, Intravenous, Q12H  empagliflozin, 10 mg, Oral, Daily  enoxaparin, 40 mg, Subcutaneous, Q24H  pantoprazole, 40 mg, Oral, Q AM  piperacillin-tazobactam, 3.375 g, Intravenous, Q8H  remdesivir, 100 mg, Intravenous, Q24H  sacubitril-valsartan, 1 tablet, Oral, BID      Continuous Infusions:   PRN Meds:.  !Patient Home Medications Stored in Pharmacy    bumetanide    nitroglycerin    sodium chloride    sodium chloride    Assessment & Plan   Assessment & Plan     Active Hospital Problems    Diagnosis  POA    **Hypoxia [R09.02]  Yes    COVID-19 virus infection [U07.1]  Unknown    COPD (chronic obstructive pulmonary disease) [J44.9]  Unknown    Cavitary lesion of lung [J98.4]  Unknown    Elevated troponin [R79.89]  Unknown    COPD with acute exacerbation [J44.1]  Yes    Congestive heart failure, unspecified HF chronicity, unspecified heart failure type [I50.9]  Yes    Essential hypertension [I10]  Yes      Resolved Hospital Problems   No resolved problems to display.        Brief Hospital Course to date:  Francisco Clark is a 63 y.o. male with hx of HFrEF, COPD, HTN, and tobacco abuse who presents due to worsening SOA over the past 10 days.      COVID-19  Coronavirus OC43  Hypoxia  COPD without exacerbation  --Hypoxic to 86% on room air, does not wear home oxygen  --Currently requiring 3 liters, wean as tolerated - likely will need home oxygen at discharge  --CTA chest with RUL cavitary lesion, no other infiltrates noted  --Respiratory PCR panel positive for COVID-19 as well as Coronavirus OC43  --Symptoms present x 10 days, may be out of window for benefit from Remdesivir but given presentation we will continue (Pulmonary in agreement)  --Continue Remdesivir and Decadron - Remdesivir through 1/9/24  --Continue home inhaler,  formulary substitute  --Scheduled albuterol MDI 4x daily   --Repeat CXR 1/6/24 no acute findings     Cavitary lung lesion of right upper lobe  --CTA chest: 25 mm cavitary lesion in right upper lobe with internal debris or septations, moderate emphysema   --Pulmonary/Dr. Mcgrath has evaluated, appears to be a small area of infiltrate, but cannot rule out malignancy: recommends treat as community acquired pneumonia and repeat CT chest in 3 months; has F/U with Kayla Ross APRN 1/19/24  --MRSA PCR negative, stopped Vanc  --Continue Zosyn and Doxycycline - plan to change to PO ATBx at discharge  --Blood cultures NGTD, sputum culture with normal respiratory [place] thus far     HFrEF with recovery   Dilated nonischemic cardiomyopathy  --EF previously <20% in October 2022, seems to have recovered on Echo January 2023 (46-50%)  --Followed by Dr. Coreas/Cardiology  --Continue home meds Coreg, Entresto, Jardiance, Bumex    Expected Discharge Location and Transportation: home  Expected Discharge anticipate home with oxygen tomorrow after last Remdesivir dose  Expected Discharge Date: 1/9/2024; Expected Discharge Time:      DVT prophylaxis:  Medical DVT prophylaxis orders are present.     AM-PAC 6 Clicks Score (PT): 24 (01/08/24 0880)    CODE STATUS:   Code Status and Medical Interventions:   Ordered at: 01/05/24 0053     Code Status (Patient has no pulse and is not breathing):    CPR (Attempt to Resuscitate)     Medical Interventions (Patient has pulse or is breathing):    Full Support       Pastora Padron MD  01/08/24

## 2024-01-08 NOTE — PROGRESS NOTES
"PULMONARY PROGRESS NOTE    Patient Care Team:  Charito Weiss APRN as PCP - General (Internal Medicine)  Anatoly Laird MD as Consulting Physician (Pulmonary Disease)    Reason for Consult     COVID-19 1/4/24  Dyspnea  Possible cavitary lesion  COPD/emphysema  Cardiomyopathy with EF 15%      Subjective     Interval History:     He is currently on 3 L nasal cannula with a saturation of 95%.  He does admit to a cough productive of clear mucoid secretions.  He denies hemoptysis.  He denies chest pain, edema, nausea or vomiting, wheezing    Review of Systems:     ROS negative except for: Shortness of air with any activity, productive cough      Objective     Vital Signs  Blood pressure 111/56, pulse 63, temperature 97.7 °F (36.5 °C), temperature source Oral, resp. rate 18, height 175.3 cm (69\"), weight 54.1 kg (119 lb 3.2 oz), SpO2 95%.    Physical Exam:  GENERAL: Thin middle-aged gentleman sitting upright in bed in no acute respiratory distress  HEENT: Atraumatic.  Conjunctiva pink.  Oral mucosa moist  NECK: Trachea midline, no palpable thyroid  CHEST: Diminished chest excursion with diffuse bilateral mid and end expiratory rhonchi  HEART: Regular rhythm, S1, S2 auscultated  ABDOMEN: Flat, bowel sounds present, soft, nontender  EXTREMITIES: No pitting edema or clubbing  NEURO: Awake, oriented,  equal, general decreased musculature     Results Review:    Lab Results (last 24 hours)       Procedure Component Value Units Date/Time    Fungal Antibodies, Quantitative Double Immunodiffusion [[id number]] Collected: 01/08/24 1020    Specimen: Blood Updated: 01/08/24 1045    Respiratory Culture - Sputum, Cough [[id number]] Collected: 01/06/24 1050    Specimen: Sputum from Cough Updated: 01/08/24 1014     Respiratory Culture Scant growth (1+) Normal respiratory [place]. No S. aureus or Pseudomonas aeruginosa detected. Final report.     Gram Stain Moderate (3+) WBCs per low power field      Rare (1+) Epithelial cells per low " power field      Few (2+) Gram positive cocci in pairs    Comprehensive Metabolic Panel [[id number]]  (Abnormal) Collected: 01/08/24 0642    Specimen: Blood Updated: 01/08/24 0747     Glucose 135 mg/dL      BUN 41 mg/dL      Creatinine 0.89 mg/dL      Sodium 137 mmol/L      Potassium 4.5 mmol/L      Comment: Slight hemolysis detected by analyzer. Result may be falsely elevated.        Chloride 99 mmol/L      CO2 26.0 mmol/L      Calcium 9.5 mg/dL      Total Protein 6.5 g/dL      Albumin 3.7 g/dL      ALT (SGPT) 15 U/L      AST (SGOT) 17 U/L      Alkaline Phosphatase 59 U/L      Total Bilirubin 0.2 mg/dL      Globulin 2.8 gm/dL      Comment: Calculated Result        A/G Ratio 1.3 g/dL      BUN/Creatinine Ratio 46.1     Anion Gap 12.0 mmol/L      eGFR 95.7 mL/min/1.73     Narrative:      GFR Normal >60  Chronic Kidney Disease <60  Kidney Failure <15      C-reactive Protein [[id number]]  (Abnormal) Collected: 01/08/24 0642    Specimen: Blood Updated: 01/08/24 0747     C-Reactive Protein 1.24 mg/dL     CBC & Differential [[id number]]  (Abnormal) Collected: 01/08/24 0642    Specimen: Blood Updated: 01/08/24 0721    Narrative:      The following orders were created for panel order CBC & Differential.  Procedure                               Abnormality         Status                     ---------                               -----------         ------                     CBC Auto Differential[[id number]]        Abnormal            Final result                 Please view results for these tests on the individual orders.    CBC Auto Differential [[id number]]  (Abnormal) Collected: 01/08/24 0642    Specimen: Blood Updated: 01/08/24 0721     WBC 7.90 10*3/mm3      RBC 4.82 10*6/mm3      Hemoglobin 15.1 g/dL      Hematocrit 47.4 %      MCV 98.3 fL      MCH 31.3 pg      MCHC 31.9 g/dL      RDW 13.4 %      RDW-SD 48.5 fl      MPV 9.3 fL      Platelets 220 10*3/mm3      Neutrophil % 73.1 %      Lymphocyte % 18.6 %      Monocyte %  6.2 %      Eosinophil % 0.0 %      Basophil % 0.3 %      Immature Grans % 1.8 %      Neutrophils, Absolute 5.78 10*3/mm3      Lymphocytes, Absolute 1.47 10*3/mm3      Monocytes, Absolute 0.49 10*3/mm3      Eosinophils, Absolute 0.00 10*3/mm3      Basophils, Absolute 0.02 10*3/mm3      Immature Grans, Absolute 0.14 10*3/mm3      nRBC 0.0 /100 WBC     Blood Culture - Blood, Hand, Right [[id number]]  (Normal) Collected: 01/05/24 0514    Specimen: Blood from Hand, Right Updated: 01/08/24 0600     Blood Culture No growth at 3 days    Blood Culture - Blood, Arm, Left [[id number]]  (Normal) Collected: 01/05/24 0508    Specimen: Blood from Arm, Left Updated: 01/08/24 0600     Blood Culture No growth at 3 days          Imaging Results (Last 24 Hours)       ** No results found for the last 24 hours. **         Impression: CTA of the chest January 5, 2024  1.No evidence of pulmonary embolism.   2.25 mm cavitary lesion in the right upper lobe with internal debris or septations. This could be related to an infectious or inflammatory process. Malignancy cannot be excluded. PET/CT follow-up could provide more information.   3.Moderate emphysema. Emphysema on CT is an independent risk factor for lung cancer. Low dose lung cancer screening should be considered if patient qualifies based on smoking history.   4.Mild coronary artery calcification and mild aortic atherosclerosis.         Electronically Signed: Tres Chau MD    1/5/2024 12:50 AM EST     albuterol sulfate HFA, 2 puff, Inhalation, 4x Daily - RT  budesonide-formoterol, 2 puff, Inhalation, BID - RT  carvedilol, 25 mg, Oral, BID With Meals  dexAMETHasone, 6 mg, Oral, Daily   Or  dexAMETHasone, 6 mg, Intravenous, Daily  doxycycline, 100 mg, Intravenous, Q12H  empagliflozin, 10 mg, Oral, Daily  enoxaparin, 40 mg, Subcutaneous, Q24H  pantoprazole, 40 mg, Oral, Q AM  piperacillin-tazobactam, 3.375 g, Intravenous, Q8H  remdesivir, 100 mg, Intravenous,  Q24H  sacubitril-valsartan, 1 tablet, Oral, BID          Hypoxia    Essential hypertension    Congestive heart failure, unspecified HF chronicity, unspecified heart failure type    COVID-19 virus infection    COPD (chronic obstructive pulmonary disease)    Cavitary lesion of lung    Elevated troponin    COPD with acute exacerbation      Assessment & Plan     63-year-old gentleman, lifelong smoker with cardiomyopathy, advanced emphysema, COVID-19, hospitalized January 4 with worsening oxygenation and dyspnea.  At baseline he does not require supplemental oxygen.  He did receive remdesivir and Decadron, completing today.  He was also empirically placed on vancomycin and Zosyn.  Vancomycin was stopped when MRSA swab was negative.  Doxycycline added for atypical coverage.  CT of the chest was negative for PE but revealed extensive emphysematous changes.  He had a possible cavitary lesion in the right upper lobe centrally.  It is a small area that is overlapping an area of emphysema.  Malignancy cannot be excluded but would not biopsy with his acute exacerbation.  It is more prominent now than it was on CT scan from 2018 and 2022.  He continues to require 3 L nasal cannula.  AFB smear is negative.  Respiratory culture revealed usual [place].  C-reactive protein is improving.    In terms of his COPD he had pulmonary function test in October 2022 that revealed severe obstruction with an FEV1 of 1.37 L, 39% and a ratio of 47%.  This is compatible with stage III.  Wheezing has resolved.  He completed therapy for COVID.  He remains on Zosyn for persistent productive cough.  He continues to require 3 L nasal cannula    He has a known history of chronic heart failure with an EF of 15%.  EF has improved and on his last echocardiogram January 2023 EF was 46 to 50%.  He does not clinically appear to be in congestive failure without effusions or edema.  He does have persistent cardiomegaly on CT and chest x-ray.  His proBNP is only  161.  Heart catheterization revealed no coronary disease October 2022.    -For his right upper lobe lesion will need a repeat CT scan in 3 months, I have ordered fungal urinary antigens, and fungal serologies  -For his COPD exacerbation would continue Symbicort, bronchodilators, antibiotics, mucolytic's, and mobilize.  Continue supplemental oxygen, wean as able  -For his history of CHF, last EF was improved and he clinically does not appear to be in congestive failure.  He does not have any coronary artery disease.  He remains on carvedilol and Entresto which have dramatically improved his cardiac function.        Yolanda Sosa MD  01/08/24  14:03 EST      Time: 25 minutes

## 2024-01-09 PROCEDURE — 94799 UNLISTED PULMONARY SVC/PX: CPT

## 2024-01-09 PROCEDURE — 25010000002 ENOXAPARIN PER 10 MG: Performed by: INTERNAL MEDICINE

## 2024-01-09 PROCEDURE — 25810000003 SODIUM CHLORIDE 0.9 % SOLUTION 250 ML FLEX CONT: Performed by: INTERNAL MEDICINE

## 2024-01-09 PROCEDURE — 94664 DEMO&/EVAL PT USE INHALER: CPT

## 2024-01-09 PROCEDURE — 99232 SBSQ HOSP IP/OBS MODERATE 35: CPT | Performed by: INTERNAL MEDICINE

## 2024-01-09 PROCEDURE — 25010000002 DEXAMETHASONE PER 1 MG: Performed by: INTERNAL MEDICINE

## 2024-01-09 PROCEDURE — 25010000002 PIPERACILLIN SOD-TAZOBACTAM PER 1 G: Performed by: INTERNAL MEDICINE

## 2024-01-09 PROCEDURE — 99232 SBSQ HOSP IP/OBS MODERATE 35: CPT | Performed by: HOSPITALIST

## 2024-01-09 PROCEDURE — 87385 HISTOPLASMA CAPSUL AG IA: CPT | Performed by: INTERNAL MEDICINE

## 2024-01-09 PROCEDURE — 25010000002 REMDESIVIR 100 MG/20ML SOLUTION 1 EACH VIAL: Performed by: INTERNAL MEDICINE

## 2024-01-09 PROCEDURE — 87116 MYCOBACTERIA CULTURE: CPT | Performed by: INTERNAL MEDICINE

## 2024-01-09 PROCEDURE — 87449 NOS EACH ORGANISM AG IA: CPT | Performed by: INTERNAL MEDICINE

## 2024-01-09 PROCEDURE — 87206 SMEAR FLUORESCENT/ACID STAI: CPT | Performed by: INTERNAL MEDICINE

## 2024-01-09 RX ADMIN — CARVEDILOL 25 MG: 12.5 TABLET, FILM COATED ORAL at 10:38

## 2024-01-09 RX ADMIN — Medication 10 ML: at 20:56

## 2024-01-09 RX ADMIN — ALBUTEROL SULFATE 2 PUFF: 90 AEROSOL, METERED RESPIRATORY (INHALATION) at 07:34

## 2024-01-09 RX ADMIN — SACUBITRIL AND VALSARTAN 1 TABLET: 24; 26 TABLET, FILM COATED ORAL at 00:03

## 2024-01-09 RX ADMIN — PANTOPRAZOLE SODIUM 40 MG: 40 TABLET, DELAYED RELEASE ORAL at 05:24

## 2024-01-09 RX ADMIN — DOXYCYCLINE 100 MG: 100 INJECTION, POWDER, LYOPHILIZED, FOR SOLUTION INTRAVENOUS at 16:59

## 2024-01-09 RX ADMIN — DEXAMETHASONE SODIUM PHOSPHATE 6 MG: 4 INJECTION, SOLUTION INTRAMUSCULAR; INTRAVENOUS at 10:38

## 2024-01-09 RX ADMIN — DOXYCYCLINE 100 MG: 100 INJECTION, POWDER, LYOPHILIZED, FOR SOLUTION INTRAVENOUS at 03:51

## 2024-01-09 RX ADMIN — BUDESONIDE AND FORMOTEROL FUMARATE DIHYDRATE 2 PUFF: 160; 4.5 AEROSOL RESPIRATORY (INHALATION) at 07:34

## 2024-01-09 RX ADMIN — REMDESIVIR 100 MG: 100 INJECTION, POWDER, LYOPHILIZED, FOR SOLUTION INTRAVENOUS at 00:02

## 2024-01-09 RX ADMIN — EMPAGLIFLOZIN 10 MG: 10 TABLET, FILM COATED ORAL at 10:38

## 2024-01-09 RX ADMIN — Medication 10 ML: at 10:39

## 2024-01-09 RX ADMIN — ENOXAPARIN SODIUM 40 MG: 100 INJECTION SUBCUTANEOUS at 03:51

## 2024-01-09 RX ADMIN — PIPERACILLIN SODIUM AND TAZOBACTAM SODIUM 3.38 G: 3; .375 INJECTION, SOLUTION INTRAVENOUS at 20:55

## 2024-01-09 RX ADMIN — ALBUTEROL SULFATE 2 PUFF: 90 AEROSOL, METERED RESPIRATORY (INHALATION) at 16:12

## 2024-01-09 RX ADMIN — PIPERACILLIN SODIUM AND TAZOBACTAM SODIUM 3.38 G: 3; .375 INJECTION, SOLUTION INTRAVENOUS at 05:24

## 2024-01-09 RX ADMIN — SACUBITRIL AND VALSARTAN 1 TABLET: 24; 26 TABLET, FILM COATED ORAL at 20:55

## 2024-01-09 RX ADMIN — PIPERACILLIN SODIUM AND TAZOBACTAM SODIUM 3.38 G: 3; .375 INJECTION, SOLUTION INTRAVENOUS at 11:09

## 2024-01-09 RX ADMIN — SACUBITRIL AND VALSARTAN 1 TABLET: 24; 26 TABLET, FILM COATED ORAL at 10:38

## 2024-01-09 RX ADMIN — BUDESONIDE AND FORMOTEROL FUMARATE DIHYDRATE 2 PUFF: 160; 4.5 AEROSOL RESPIRATORY (INHALATION) at 18:49

## 2024-01-09 RX ADMIN — ALBUTEROL SULFATE 2 PUFF: 90 AEROSOL, METERED RESPIRATORY (INHALATION) at 18:49

## 2024-01-09 NOTE — PLAN OF CARE
Goal Outcome Evaluation:  Plan of Care Reviewed With: patient        Progress: improving      VSS and no reports of pain during shift. O2 weaned from 3.5L to 1.5L during shift. Patient tolerating O2 wean well. Patient ambulating to the bathroom well with no assistance.    Carvedilol evening dose held due to hypotension.     No needs from patient at this time.

## 2024-01-09 NOTE — PROGRESS NOTES
[facility] Medicine Services  PROGRESS NOTE    Patient Name: Francisco Clark  : 1960  MRN: [id number]    Date of Admission: 2024  Primary Care Physician: Charito Weiss APRN    Subjective   Subjective     CC:  F/U SOA    HPI:  Seen this morning. Feeling better. On 3.5L of NC. Still need another AFB to be collected.No new complaints.       Objective   Objective     Vital Signs:   Temp:  [97.2 °F (36.2 °C)-98 °F (36.7 °C)] 97.5 °F (36.4 °C)  Heart Rate:  [58-77] 59  Resp:  [18-22] 18  BP: ()/(56-87) 141/87  Flow (L/min):  [3-3.5] 3.5     Physical Exam:  Gen-no acute distress  HENT-NCAT, mucous membranes moist  CV-RRR, S1 S2 normal, no m/r/g  Resp-diminished bilaterally, no wheezes today, nonlabored on 3 liters   Abd-soft, NT, ND, +BS  Ext-no edema  Neuro-A&Ox3, no focal deficits  Skin-no rashes  Psych-appropriate mood      Results Reviewed:  LAB RESULTS:      Lab 24  0642 24  0557 24  0838 24  2124   WBC 7.90 8.81 8.54 10.27   HEMOGLOBIN 15.1 14.7 16.1 15.4   HEMATOCRIT 47.4 46.5 51.6* 48.3   PLATELETS 220 232 281 258   NEUTROS ABS 5.78 6.86 6.86 7.92*   IMMATURE GRANS (ABS) 0.14* 0.03 0.04 0.04   LYMPHS ABS 1.47 1.23 1.12 1.00   MONOS ABS 0.49 0.68 0.51 1.27*   EOS ABS 0.00 0.00 0.00 0.01   MCV 98.3* 97.1* 99.6* 97.4*   CRP 1.24* 1.97* 4.20* 8.20*   PROCALCITONIN  --   --   --  0.10   LACTATE  --   --   --  0.7   LDH  --   --   --  324*   D DIMER QUANT  --   --   --  0.38         Lab 24  0642 24  0557 24  0838 24  0514 24  2319   SODIUM 137 140 139 133* 134*   POTASSIUM 4.5 4.5 5.1 5.0 5.1   CHLORIDE 99 101 98 97* 97*   CO2 26.0 30.0* 34.0* 23.0 28.0   ANION GAP 12.0 9.0 7.0 13.0 9.0   BUN 41* 37* 37* 25* 25*   CREATININE 0.89 0.76 1.06 0.79 0.77   EGFR 95.7 100.4 78.4 99.2 100.0   GLUCOSE 135* 104* 149* 149* 118*   CALCIUM 9.5 10.4 11.3* 9.5 9.4         Lab 24  0642 24  0557 24  0838 24  01/04/24  2319   TOTAL PROTEIN 6.5 6.3 7.3 7.7 7.4   ALBUMIN 3.7 3.4* 4.0 3.5 3.5   GLOBULIN 2.8 2.9 3.3 4.2 3.9   ALT (SGPT) 15 13 12 12 12   AST (SGOT) 17 16 16 21 16   BILIRUBIN 0.2 <0.2 0.2 0.2 0.3   ALK PHOS 59 56 71 69 66         Lab 01/06/24  0838 01/05/24  0707 01/05/24  0514 01/04/24  2319 01/04/24 2124   PROBNP 160.9  --   --   --  294.2   HSTROP T  --  23* 26* 32*  --                  Lab 01/04/24 2134   PH, ARTERIAL 7.365   PCO2, ARTERIAL 52.1*   PO2 ART 83.9   FIO2 32   HCO3 ART 29.7*   BASE EXCESS ART 3.1*   CARBOXYHEMOGLOBIN 1.8     Brief Urine Lab Results       None            Microbiology Results Abnormal       Procedure Component Value - Date/Time    Blood Culture - Blood, Hand, Right [[id number]]  (Normal) Collected: 01/05/24 0514    Lab Status: Preliminary result Specimen: Blood from Hand, Right Updated: 01/09/24 0600     Blood Culture No growth at 4 days    Blood Culture - Blood, Arm, Left [[id number]]  (Normal) Collected: 01/05/24 0508    Lab Status: Preliminary result Specimen: Blood from Arm, Left Updated: 01/09/24 0600     Blood Culture No growth at 4 days    Respiratory Culture - Sputum, Cough [[id number]] Collected: 01/06/24 1050    Lab Status: Final result Specimen: Sputum from Cough Updated: 01/08/24 1014     Respiratory Culture Scant growth (1+) Normal respiratory [place]. No S. aureus or Pseudomonas aeruginosa detected. Final report.     Gram Stain Moderate (3+) WBCs per low power field      Rare (1+) Epithelial cells per low power field      Few (2+) Gram positive cocci in pairs    AFB Culture - Sputum, Cough [[id number]] Collected: 01/06/24 1050    Lab Status: Preliminary result Specimen: Sputum from Cough Updated: 01/07/24 1209     AFB Stain No acid fast bacilli seen on concentrated smear    MRSA Screen, PCR (Inpatient) - Swab, Nares [[id number]]  (Normal) Collected: 01/05/24 0546    Lab Status: Final result Specimen: Swab from Nares Updated: 01/05/24 0843     MRSA PCR Negative     Narrative:      The negative predictive value of this diagnostic test is high and should only be used to consider de-escalating anti-MRSA therapy. A positive result may indicate colonization with MRSA and must be correlated clinically.  MRSA Negative    Respiratory Culture - Sputum, Cough [[id number]] Collected: 01/04/24 8572    Lab Status: Final result Specimen: Sputum from Cough Updated: 01/05/24 0664     Respiratory Culture Rejected     Gram Stain Many (4+) Gram positive cocci in pairs, chains and clusters      Moderate (3+) Gram negative bacilli      Moderate (3+) Epithelial cells per low power field      Rare (1+) WBCs per low power field    Narrative:      Specimen rejected due to oropharyngeal contamination. Please reorder and recollect specimen if clinically necessary.  Specimen rejected due to oropharyngeal contamination.            No radiology results from the last 24 hrs    Results for orders placed during the hospital encounter of 01/10/23    Adult Transthoracic Echo Complete W/ Cont if Necessary Per Protocol    Interpretation Summary    Left ventricular systolic function is mildly decreased. Left ventricular ejection fraction appears to be 46 - 50%.    Left ventricular wall thickness is consistent with mild concentric hypertrophy.    Left ventricular diastolic function is consistent with (grade II w/high LAP) pseudonormalization.    Estimated right ventricular systolic pressure from tricuspid regurgitation is normal (<35 mmHg).      Current medications:  Scheduled Meds:albuterol sulfate HFA, 2 puff, Inhalation, 4x Daily - RT  budesonide-formoterol, 2 puff, Inhalation, BID - RT  carvedilol, 25 mg, Oral, BID With Meals  dexAMETHasone, 6 mg, Oral, Daily   Or  dexAMETHasone, 6 mg, Intravenous, Daily  doxycycline, 100 mg, Intravenous, Q12H  empagliflozin, 10 mg, Oral, Daily  enoxaparin, 40 mg, Subcutaneous, Q24H  pantoprazole, 40 mg, Oral, Q AM  piperacillin-tazobactam, 3.375 g, Intravenous,  Q8H  sacubitril-valsartan, 1 tablet, Oral, BID      Continuous Infusions:   PRN Meds:.  !Patient Home Medications Stored in Pharmacy    bumetanide    nitroglycerin    sodium chloride    sodium chloride    Assessment & Plan   Assessment & Plan     Active Hospital Problems    Diagnosis  POA    **Hypoxia [R09.02]  Yes    COVID-19 virus infection [U07.1]  Unknown    COPD (chronic obstructive pulmonary disease) [J44.9]  Unknown    Cavitary lesion of lung [J98.4]  Unknown    Elevated troponin [R79.89]  Unknown    COPD with acute exacerbation [J44.1]  Yes    Congestive heart failure, unspecified HF chronicity, unspecified heart failure type [I50.9]  Yes    Essential hypertension [I10]  Yes      Resolved Hospital Problems   No resolved problems to display.        Brief Hospital Course to date:  Francisco Clark is a 63 y.o. male with hx of HFrEF, COPD, HTN, and tobacco abuse who presents due to worsening SOA over the past 10 days.      Copied text in this note has been reviewed and is accurate as of 01/09/24.    COVID-19  Coronavirus OC43  Hypoxia  COPD without exacerbation  --Hypoxic to 86% on room air, does not wear home oxygen  --Currently requiring 3 liters, wean as tolerated - likely will need home oxygen at discharge  --CTA chest with RUL cavitary lesion, no other infiltrates noted  --Respiratory PCR panel positive for COVID-19 as well as Coronavirus OC43  --Symptoms present x 10 days, may be out of window for benefit from Remdesivir but given presentation we will continue (Pulmonary in agreement)  --Continue Remdesivir and Decadron - Remdesivir through 1/9/24  --Continue home inhaler, formulary substitute  --Scheduled albuterol MDI 4x daily   --Repeat CXR 1/6/24 no acute findings  -- Still need another AFB to be collected. Ordered.  -- Will likely need home oxygen on discharge,     Cavitary lung lesion of right upper lobe  --CTA chest: 25 mm cavitary lesion in right upper lobe with internal debris or septations,  moderate emphysema   --Pulmonary/Dr. Mcgrath has evaluated, appears to be a small area of infiltrate, but cannot rule out malignancy: recommends treat as community acquired pneumonia and repeat CT chest in 3 months; has F/U with Kayla Ross APRN 1/19/24  --MRSA PCR negative, stopped Vanc  --Continue Zosyn and Doxycycline need total of 7 days  --Blood cultures NGTD, sputum culture with normal respiratory [place] thus far     HFrEF with recovery   Dilated nonischemic cardiomyopathy  --EF previously <20% in October 2022, seems to have recovered on Echo January 2023 (46-50%)  --Followed by Dr. Coreas/Cardiology  --Continue home meds Coreg, Entresto, Jardiance, Bumex    Expected Discharge Location and Transportation: home  Expected Discharge anticipate home with oxygen tomorrow after last Remdesivir dose  Expected Discharge Date: 1/10/2024; Expected Discharge Time:      DVT prophylaxis:  Medical DVT prophylaxis orders are present.     AM-PAC 6 Clicks Score (PT): 23 (01/08/24 2000)    CODE STATUS:   Code Status and Medical Interventions:   Ordered at: 01/05/24 0053     Code Status (Patient has no pulse and is not breathing):    CPR (Attempt to Resuscitate)     Medical Interventions (Patient has pulse or is breathing):    Full Support       Evy Ramirez MD  01/09/24

## 2024-01-09 NOTE — PROGRESS NOTES
"PULMONARY PROGRESS NOTE    Patient Care Team:  Charito Weiss APRN as PCP - General (Internal Medicine)  Anatoly Laird MD as Consulting Physician (Pulmonary Disease)    Reason for Consult     COVID-19 1/4/24  Dyspnea  Possible cavitary lesion  COPD/emphysema  Cardiomyopathy with EF 15%      Subjective     Interval History:     He continues to require supplemental oxygen, 3 L with a saturation of 93%.  Yesterday he did work with physical therapy and with significant assistance ambulated 80 feet.  He did not desaturate.  He was on 3 L nasal cannula.    Review of Systems:     ROS negative except for: Shortness of air with any activity, productive cough      Objective     Vital Signs  Blood pressure 141/87, pulse 68, temperature 97.5 °F (36.4 °C), temperature source Oral, resp. rate 18, height 175.3 cm (69\"), weight 54.1 kg (119 lb 3.2 oz), SpO2 93%.    Physical Exam:  GENERAL: Thin middle-aged gentleman sitting upright in bed in no acute respiratory distress  HEENT: Atraumatic.  Conjunctiva pink.  Oral mucosa moist  NECK: Trachea midline, no palpable thyroid  CHEST: Diminished chest excursion with diffuse bilateral mid and end expiratory rhonchi  HEART: Regular rhythm, S1, S2 auscultated  ABDOMEN: Flat, bowel sounds present, soft, nontender  EXTREMITIES: No pitting edema or clubbing  NEURO: Awake, oriented,  equal, general decreased musculature     Results Review:    Lab Results (last 24 hours)       Procedure Component Value Units Date/Time    Histoplasma Ag Ur - Urine, Urine, Clean Catch [[id number]] Collected: 01/09/24 1109    Specimen: Urine, Clean Catch Updated: 01/09/24 1139    Blastomyces Antigen - Urine, Urine, Clean Catch [[id number]] Collected: 01/09/24 1109    Specimen: Urine, Clean Catch Updated: 01/09/24 1139    Blood Culture - Blood, Hand, Right [[id number]]  (Normal) Collected: 01/05/24 0514    Specimen: Blood from Hand, Right Updated: 01/09/24 0600     Blood Culture No growth at 4 days    Blood " Culture - Blood, Arm, Left [[id number]]  (Normal) Collected: 01/05/24 0508    Specimen: Blood from Arm, Left Updated: 01/09/24 0600     Blood Culture No growth at 4 days          Imaging Results (Last 24 Hours)       ** No results found for the last 24 hours. **         Impression: CTA of the chest January 5, 2024  1.No evidence of pulmonary embolism.   2.25 mm cavitary lesion in the right upper lobe with internal debris or septations. This could be related to an infectious or inflammatory process. Malignancy cannot be excluded. PET/CT follow-up could provide more information.   3.Moderate emphysema. Emphysema on CT is an independent risk factor for lung cancer. Low dose lung cancer screening should be considered if patient qualifies based on smoking history.   4.Mild coronary artery calcification and mild aortic atherosclerosis.         Electronically Signed: Tres Chau MD    1/5/2024 12:50 AM EST     albuterol sulfate HFA, 2 puff, Inhalation, 4x Daily - RT  budesonide-formoterol, 2 puff, Inhalation, BID - RT  carvedilol, 25 mg, Oral, BID With Meals  dexAMETHasone, 6 mg, Oral, Daily   Or  dexAMETHasone, 6 mg, Intravenous, Daily  doxycycline, 100 mg, Intravenous, Q12H  empagliflozin, 10 mg, Oral, Daily  enoxaparin, 40 mg, Subcutaneous, Q24H  pantoprazole, 40 mg, Oral, Q AM  piperacillin-tazobactam, 3.375 g, Intravenous, Q8H  sacubitril-valsartan, 1 tablet, Oral, BID          Hypoxia    Essential hypertension    Congestive heart failure, unspecified HF chronicity, unspecified heart failure type    COVID-19 virus infection    COPD (chronic obstructive pulmonary disease)    Cavitary lesion of lung    Elevated troponin    COPD with acute exacerbation      Assessment & Plan     63-year-old gentleman, lifelong smoker with cardiomyopathy, advanced emphysema, COVID-19, hospitalized January 4 with worsening oxygenation and dyspnea.  At baseline he does not require supplemental oxygen.  He did receive remdesivir and  Decadron, completing January 8.  He was also empirically placed on vancomycin and Zosyn.  Vancomycin was stopped when MRSA swab was negative.  Doxycycline added for atypical coverage.  CT of the chest was negative for PE but revealed extensive emphysematous changes.  He had a possible cavitary lesion in the right upper lobe centrally.  It is a small area that is overlapping an area of emphysema.  Malignancy cannot be excluded but would not biopsy with his acute exacerbation.  It is more prominent now than it was on CT scan from 2018 and 2022.  He continues to require 3 L nasal cannula.  AFB smear is negative.  Respiratory culture revealed usual [place].  C-reactive protein is improving.    In terms of his COPD he had pulmonary function test in October 2022 that revealed severe obstruction with an FEV1 of 1.37 L, 39% and a ratio of 47%.  This is compatible with stage III.  Wheezing has resolved.  He completed therapy for COVID.  He remains on Zosyn for persistent productive cough.  He continues to require 3 L nasal cannula    He has a known history of chronic heart failure with an EF of 15%.  EF has improved and on his last echocardiogram January 2023 EF was 46 to 50%.  He does not clinically appear to be in congestive failure without effusions or edema.  He does have persistent cardiomegaly on CT and chest x-ray.  His proBNP is only 161.  Heart catheterization revealed no coronary disease October 2022.      -Consider inpatient rehab  -Will likely need home oxygen  -Symbicort 2 puffs twice daily  -Albuterol HFA  -Add Spiriva  -Blasto and histo urinary antigen  -Continue Decadron 6 mg daily to complete a 10-day course  -Lovenox 40 mg subcutaneous daily for DVT prophylaxis  -Zosyn, doxycycline, should complete 7 days tomorrow      Yolanda Sosa MD  01/09/24  14:07 EST      Time: 25 minutes

## 2024-01-09 NOTE — PAYOR COMM NOTE
"Ref # [id number]  Annette Marks (63 y.o. Male)       Date of Birth   1960    Social Security Number       Address   [address] [place] [address]    Home Phone   [phone]    MRN   [id number]       Hoahaoism   None    Marital Status   Single                            Admission Date   1/4/24    Admission Type   Emergency    Admitting Provider   Evy Ramirez MD    Attending Provider   Evy Ramirez MD    Department, Room/Bed   [address], S522/1       Discharge Date       Discharge Disposition       Discharge Destination                                 Attending Provider: Evy Ramirez MD    Allergies: No Known Allergies    Isolation: Airborne, Contact   Infection: COVID (confirmed) (01/04/24), Tuberculosis (rule out) (01/05/24)   Code Status: CPR    Ht: 175.3 cm (69\")   Wt: 54.1 kg (119 lb 3.2 oz)    Admission Cmt: None   Principal Problem: Hypoxia [R09.02]                   Active Insurance as of 1/4/2024       Primary Coverage       Payor Plan Insurance Group Employer/Plan Group    [employer] [facility] [employer] [facility] PPO [id number]       Payor Plan Address Payor Plan Phone Number Payor Plan Fax Number Effective Dates    [id number] [phone]  1/1/2014 - None Entered    [place] [address]         Subscriber Name Subscriber Birth Date Member ID       ANNETTE MARKS 1960 [id number]                     Emergency Contacts        (Rel.) Home Phone Work Phone Mobile Phone    Alley Webb (Daughter) [phone] -- --    Maximiliano Marks (Son) [phone] -- [phone]                 Physician Progress Notes (all)        Yolanda Sosa MD at 01/08/24 1403          PULMONARY PROGRESS NOTE    Patient Care Team:  Charito Weiss APRN as PCP - General (Internal Medicine)  Anatoly Laird MD as Consulting Physician (Pulmonary Disease)    Reason for Consult     COVID-19 1/4/24  Dyspnea  Possible cavitary " "lesion  COPD/emphysema  Cardiomyopathy with EF 15%      Subjective     Interval History:     He is currently on 3 L nasal cannula with a saturation of 95%.  He does admit to a cough productive of clear mucoid secretions.  He denies hemoptysis.  He denies chest pain, edema, nausea or vomiting, wheezing    Review of Systems:     ROS negative except for: Shortness of air with any activity, productive cough      Objective     Vital Signs  Blood pressure 111/56, pulse 63, temperature 97.7 °F (36.5 °C), temperature source Oral, resp. rate 18, height 175.3 cm (69\"), weight 54.1 kg (119 lb 3.2 oz), SpO2 95%.    Physical Exam:  GENERAL: Thin middle-aged gentleman sitting upright in bed in no acute respiratory distress  HEENT: Atraumatic.  Conjunctiva pink.  Oral mucosa moist  NECK: Trachea midline, no palpable thyroid  CHEST: Diminished chest excursion with diffuse bilateral mid and end expiratory rhonchi  HEART: Regular rhythm, S1, S2 auscultated  ABDOMEN: Flat, bowel sounds present, soft, nontender  EXTREMITIES: No pitting edema or clubbing  NEURO: Awake, oriented,  equal, general decreased musculature     Results Review:    Lab Results (last 24 hours)       Procedure Component Value Units Date/Time    Fungal Antibodies, Quantitative Double Immunodiffusion [[id number]] Collected: 01/08/24 1020    Specimen: Blood Updated: 01/08/24 1045    Respiratory Culture - Sputum, Cough [[id number]] Collected: 01/06/24 1050    Specimen: Sputum from Cough Updated: 01/08/24 1014     Respiratory Culture Scant growth (1+) Normal respiratory [place]. No S. aureus or Pseudomonas aeruginosa detected. Final report.     Gram Stain Moderate (3+) WBCs per low power field      Rare (1+) Epithelial cells per low power field      Few (2+) Gram positive cocci in pairs    Comprehensive Metabolic Panel [[id number]]  (Abnormal) Collected: 01/08/24 0642    Specimen: Blood Updated: 01/08/24 0747     Glucose 135 mg/dL      BUN 41 mg/dL      Creatinine 0.89 " mg/dL      Sodium 137 mmol/L      Potassium 4.5 mmol/L      Comment: Slight hemolysis detected by analyzer. Result may be falsely elevated.        Chloride 99 mmol/L      CO2 26.0 mmol/L      Calcium 9.5 mg/dL      Total Protein 6.5 g/dL      Albumin 3.7 g/dL      ALT (SGPT) 15 U/L      AST (SGOT) 17 U/L      Alkaline Phosphatase 59 U/L      Total Bilirubin 0.2 mg/dL      Globulin 2.8 gm/dL      Comment: Calculated Result        A/G Ratio 1.3 g/dL      BUN/Creatinine Ratio 46.1     Anion Gap 12.0 mmol/L      eGFR 95.7 mL/min/1.73     Narrative:      GFR Normal >60  Chronic Kidney Disease <60  Kidney Failure <15      C-reactive Protein [[id number]]  (Abnormal) Collected: 01/08/24 0642    Specimen: Blood Updated: 01/08/24 0747     C-Reactive Protein 1.24 mg/dL     CBC & Differential [[id number]]  (Abnormal) Collected: 01/08/24 0642    Specimen: Blood Updated: 01/08/24 0721    Narrative:      The following orders were created for panel order CBC & Differential.  Procedure                               Abnormality         Status                     ---------                               -----------         ------                     CBC Auto Differential[[id number]]        Abnormal            Final result                 Please view results for these tests on the individual orders.    CBC Auto Differential [[id number]]  (Abnormal) Collected: 01/08/24 0642    Specimen: Blood Updated: 01/08/24 0721     WBC 7.90 10*3/mm3      RBC 4.82 10*6/mm3      Hemoglobin 15.1 g/dL      Hematocrit 47.4 %      MCV 98.3 fL      MCH 31.3 pg      MCHC 31.9 g/dL      RDW 13.4 %      RDW-SD 48.5 fl      MPV 9.3 fL      Platelets 220 10*3/mm3      Neutrophil % 73.1 %      Lymphocyte % 18.6 %      Monocyte % 6.2 %      Eosinophil % 0.0 %      Basophil % 0.3 %      Immature Grans % 1.8 %      Neutrophils, Absolute 5.78 10*3/mm3      Lymphocytes, Absolute 1.47 10*3/mm3      Monocytes, Absolute 0.49 10*3/mm3      Eosinophils, Absolute 0.00 10*3/mm3       Basophils, Absolute 0.02 10*3/mm3      Immature Grans, Absolute 0.14 10*3/mm3      nRBC 0.0 /100 WBC     Blood Culture - Blood, Hand, Right [[id number]]  (Normal) Collected: 01/05/24 0514    Specimen: Blood from Hand, Right Updated: 01/08/24 0600     Blood Culture No growth at 3 days    Blood Culture - Blood, Arm, Left [[id number]]  (Normal) Collected: 01/05/24 0508    Specimen: Blood from Arm, Left Updated: 01/08/24 0600     Blood Culture No growth at 3 days          Imaging Results (Last 24 Hours)       ** No results found for the last 24 hours. **         Impression: CTA of the chest January 5, 2024  1.No evidence of pulmonary embolism.   2.25 mm cavitary lesion in the right upper lobe with internal debris or septations. This could be related to an infectious or inflammatory process. Malignancy cannot be excluded. PET/CT follow-up could provide more information.   3.Moderate emphysema. Emphysema on CT is an independent risk factor for lung cancer. Low dose lung cancer screening should be considered if patient qualifies based on smoking history.   4.Mild coronary artery calcification and mild aortic atherosclerosis.         Electronically Signed: Tres Chau MD    1/5/2024 12:50 AM EST     albuterol sulfate HFA, 2 puff, Inhalation, 4x Daily - RT  budesonide-formoterol, 2 puff, Inhalation, BID - RT  carvedilol, 25 mg, Oral, BID With Meals  dexAMETHasone, 6 mg, Oral, Daily   Or  dexAMETHasone, 6 mg, Intravenous, Daily  doxycycline, 100 mg, Intravenous, Q12H  empagliflozin, 10 mg, Oral, Daily  enoxaparin, 40 mg, Subcutaneous, Q24H  pantoprazole, 40 mg, Oral, Q AM  piperacillin-tazobactam, 3.375 g, Intravenous, Q8H  remdesivir, 100 mg, Intravenous, Q24H  sacubitril-valsartan, 1 tablet, Oral, BID          Hypoxia    Essential hypertension    Congestive heart failure, unspecified HF chronicity, unspecified heart failure type    COVID-19 virus infection    COPD (chronic obstructive pulmonary disease)    Cavitary  lesion of lung    Elevated troponin    COPD with acute exacerbation      Assessment & Plan     63-year-old gentleman, lifelong smoker with cardiomyopathy, advanced emphysema, COVID-19, hospitalized January 4 with worsening oxygenation and dyspnea.  At baseline he does not require supplemental oxygen.  He did receive remdesivir and Decadron, completing today.  He was also empirically placed on vancomycin and Zosyn.  Vancomycin was stopped when MRSA swab was negative.  Doxycycline added for atypical coverage.  CT of the chest was negative for PE but revealed extensive emphysematous changes.  He had a possible cavitary lesion in the right upper lobe centrally.  It is a small area that is overlapping an area of emphysema.  Malignancy cannot be excluded but would not biopsy with his acute exacerbation.  It is more prominent now than it was on CT scan from 2018 and 2022.  He continues to require 3 L nasal cannula.  AFB smear is negative.  Respiratory culture revealed usual [place].  C-reactive protein is improving.    In terms of his COPD he had pulmonary function test in October 2022 that revealed severe obstruction with an FEV1 of 1.37 L, 39% and a ratio of 47%.  This is compatible with stage III.  Wheezing has resolved.  He completed therapy for COVID.  He remains on Zosyn for persistent productive cough.  He continues to require 3 L nasal cannula    He has a known history of chronic heart failure with an EF of 15%.  EF has improved and on his last echocardiogram January 2023 EF was 46 to 50%.  He does not clinically appear to be in congestive failure without effusions or edema.  He does have persistent cardiomegaly on CT and chest x-ray.  His proBNP is only 161.  Heart catheterization revealed no coronary disease October 2022.    -For his right upper lobe lesion will need a repeat CT scan in 3 months, I have ordered fungal urinary antigens, and fungal serologies  -For his COPD exacerbation would continue Symbicort,  bronchodilators, antibiotics, mucolytic's, and mobilize.  Continue supplemental oxygen, wean as able  -For his history of CHF, last EF was improved and he clinically does not appear to be in congestive failure.  He does not have any coronary artery disease.  He remains on carvedilol and Entresto which have dramatically improved his cardiac function.        Yolanda Sosa MD  24  14:03 EST      Time: 25 minutes    Electronically signed by Yolanda Sosa MD at 24 1426       Pastora Padron MD at 24 1344              [facility] Medicine Services  PROGRESS NOTE    Patient Name: Francisco Clark  : 1960  MRN: [id number]    Date of Admission: 2024  Primary Care Physician: Charito Weiss APRN    Subjective   Subjective     CC:  F/U SOA    HPI:  Seen this morning. No complaints.       Objective   Objective     Vital Signs:   Temp:  [97.7 °F (36.5 °C)-97.9 °F (36.6 °C)] 97.7 °F (36.5 °C)  Heart Rate:  [51-73] 63  Resp:  [16-20] 18  BP: ()/(56-67) 111/56  Flow (L/min):  [2-3] 3     Physical Exam:  Gen-no acute distress  HENT-NCAT, mucous membranes moist  CV-RRR, S1 S2 normal, no m/r/g  Resp-diminished bilaterally, no wheezes today, nonlabored on 3 liters   Abd-soft, NT, ND, +BS  Ext-no edema  Neuro-A&Ox3, no focal deficits  Skin-no rashes  Psych-appropriate mood      Results Reviewed:  LAB RESULTS:      Lab 24  0642 24  0557 24  0838 24  2124   WBC 7.90 8.81 8.54 10.27   HEMOGLOBIN 15.1 14.7 16.1 15.4   HEMATOCRIT 47.4 46.5 51.6* 48.3   PLATELETS 220 232 281 258   NEUTROS ABS 5.78 6.86 6.86 7.92*   IMMATURE GRANS (ABS) 0.14* 0.03 0.04 0.04   LYMPHS ABS 1.47 1.23 1.12 1.00   MONOS ABS 0.49 0.68 0.51 1.27*   EOS ABS 0.00 0.00 0.00 0.01   MCV 98.3* 97.1* 99.6* 97.4*   CRP 1.24* 1.97* 4.20* 8.20*   PROCALCITONIN  --   --   --  0.10   LACTATE  --   --   --  0.7   LDH  --   --   --  324*   D DIMER QUANT  --   --   --  0.38          Lab 01/08/24  0642 01/07/24  0557 01/06/24  0838 01/05/24  0514 01/04/24  2319   SODIUM 137 140 139 133* 134*   POTASSIUM 4.5 4.5 5.1 5.0 5.1   CHLORIDE 99 101 98 97* 97*   CO2 26.0 30.0* 34.0* 23.0 28.0   ANION GAP 12.0 9.0 7.0 13.0 9.0   BUN 41* 37* 37* 25* 25*   CREATININE 0.89 0.76 1.06 0.79 0.77   EGFR 95.7 100.4 78.4 99.2 100.0   GLUCOSE 135* 104* 149* 149* 118*   CALCIUM 9.5 10.4 11.3* 9.5 9.4         Lab 01/08/24  0642 01/07/24  0557 01/06/24  0838 01/05/24  0514 01/04/24  2319   TOTAL PROTEIN 6.5 6.3 7.3 7.7 7.4   ALBUMIN 3.7 3.4* 4.0 3.5 3.5   GLOBULIN 2.8 2.9 3.3 4.2 3.9   ALT (SGPT) 15 13 12 12 12   AST (SGOT) 17 16 16 21 16   BILIRUBIN 0.2 <0.2 0.2 0.2 0.3   ALK PHOS 59 56 71 69 66         Lab 01/06/24  0838 01/05/24  0707 01/05/24  0514 01/04/24  2319 01/04/24  2124   PROBNP 160.9  --   --   --  294.2   HSTROP T  --  23* 26* 32*  --                  Lab 01/04/24  2134   PH, ARTERIAL 7.365   PCO2, ARTERIAL 52.1*   PO2 ART 83.9   FIO2 32   HCO3 ART 29.7*   BASE EXCESS ART 3.1*   CARBOXYHEMOGLOBIN 1.8     Brief Urine Lab Results       None            Microbiology Results Abnormal       Procedure Component Value - Date/Time    Respiratory Culture - Sputum, Cough [[id number]] Collected: 01/06/24 1050    Lab Status: Final result Specimen: Sputum from Cough Updated: 01/08/24 1014     Respiratory Culture Scant growth (1+) Normal respiratory [place]. No S. aureus or Pseudomonas aeruginosa detected. Final report.     Gram Stain Moderate (3+) WBCs per low power field      Rare (1+) Epithelial cells per low power field      Few (2+) Gram positive cocci in pairs    Blood Culture - Blood, Hand, Right [[id number]]  (Normal) Collected: 01/05/24 0514    Lab Status: Preliminary result Specimen: Blood from Hand, Right Updated: 01/08/24 0600     Blood Culture No growth at 3 days    Blood Culture - Blood, Arm, Left [[id number]]  (Normal) Collected: 01/05/24 0508    Lab Status: Preliminary result Specimen: Blood from Arm, Left  Updated: 01/08/24 0600     Blood Culture No growth at 3 days    AFB Culture - Sputum, Cough [[id number]] Collected: 01/06/24 1050    Lab Status: Preliminary result Specimen: Sputum from Cough Updated: 01/07/24 1209     AFB Stain No acid fast bacilli seen on concentrated smear    MRSA Screen, PCR (Inpatient) - Swab, Nares [[id number]]  (Normal) Collected: 01/05/24 0546    Lab Status: Final result Specimen: Swab from Nares Updated: 01/05/24 0843     MRSA PCR Negative    Narrative:      The negative predictive value of this diagnostic test is high and should only be used to consider de-escalating anti-MRSA therapy. A positive result may indicate colonization with MRSA and must be correlated clinically.  MRSA Negative    Respiratory Culture - Sputum, Cough [[id number]] Collected: 01/04/24 2319    Lab Status: Final result Specimen: Sputum from Cough Updated: 01/05/24 0654     Respiratory Culture Rejected     Gram Stain Many (4+) Gram positive cocci in pairs, chains and clusters      Moderate (3+) Gram negative bacilli      Moderate (3+) Epithelial cells per low power field      Rare (1+) WBCs per low power field    Narrative:      Specimen rejected due to oropharyngeal contamination. Please reorder and recollect specimen if clinically necessary.  Specimen rejected due to oropharyngeal contamination.            XR Chest 1 View    Result Date: 1/6/2024  XR CHEST 1 VW Date of Exam: 1/6/2024 1:42 PM EST Indication: F/U hypoxia Comparison: Chest x-ray 1/4/2024 Findings: Stable cardiomediastinal silhouette within normal limits. There is background emphysema. No focal consolidation. No pleural effusion or pneumothorax.     Impression: Impression: No acute cardiopulmonary findings. Unchanged emphysema. Electronically Signed: Thomas Allen MD  1/6/2024 2:36 PM EST  Workstation ID: [id number]     Results for orders placed during the hospital encounter of 01/10/23    Adult Transthoracic Echo Complete W/ Cont if Necessary Per  Protocol    Interpretation Summary    Left ventricular systolic function is mildly decreased. Left ventricular ejection fraction appears to be 46 - 50%.    Left ventricular wall thickness is consistent with mild concentric hypertrophy.    Left ventricular diastolic function is consistent with (grade II w/high LAP) pseudonormalization.    Estimated right ventricular systolic pressure from tricuspid regurgitation is normal (<35 mmHg).      Current medications:  Scheduled Meds:albuterol sulfate HFA, 2 puff, Inhalation, 4x Daily - RT  budesonide-formoterol, 2 puff, Inhalation, BID - RT  carvedilol, 25 mg, Oral, BID With Meals  dexAMETHasone, 6 mg, Oral, Daily   Or  dexAMETHasone, 6 mg, Intravenous, Daily  doxycycline, 100 mg, Intravenous, Q12H  empagliflozin, 10 mg, Oral, Daily  enoxaparin, 40 mg, Subcutaneous, Q24H  pantoprazole, 40 mg, Oral, Q AM  piperacillin-tazobactam, 3.375 g, Intravenous, Q8H  remdesivir, 100 mg, Intravenous, Q24H  sacubitril-valsartan, 1 tablet, Oral, BID      Continuous Infusions:   PRN Meds:.  !Patient Home Medications Stored in Pharmacy    bumetanide    nitroglycerin    sodium chloride    sodium chloride    Assessment & Plan   Assessment & Plan     Active Hospital Problems    Diagnosis  POA    **Hypoxia [R09.02]  Yes    COVID-19 virus infection [U07.1]  Unknown    COPD (chronic obstructive pulmonary disease) [J44.9]  Unknown    Cavitary lesion of lung [J98.4]  Unknown    Elevated troponin [R79.89]  Unknown    COPD with acute exacerbation [J44.1]  Yes    Congestive heart failure, unspecified HF chronicity, unspecified heart failure type [I50.9]  Yes    Essential hypertension [I10]  Yes      Resolved Hospital Problems   No resolved problems to display.        Brief Hospital Course to date:  Francisco Clark is a 63 y.o. male with hx of HFrEF, COPD, HTN, and tobacco abuse who presents due to worsening SOA over the past 10 days.      COVID-19  Coronavirus OC43  Hypoxia  COPD without  exacerbation  --Hypoxic to 86% on room air, does not wear home oxygen  --Currently requiring 3 liters, wean as tolerated - likely will need home oxygen at discharge  --CTA chest with RUL cavitary lesion, no other infiltrates noted  --Respiratory PCR panel positive for COVID-19 as well as Coronavirus OC43  --Symptoms present x 10 days, may be out of window for benefit from Remdesivir but given presentation we will continue (Pulmonary in agreement)  --Continue Remdesivir and Decadron - Remdesivir through 1/9/24  --Continue home inhaler, formulary substitute  --Scheduled albuterol MDI 4x daily   --Repeat CXR 1/6/24 no acute findings     Cavitary lung lesion of right upper lobe  --CTA chest: 25 mm cavitary lesion in right upper lobe with internal debris or septations, moderate emphysema   --Pulmonary/Dr. Mcgrath has evaluated, appears to be a small area of infiltrate, but cannot rule out malignancy: recommends treat as community acquired pneumonia and repeat CT chest in 3 months; has F/U with Kayla Ross APRN 1/19/24  --MRSA PCR negative, stopped Vanc  --Continue Zosyn and Doxycycline - plan to change to PO ATBx at discharge  --Blood cultures NGTD, sputum culture with normal respiratory [place] thus far     HFrEF with recovery   Dilated nonischemic cardiomyopathy  --EF previously <20% in October 2022, seems to have recovered on Echo January 2023 (46-50%)  --Followed by Dr. Coreas/Cardiology  --Continue home meds Coreg, Entresto, Jardiance, Bumex    Expected Discharge Location and Transportation: home  Expected Discharge anticipate home with oxygen tomorrow after last Remdesivir dose  Expected Discharge Date: 1/9/2024; Expected Discharge Time:      DVT prophylaxis:  Medical DVT prophylaxis orders are present.     AM-PAC 6 Clicks Score (PT): 24 (01/08/24 9310)    CODE STATUS:   Code Status and Medical Interventions:   Ordered at: 01/05/24 0053     Code Status (Patient has no pulse and is not breathing):    CPR (Attempt to  Resuscitate)     Medical Interventions (Patient has pulse or is breathing):    Full Support       Pastora Padron MD  24        Electronically signed by Pastora Padron MD at 24 1345       Pastora Padron MD at 24 1159              [facility] Medicine Services  PROGRESS NOTE    Patient Name: Francisco Clark  : 1960  MRN: [id number]    Date of Admission: 2024  Primary Care Physician: Charito Weiss APRN    Subjective   Subjective     CC:  F/U SOA    HPI:  Seen this morning. Reports feeling better. On 3 liters.      Objective   Objective     Vital Signs:   Temp:  [97.7 °F (36.5 °C)-98.2 °F (36.8 °C)] 97.8 °F (36.6 °C)  Heart Rate:  [54-76] 72  Resp:  [18-20] 18  BP: (103-136)/(54-74) 125/61  Flow (L/min):  [3-4] 3     Physical Exam:  Gen-no acute distress  HENT-NCAT, mucous membranes moist  CV-RRR, S1 S2 normal, no m/r/g  Resp-diminished bilaterally, improved wheezes almost none today, nonlabored on 3 liters saturating 92%  Abd-soft, NT, ND, +BS  Ext-no edema  Neuro-A&Ox3, no focal deficits  Skin-no rashes  Psych-appropriate mood      Results Reviewed:  LAB RESULTS:      Lab 24  0557 24  0838 24  2124   WBC 8.81 8.54 10.27   HEMOGLOBIN 14.7 16.1 15.4   HEMATOCRIT 46.5 51.6* 48.3   PLATELETS 232 281 258   NEUTROS ABS 6.86 6.86 7.92*   IMMATURE GRANS (ABS) 0.03 0.04 0.04   LYMPHS ABS 1.23 1.12 1.00   MONOS ABS 0.68 0.51 1.27*   EOS ABS 0.00 0.00 0.01   MCV 97.1* 99.6* 97.4*   CRP 1.97* 4.20* 8.20*   PROCALCITONIN  --   --  0.10   LACTATE  --   --  0.7   LDH  --   --  324*   D DIMER QUANT  --   --  0.38         Lab 24  0557 24  0838 24  0514 24  2319   SODIUM 140 139 133* 134*   POTASSIUM 4.5 5.1 5.0 5.1   CHLORIDE 101 98 97* 97*   CO2 30.0* 34.0* 23.0 28.0   ANION GAP 9.0 7.0 13.0 9.0   BUN 37* 37* 25* 25*   CREATININE 0.76 1.06 0.79 0.77   EGFR 100.4 78.4 99.2 100.0   GLUCOSE 104* 149* 149* 118*   CALCIUM 10.4 11.3* 9.5  9.4         Lab 01/07/24  0557 01/06/24  0838 01/05/24  0514 01/04/24  2319   TOTAL PROTEIN 6.3 7.3 7.7 7.4   ALBUMIN 3.4* 4.0 3.5 3.5   GLOBULIN 2.9 3.3 4.2 3.9   ALT (SGPT) 13 12 12 12   AST (SGOT) 16 16 21 16   BILIRUBIN <0.2 0.2 0.2 0.3   ALK PHOS 56 71 69 66         Lab 01/06/24  0838 01/05/24  0707 01/05/24  0514 01/04/24  2319 01/04/24  2124   PROBNP 160.9  --   --   --  294.2   HSTROP T  --  23* 26* 32*  --                  Lab 01/04/24 2134   PH, ARTERIAL 7.365   PCO2, ARTERIAL 52.1*   PO2 ART 83.9   FIO2 32   HCO3 ART 29.7*   BASE EXCESS ART 3.1*   CARBOXYHEMOGLOBIN 1.8     Brief Urine Lab Results       None            Microbiology Results Abnormal       Procedure Component Value - Date/Time    Respiratory Culture - Sputum, Cough [[id number]] Collected: 01/06/24 1050    Lab Status: Preliminary result Specimen: Sputum from Cough Updated: 01/07/24 0924     Respiratory Culture Scant growth (1+) The culture consists of normal respiratory [place]. This is a preliminary report; final report to follow.     Gram Stain Moderate (3+) WBCs per low power field      Rare (1+) Epithelial cells per low power field      Few (2+) Gram positive cocci in pairs    Blood Culture - Blood, Hand, Right [[id number]]  (Normal) Collected: 01/05/24 0514    Lab Status: Preliminary result Specimen: Blood from Hand, Right Updated: 01/07/24 0600     Blood Culture No growth at 2 days    Blood Culture - Blood, Arm, Left [[id number]]  (Normal) Collected: 01/05/24 0508    Lab Status: Preliminary result Specimen: Blood from Arm, Left Updated: 01/07/24 0600     Blood Culture No growth at 2 days    MRSA Screen, PCR (Inpatient) - Swab, Nares [[id number]]  (Normal) Collected: 01/05/24 0546    Lab Status: Final result Specimen: Swab from Nares Updated: 01/05/24 0843     MRSA PCR Negative    Narrative:      The negative predictive value of this diagnostic test is high and should only be used to consider de-escalating anti-MRSA therapy. A positive result  may indicate colonization with MRSA and must be correlated clinically.  MRSA Negative    Respiratory Culture - Sputum, Cough [[id number]] Collected: 01/04/24 3913    Lab Status: Final result Specimen: Sputum from Cough Updated: 01/05/24 0654     Respiratory Culture Rejected     Gram Stain Many (4+) Gram positive cocci in pairs, chains and clusters      Moderate (3+) Gram negative bacilli      Moderate (3+) Epithelial cells per low power field      Rare (1+) WBCs per low power field    Narrative:      Specimen rejected due to oropharyngeal contamination. Please reorder and recollect specimen if clinically necessary.  Specimen rejected due to oropharyngeal contamination.            XR Chest 1 View    Result Date: 1/6/2024  XR CHEST 1 VW Date of Exam: 1/6/2024 1:42 PM EST Indication: F/U hypoxia Comparison: Chest x-ray 1/4/2024 Findings: Stable cardiomediastinal silhouette within normal limits. There is background emphysema. No focal consolidation. No pleural effusion or pneumothorax.     Impression: Impression: No acute cardiopulmonary findings. Unchanged emphysema. Electronically Signed: Thomas Allen MD  1/6/2024 2:36 PM EST  Workstation ID: [id number]     Results for orders placed during the hospital encounter of 01/10/23    Adult Transthoracic Echo Complete W/ Cont if Necessary Per Protocol    Interpretation Summary    Left ventricular systolic function is mildly decreased. Left ventricular ejection fraction appears to be 46 - 50%.    Left ventricular wall thickness is consistent with mild concentric hypertrophy.    Left ventricular diastolic function is consistent with (grade II w/high LAP) pseudonormalization.    Estimated right ventricular systolic pressure from tricuspid regurgitation is normal (<35 mmHg).      Current medications:  Scheduled Meds:albuterol sulfate HFA, 2 puff, Inhalation, 4x Daily - RT  budesonide-formoterol, 2 puff, Inhalation, BID - RT  carvedilol, 25 mg, Oral, BID With Meals  dexAMETHasone,  6 mg, Oral, Daily   Or  dexAMETHasone, 6 mg, Intravenous, Daily  doxycycline, 100 mg, Intravenous, Q12H  empagliflozin, 10 mg, Oral, Daily  enoxaparin, 40 mg, Subcutaneous, Q24H  pantoprazole, 40 mg, Oral, Q AM  piperacillin-tazobactam, 3.375 g, Intravenous, Q8H  remdesivir, 100 mg, Intravenous, Q24H  sacubitril-valsartan, 1 tablet, Oral, BID      Continuous Infusions:   PRN Meds:.  !Patient Home Medications Stored in Pharmacy    bumetanide    nitroglycerin    sodium chloride    sodium chloride    Assessment & Plan   Assessment & Plan     Active Hospital Problems    Diagnosis  POA    **Hypoxia [R09.02]  Yes    COVID-19 virus infection [U07.1]  Unknown    COPD (chronic obstructive pulmonary disease) [J44.9]  Unknown    Cavitary lesion of lung [J98.4]  Unknown    Elevated troponin [R79.89]  Unknown    COPD with acute exacerbation [J44.1]  Yes    Congestive heart failure, unspecified HF chronicity, unspecified heart failure type [I50.9]  Yes    Essential hypertension [I10]  Yes      Resolved Hospital Problems   No resolved problems to display.        Brief Hospital Course to date:  Francisco Clark is a 63 y.o. male with hx of HFrEF, COPD, HTN, and tobacco abuse who presents due to worsening SOA over the past 10 days.      COVID-19  Coronavirus OC43  Hypoxia  COPD without exacerbation  --Hypoxic to 86% on room air, does not wear home oxygen  --Currently requiring 3 liters, wean as tolerated - may need home oxygen at discharge  --CTA chest with RUL cavitary lesion, no other infiltrates noted  --Respiratory PCR panel positive for COVID-19 as well as Coronavirus OC43  --Symptoms present x 10 days, may be out of window for benefit from Remdesivir but given presentation we will continue (Pulmonary in agreement)  --Continue Remdesivir and Decadron - Remdesivir through 1/9/24  --Continue home inhaler, formulary substitute  --Scheduled albuterol MDI 4x daily   --Repeat CXR 1/6/24 no acute findings     Cavitary lung lesion of  right upper lobe  --CTA chest: 25 mm cavitary lesion in right upper lobe with internal debris or septations, moderate emphysema   --Pulmonary/Dr. Mcgrath has evaluated, appears to be a small area of infiltrate, but cannot rule out malignancy: recommends treat as community acquired pneumonia and repeat CT chest in 3 months; has F/U with Kayla Ross NATALIO 24  --MRSA PCR negative, stopped Vanc  --Continue Zosyn and Doxycycline  --Blood cultures NGTD, sputum culture with normal respiratory [place] thus far     HFrEF with recovery   Dilated nonischemic cardiomyopathy  --EF previously <20% in 2022, seems to have recovered on Echo 2023 (46-50%)  --Followed by Dr. Coreas/Cardiology  --Continue home meds Coreg, Entresto, Jardiance, Bumex    Expected Discharge Location and Transportation: home  Expected Discharge   Expected Discharge Date: 2024; Expected Discharge Time:      DVT prophylaxis:  Medical DVT prophylaxis orders are present.     AM-PAC 6 Clicks Score (PT): 24 (24)    CODE STATUS:   Code Status and Medical Interventions:   Ordered at: 24 0053     Code Status (Patient has no pulse and is not breathing):    CPR (Attempt to Resuscitate)     Medical Interventions (Patient has pulse or is breathing):    Full Support       Pastora Padron MD  24        Electronically signed by Pastora Padron MD at 24 1202       Pastora Padron MD at 24 1330              [facility] Medicine Services  PROGRESS NOTE    Patient Name: Francisco Clark  : 1960  MRN: [id number]    Date of Admission: 2024  Primary Care Physician: Charito Weiss APRN    Subjective   Subjective     CC:  F/U SOA    HPI:  Seen this morning. Reports feeling a little stronger today. Still on oxygen and has been turned up to 4 liters. His cough is breaking loose and he was able to provide a sputum sample today.      Objective   Objective     Vital Signs:   Temp:  [97.6 °F  (36.4 °C)-98 °F (36.7 °C)] 97.6 °F (36.4 °C)  Heart Rate:  [61-90] 78  Resp:  [17-20] 18  BP: (116-134)/(65-78) 116/65  Flow (L/min):  [3-4] 4     Physical Exam:  Gen-no acute distress  HENT-NCAT, mucous membranes moist  CV-RRR, S1 S2 normal, no m/r/g  Resp-diminished bilaterally, +wheezes, nonlabored on 4 liters saturating 92%  Abd-soft, NT, ND, +BS  Ext-no edema  Neuro-A&Ox3, no focal deficits  Skin-no rashes  Psych-appropriate mood      Results Reviewed:  LAB RESULTS:      Lab 01/06/24  0838 01/04/24 2124   WBC 8.54 10.27   HEMOGLOBIN 16.1 15.4   HEMATOCRIT 51.6* 48.3   PLATELETS 281 258   NEUTROS ABS 6.86 7.92*   IMMATURE GRANS (ABS) 0.04 0.04   LYMPHS ABS 1.12 1.00   MONOS ABS 0.51 1.27*   EOS ABS 0.00 0.01   MCV 99.6* 97.4*   CRP 4.20* 8.20*   PROCALCITONIN  --  0.10   LACTATE  --  0.7   LDH  --  324*   D DIMER QUANT  --  0.38         Lab 01/06/24  0838 01/05/24 0514 01/04/24  2319   SODIUM 139 133* 134*   POTASSIUM 5.1 5.0 5.1   CHLORIDE 98 97* 97*   CO2 34.0* 23.0 28.0   ANION GAP 7.0 13.0 9.0   BUN 37* 25* 25*   CREATININE 1.06 0.79 0.77   EGFR 78.4 99.2 100.0   GLUCOSE 149* 149* 118*   CALCIUM 11.3* 9.5 9.4         Lab 01/06/24  0838 01/05/24  0514 01/04/24  2319   TOTAL PROTEIN 7.3 7.7 7.4   ALBUMIN 4.0 3.5 3.5   GLOBULIN 3.3 4.2 3.9   ALT (SGPT) 12 12 12   AST (SGOT) 16 21 16   BILIRUBIN 0.2 0.2 0.3   ALK PHOS 71 69 66         Lab 01/05/24  0707 01/05/24  0514 01/04/24  2319 01/04/24 2124   PROBNP  --   --   --  294.2   HSTROP T 23* 26* 32*  --                  Lab 01/04/24 2134   PH, ARTERIAL 7.365   PCO2, ARTERIAL 52.1*   PO2 ART 83.9   FIO2 32   HCO3 ART 29.7*   BASE EXCESS ART 3.1*   CARBOXYHEMOGLOBIN 1.8     Brief Urine Lab Results       None            Microbiology Results Abnormal       Procedure Component Value - Date/Time    Respiratory Culture - Sputum, Cough [[id number]] Collected: 01/06/24 1050    Lab Status: Preliminary result Specimen: Sputum from Cough Updated: 01/06/24 1320     Gram  Stain Moderate (3+) WBCs per low power field      Rare (1+) Epithelial cells per low power field      Few (2+) Gram positive cocci in pairs    Blood Culture - Blood, Hand, Right [[id number]]  (Normal) Collected: 01/05/24 0514    Lab Status: Preliminary result Specimen: Blood from Hand, Right Updated: 01/06/24 0600     Blood Culture No growth at 24 hours    Blood Culture - Blood, Arm, Left [[id number]]  (Normal) Collected: 01/05/24 0508    Lab Status: Preliminary result Specimen: Blood from Arm, Left Updated: 01/06/24 0600     Blood Culture No growth at 24 hours    MRSA Screen, PCR (Inpatient) - Swab, Nares [[id number]]  (Normal) Collected: 01/05/24 0546    Lab Status: Final result Specimen: Swab from Nares Updated: 01/05/24 0843     MRSA PCR Negative    Narrative:      The negative predictive value of this diagnostic test is high and should only be used to consider de-escalating anti-MRSA therapy. A positive result may indicate colonization with MRSA and must be correlated clinically.  MRSA Negative    Respiratory Culture - Sputum, Cough [[id number]] Collected: 01/04/24 2319    Lab Status: Final result Specimen: Sputum from Cough Updated: 01/05/24 0654     Respiratory Culture Rejected     Gram Stain Many (4+) Gram positive cocci in pairs, chains and clusters      Moderate (3+) Gram negative bacilli      Moderate (3+) Epithelial cells per low power field      Rare (1+) WBCs per low power field    Narrative:      Specimen rejected due to oropharyngeal contamination. Please reorder and recollect specimen if clinically necessary.  Specimen rejected due to oropharyngeal contamination.            CT Angiogram Chest    Result Date: 1/5/2024  CT ANGIOGRAM CHEST Date of Exam: 1/5/2024 12:24 AM EST Indication: cough, SOA, COPD, hypoxia,. Comparison: None available. Technique: CTA of the chest was performed after the uneventful intravenous administration of 80 mL of Isovue-370. Reconstructed coronal and sagittal images were also  obtained. In addition, a 3-D volume rendered image was created for interpretation. Automated exposure control and iterative reconstruction methods were used. Findings: The thyroid, trachea and esophagus appear within normal limits. Heart size is normal. There is mild aortic atherosclerotic disease. No aneurysm. The main pulmonary artery is normal diameter. There is no evidence of pulmonary embolism. No pericardial effusion or mediastinal lymphadenopathy. There is mild coronary artery calcification. There is moderate emphysema. Emphysema on CT is an independent risk factor for lung cancer. Low dose lung cancer screening should be considered if patient qualifies based on smoking history or if not already enrolled in a screening program. There is an irregular cavitary lesion in the right perihilar aspect of the right upper lobe measuring 25 mm total diameter. There is internal debris or septations in this semisolid lesion. There is mild adjacent peribronchial thickening. There is a small amount of debris in the right bronchus intermedius. There is additional peribronchial thickening in the right lower lobe dependently. There is no pneumothorax or pleural effusion. No lobar consolidation. There are no acute findings in the superficial soft tissues. No acute findings in the upper abdomen. There are no acute osseous abnormalities or destructive bone lesions. There are mild to moderate lower cervical degenerative changes.     Impression: Impression: 1.No evidence of pulmonary embolism. 2.25 mm cavitary lesion in the right upper lobe with internal debris or septations. This could be related to an infectious or inflammatory process. Malignancy cannot be excluded. PET/CT follow-up could provide more information. 3.Moderate emphysema. Emphysema on CT is an independent risk factor for lung cancer. Low dose lung cancer screening should be considered if patient qualifies based on smoking history. 4.Mild coronary artery  calcification and mild aortic atherosclerosis. Electronically Signed: Tres Chau MD  1/5/2024 12:50 AM EST  Workstation ID: [id number]    XR Chest 1 View    Result Date: 1/4/2024  XR CHEST 1 VW Date of Exam: 1/4/2024 8:44 PM EST Indication: SOA triage protocol Comparison: 10/19/2022. Findings: Chronic interstitial and emphysematous changes are present. There is no focal consolidation. There is no effusion or pneumothorax. Unremarkable heart and mediastinal contours.     Impression: Impression: No evidence of acute disease. Electronically Signed: Vishnu Chan MD  1/4/2024 9:10 PM EST  Workstation ID: [id number]     Results for orders placed during the hospital encounter of 01/10/23    Adult Transthoracic Echo Complete W/ Cont if Necessary Per Protocol    Interpretation Summary    Left ventricular systolic function is mildly decreased. Left ventricular ejection fraction appears to be 46 - 50%.    Left ventricular wall thickness is consistent with mild concentric hypertrophy.    Left ventricular diastolic function is consistent with (grade II w/high LAP) pseudonormalization.    Estimated right ventricular systolic pressure from tricuspid regurgitation is normal (<35 mmHg).      Current medications:  Scheduled Meds:albuterol sulfate HFA, 2 puff, Inhalation, 4x Daily - RT  budesonide-formoterol, 2 puff, Inhalation, BID - RT  carvedilol, 25 mg, Oral, BID With Meals  dexAMETHasone, 6 mg, Oral, Daily   Or  dexAMETHasone, 6 mg, Intravenous, Daily  doxycycline, 100 mg, Intravenous, Q12H  empagliflozin, 10 mg, Oral, Daily  enoxaparin, 40 mg, Subcutaneous, Q24H  piperacillin-tazobactam, 3.375 g, Intravenous, Q8H  remdesivir, 100 mg, Intravenous, Q24H  sacubitril-valsartan, 1 tablet, Oral, BID      Continuous Infusions:   PRN Meds:.  bumetanide    nitroglycerin    sodium chloride    sodium chloride    Assessment & Plan   Assessment & Plan     Active Hospital Problems    Diagnosis  POA    **Hypoxia [R09.02]  Yes    COVID-19  virus infection [U07.1]  Unknown    COPD (chronic obstructive pulmonary disease) [J44.9]  Unknown    Cavitary lesion of lung [J98.4]  Unknown    Elevated troponin [R79.89]  Unknown    COPD with acute exacerbation [J44.1]  Yes    Congestive heart failure, unspecified HF chronicity, unspecified heart failure type [I50.9]  Yes    Essential hypertension [I10]  Yes      Resolved Hospital Problems   No resolved problems to display.        Brief Hospital Course to date:  Francisco Clark is a 63 y.o. male with hx of HFrEF, COPD, HTN, and tobacco abuse who presents due to worsening SOA over the past 10 days.      COVID-19  Coronavirus OC43  Hypoxia  COPD without exacerbation  --Hypoxic to 86% on room air, does not wear home oxygen  --Currently requiring 3-4 liters, wean as tolerated  --CTA chest with RUL cavitary lesion, no other infiltrates noted  --Respiratory PCR panel positive for COVID-19 as well as Coronavirus OC43  --Symptoms present x 10 days, may be out of window for benefit from Remdesivir but given presentation we will continue (Pulmonary in agreement)  --Continue Remdesivir and Decadron  --Continue home inhaler, formulary substitute  --Will scheduled albuterol MDI 4x daily due to wheezing on exam today  --Repeat CXR today     Cavitary lung lesion of right upper lobe  --CTA chest: 25 mm cavitary lesion in right upper lobe with internal debris or septations, moderate emphysema   --Pulmonary/Dr. Mcgrath has evaluated, appears to be a small area of infiltrate, but cannot rule out malignancy: recommends treat as community acquired pneumonia and repeat CT chest in 3 months  --MRSA PCR negative, stopped Vanc  --Continue Zosyn and Doxycycline  --F/U cultures     HFrEF with recovery   Dilated nonischemic cardiomyopathy  --EF previously <20% in October 2022, seems to have recovered on Echo January 2023 (46-50%)  --Followed by Dr. Coreas/Cardiology  --Continue home meds Coreg, Entresto, Jardiance, Bumex    Expected  Discharge Location and Transportation: home  Expected Discharge   Expected Discharge Date: 1/8/2024; Expected Discharge Time:      DVT prophylaxis:  Medical DVT prophylaxis orders are present.     AM-PAC 6 Clicks Score (PT): 24 (01/06/24 0800)    CODE STATUS:   Code Status and Medical Interventions:   Ordered at: 01/05/24 0053     Code Status (Patient has no pulse and is not breathing):    CPR (Attempt to Resuscitate)     Medical Interventions (Patient has pulse or is breathing):    Full Support       Pastora Padron MD  01/06/24        Electronically signed by Pastora Padron MD at 01/06/24 1333       Pastora Padron MD at 01/05/24 1346                [facility] Medicine Services  ADMISSION FOLLOW-UP NOTE          Patient admitted after midnight, H&P by my partner performed earlier on today's date reviewed.  Interim findings, labs, and charting also reviewed.        [facility] Hospital Problem List has been managed and updated to include any new diagnoses:  Active Hospital Problems    Diagnosis  POA    **Hypoxia [R09.02]  Yes    COVID-19 virus infection [U07.1]  Unknown    COPD (chronic obstructive pulmonary disease) [J44.9]  Unknown    Cavitary lesion of lung [J98.4]  Unknown    Elevated troponin [R79.89]  Unknown    COPD with acute exacerbation [J44.1]  Yes    Congestive heart failure, unspecified HF chronicity, unspecified heart failure type [I50.9]  Yes    Essential hypertension [I10]  Yes      Resolved Hospital Problems   No resolved problems to display.     64 yo M with hx of HFrEF, COPD, HTN, and tobacco abuse who presents due to worsening SOA over the past 10 days.     COVID-19  Coronavirus OC43  Hypoxia  COPD without exacerbation  --Hypoxic to 86% on room air, does not wear home oxygen  --Currently requiring 3-4 liters, wean as tolerated  --CTA chest with RUL cavitary lesion, no other infiltrates noted  --Respiratory PCR panel positive for COVID-19 as well as Coronavirus  OC43  --Symptoms present x 10 days, may be out of window for benefit from Remdesivir but given presentation we will continue (Pulmonary in agreement)  --Continue Remdesivir and Decadron  --Continue home inhaler, formulary substitute  --PRN albuterol MDI    Cavitary lung lesion of right upper lobe  --CTA chest: 25 mm cavitary lesion in right upper lobe with internal debris or septations, moderate emphysema   --Pulmonary/Dr. Mcgrath has evaluated, appears to be a small area of infiltrate, but cannot rule out malignancy: recommends treat as community acquired pneumonia and repeat CT chest in 3 months  --MRSA PCR negative, stopped Vanc  --Continue Zosyn, add Doxycycline  --F/U cultures    HFrEF with recovery   Dilated nonischemic cardiomyopathy  --EF previously <20% in October 2022, seems to have recovered on Echo January 2023 (46-50%)  --Followed by Dr. Coreas/Cardiology  --Continue home meds Coreg, Entresto, Jardiance, Bumex    Expected Discharge   Expected Discharge Date: 1/8/2024; Expected Discharge Time:      Pastora Padron MD  01/05/24        Electronically signed by Pastora Padron MD at 01/05/24 7170

## 2024-01-10 ENCOUNTER — READMISSION MANAGEMENT (OUTPATIENT)
Dept: CALL CENTER | Facility: HOSPITAL | Age: 64
End: 2024-01-10
Payer: COMMERCIAL

## 2024-01-10 VITALS
DIASTOLIC BLOOD PRESSURE: 61 MMHG | HEIGHT: 69 IN | RESPIRATION RATE: 16 BRPM | SYSTOLIC BLOOD PRESSURE: 98 MMHG | BODY MASS INDEX: 17.66 KG/M2 | HEART RATE: 74 BPM | OXYGEN SATURATION: 90 % | WEIGHT: 119.2 LBS | TEMPERATURE: 98 F

## 2024-01-10 LAB
ALBUMIN SERPL-MCNC: 3.5 G/DL (ref 3.5–5.2)
ALBUMIN/GLOB SERPL: 1.1 G/DL
ALP SERPL-CCNC: 57 U/L (ref 39–117)
ALT SERPL W P-5'-P-CCNC: 24 U/L (ref 1–41)
ANION GAP SERPL CALCULATED.3IONS-SCNC: 7 MMOL/L (ref 5–15)
AST SERPL-CCNC: 23 U/L (ref 1–40)
BACTERIA SPEC AEROBE CULT: NORMAL
BACTERIA SPEC AEROBE CULT: NORMAL
BASOPHILS # BLD AUTO: 0.09 10*3/MM3 (ref 0–0.2)
BASOPHILS NFR BLD AUTO: 1 % (ref 0–1.5)
BILIRUB SERPL-MCNC: 0.2 MG/DL (ref 0–1.2)
BUN SERPL-MCNC: 41 MG/DL (ref 8–23)
BUN/CREAT SERPL: 51.3 (ref 7–25)
CALCIUM SPEC-SCNC: 9.5 MG/DL (ref 8.6–10.5)
CHLORIDE SERPL-SCNC: 99 MMOL/L (ref 98–107)
CO2 SERPL-SCNC: 31 MMOL/L (ref 22–29)
CREAT SERPL-MCNC: 0.8 MG/DL (ref 0.76–1.27)
DEPRECATED RDW RBC AUTO: 47.6 FL (ref 37–54)
EGFRCR SERPLBLD CKD-EPI 2021: 98.8 ML/MIN/1.73
EOSINOPHIL # BLD AUTO: 0.01 10*3/MM3 (ref 0–0.4)
EOSINOPHIL NFR BLD AUTO: 0.1 % (ref 0.3–6.2)
ERYTHROCYTE [DISTWIDTH] IN BLOOD BY AUTOMATED COUNT: 13.3 % (ref 12.3–15.4)
GLOBULIN UR ELPH-MCNC: 3.2 GM/DL
GLUCOSE SERPL-MCNC: 91 MG/DL (ref 65–99)
HCT VFR BLD AUTO: 49.3 % (ref 37.5–51)
HGB BLD-MCNC: 15.9 G/DL (ref 13–17.7)
IMM GRANULOCYTES # BLD AUTO: 0.44 10*3/MM3 (ref 0–0.05)
IMM GRANULOCYTES NFR BLD AUTO: 4.8 % (ref 0–0.5)
LYMPHOCYTES # BLD AUTO: 2.1 10*3/MM3 (ref 0.7–3.1)
LYMPHOCYTES NFR BLD AUTO: 22.7 % (ref 19.6–45.3)
MCH RBC QN AUTO: 30.8 PG (ref 26.6–33)
MCHC RBC AUTO-ENTMCNC: 32.3 G/DL (ref 31.5–35.7)
MCV RBC AUTO: 95.5 FL (ref 79–97)
MONOCYTES # BLD AUTO: 0.75 10*3/MM3 (ref 0.1–0.9)
MONOCYTES NFR BLD AUTO: 8.1 % (ref 5–12)
NEUTROPHILS NFR BLD AUTO: 5.85 10*3/MM3 (ref 1.7–7)
NEUTROPHILS NFR BLD AUTO: 63.3 % (ref 42.7–76)
NRBC BLD AUTO-RTO: 0 /100 WBC (ref 0–0.2)
PLATELET # BLD AUTO: 216 10*3/MM3 (ref 140–450)
PMV BLD AUTO: 9.4 FL (ref 6–12)
POTASSIUM SERPL-SCNC: 5 MMOL/L (ref 3.5–5.2)
PROT SERPL-MCNC: 6.7 G/DL (ref 6–8.5)
RBC # BLD AUTO: 5.16 10*6/MM3 (ref 4.14–5.8)
SODIUM SERPL-SCNC: 137 MMOL/L (ref 136–145)
WBC NRBC COR # BLD AUTO: 9.24 10*3/MM3 (ref 3.4–10.8)

## 2024-01-10 PROCEDURE — 80053 COMPREHEN METABOLIC PANEL: CPT | Performed by: HOSPITALIST

## 2024-01-10 PROCEDURE — 97110 THERAPEUTIC EXERCISES: CPT

## 2024-01-10 PROCEDURE — 97116 GAIT TRAINING THERAPY: CPT

## 2024-01-10 PROCEDURE — 25010000002 ENOXAPARIN PER 10 MG: Performed by: INTERNAL MEDICINE

## 2024-01-10 PROCEDURE — 99232 SBSQ HOSP IP/OBS MODERATE 35: CPT | Performed by: INTERNAL MEDICINE

## 2024-01-10 PROCEDURE — 94799 UNLISTED PULMONARY SVC/PX: CPT

## 2024-01-10 PROCEDURE — 99239 HOSP IP/OBS DSCHRG MGMT >30: CPT | Performed by: HOSPITALIST

## 2024-01-10 PROCEDURE — 25010000002 PIPERACILLIN SOD-TAZOBACTAM PER 1 G: Performed by: INTERNAL MEDICINE

## 2024-01-10 PROCEDURE — 25010000002 DEXAMETHASONE PER 1 MG: Performed by: INTERNAL MEDICINE

## 2024-01-10 PROCEDURE — 85025 COMPLETE CBC W/AUTO DIFF WBC: CPT | Performed by: HOSPITALIST

## 2024-01-10 RX ORDER — GUAIFENESIN AND DEXTROMETHORPHAN HYDROBROMIDE 600; 30 MG/1; MG/1
2 TABLET, EXTENDED RELEASE ORAL 2 TIMES DAILY
Qty: 20 TABLET | Refills: 0 | Status: SHIPPED | OUTPATIENT
Start: 2024-01-10 | End: 2024-01-15

## 2024-01-10 RX ORDER — AMOXICILLIN AND CLAVULANATE POTASSIUM 875; 125 MG/1; MG/1
1 TABLET, FILM COATED ORAL 2 TIMES DAILY
Qty: 4 TABLET | Refills: 0 | Status: SHIPPED | OUTPATIENT
Start: 2024-01-10 | End: 2024-01-12

## 2024-01-10 RX ORDER — DOXYCYCLINE HYCLATE 100 MG/1
100 CAPSULE ORAL 2 TIMES DAILY
Qty: 4 CAPSULE | Refills: 0 | Status: SHIPPED | OUTPATIENT
Start: 2024-01-10 | End: 2024-01-12

## 2024-01-10 RX ORDER — DEXAMETHASONE 6 MG/1
6 TABLET ORAL DAILY
Qty: 4 TABLET | Refills: 0 | Status: SHIPPED | OUTPATIENT
Start: 2024-01-11 | End: 2024-01-15

## 2024-01-10 RX ADMIN — DEXAMETHASONE SODIUM PHOSPHATE 6 MG: 4 INJECTION, SOLUTION INTRAMUSCULAR; INTRAVENOUS at 09:36

## 2024-01-10 RX ADMIN — DOXYCYCLINE 100 MG: 100 INJECTION, POWDER, LYOPHILIZED, FOR SOLUTION INTRAVENOUS at 03:46

## 2024-01-10 RX ADMIN — PANTOPRAZOLE SODIUM 40 MG: 40 TABLET, DELAYED RELEASE ORAL at 05:03

## 2024-01-10 RX ADMIN — CARVEDILOL 25 MG: 12.5 TABLET, FILM COATED ORAL at 09:36

## 2024-01-10 RX ADMIN — PIPERACILLIN SODIUM AND TAZOBACTAM SODIUM 3.38 G: 3; .375 INJECTION, SOLUTION INTRAVENOUS at 05:03

## 2024-01-10 RX ADMIN — BUDESONIDE AND FORMOTEROL FUMARATE DIHYDRATE 2 PUFF: 160; 4.5 AEROSOL RESPIRATORY (INHALATION) at 07:20

## 2024-01-10 RX ADMIN — ENOXAPARIN SODIUM 40 MG: 100 INJECTION SUBCUTANEOUS at 03:45

## 2024-01-10 RX ADMIN — TIOTROPIUM BROMIDE INHALATION SPRAY 2 PUFF: 3.12 SPRAY, METERED RESPIRATORY (INHALATION) at 07:20

## 2024-01-10 RX ADMIN — EMPAGLIFLOZIN 10 MG: 10 TABLET, FILM COATED ORAL at 09:36

## 2024-01-10 RX ADMIN — ALBUTEROL SULFATE 2 PUFF: 90 AEROSOL, METERED RESPIRATORY (INHALATION) at 11:47

## 2024-01-10 RX ADMIN — ALBUTEROL SULFATE 2 PUFF: 90 AEROSOL, METERED RESPIRATORY (INHALATION) at 07:20

## 2024-01-10 RX ADMIN — PIPERACILLIN SODIUM AND TAZOBACTAM SODIUM 3.38 G: 3; .375 INJECTION, SOLUTION INTRAVENOUS at 11:47

## 2024-01-10 RX ADMIN — SACUBITRIL AND VALSARTAN 1 TABLET: 24; 26 TABLET, FILM COATED ORAL at 09:36

## 2024-01-10 NOTE — PROGRESS NOTES
Mr.Daivd Clark was my under care from 01/04/24 until 01/0. The patient is given a work excuse note for 7 days starting 01/01/24. Please feel free to call me with any question.    Dr.Rabie Ramirez. M.D.  [phone]

## 2024-01-10 NOTE — PAYOR COMM NOTE
"Annette Marks (63 y.o. Male)       Date of Birth   1960    Social Security Number       Address   [address] [place] [address]    Home Phone   [phone]    MRN   [id number]       Buddhist   None    Marital Status   Single                            Admission Date   1/4/24    Admission Type   Emergency    Admitting Provider   Evy Ramirez MD    Attending Provider       Department, Room/Bed   [address], [address]       Discharge Date   1/10/2024    Discharge Disposition   Home or Self Care    Discharge Destination                                 Attending Provider: (none)   Allergies: No Known Allergies    Isolation: Airborne, Contact   Infection: COVID (confirmed) (01/04/24), Tuberculosis (rule out) (01/05/24)   Code Status: CPR    Ht: 175.3 cm (69\")   Wt: 54.1 kg (119 lb 3.2 oz)    Admission Cmt: None   Principal Problem: Hypoxia [R09.02]                   Active Insurance as of 1/4/2024       Primary Coverage       Payor Plan Insurance Group Employer/Plan Group    [facility] BLUE CROSS [facility] BLUE CROSS BLUE [facility] [employer] PPO [id number]       Payor Plan Address Payor Plan Phone Number Payor Plan Fax Number Effective Dates    [address] [phone]  1/1/2014 - None Entered    [place] [address]         Subscriber Name Subscriber Birth Date Member ID       ANNETTE MARKS 1960 [id number]                     Emergency Contacts        (Rel.) Home Phone Work Phone Mobile Phone    Alley Webb (Daughter) [phone] -- --    Maximiliano Marks (Son) [phone] -- [phone]              Discharge Summary    No notes of this type exist for this encounter.       "

## 2024-01-10 NOTE — PLAN OF CARE
Problem: Adult Inpatient Plan of Care  Goal: Plan of Care Review  Outcome: Ongoing, Progressing  Flowsheets (Taken 1/10/2024 1218)  Progress: improving  Plan of Care Reviewed With: patient  Goal: Patient-Specific Goal (Individualized)  Outcome: Ongoing, Progressing  Goal: Absence of Hospital-Acquired Illness or Injury  Outcome: Ongoing, Progressing  Intervention: Identify and Manage Fall Risk  Recent Flowsheet Documentation  Taken 1/10/2024 1200 by Jordyn Everett RN  Safety Promotion/Fall Prevention: safety round/check completed  Taken 1/10/2024 1000 by Jordyn Everett RN  Safety Promotion/Fall Prevention: safety round/check completed  Taken 1/10/2024 0800 by Jordyn Everett RN  Safety Promotion/Fall Prevention: safety round/check completed  Taken 1/10/2024 0750 by Jordyn Everett RN  Safety Promotion/Fall Prevention: safety round/check completed  Intervention: Prevent Skin Injury  Recent Flowsheet Documentation  Taken 1/10/2024 0750 by Jordyn Everett RN  Body Position: position changed independently  Intervention: Prevent and Manage VTE (Venous Thromboembolism) Risk  Recent Flowsheet Documentation  Taken 1/10/2024 0750 by Jordyn Everett RN  Activity Management: activity minimized  Goal: Optimal Comfort and Wellbeing  Outcome: Ongoing, Progressing  Goal: Readiness for Transition of Care  Outcome: Ongoing, Progressing     Problem: COPD (Chronic Obstructive Pulmonary Disease) Comorbidity  Goal: Maintenance of COPD Symptom Control  Outcome: Ongoing, Progressing  Intervention: Maintain COPD-Symptom Control  Recent Flowsheet Documentation  Taken 1/10/2024 1200 by Jordyn Everett RN  Medication Review/Management: medications reviewed  Taken 1/10/2024 1000 by Jordyn Everett RN  Medication Review/Management: medications reviewed  Taken 1/10/2024 0800 by Jordyn Everett RN  Medication Review/Management: medications reviewed  Taken 1/10/2024 0750 by Jordyn Everett RN  Medication Review/Management: medications  reviewed     Problem: Hypertension Comorbidity  Goal: Blood Pressure in Desired Range  Outcome: Ongoing, Progressing  Intervention: Maintain Blood Pressure Management  Recent Flowsheet Documentation  Taken 1/10/2024 1200 by Jordyn Everett RN  Medication Review/Management: medications reviewed  Taken 1/10/2024 1000 by Jordyn Everett RN  Medication Review/Management: medications reviewed  Taken 1/10/2024 0800 by Jordyn Everett RN  Medication Review/Management: medications reviewed  Taken 1/10/2024 0750 by Jordyn Everett RN  Medication Review/Management: medications reviewed     Problem: Fall Injury Risk  Goal: Absence of Fall and Fall-Related Injury  Outcome: Ongoing, Progressing  Intervention: Identify and Manage Contributors  Recent Flowsheet Documentation  Taken 1/10/2024 1200 by Jordyn Everett RN  Medication Review/Management: medications reviewed  Taken 1/10/2024 1000 by Jordyn Everett RN  Medication Review/Management: medications reviewed  Taken 1/10/2024 0800 by Jordyn Everett RN  Medication Review/Management: medications reviewed  Taken 1/10/2024 0750 by Jordyn Everett RN  Medication Review/Management: medications reviewed  Intervention: Promote Injury-Free Environment  Recent Flowsheet Documentation  Taken 1/10/2024 1200 by Jordyn Everett RN  Safety Promotion/Fall Prevention: safety round/check completed  Taken 1/10/2024 1000 by Jordyn Everett RN  Safety Promotion/Fall Prevention: safety round/check completed  Taken 1/10/2024 0800 by Jordyn Everett RN  Safety Promotion/Fall Prevention: safety round/check completed  Taken 1/10/2024 0750 by Jordyn Everett RN  Safety Promotion/Fall Prevention: safety round/check completed   Goal Outcome Evaluation:  Plan of Care Reviewed With: patient        Progress: improving

## 2024-01-10 NOTE — OUTREACH NOTE
Prep Survey      Flowsheet Row Responses   [facility] patient discharged from? [place]   Is LACE score < 7 ? No   Eligibility [facility]   Date of Admission 01/04/24   Date of Discharge 01/10/24   Discharge Disposition Home or Self Care   Discharge diagnosis hypoxia, COVID-19, Cavitary lung lesion of right upper lobe   Does the patient have one of the following disease processes/diagnoses(primary or secondary)? Other   Does the patient have Home health ordered? No   Is there a DME ordered? No   Prep survey completed? Yes            Elisabet GAMING - Registered Nurse

## 2024-01-10 NOTE — PROGRESS NOTES
"[facility]  [facility]    Chief Complaint  Congestive Heart Failure    Subjective    History of Present Illness {CC  Problem List  Visit  Diagnosis   Encounters  Notes  Medications  Labs  Result Review Imaging  Media :23}     Francisco Clark is a 63 y.o. male with Hypertension, dilated cardiomyopathy (EF 46-50% 1/2023), possible COPD, who presents today as a hospital referral for HF.    Patient admitted 1/4 with COVID-19 and hypoxia.  CTA chest showed a 25 mm cavitary lesion in the right upper lobe.  He was evaluated by pulmonary who recommended to treat as community-acquired pneumonia and repeat CT chest in 3 months.    He reports he is doing well.  Shortness of breath has improved.  Denies any lower extremity edema, orthopnea/PND or weight gain        Objective     Vital Signs:   Vitals:    01/15/24 0944   BP: 122/56   BP Location: Left arm   Patient Position: Sitting   Cuff Size: Adult   Pulse: 89   Resp: 18   Temp: 97.4 °F (36.3 °C)   TempSrc: Temporal   SpO2: 95%   Weight: 56.2 kg (124 lb)   Height: 175.3 cm (69\")       Body mass index is 18.31 kg/m².  Physical Exam  Vitals reviewed.   Constitutional:       Appearance: Normal appearance.   HENT:      Head: Normocephalic.   Neck:      Vascular: No carotid bruit.   Cardiovascular:      Rate and Rhythm: Normal rate and regular rhythm.      Pulses: Normal pulses.      Heart sounds: Normal heart sounds, S1 normal and S2 normal. No murmur heard.  Pulmonary:      Effort: Pulmonary effort is normal. No respiratory distress.      Breath sounds: Normal breath sounds. Examination of the right-upper field reveals wheezing. Examination of the left-upper field reveals wheezing.   Chest:      Chest wall: No tenderness.   Abdominal:      General: Abdomen is flat.      Palpations: Abdomen is soft.   Musculoskeletal:      Cervical back: Neck supple.      Right lower leg: No edema.      Left lower leg: No edema.   Skin:     General: Skin " is warm and dry.   Neurological:      General: No focal deficit present.      Mental Status: He is alert and oriented to person, place, and time. Mental status is at baseline.   Psychiatric:         Mood and Affect: Mood normal.         Behavior: Behavior normal.         Thought Content: Thought content normal.              Result Review  Data Reviewed:{ Labs  Result Review  Imaging  Med Tab  Media :23}     Adult Transthoracic Echo Complete W/ Cont if Necessary Per Protocol (01/10/2023 14:08)   Comprehensive Metabolic Panel (01/10/2024 06:22)  CBC & Differential (01/10/2024 06:22)  proBNP (01/06/2024 08:38)           Assessment and Plan {CC Problem List  Visit Diagnosis  ROS  Review (Popup)  Health Maintenance  Quality  BestPractice  Medications  SmartSets  SnapShot Encounters  Media :23}   1.  Dilated cardiomyopathy  -LVEF improved to 46 to 50% per echo 1/2023  - Normal proBNPs inpatient  -Appears euvolemic  -Continue carvedilol, Entresto and Jardiance.  Did not tolerate spironolactone due to hyperkalemia.  Did not tolerate higher doses of Entresto due to hypotension.      2. Essential hypertension  - Well controlled  - Continue GDMT    3. COVID 19/PNA/COPD/RUL Cavitary lesion  - s/p antibiotics and steroids  - Keep follow up 1/19 with pulmonar    Keep upcoming appt with Dr. Coreas      Follow Up {Instructions Charge Capture  Follow-up Communications :23}   Return if symptoms worsen or fail to improve.    Patient was given instructions and counseling regarding his condition or for health maintenance advice. Please see specific information pulled into the AVS if appropriate.  Advised to call the [facility] with any questions, concerns, or worsening symptoms.

## 2024-01-10 NOTE — PROGRESS NOTES
"PULMONARY PROGRESS NOTE    Patient Care Team:  Charito Weiss APRN as PCP - General (Internal Medicine)  Anatoly Laird MD as Consulting Physician (Pulmonary Disease)    Reason for Consult     COVID-19 1/4/24  Dyspnea  Possible cavitary lesion  COPD/emphysema  Cardiomyopathy with EF 15%      Subjective     Interval History:     His oxygenation is better and on room air his saturation is ranging from 98 to 94%.  He continues to feel improved and is currently denying a productive cough.  He denies chest pain or edema.  He is tolerating a p.o. diet with no nausea or vomiting    Review of Systems:     ROS negative except for: Shortness of air with any activity, productive cough      Objective     Vital Signs  Blood pressure 98/61, pulse 71, temperature 98 °F (36.7 °C), temperature source Oral, resp. rate 16, height 175.3 cm (69\"), weight 54.1 kg (119 lb 3.2 oz), SpO2 (!) 88%.    Physical Exam:  GENERAL: Thin middle-aged gentleman sitting upright in bed in no acute respiratory distress  HEENT: Atraumatic.  Conjunctiva pink.  Oral mucosa moist  NECK: Trachea midline, no palpable thyroid  CHEST: Diminished chest excursion with improved rhonchi, no wheezing  HEART: Regular rhythm, S1, S2 auscultated  ABDOMEN: Flat, bowel sounds present, soft, nontender  EXTREMITIES: No pitting edema or clubbing  NEURO: Awake, oriented,  equal, general decreased musculature     Results Review:    Lab Results (last 24 hours)       Procedure Component Value Units Date/Time    Comprehensive Metabolic Panel [[id number]]  (Abnormal) Collected: 01/10/24 0622    Specimen: Blood Updated: 01/10/24 0748     Glucose 91 mg/dL      BUN 41 mg/dL      Creatinine 0.80 mg/dL      Sodium 137 mmol/L      Potassium 5.0 mmol/L      Comment: Slight hemolysis detected by analyzer. Result may be falsely elevated.        Chloride 99 mmol/L      CO2 31.0 mmol/L      Calcium 9.5 mg/dL      Total Protein 6.7 g/dL      Albumin 3.5 g/dL      ALT (SGPT) 24 U/L      " AST (SGOT) 23 U/L      Alkaline Phosphatase 57 U/L      Total Bilirubin 0.2 mg/dL      Globulin 3.2 gm/dL      Comment: Calculated Result        A/G Ratio 1.1 g/dL      BUN/Creatinine Ratio 51.3     Anion Gap 7.0 mmol/L      eGFR 98.8 mL/min/1.73     Narrative:      GFR Normal >60  Chronic Kidney Disease <60  Kidney Failure <15      CBC & Differential [[id number]]  (Abnormal) Collected: 01/10/24 0622    Specimen: Blood Updated: 01/10/24 0659    Narrative:      The following orders were created for panel order CBC & Differential.  Procedure                               Abnormality         Status                     ---------                               -----------         ------                     CBC Auto Differential[[id number]]        Abnormal            Final result                 Please view results for these tests on the individual orders.    CBC Auto Differential [[id number]]  (Abnormal) Collected: 01/10/24 0622    Specimen: Blood Updated: 01/10/24 0659     WBC 9.24 10*3/mm3      RBC 5.16 10*6/mm3      Hemoglobin 15.9 g/dL      Hematocrit 49.3 %      MCV 95.5 fL      MCH 30.8 pg      MCHC 32.3 g/dL      RDW 13.3 %      RDW-SD 47.6 fl      MPV 9.4 fL      Platelets 216 10*3/mm3      Neutrophil % 63.3 %      Lymphocyte % 22.7 %      Monocyte % 8.1 %      Eosinophil % 0.1 %      Basophil % 1.0 %      Immature Grans % 4.8 %      Neutrophils, Absolute 5.85 10*3/mm3      Lymphocytes, Absolute 2.10 10*3/mm3      Monocytes, Absolute 0.75 10*3/mm3      Eosinophils, Absolute 0.01 10*3/mm3      Basophils, Absolute 0.09 10*3/mm3      Immature Grans, Absolute 0.44 10*3/mm3      nRBC 0.0 /100 WBC     Blood Culture - Blood, Hand, Right [[id number]]  (Normal) Collected: 01/05/24 0514    Specimen: Blood from Hand, Right Updated: 01/10/24 0600     Blood Culture No growth at 5 days    Blood Culture - Blood, Arm, Left [[id number]]  (Normal) Collected: 01/05/24 0508    Specimen: Blood from Arm, Left Updated: 01/10/24 0600      Blood Culture No growth at 5 days    AFB Culture - Sputum, Cough [[id number]] Updated: 01/09/24 1611    Specimen: Sputum from Cough     AFB Culture - Sputum, Cough [[id number]] Collected: 01/09/24 1555    Specimen: Sputum from Cough Updated: 01/09/24 1608          Imaging Results (Last 24 Hours)       ** No results found for the last 24 hours. **         Impression: CTA of the chest January 5, 2024  1.No evidence of pulmonary embolism.   2.25 mm cavitary lesion in the right upper lobe with internal debris or septations. This could be related to an infectious or inflammatory process. Malignancy cannot be excluded. PET/CT follow-up could provide more information.   3.Moderate emphysema. Emphysema on CT is an independent risk factor for lung cancer. Low dose lung cancer screening should be considered if patient qualifies based on smoking history.   4.Mild coronary artery calcification and mild aortic atherosclerosis.         Electronically Signed: Tres Chau MD    1/5/2024 12:50 AM EST     albuterol sulfate HFA, 2 puff, Inhalation, 4x Daily - RT  budesonide-formoterol, 2 puff, Inhalation, BID - RT  carvedilol, 25 mg, Oral, BID With Meals  dexAMETHasone, 6 mg, Oral, Daily   Or  dexAMETHasone, 6 mg, Intravenous, Daily  empagliflozin, 10 mg, Oral, Daily  enoxaparin, 40 mg, Subcutaneous, Q24H  pantoprazole, 40 mg, Oral, Q AM  piperacillin-tazobactam, 3.375 g, Intravenous, Q8H  sacubitril-valsartan, 1 tablet, Oral, BID  tiotropium bromide monohydrate, 2 puff, Inhalation, Daily - RT          Hypoxia    Essential hypertension    Congestive heart failure, unspecified HF chronicity, unspecified heart failure type    COVID-19 virus infection    COPD (chronic obstructive pulmonary disease)    Cavitary lesion of lung    Elevated troponin    COPD with acute exacerbation      Assessment & Plan     63-year-old gentleman, lifelong smoker with cardiomyopathy, advanced emphysema, COVID-19, hospitalized January 4 with worsening  oxygenation and dyspnea.  At baseline he does not require supplemental oxygen.  He did receive remdesivir and Decadron, completing January 8.  He was also empirically placed on vancomycin and Zosyn.  Vancomycin was stopped when MRSA swab was negative.  Doxycycline added for atypical coverage.  CT of the chest was negative for PE but revealed extensive emphysematous changes.  He had a possible cavitary lesion in the right upper lobe centrally.  It is a small area that is overlapping an area of emphysema.  Malignancy cannot be excluded but would not biopsy with his acute exacerbation.  It is more prominent now than it was on CT scan from 2018 and 2022.  He continues to require 3 L nasal cannula.  AFB smear is negative.  Respiratory culture revealed usual [place].  C-reactive protein is improving.  Fungal urinary antigens are pending.    In terms of his COPD he had pulmonary function test in October 2022 that revealed severe obstruction with an FEV1 of 1.37 L, 39% and a ratio of 47%.  This is compatible with stage III.  Wheezing has resolved.  He completed therapy for COVID.  He remains on Zosyn for persistent productive cough.  His respiratory culture revealed usual [place].  White blood cell count is normal.    He has a known history of chronic heart failure with an EF of 15%.  EF has improved and on his last echocardiogram January 2023 EF was 46 to 50%.  He does not clinically appear to be in congestive failure without effusions or edema.  He does have persistent cardiomegaly on CT and chest x-ray.  His proBNP is only 161.  Heart catheterization revealed no coronary disease October 2022.      -Ability has improved I do think he can go home  -Will likely need home oxygen, with exertion and at night  -Symbicort 2 puffs twice daily  -Albuterol HFA  - Spiriva Respimat 2 puffs daily  -Blasto and histo urinary antigen  -Continue Decadron 6 mg daily to complete a 10-day course  -Lovenox 40 mg subcutaneous daily for DVT  prophylaxis  -Stop antibiotic  -Okay for discharge  -He will need a 3-month CT scan to reevaluate right upper lobe lesion  -Has follow-up January 19, 2024 with NATALIO Ross in our office      Yolanda Sosa MD  01/10/24  12:00 EST      Time: 25 minutes

## 2024-01-10 NOTE — PROGRESS NOTES
Continued Stay Note  [facility]     Patient Name: Francisco Clark  MRN: [id number]  Today's Date: 1/10/2024    Admit Date: 1/4/2024        Discharge Plan       Row Name 01/10/24 1419       Plan    Final Discharge Disposition Code 01 - home or self-care    Final Note Mr. Clark will be discharged and he had an oxygen tank delivered to his hospital room today.                   Discharge Codes    No documentation.                 Expected Discharge Date and Time       Expected Discharge Date Expected Discharge Time    Dimitry 10, 2024               ZAN Velez

## 2024-01-10 NOTE — DISCHARGE SUMMARY
[facility] Medicine Services  DISCHARGE SUMMARY    Patient Name: Francisco Clark  : 1960  MRN: [id number]    Date of Admission: 2024  8:41 PM  Date of Discharge:  1/10/24  Primary Care Physician: Charito Weiss APRN    Consults       Date and Time Order Name Status Description    2024  3:19 AM Inpatient Pulmonology Consult Completed             Hospital Course     Presenting Problem: Dyspnea.    Active Hospital Problems    Diagnosis  POA   • **Hypoxia [R09.02]  Yes   • COVID-19 virus infection [U07.1]  Unknown   • COPD (chronic obstructive pulmonary disease) [J44.9]  Unknown   • Cavitary lesion of lung [J98.4]  Unknown   • Elevated troponin [R79.89]  Unknown   • COPD with acute exacerbation [J44.1]  Yes   • Congestive heart failure, unspecified HF chronicity, unspecified heart failure type [I50.9]  Yes   • Essential hypertension [I10]  Yes      Resolved Hospital Problems   No resolved problems to display.          Hospital Course:  Francisco Clark is a 63 y.o. male with hx of HFrEF, COPD, HTN, and tobacco abuse who presents due to worsening SOA over the past 10 days.      Copied text in this note has been reviewed and is accurate as of 01/10/24.     Acute hypoxic respiratory failure 2/2 below. improving  COVID-19 infection  Coronavirus OC43  COPD  --Hypoxic to 86% on room air, does not wear home oxygen  -- Initially required 3 liters.  --CTA chest with RUL cavitary lesion, no other infiltrates noted  --Respiratory PCR panel positive for COVID-19 as well as Coronavirus OC43  --Started on Remdesivir and Dexamethasone. Continue Decadron 6 mg daily to complete a 10-day course   --Pt was started Zosyn and Doxy. Switched to Augmentin and doxy on discharge.   --Scheduled albuterol MDI 4x daily   --Repeat CXR 24 no acute findings  -- AFB negative. MRSA PCR negative, stopped Vanc  --Blood cultures NGTD, sputum culture with normal respiratory [place] thus far  --Started on  Remdesivir and Dexamethasone. Continue Decadron 6 mg daily to complete a 10-day course   --Pt was started Zosyn and Doxy. Switched to Augmentin and doxy on discharge.   -- Will need home oxygen on discharge. 1-2 with activities.      Cavitary lung lesion of right upper lobe  --CTA chest: 25 mm cavitary lesion in right upper lobe with internal debris or septations, moderate emphysema   --Pulmonary/Dr. Mcgrath has evaluated, appears to be a small area of infiltrate, but cannot rule out malignancy: recommends treat as community acquired pneumonia and repeat CT chest in 3 months; has F/U with Kayla Ross APRN 1/19/24       HFrEF with recovery   Dilated nonischemic cardiomyopathy  --EF previously <20% in October 2022, seems to have recovered on Echo January 2023 (46-50%)  --Followed by Dr. Coreas/Cardiology  --Continue home meds Coreg, Entresto, Jardiance, Bumex      Discharge Follow Up Recommendations for outpatient labs/diagnostics:  Follow up with pcp in 1 week. Blasto and histo urinary antigen pending.  Follow up with pulmonary in 3 months with repeated CT chest.    Day of Discharge     HPI:   Seen and examined.    Review of Systems  No fever, chills, CP or abd pain.    Vital Signs:   Temp:  [97.6 °F (36.4 °C)-98.7 °F (37.1 °C)] 98 °F (36.7 °C)  Heart Rate:  [66-96] 71  Resp:  [16-18] 16  BP: ()/(52-70) 98/61  Flow (L/min):  [1.5-3] 1.5      Physical Exam:  Gen-no acute distress  HENT-NCAT, mucous membranes moist  CV-RRR, S1 S2 normal, no m/r/g  Resp-diminished bilaterally, no wheezes today, nonlabored on 3 liters   Abd-soft, NT, ND, +BS  Ext-no edema  Neuro-A&Ox3, no focal deficits  Skin-no rashes  Psych-appropriate mood    Pertinent  and/or Most Recent Results     LAB RESULTS:      Lab 01/10/24  0622 01/08/24  0642 01/07/24  0557 01/06/24  0838 01/04/24  2124   WBC 9.24 7.90 8.81 8.54 10.27   HEMOGLOBIN 15.9 15.1 14.7 16.1 15.4   HEMATOCRIT 49.3 47.4 46.5 51.6* 48.3   PLATELETS 216 220 232 281 258   NEUTROS  ABS 5.85 5.78 6.86 6.86 7.92*   IMMATURE GRANS (ABS) 0.44* 0.14* 0.03 0.04 0.04   LYMPHS ABS 2.10 1.47 1.23 1.12 1.00   MONOS ABS 0.75 0.49 0.68 0.51 1.27*   EOS ABS 0.01 0.00 0.00 0.00 0.01   MCV 95.5 98.3* 97.1* 99.6* 97.4*   CRP  --  1.24* 1.97* 4.20* 8.20*   PROCALCITONIN  --   --   --   --  0.10   LACTATE  --   --   --   --  0.7   LDH  --   --   --   --  324*   D DIMER QUANT  --   --   --   --  0.38         Lab 01/10/24  0622 01/08/24  0642 01/07/24  0557 01/06/24  0838 01/05/24  0514   SODIUM 137 137 140 139 133*   POTASSIUM 5.0 4.5 4.5 5.1 5.0   CHLORIDE 99 99 101 98 97*   CO2 31.0* 26.0 30.0* 34.0* 23.0   ANION GAP 7.0 12.0 9.0 7.0 13.0   BUN 41* 41* 37* 37* 25*   CREATININE 0.80 0.89 0.76 1.06 0.79   EGFR 98.8 95.7 100.4 78.4 99.2   GLUCOSE 91 135* 104* 149* 149*   CALCIUM 9.5 9.5 10.4 11.3* 9.5         Lab 01/10/24  0622 01/08/24  0642 01/07/24  0557 01/06/24  0838 01/05/24  0514   TOTAL PROTEIN 6.7 6.5 6.3 7.3 7.7   ALBUMIN 3.5 3.7 3.4* 4.0 3.5   GLOBULIN 3.2 2.8 2.9 3.3 4.2   ALT (SGPT) 24 15 13 12 12   AST (SGOT) 23 17 16 16 21   BILIRUBIN 0.2 0.2 <0.2 0.2 0.2   ALK PHOS 57 59 56 71 69         Lab 01/06/24  0838 01/05/24  0707 01/05/24  0514 01/04/24  2319 01/04/24 2124   PROBNP 160.9  --   --   --  294.2   HSTROP T  --  23* 26* 32*  --                  Lab 01/04/24 2134   PH, ARTERIAL 7.365   PCO2, ARTERIAL 52.1*   PO2 ART 83.9   FIO2 32   HCO3 ART 29.7*   BASE EXCESS ART 3.1*   CARBOXYHEMOGLOBIN 1.8     Brief Urine Lab Results       None          Microbiology Results (last 10 days)       Procedure Component Value - Date/Time    AFB Culture - Sputum, Cough [[id number]] Collected: 01/06/24 1050    Lab Status: Preliminary result Specimen: Sputum from Cough Updated: 01/07/24 1209     AFB Stain No acid fast bacilli seen on concentrated smear    Respiratory Culture - Sputum, Cough [[id number]] Collected: 01/06/24 1050    Lab Status: Final result Specimen: Sputum from Cough Updated: 01/08/24 1014      Respiratory Culture Scant growth (1+) Normal respiratory [place]. No S. aureus or Pseudomonas aeruginosa detected. Final report.     Gram Stain Moderate (3+) WBCs per low power field      Rare (1+) Epithelial cells per low power field      Few (2+) Gram positive cocci in pairs    MRSA Screen, PCR (Inpatient) - Swab, Nares [[id number]]  (Normal) Collected: 01/05/24 0546    Lab Status: Final result Specimen: Swab from Nares Updated: 01/05/24 0843     MRSA PCR Negative    Narrative:      The negative predictive value of this diagnostic test is high and should only be used to consider de-escalating anti-MRSA therapy. A positive result may indicate colonization with MRSA and must be correlated clinically.  MRSA Negative    Blood Culture - Blood, Hand, Right [[id number]]  (Normal) Collected: 01/05/24 0514    Lab Status: Final result Specimen: Blood from Hand, Right Updated: 01/10/24 0600     Blood Culture No growth at 5 days    Blood Culture - Blood, Arm, Left [[id number]]  (Normal) Collected: 01/05/24 0508    Lab Status: Final result Specimen: Blood from Arm, Left Updated: 01/10/24 0600     Blood Culture No growth at 5 days    Respiratory Culture - Sputum, Cough [[id number]] Collected: 01/04/24 2319    Lab Status: Final result Specimen: Sputum from Cough Updated: 01/05/24 0654     Respiratory Culture Rejected     Gram Stain Many (4+) Gram positive cocci in pairs, chains and clusters      Moderate (3+) Gram negative bacilli      Moderate (3+) Epithelial cells per low power field      Rare (1+) WBCs per low power field    Narrative:      Specimen rejected due to oropharyngeal contamination. Please reorder and recollect specimen if clinically necessary.  Specimen rejected due to oropharyngeal contamination.    COVID PRE-OP / PRE-PROCEDURE SCREENING ORDER (NO ISOLATION) - Swab, Nasopharynx [[id number]]  (Abnormal) Collected: 01/04/24 2135    Lab Status: Final result Specimen: Swab from Nasopharynx Updated: 01/04/24 2324     Narrative:      The following orders were created for panel order COVID PRE-OP / PRE-PROCEDURE SCREENING ORDER (NO ISOLATION) - Swab, Nasopharynx.  Procedure                               Abnormality         Status                     ---------                               -----------         ------                     Respiratory Panel PCR w/...[[id number]]  Abnormal            Final result                 Please view results for these tests on the individual orders.    Respiratory Panel PCR w/COVID-19(SARS-CoV-2) [facility] In-House, NP Swab in UTM/VTM, 2 HR TAT - Swab, Nasopharynx [[id number]]  (Abnormal) Collected: 01/04/24 2135    Lab Status: Final result Specimen: Swab from Nasopharynx Updated: 01/04/24 2324     ADENOVIRUS, PCR Not Detected     Coronavirus 229E Not Detected     Coronavirus HKU1 Not Detected     Coronavirus NL63 Not Detected     Coronavirus OC43 Detected     COVID19 Detected     Human Metapneumovirus Not Detected     Human Rhinovirus/Enterovirus Not Detected     Influenza A PCR Not Detected     Influenza B PCR Not Detected     Parainfluenza Virus 1 Not Detected     Parainfluenza Virus 2 Not Detected     Parainfluenza Virus 3 Not Detected     Parainfluenza Virus 4 Not Detected     RSV, PCR Not Detected     Bordetella pertussis pcr Not Detected     Bordetella parapertussis PCR Not Detected     Chlamydophila pneumoniae PCR Not Detected     Mycoplasma pneumo by PCR Not Detected    Narrative:      In the setting of a positive respiratory panel with a viral infection PLUS a negative procalcitonin without other underlying concern for bacterial infection, consider observing off antibiotics or discontinuation of antibiotics and continue supportive care. If the respiratory panel is positive for atypical bacterial infection (Bordetella pertussis, Chlamydophila pneumoniae, or Mycoplasma pneumoniae), consider antibiotic de-escalation to target atypical bacterial infection.            XR Chest 1  View    Result Date: 1/6/2024  XR CHEST 1 VW Date of Exam: 1/6/2024 1:42 PM EST Indication: F/U hypoxia Comparison: Chest x-ray 1/4/2024 Findings: Stable cardiomediastinal silhouette within normal limits. There is background emphysema. No focal consolidation. No pleural effusion or pneumothorax.     Impression: No acute cardiopulmonary findings. Unchanged emphysema. Electronically Signed: Thomas Allen MD  1/6/2024 2:36 PM EST  Workstation ID: [id number]    CT Angiogram Chest    Result Date: 1/5/2024  CT ANGIOGRAM CHEST Date of Exam: 1/5/2024 12:24 AM EST Indication: cough, SOA, COPD, hypoxia,. Comparison: None available. Technique: CTA of the chest was performed after the uneventful intravenous administration of 80 mL of Isovue-370. Reconstructed coronal and sagittal images were also obtained. In addition, a 3-D volume rendered image was created for interpretation. Automated exposure control and iterative reconstruction methods were used. Findings: The thyroid, trachea and esophagus appear within normal limits. Heart size is normal. There is mild aortic atherosclerotic disease. No aneurysm. The main pulmonary artery is normal diameter. There is no evidence of pulmonary embolism. No pericardial effusion or mediastinal lymphadenopathy. There is mild coronary artery calcification. There is moderate emphysema. Emphysema on CT is an independent risk factor for lung cancer. Low dose lung cancer screening should be considered if patient qualifies based on smoking history or if not already enrolled in a screening program. There is an irregular cavitary lesion in the right perihilar aspect of the right upper lobe measuring 25 mm total diameter. There is internal debris or septations in this semisolid lesion. There is mild adjacent peribronchial thickening. There is a small amount of debris in the right bronchus intermedius. There is additional peribronchial thickening in the right lower lobe dependently. There is no  pneumothorax or pleural effusion. No lobar consolidation. There are no acute findings in the superficial soft tissues. No acute findings in the upper abdomen. There are no acute osseous abnormalities or destructive bone lesions. There are mild to moderate lower cervical degenerative changes.     Impression: 1.No evidence of pulmonary embolism. 2.25 mm cavitary lesion in the right upper lobe with internal debris or septations. This could be related to an infectious or inflammatory process. Malignancy cannot be excluded. PET/CT follow-up could provide more information. 3.Moderate emphysema. Emphysema on CT is an independent risk factor for lung cancer. Low dose lung cancer screening should be considered if patient qualifies based on smoking history. 4.Mild coronary artery calcification and mild aortic atherosclerosis. Electronically Signed: Tres Chau MD  1/5/2024 12:50 AM EST  Workstation ID: [id number]    XR Chest 1 View    Result Date: 1/4/2024  XR CHEST 1 VW Date of Exam: 1/4/2024 8:44 PM EST Indication: SOA triage protocol Comparison: 10/19/2022. Findings: Chronic interstitial and emphysematous changes are present. There is no focal consolidation. There is no effusion or pneumothorax. Unremarkable heart and mediastinal contours.     Impression: No evidence of acute disease. Electronically Signed: Vishnu Chan MD  1/4/2024 9:10 PM EST  Workstation ID: [id number]             Results for orders placed during the hospital encounter of 01/10/23    Adult Transthoracic Echo Complete W/ Cont if Necessary Per Protocol    Interpretation Summary  •  Left ventricular systolic function is mildly decreased. Left ventricular ejection fraction appears to be 46 - 50%.  •  Left ventricular wall thickness is consistent with mild concentric hypertrophy.  •  Left ventricular diastolic function is consistent with (grade II w/high LAP) pseudonormalization.  •  Estimated right ventricular systolic pressure from tricuspid regurgitation  is normal (<35 mmHg).      Plan for Follow-up of Pending Labs/Results:   Pending Labs       Order Current Status    AFB Culture - Sputum, Cough In process    Blastomyces Antigen - Urine, Urine, Clean Catch In process    Fungal Antibodies, Quantitative Double Immunodiffusion In process    Histoplasma Ag Ur - Urine, Urine, Clean Catch In process    AFB Culture - Sputum, Cough Preliminary result          Discharge Details        Discharge Medications        New Medications        Instructions Start Date   amoxicillin-clavulanate 875-125 MG per tablet  Commonly known as: AUGMENTIN   1 tablet, Oral, 2 Times Daily      dexAMETHasone 6 MG tablet  Commonly known as: DECADRON   6 mg, Oral, Daily   Start Date: January 11, 2024     doxycycline 100 MG capsule  Commonly known as: VIBRAMYCIN   100 mg, Oral, 2 Times Daily      guaifenesin-dextromethorphan  MG tablet sustained-release 12 hour tablet   2 tablets, Oral, 2 Times Daily      tiotropium bromide monohydrate 2.5 MCG/ACT aerosol solution inhaler  Commonly known as: SPIRIVA RESPIMAT   2 puffs, Inhalation, Daily - RT             Continue These Medications        Instructions Start Date   albuterol sulfate  (90 Base) MCG/ACT inhaler  Commonly known as: Proventil HFA   2 puffs, Inhalation, Every 4 Hours PRN      carvedilol 25 MG tablet  Commonly known as: Coreg   25 mg, Oral, 2 Times Daily With Meals      empagliflozin 10 MG tablet tablet  Commonly known as: JARDIANCE   10 mg, Oral, Daily      Entresto 24-26 MG tablet  Generic drug: sacubitril-valsartan   1 tablet, Oral, 2 Times Daily      mometasone-formoterol 200-5 MCG/ACT inhaler  Commonly known as: DULERA 200   2 puffs, Inhalation, 2 Times Daily - RT             ASK your doctor about these medications        Instructions Start Date   bumetanide 0.5 MG tablet  Commonly known as: BUMEX   0.5 mg, Oral, As Needed               No Known Allergies      Discharge Disposition:  Home or Self  Care    Diet:  Hospital:  Diet Order   Procedures   • Diet: Cardiac Diets; Healthy Heart (2-3 Na+); Texture: Regular Texture (IDDSI 7); Fluid Consistency: Thin (IDDSI 0)            Activity: As tolerated.           CODE STATUS:    Code Status and Medical Interventions:   Ordered at: 01/05/24 0053     Code Status (Patient has no pulse and is not breathing):    CPR (Attempt to Resuscitate)     Medical Interventions (Patient has pulse or is breathing):    Full Support       Future Appointments   Date Time Provider Department Center   1/19/2024  9:00 AM Kayla Ross APRN [facility] [place]   3/26/2024  9:30 AM Amador Coreas III, MD [facility] [place]       Additional Instructions for the Follow-ups that You Need to Schedule       Discharge Follow-up with PCP   As directed       Currently Documented PCP:    Charito Weiss APRN    PCP Phone Number:    [phone]     Follow Up Details: 2 weeks        Discharge Follow-up with Specified Provider: Dr. Mcgrath, Pulmonary in 2-4 weeks   As directed      To: Dr. Mcgrath Pulmonary in 2-4 weeks                      Evy Ramirez MD  01/10/24      Time Spent on Discharge:  I spent  35  minutes on this discharge activity which included: face-to-face encounter with the patient, reviewing the data in the system, coordination of the care with the nursing staff as well as consultants, documentation, and entering orders.

## 2024-01-10 NOTE — THERAPY DISCHARGE NOTE
Patient Name: Francisco Clark  : 1960    MRN: [id number]                              Today's Date: 1/10/2024       Admit Date: 2024    Visit Dx:     ICD-10-CM ICD-9-CM   1. COVID-19 virus infection  U07.1 079.89   2. Acute on chronic respiratory failure with hypoxia and hypercapnia  J96.21 518.84    J96.22 786.09     799.02   3. COPD with acute exacerbation  J44.1 491.21   4. Cough productive of purulent sputum  R05.8 786.2   5. Pulmonary cavitary lesion  J98.4 518.89   6. Tobacco dependence  F17.200 305.1   7. History of hypertension  Z86.79 V12.59   8. History of CHF (congestive heart failure)  Z86.79 V12.59   9. History of peptic ulcer disease  Z87.11 V12.71   10. Hypoxia  R09.02 799.02   11. Cavitary lesion of lung  J98.4 518.89   12. Congestive heart failure, unspecified HF chronicity, unspecified heart failure type  I50.9 428.0     Patient Active Problem List   Diagnosis    Chest pain    Centrilobular emphysema    Tobacco abuse    Essential hypertension    Precordial pain    Dilated cardiomyopathy    Congestive heart failure, unspecified HF chronicity, unspecified heart failure type    Severe malnutrition    Acute on chronic HFrEF (heart failure with reduced ejection fraction)    Oropharyngeal dysphagia    Hypoxia    COVID-19 virus infection    COPD (chronic obstructive pulmonary disease)    Cavitary lesion of lung    Elevated troponin    COPD with acute exacerbation     Past Medical History:   Diagnosis Date    CHF (congestive heart failure)     COPD (chronic obstructive pulmonary disease)     History of stomach ulcers     Hypertension      Past Surgical History:   Procedure Laterality Date    CARDIAC CATHETERIZATION N/A 10/18/2022    Procedure: LEFT HEART CATH;  Surgeon: Kd Roque MD;  Location: [facility] CATH INVASIVE LOCATION;  Service: Cardiovascular;  Laterality: N/A;    COLONOSCOPY  2018    Dr. AGNES Olea. Normal. Repeat 1- yrs if wihtout significant family history or 5  years if first degree relative younger than age 60 with high rish polyp or colorectal cancer.     NO PAST SURGERIES        General Information       Row Name 01/10/24 1023          Physical Therapy Time and Intention    Document Type progress note/recertification;discharge treatment  -MB     Mode of Treatment physical therapy  -MB       Row Name 01/10/24 1023          General Information    Patient Profile Reviewed yes  -MB     Existing Precautions/Restrictions other (see comments)  Contact and airborne  -MB     Barriers to Rehab medically complex  -MB       Row Name 01/10/24 1023          Cognition    Orientation Status (Cognition) oriented x 4  -MB       Row Name 01/10/24 1023          Safety Issues, Functional Mobility    Impairments Affecting Function (Mobility) endurance/activity tolerance;strength  -MB               User Key  (r) = Recorded By, (t) = Taken By, (c) = Cosigned By      Initials Name Provider Type    Lauryn Patel, PT Physical Therapist                   Mobility       Row Name 01/10/24 1316          Bed Mobility    Supine-Sit [place] (Bed Mobility) independent  -MB     Sit-Supine [place] (Bed Mobility) not tested  -MB       Row Name 01/10/24 1316          Transfers    Comment, (Transfers) STS from EOB/recliner w/ safe and appropriate transfer technique w/ no LOB or instability.  -MB       Row Name 01/10/24 1316          Sit-Stand Transfer    Sit-Stand [place] (Transfers) independent  -MB       Row Name 01/10/24 1316          Gait/Stairs (Locomotion)    [place] Level (Gait) independent  -MB     Distance in Feet (Gait) 150  -MB     Deviations/Abnormal Patterns (Gait) cathryn decreased  -MB     [place] Level (Stairs) unable to assess  -MB     Comment, (Gait/Stairs) Pt. ambulated w/ step through gait mechanics w/ slightly slower pace, navigating obstacles and changing directions. No LOB or instability; SpO2 remained >90% throughout on RA.  -MB               User Key   (r) = Recorded By, (t) = Taken By, (c) = Cosigned By      Initials Name Provider Type    Lauryn Patel, PT Physical Therapist                   Obj/Interventions       Row Name 01/10/24 1319          Motor Skills    Therapeutic Exercise shoulder;hip;knee;ankle  -MB       Row Name 01/10/24 1319          Shoulder (Therapeutic Exercise)    Shoulder AROM (Therapeutic Exercise) bilateral;flexion;extension;aBduction;aDduction;15 repititions  Pulmonary set x 10 reps  -MB       Row Name 01/10/24 1319          Hip (Therapeutic Exercise)    Hip (Therapeutic Exercise) strengthening exercise  -MB     Hip Strengthening (Therapeutic Exercise) bilateral;marching while seated;15 repititions  -MB       Row Name 01/10/24 1319          Knee (Therapeutic Exercise)    Knee (Therapeutic Exercise) strengthening exercise  -MB     Knee AROM (Therapeutic Exercise) LAQ (long arc quad)  -MB     Knee Strengthening (Therapeutic Exercise) bilateral;LAQ (long arc quad);15 repititions  -MB       Row Name 01/10/24 1319          Ankle (Therapeutic Exercise)    Ankle AROM (Therapeutic Exercise) bilateral;plantarflexion;dorsiflexion;10 repetitions  -MB       Row Name 01/10/24 1319          Balance    Balance Assessment sitting static balance;standing static balance;standing dynamic balance  -MB     Static Standing Balance independent  -MB     Dynamic Standing Balance independent  -MB     Position/Device Used, Standing Balance unsupported  -MB     Balance Interventions standing;sit to stand;weight shifting activity;dynamic reaching  -MB               User Key  (r) = Recorded By, (t) = Taken By, (c) = Cosigned By      Initials Name Provider Type    Lauryn Patel, PT Physical Therapist                   Goals/Plan       Row Name 01/10/24 1320          Bed Mobility Goal 1 (PT)    Activity/Assistive Device (Bed Mobility Goal 1, PT) bed mobility activities, all  -MB     [place] Level/Cues Needed (Bed Mobility Goal 1, PT) independent   -MB     Time Frame (Bed Mobility Goal 1, PT) 1 week;short term goal (STG);3 days  -MB     Progress/Outcomes (Bed Mobility Goal 1, PT) goal met  -MB       Row Name 01/10/24 1320          Transfer Goal 1 (PT)    Activity/Assistive Device (Transfer Goal 1, PT) sit-to-stand/stand-to-sit;bed-to-chair/chair-to-bed  -MB     [place] Level/Cues Needed (Transfer Goal 1, PT) independent  -MB     Time Frame (Transfer Goal 1, PT) short term goal (STG);3 days  -MB     Progress/Outcome (Transfer Goal 1, PT) goal met  -MB       Row Name 01/10/24 1320          Gait Training Goal 1 (PT)    Activity/Assistive Device (Gait Training Goal 1, PT) gait (walking locomotion)  -MB     [place] Level (Gait Training Goal 1, PT) supervision required  -MB     Distance (Gait Training Goal 1, PT) 150  -MB     Time Frame (Gait Training Goal 1, PT) long term goal (LTG);1 week  -MB     Progress/Outcome (Gait Training Goal 1, PT) goal met  -MB       Row Name 01/10/24 1320          Patient Education Goal (PT)    Activity (Patient Education Goal, PT) HEP exer  -MB     [place]/Cues/Accuracy (Memory Goal 2, PT) demonstrates adequately;verbalizes understanding  -MB     Time Frame (Patient Education Goal, PT) long term goal (LTG);1 week  -MB     Progress/Outcome (Patient Education Goal, PT) goal met  -MB               User Key  (r) = Recorded By, (t) = Taken By, (c) = Cosigned By      Initials Name Provider Type    Lauryn Patel, PT Physical Therapist                   Clinical Impression       Row Name 01/10/24 1321          Pain    Pretreatment Pain Rating 0/10 - no pain  -MB     Posttreatment Pain Rating 0/10 - no pain  -MB       Row Name 01/10/24 1321          Plan of Care Review    Plan of Care Reviewed With patient  -MB     Progress improving  -MB     Outcome Evaluation Patient demonstrates improved safety/independence w/ mobility and increased activity tolerance, and appears close to his functional baseline. He met all PT goals  and is independent w/ mobility. Pt. ambulated 150ft safely and independently w/ SpO2 remaining >90% on RA. No further acute PT services indicated at this time. Anticipate safe D/C home when medically appropriate.  -MB       Row Name 01/10/24 1321          Vital Signs    Pre SpO2 (%) 93  -MB     O2 Delivery Pre Treatment room air  -MB     Intra SpO2 (%) 97  -MB     O2 Delivery Intra Treatment room air  -MB     Post SpO2 (%) 98  -MB     O2 Delivery Post Treatment room air  -MB     Pre Patient Position Supine  -MB     Intra Patient Position Standing  -MB     Post Patient Position Sitting  -MB       Row Name 01/10/24 1321          Positioning and Restraints    Pre-Treatment Position in bed  -MB     Post Treatment Position chair  -MB     In Chair notified nsg;reclined;call light within reach;encouraged to call for assist;waffle cushion;legs elevated  -MB               User Key  (r) = Recorded By, (t) = Taken By, (c) = Cosigned By      Initials Name Provider Type    MB Lauryn Richardson, PT Physical Therapist                   Outcome Measures       Row Name 01/10/24 1326 01/10/24 0750       How much help from another person do you currently need...    Turning from your back to your side while in flat bed without using bedrails? 4  -MB 4  -MA    Moving from lying on back to sitting on the side of a flat bed without bedrails? 4  -MB 4  -MA    Moving to and from a bed to a chair (including a wheelchair)? 4  -MB 4  -MA    Standing up from a chair using your arms (e.g., wheelchair, bedside chair)? 4  -MB 4  -MA    Climbing 3-5 steps with a railing? 4  -MB 3  -MA    To walk in hospital room? 4  -MB 4  -MA    AM-PAC 6 Clicks Score (PT) 24  -MB 23  -MA    Highest Level of Mobility Goal 8 --> Walked 250 feet or more  -MB 7 --> Walk 25 feet or more  -MA      Row Name 01/10/24 1326          Functional Assessment    Outcome Measure Options AM-PAC 6 Clicks Basic Mobility (PT)  -MB               User Key  (r) = Recorded By, (t) =  Taken By, (c) = Cosigned By      Initials Name Provider Type    Lauryn Patel, PT Physical Therapist    Jordyn Park, RN Registered Nurse                  Physical Therapy Education       Title: PT OT SLP Therapies (Resolved)       Topic: Physical Therapy (Resolved)       Point: Mobility training (Resolved)       Learning Progress Summary             Patient Acceptance, E, VU,NR by LO at 1/8/2024 1403    Comment: PT POC, review of HEP    Eager, E,D,H, VU by DM at 1/5/2024 1701                         Point: Home exercise program (Resolved)       Learning Progress Summary             Patient Acceptance, E, VU,NR by LO at 1/8/2024 1403    Comment: PT POC, review of HEP    Eager, E,D,H, VU by DM at 1/5/2024 1701                         Point: Body mechanics (Resolved)       Learning Progress Summary             Patient Acceptance, E, VU,NR by LO at 1/8/2024 1403    Comment: PT POC, review of HEP    Eager, E,D,H, VU by DM at 1/5/2024 1701                         Point: Precautions (Resolved)       Learning Progress Summary             Patient Acceptance, E, VU,NR by LO at 1/8/2024 1403    Comment: PT POC, review of HEP    Eager, E,D,H, VU by DM at 1/5/2024 1701                                         User Key       Initials Effective Dates Name Provider Type Discipline     02/03/23 -  Domi Cortez, PT Physical Therapist PT     06/16/21 -  Rosa M Burns, LUCY Physical Therapist PT                  PT Recommendation and Plan     Plan of Care Reviewed With: patient  Progress: improving  Outcome Evaluation: Patient demonstrates improved safety/independence w/ mobility and increased activity tolerance, and appears close to his functional baseline. He met all PT goals and is independent w/ mobility. Pt. ambulated 150ft safely and independently w/ SpO2 remaining >90% on RA. No further acute PT services indicated at this time. Anticipate safe D/C home when medically appropriate.     Time Calculation:          PT Charges       Row Name 01/10/24 1326             Time Calculation    Start Time 1023  -MB      PT Received On 01/10/24  -MB         Timed Charges    04536 - PT Therapeutic Exercise Minutes 12  -MB      87405 - Gait Training Minutes  16  -MB         Total Minutes    Timed Charges Total Minutes 28  -MB       Total Minutes 28  -MB                User Key  (r) = Recorded By, (t) = Taken By, (c) = Cosigned By      Initials Name Provider Type    Lauryn Patel, PT Physical Therapist                  Therapy Charges for Today       Code Description Service Date Service Provider Modifiers Qty    26817678106 HC PT THER PROC EA 15 MIN 1/10/2024 Lauryn Richardson, PT GP 1    20373033148 HC GAIT TRAINING EA 15 MIN 1/10/2024 Lauryn Richardson, PT GP 1            PT G-Codes  Outcome Measure Options: AM-PAC 6 Clicks Basic Mobility (PT)  AM-PAC 6 Clicks Score (PT): 24    PT Discharge Summary  Anticipated Discharge Disposition (PT): home with assist, home with home health    Lauryn Richardson, PT  1/10/2024

## 2024-01-10 NOTE — PLAN OF CARE
Goal Outcome Evaluation:  Plan of Care Reviewed With: patient        Progress: improving  Outcome Evaluation: Patient demonstrates improved safety/independence w/ mobility and increased activity tolerance, and appears close to his functional baseline. He met all PT goals and is independent w/ mobility. Pt. ambulated 150ft safely and independently w/ SpO2 remaining >90% on RA. No further acute PT services indicated at this time. Anticipate safe D/C home when medically appropriate.      Anticipated Discharge Disposition (PT): home with assist, home with home health

## 2024-01-11 ENCOUNTER — TRANSITIONAL CARE MANAGEMENT TELEPHONE ENCOUNTER (OUTPATIENT)
Dept: CALL CENTER | Facility: HOSPITAL | Age: 64
End: 2024-01-11
Payer: COMMERCIAL

## 2024-01-11 LAB
A FLAVUS AB SER QL ID: NEGATIVE
A FUMIGATUS AB SER QL ID: NEGATIVE
A NIGER AB SER QL ID: NEGATIVE
B DERMAT AB TITR SER: NEGATIVE {TITER}

## 2024-01-11 NOTE — OUTREACH NOTE
Call Center TCM Note      Flowsheet Row Responses   [facility] patient discharged from? [place]   Does the patient have one of the following disease processes/diagnoses(primary or secondary)? Other   TCM attempt successful? No   Unsuccessful attempts Attempt 2            Kylah Sellers RN    1/11/2024, 13:54 EST

## 2024-01-11 NOTE — OUTREACH NOTE
Call Center TCM Note      Flowsheet Row Responses   [facility] patient discharged from? [place]   Does the patient have one of the following disease processes/diagnoses(primary or secondary)? Other   TCM attempt successful? No   Unsuccessful attempts Attempt 1            Kylah Sellers RN    1/11/2024, 09:06 EST

## 2024-01-12 ENCOUNTER — TRANSITIONAL CARE MANAGEMENT TELEPHONE ENCOUNTER (OUTPATIENT)
Dept: CALL CENTER | Facility: HOSPITAL | Age: 64
End: 2024-01-12
Payer: COMMERCIAL

## 2024-01-12 LAB
BLASTOMYCES AG UR IA-MCNC: NORMAL NG/ML
SPECIMEN SOURCE: NORMAL

## 2024-01-12 NOTE — OUTREACH NOTE
Call Center TCM Note      Flowsheet Row Responses   [facility] patient discharged from? [place]   Does the patient have one of the following disease processes/diagnoses(primary or secondary)? Other   TCM attempt successful? Yes   Call start time 1538   Call end time 1540   Discharge diagnosis hypoxia, COVID-19, Cavitary lung lesion of right upper lobe   Meds reviewed with patient/caregiver? Yes   Is the patient having any side effects they believe may be caused by any medication additions or changes? No   Does the patient have all medications ordered at discharge? Yes   Is the patient taking all medications as directed (includes completed medication regime)? Yes   Comments HOSP DC FU appt 1/15/24 12 pm.   Does the patient have an appointment with their PCP within 7-14 days of discharge? Yes   Has home health visited the patient within 72 hours of discharge? N/A   Psychosocial issues? No   Did the patient receive a copy of their discharge instructions? Yes   Nursing interventions Reviewed instructions with patient   What is the patient's perception of their health status since discharge? Improving   Is the patient/caregiver able to teach back signs and symptoms related to disease process for when to call PCP? Yes   Is the patient/caregiver able to teach back signs and symptoms related to disease process for when to call 911? Yes   Is the patient/caregiver able to teach back the hierarchy of who to call/visit for symptoms/problems? PCP, Specialist, Home health nurse, Urgent Care, ED, 911 Yes   TCM call completed? Yes   Wrap up additional comments Pt reports he is doing better.   Call end time 1540            Bhavani Francis RN    1/12/2024, 15:40 EST

## 2024-01-13 LAB
MYCOBACTERIUM SPEC CULT: NORMAL
NIGHT BLUE STAIN TISS: NORMAL

## 2024-01-14 LAB
MYCOBACTERIUM SPEC CULT: NORMAL
NIGHT BLUE STAIN TISS: NORMAL

## 2024-01-15 ENCOUNTER — OFFICE VISIT (OUTPATIENT)
Dept: CARDIOLOGY | Facility: HOSPITAL | Age: 64
End: 2024-01-15
Payer: COMMERCIAL

## 2024-01-15 ENCOUNTER — OFFICE VISIT (OUTPATIENT)
Dept: INTERNAL MEDICINE | Facility: CLINIC | Age: 64
End: 2024-01-15
Payer: COMMERCIAL

## 2024-01-15 VITALS
HEART RATE: 89 BPM | HEIGHT: 69 IN | DIASTOLIC BLOOD PRESSURE: 56 MMHG | OXYGEN SATURATION: 95 % | BODY MASS INDEX: 18.37 KG/M2 | WEIGHT: 124 LBS | SYSTOLIC BLOOD PRESSURE: 122 MMHG | RESPIRATION RATE: 18 BRPM | TEMPERATURE: 97.4 F

## 2024-01-15 VITALS
HEIGHT: 69 IN | HEART RATE: 83 BPM | SYSTOLIC BLOOD PRESSURE: 98 MMHG | OXYGEN SATURATION: 94 % | BODY MASS INDEX: 18.37 KG/M2 | WEIGHT: 124 LBS | TEMPERATURE: 97.8 F | DIASTOLIC BLOOD PRESSURE: 70 MMHG

## 2024-01-15 DIAGNOSIS — I50.23 ACUTE ON CHRONIC HFREF (HEART FAILURE WITH REDUCED EJECTION FRACTION): ICD-10-CM

## 2024-01-15 DIAGNOSIS — I10 ESSENTIAL HYPERTENSION: ICD-10-CM

## 2024-01-15 DIAGNOSIS — I42.0 DILATED CARDIOMYOPATHY: Primary | ICD-10-CM

## 2024-01-15 DIAGNOSIS — J98.4 CAVITARY LESION OF LUNG: ICD-10-CM

## 2024-01-15 DIAGNOSIS — Z09 HOSPITAL DISCHARGE FOLLOW-UP: Primary | ICD-10-CM

## 2024-01-15 DIAGNOSIS — J44.1 COPD WITH ACUTE EXACERBATION: ICD-10-CM

## 2024-01-15 DIAGNOSIS — U07.1 COVID-19 VIRUS INFECTION: ICD-10-CM

## 2024-01-15 RX ORDER — METHYLPREDNISOLONE 4 MG/1
TABLET ORAL
Qty: 21 TABLET | Refills: 0 | Status: SHIPPED | OUTPATIENT
Start: 2024-01-15 | End: 2024-01-22

## 2024-01-15 NOTE — PROGRESS NOTES
Office Note     Name: Francisco Clark    : 1960     MRN: [id number]     Chief Complaint  Hospital Follow Up Visit    Subjective     History of Present Illness:  Francisco Clark is a 63 y.o. male who presents today for     Down to 6 cigs daily      Past Medical History:   Diagnosis Date    CHF (congestive heart failure)     COPD (chronic obstructive pulmonary disease)     History of stomach ulcers     Hypertension        Past Surgical History:   Procedure Laterality Date    CARDIAC CATHETERIZATION N/A 10/18/2022    Procedure: LEFT HEART CATH;  Surgeon: Kd Roque MD;  Location: [facility] CATH INVASIVE LOCATION;  Service: Cardiovascular;  Laterality: N/A;    COLONOSCOPY  2018    Dr. AGNES Olea. Normal. Repeat 1- yrs if wihtout significant family history or 5 years if first degree relative younger than age 60 with high rish polyp or colorectal cancer.     NO PAST SURGERIES         Social History     Socioeconomic History    Marital status: Single   Tobacco Use    Smoking status: Every Day     Packs/day: 0.25     Years: 35.00     Additional pack years: 0.00     Total pack years: 8.75     Types: Cigarettes     Passive exposure: Never    Smokeless tobacco: Never    Tobacco comments:     less than 1/2 ppd since getting sick   Vaping Use    Vaping Use: Never used   Substance and Sexual Activity    Alcohol use: No    Drug use: No    Sexual activity: Defer     Comment:          Current Outpatient Medications:     albuterol sulfate HFA (Proventil HFA) 108 (90 Base) MCG/ACT inhaler, Inhale 2 puffs Every 4 (Four) Hours As Needed for Wheezing., Disp: 18 g, Rfl: 2    bumetanide (BUMEX) 0.5 MG tablet, Take 1 tablet by mouth As Needed (daily as needed for edema) for up to 1 dose., Disp: 30 tablet, Rfl: 1    carvedilol (Coreg) 25 MG tablet, Take 1 tablet by mouth 2 (Two) Times a Day With Meals for 30 days., Disp: 180 tablet, Rfl: 3    empagliflozin (JARDIANCE) 10 MG tablet tablet, Take 1  "tablet by mouth Daily., Disp: 90 tablet, Rfl: 3    guaifenesin-dextromethorphan (MUCINEX DM)  MG tablet sustained-release 12 hour tablet, Take 2 tablets by mouth 2 (Two) Times a Day for 5 days., Disp: 20 tablet, Rfl: 0    mometasone-formoterol (DULERA 200) 200-5 MCG/ACT inhaler, Inhale 2 puffs 2 (Two) Times a Day., Disp: 13 g, Rfl: 11    sacubitril-valsartan (Entresto) 24-26 MG tablet, Take 1 tablet by mouth 2 (Two) Times a Day., Disp: 180 tablet, Rfl: 3    tiotropium bromide monohydrate (SPIRIVA RESPIMAT) 2.5 MCG/ACT aerosol solution inhaler, Inhale 2 puffs Daily., Disp: 4 g, Rfl: 0    Objective     Vital Signs  BP 98/70   Pulse 83   Temp 97.8 °F (36.6 °C)   Ht 175.3 cm (69.02\")   Wt 56.2 kg (124 lb)   SpO2 94%   BMI 18.30 kg/m²   Estimated body mass index is 18.3 kg/m² as calculated from the following:    Height as of this encounter: 175.3 cm (69.02\").    Weight as of this encounter: 56.2 kg (124 lb).    {BMI is below normal parameters (malnutrition). Recommendations:37085}      Physical Exam     Assessment and Plan     There are no diagnoses linked to this encounter.    Follow Up  No follow-ups on file.    NATALIO Arroyo    Part of this note may be an electronic transcription/translation of spoken language to printed text using the Dragon Dictation System.  "

## 2024-01-15 NOTE — LETTER
January 15, 2024     Patient: Francisco Clark   YOB: 1960   Date of Visit: 1/15/2024       To Whom It May Concern:    Francisco Clark was seen in the office today by myself for a hospital discharge follow up. It is my medical opinion that Francisco Clark may return to work on Tuesday January 23, 2024 as long as respiratory symptoms have improved. Please let me know if you have any questions.         Sincerely,        NATALIO Arroyo

## 2024-01-16 LAB
MYCOBACTERIUM SPEC CULT: NORMAL
NIGHT BLUE STAIN TISS: NORMAL

## 2024-01-19 LAB — H CAPSUL AG UR QL IA: NEGATIVE

## 2024-01-19 NOTE — PAYOR COMM NOTE
"Annette Marks (63 y.o. Male)       Date of Birth   1960    Social Security Number       Address   [address] [place] [id number]    Home Phone   [phone]    MRN   [id number]       Episcopalian   None    Marital Status   Single                            Admission Date   1/4/24    Admission Type   Emergency    Admitting Provider   Evy Ramirez MD    Attending Provider       Department, Room/Bed   [address], S522/1       Discharge Date   1/10/2024    Discharge Disposition   Home or Self Care    Discharge Destination                                 Attending Provider: (none)   Allergies: No Known Allergies    Isolation: None   Infection: COVID (confirmed) (01/04/24), Tuberculosis (rule out) (01/05/24)   Code Status: Prior    Ht: 175.3 cm (69\")   Wt: 54.1 kg (119 lb 3.2 oz)    Admission Cmt: None   Principal Problem: Hypoxia [R09.02]                   Active Insurance as of 1/4/2024       Primary Coverage       Payor Plan Insurance Group Employer/Plan Group    [employer] [facility] [employer] [facility] PPO [id number]       Payor Plan Address Payor Plan Phone Number Payor Plan Fax Number Effective Dates    [address] [phone]  1/1/2014 - None Entered    [place] [address]         Subscriber Name Subscriber Birth Date Member ID       ANNETTE MARKS 1960 [id number]                     Emergency Contacts        (Rel.) Home Phone Work Phone Mobile Phone    Alley Webb (Daughter) [phone] -- --    Maximiliano Marks (Son) [phone] -- [phone]                 Physician Progress Notes (all)        Evy Ramirez MD at 01/10/24 1218          Mr.Daivd Marks was my under care from 01/04/24 until 01/0. The patient is given a work excuse note for 7 days starting 01/01/24. Please feel free to call me with any question.    Dr.Rabie Ramirez. MGONZALO.  [phone]      Electronically signed by Evy Ramirez MD at 01/10/24 1221   " "    Yolanda Sosa MD at 01/10/24 1200          PULMONARY PROGRESS NOTE    Patient Care Team:  Charito Weiss APRN as PCP - General (Internal Medicine)  Anatoly Laird MD as Consulting Physician (Pulmonary Disease)    Reason for Consult     COVID-19 1/4/24  Dyspnea  Possible cavitary lesion  COPD/emphysema  Cardiomyopathy with EF 15%      Subjective     Interval History:     His oxygenation is better and on room air his saturation is ranging from 98 to 94%.  He continues to feel improved and is currently denying a productive cough.  He denies chest pain or edema.  He is tolerating a p.o. diet with no nausea or vomiting    Review of Systems:     ROS negative except for: Shortness of air with any activity, productive cough      Objective     Vital Signs  Blood pressure 98/61, pulse 71, temperature 98 °F (36.7 °C), temperature source Oral, resp. rate 16, height 175.3 cm (69\"), weight 54.1 kg (119 lb 3.2 oz), SpO2 (!) 88%.    Physical Exam:  GENERAL: Thin middle-aged gentleman sitting upright in bed in no acute respiratory distress  HEENT: Atraumatic.  Conjunctiva pink.  Oral mucosa moist  NECK: Trachea midline, no palpable thyroid  CHEST: Diminished chest excursion with improved rhonchi, no wheezing  HEART: Regular rhythm, S1, S2 auscultated  ABDOMEN: Flat, bowel sounds present, soft, nontender  EXTREMITIES: No pitting edema or clubbing  NEURO: Awake, oriented,  equal, general decreased musculature     Results Review:    Lab Results (last 24 hours)       Procedure Component Value Units Date/Time    Comprehensive Metabolic Panel [[id number]]  (Abnormal) Collected: 01/10/24 0622    Specimen: Blood Updated: 01/10/24 0748     Glucose 91 mg/dL      BUN 41 mg/dL      Creatinine 0.80 mg/dL      Sodium 137 mmol/L      Potassium 5.0 mmol/L      Comment: Slight hemolysis detected by analyzer. Result may be falsely elevated.        Chloride 99 mmol/L      CO2 31.0 mmol/L      Calcium 9.5 mg/dL      Total Protein 6.7 " g/dL      Albumin 3.5 g/dL      ALT (SGPT) 24 U/L      AST (SGOT) 23 U/L      Alkaline Phosphatase 57 U/L      Total Bilirubin 0.2 mg/dL      Globulin 3.2 gm/dL      Comment: Calculated Result        A/G Ratio 1.1 g/dL      BUN/Creatinine Ratio 51.3     Anion Gap 7.0 mmol/L      eGFR 98.8 mL/min/1.73     Narrative:      GFR Normal >60  Chronic Kidney Disease <60  Kidney Failure <15      CBC & Differential [[id number]]  (Abnormal) Collected: 01/10/24 0622    Specimen: Blood Updated: 01/10/24 0659    Narrative:      The following orders were created for panel order CBC & Differential.  Procedure                               Abnormality         Status                     ---------                               -----------         ------                     CBC Auto Differential[[id number]]        Abnormal            Final result                 Please view results for these tests on the individual orders.    CBC Auto Differential [[id number]]  (Abnormal) Collected: 01/10/24 0622    Specimen: Blood Updated: 01/10/24 0659     WBC 9.24 10*3/mm3      RBC 5.16 10*6/mm3      Hemoglobin 15.9 g/dL      Hematocrit 49.3 %      MCV 95.5 fL      MCH 30.8 pg      MCHC 32.3 g/dL      RDW 13.3 %      RDW-SD 47.6 fl      MPV 9.4 fL      Platelets 216 10*3/mm3      Neutrophil % 63.3 %      Lymphocyte % 22.7 %      Monocyte % 8.1 %      Eosinophil % 0.1 %      Basophil % 1.0 %      Immature Grans % 4.8 %      Neutrophils, Absolute 5.85 10*3/mm3      Lymphocytes, Absolute 2.10 10*3/mm3      Monocytes, Absolute 0.75 10*3/mm3      Eosinophils, Absolute 0.01 10*3/mm3      Basophils, Absolute 0.09 10*3/mm3      Immature Grans, Absolute 0.44 10*3/mm3      nRBC 0.0 /100 WBC     Blood Culture - Blood, Hand, Right [[id number]]  (Normal) Collected: 01/05/24 0514    Specimen: Blood from Hand, Right Updated: 01/10/24 0600     Blood Culture No growth at 5 days    Blood Culture - Blood, Arm, Left [[id number]]  (Normal) Collected: 01/05/24 0503     Specimen: Blood from Arm, Left Updated: 01/10/24 0600     Blood Culture No growth at 5 days    AFB Culture - Sputum, Cough [[id number]] Updated: 01/09/24 1611    Specimen: Sputum from Cough     AFB Culture - Sputum, Cough [[id number]] Collected: 01/09/24 1555    Specimen: Sputum from Cough Updated: 01/09/24 1608          Imaging Results (Last 24 Hours)       ** No results found for the last 24 hours. **         Impression: CTA of the chest January 5, 2024  1.No evidence of pulmonary embolism.   2.25 mm cavitary lesion in the right upper lobe with internal debris or septations. This could be related to an infectious or inflammatory process. Malignancy cannot be excluded. PET/CT follow-up could provide more information.   3.Moderate emphysema. Emphysema on CT is an independent risk factor for lung cancer. Low dose lung cancer screening should be considered if patient qualifies based on smoking history.   4.Mild coronary artery calcification and mild aortic atherosclerosis.         Electronically Signed: Tres Chau MD    1/5/2024 12:50 AM EST     albuterol sulfate HFA, 2 puff, Inhalation, 4x Daily - RT  budesonide-formoterol, 2 puff, Inhalation, BID - RT  carvedilol, 25 mg, Oral, BID With Meals  dexAMETHasone, 6 mg, Oral, Daily   Or  dexAMETHasone, 6 mg, Intravenous, Daily  empagliflozin, 10 mg, Oral, Daily  enoxaparin, 40 mg, Subcutaneous, Q24H  pantoprazole, 40 mg, Oral, Q AM  piperacillin-tazobactam, 3.375 g, Intravenous, Q8H  sacubitril-valsartan, 1 tablet, Oral, BID  tiotropium bromide monohydrate, 2 puff, Inhalation, Daily - RT          Hypoxia    Essential hypertension    Congestive heart failure, unspecified HF chronicity, unspecified heart failure type    COVID-19 virus infection    COPD (chronic obstructive pulmonary disease)    Cavitary lesion of lung    Elevated troponin    COPD with acute exacerbation      Assessment & Plan     63-year-old gentleman, lifelong smoker with cardiomyopathy, advanced  emphysema, COVID-19, hospitalized January 4 with worsening oxygenation and dyspnea.  At baseline he does not require supplemental oxygen.  He did receive remdesivir and Decadron, completing January 8.  He was also empirically placed on vancomycin and Zosyn.  Vancomycin was stopped when MRSA swab was negative.  Doxycycline added for atypical coverage.  CT of the chest was negative for PE but revealed extensive emphysematous changes.  He had a possible cavitary lesion in the right upper lobe centrally.  It is a small area that is overlapping an area of emphysema.  Malignancy cannot be excluded but would not biopsy with his acute exacerbation.  It is more prominent now than it was on CT scan from 2018 and 2022.  He continues to require 3 L nasal cannula.  AFB smear is negative.  Respiratory culture revealed usual [place].  C-reactive protein is improving.  Fungal urinary antigens are pending.    In terms of his COPD he had pulmonary function test in October 2022 that revealed severe obstruction with an FEV1 of 1.37 L, 39% and a ratio of 47%.  This is compatible with stage III.  Wheezing has resolved.  He completed therapy for COVID.  He remains on Zosyn for persistent productive cough.  His respiratory culture revealed usual [place].  White blood cell count is normal.    He has a known history of chronic heart failure with an EF of 15%.  EF has improved and on his last echocardiogram January 2023 EF was 46 to 50%.  He does not clinically appear to be in congestive failure without effusions or edema.  He does have persistent cardiomegaly on CT and chest x-ray.  His proBNP is only 161.  Heart catheterization revealed no coronary disease October 2022.      -Ability has improved I do think he can go home  -Will likely need home oxygen, with exertion and at night  -Symbicort 2 puffs twice daily  -Albuterol HFA  - Spiriva Respimat 2 puffs daily  -Blasto and histo urinary antigen  -Continue Decadron 6 mg daily to complete a  "10-day course  -Lovenox 40 mg subcutaneous daily for DVT prophylaxis  -Stop antibiotic  -Okay for discharge  -He will need a 3-month CT scan to reevaluate right upper lobe lesion  -Has follow-up January 19, 2024 with NATALIO Ross in our office      Yolanda Sosa MD  01/10/24  12:00 EST      Time: 25 minutes    Electronically signed by Yolanda Sosa MD at 01/10/24 1206       Yolanda Sosa MD at 01/09/24 1407          PULMONARY PROGRESS NOTE    Patient Care Team:  Charito Weiss APRN as PCP - General (Internal Medicine)  Anatoly Laird MD as Consulting Physician (Pulmonary Disease)    Reason for Consult     COVID-19 1/4/24  Dyspnea  Possible cavitary lesion  COPD/emphysema  Cardiomyopathy with EF 15%      Subjective     Interval History:     He continues to require supplemental oxygen, 3 L with a saturation of 93%.  Yesterday he did work with physical therapy and with significant assistance ambulated 80 feet.  He did not desaturate.  He was on 3 L nasal cannula.    Review of Systems:     ROS negative except for: Shortness of air with any activity, productive cough      Objective     Vital Signs  Blood pressure 141/87, pulse 68, temperature 97.5 °F (36.4 °C), temperature source Oral, resp. rate 18, height 175.3 cm (69\"), weight 54.1 kg (119 lb 3.2 oz), SpO2 93%.    Physical Exam:  GENERAL: Thin middle-aged gentleman sitting upright in bed in no acute respiratory distress  HEENT: Atraumatic.  Conjunctiva pink.  Oral mucosa moist  NECK: Trachea midline, no palpable thyroid  CHEST: Diminished chest excursion with diffuse bilateral mid and end expiratory rhonchi  HEART: Regular rhythm, S1, S2 auscultated  ABDOMEN: Flat, bowel sounds present, soft, nontender  EXTREMITIES: No pitting edema or clubbing  NEURO: Awake, oriented,  equal, general decreased musculature     Results Review:    Lab Results (last 24 hours)       Procedure Component Value Units Date/Time    Histoplasma Ag Ur - " Urine, Urine, Clean Catch [[id number]] Collected: 01/09/24 1109    Specimen: Urine, Clean Catch Updated: 01/09/24 1139    Blastomyces Antigen - Urine, Urine, Clean Catch [[id number]] Collected: 01/09/24 1109    Specimen: Urine, Clean Catch Updated: 01/09/24 1139    Blood Culture - Blood, Hand, Right [[id number]]  (Normal) Collected: 01/05/24 0514    Specimen: Blood from Hand, Right Updated: 01/09/24 0600     Blood Culture No growth at 4 days    Blood Culture - Blood, Arm, Left [[id number]]  (Normal) Collected: 01/05/24 0508    Specimen: Blood from Arm, Left Updated: 01/09/24 0600     Blood Culture No growth at 4 days          Imaging Results (Last 24 Hours)       ** No results found for the last 24 hours. **         Impression: CTA of the chest January 5, 2024  1.No evidence of pulmonary embolism.   2.25 mm cavitary lesion in the right upper lobe with internal debris or septations. This could be related to an infectious or inflammatory process. Malignancy cannot be excluded. PET/CT follow-up could provide more information.   3.Moderate emphysema. Emphysema on CT is an independent risk factor for lung cancer. Low dose lung cancer screening should be considered if patient qualifies based on smoking history.   4.Mild coronary artery calcification and mild aortic atherosclerosis.         Electronically Signed: Tres Chau MD    1/5/2024 12:50 AM EST     albuterol sulfate HFA, 2 puff, Inhalation, 4x Daily - RT  budesonide-formoterol, 2 puff, Inhalation, BID - RT  carvedilol, 25 mg, Oral, BID With Meals  dexAMETHasone, 6 mg, Oral, Daily   Or  dexAMETHasone, 6 mg, Intravenous, Daily  doxycycline, 100 mg, Intravenous, Q12H  empagliflozin, 10 mg, Oral, Daily  enoxaparin, 40 mg, Subcutaneous, Q24H  pantoprazole, 40 mg, Oral, Q AM  piperacillin-tazobactam, 3.375 g, Intravenous, Q8H  sacubitril-valsartan, 1 tablet, Oral, BID          Hypoxia    Essential hypertension    Congestive heart failure, unspecified HF chronicity,  unspecified heart failure type    COVID-19 virus infection    COPD (chronic obstructive pulmonary disease)    Cavitary lesion of lung    Elevated troponin    COPD with acute exacerbation      Assessment & Plan     63-year-old gentleman, lifelong smoker with cardiomyopathy, advanced emphysema, COVID-19, hospitalized January 4 with worsening oxygenation and dyspnea.  At baseline he does not require supplemental oxygen.  He did receive remdesivir and Decadron, completing January 8.  He was also empirically placed on vancomycin and Zosyn.  Vancomycin was stopped when MRSA swab was negative.  Doxycycline added for atypical coverage.  CT of the chest was negative for PE but revealed extensive emphysematous changes.  He had a possible cavitary lesion in the right upper lobe centrally.  It is a small area that is overlapping an area of emphysema.  Malignancy cannot be excluded but would not biopsy with his acute exacerbation.  It is more prominent now than it was on CT scan from 2018 and 2022.  He continues to require 3 L nasal cannula.  AFB smear is negative.  Respiratory culture revealed usual [place].  C-reactive protein is improving.    In terms of his COPD he had pulmonary function test in October 2022 that revealed severe obstruction with an FEV1 of 1.37 L, 39% and a ratio of 47%.  This is compatible with stage III.  Wheezing has resolved.  He completed therapy for COVID.  He remains on Zosyn for persistent productive cough.  He continues to require 3 L nasal cannula    He has a known history of chronic heart failure with an EF of 15%.  EF has improved and on his last echocardiogram January 2023 EF was 46 to 50%.  He does not clinically appear to be in congestive failure without effusions or edema.  He does have persistent cardiomegaly on CT and chest x-ray.  His proBNP is only 161.  Heart catheterization revealed no coronary disease October 2022.      -Consider inpatient rehab  -Will likely need home oxygen  -Symbicort  2 puffs twice daily  -Albuterol HFA  -Add Spiriva  -Blasto and histo urinary antigen  -Continue Decadron 6 mg daily to complete a 10-day course  -Lovenox 40 mg subcutaneous daily for DVT prophylaxis  -Zosyn, doxycycline, should complete 7 days tomorrow      Yolanda Sosa MD  24  14:07 EST      Time: 25 minutes    Electronically signed by Yolanda Sosa MD at 24 1533       Evy Ramirez MD at 24 0925              [facility]  PROGRESS NOTE    Patient Name: Francisco Clark  : 1960  MRN: [id number]    Date of Admission: 2024  Primary Care Physician: Charito Weiss APRN    Subjective   Subjective     CC:  F/U SOA    HPI:  Seen this morning. Feeling better. On 3.5L of NC. Still need another AFB to be collected.No new complaints.       Objective   Objective     Vital Signs:   Temp:  [97.2 °F (36.2 °C)-98 °F (36.7 °C)] 97.5 °F (36.4 °C)  Heart Rate:  [58-77] 59  Resp:  [18-22] 18  BP: ()/(56-87) 141/87  Flow (L/min):  [3-3.5] 3.5     Physical Exam:  Gen-no acute distress  HENT-NCAT, mucous membranes moist  CV-RRR, S1 S2 normal, no m/r/g  Resp-diminished bilaterally, no wheezes today, nonlabored on 3 liters   Abd-soft, NT, ND, +BS  Ext-no edema  Neuro-A&Ox3, no focal deficits  Skin-no rashes  Psych-appropriate mood      Results Reviewed:  LAB RESULTS:      Lab 24  0642 24  0557 24  0838 24  2124   WBC 7.90 8.81 8.54 10.27   HEMOGLOBIN 15.1 14.7 16.1 15.4   HEMATOCRIT 47.4 46.5 51.6* 48.3   PLATELETS 220 232 281 258   NEUTROS ABS 5.78 6.86 6.86 7.92*   IMMATURE GRANS (ABS) 0.14* 0.03 0.04 0.04   LYMPHS ABS 1.47 1.23 1.12 1.00   MONOS ABS 0.49 0.68 0.51 1.27*   EOS ABS 0.00 0.00 0.00 0.01   MCV 98.3* 97.1* 99.6* 97.4*   CRP 1.24* 1.97* 4.20* 8.20*   PROCALCITONIN  --   --   --  0.10   LACTATE  --   --   --  0.7   LDH  --   --   --  324*   D DIMER QUANT  --   --   --  0.38         Lab 24  0642  01/07/24  0557 01/06/24  0838 01/05/24  0514 01/04/24  2319   SODIUM 137 140 139 133* 134*   POTASSIUM 4.5 4.5 5.1 5.0 5.1   CHLORIDE 99 101 98 97* 97*   CO2 26.0 30.0* 34.0* 23.0 28.0   ANION GAP 12.0 9.0 7.0 13.0 9.0   BUN 41* 37* 37* 25* 25*   CREATININE 0.89 0.76 1.06 0.79 0.77   EGFR 95.7 100.4 78.4 99.2 100.0   GLUCOSE 135* 104* 149* 149* 118*   CALCIUM 9.5 10.4 11.3* 9.5 9.4         Lab 01/08/24  0642 01/07/24  0557 01/06/24  0838 01/05/24  0514 01/04/24  2319   TOTAL PROTEIN 6.5 6.3 7.3 7.7 7.4   ALBUMIN 3.7 3.4* 4.0 3.5 3.5   GLOBULIN 2.8 2.9 3.3 4.2 3.9   ALT (SGPT) 15 13 12 12 12   AST (SGOT) 17 16 16 21 16   BILIRUBIN 0.2 <0.2 0.2 0.2 0.3   ALK PHOS 59 56 71 69 66         Lab 01/06/24  0838 01/05/24  0707 01/05/24  0514 01/04/24  2319 01/04/24  2124   PROBNP 160.9  --   --   --  294.2   HSTROP T  --  23* 26* 32*  --                  Lab 01/04/24  2134   PH, ARTERIAL 7.365   PCO2, ARTERIAL 52.1*   PO2 ART 83.9   FIO2 32   HCO3 ART 29.7*   BASE EXCESS ART 3.1*   CARBOXYHEMOGLOBIN 1.8     Brief Urine Lab Results       None            Microbiology Results Abnormal       Procedure Component Value - Date/Time    Blood Culture - Blood, Hand, Right [[id number]]  (Normal) Collected: 01/05/24 0514    Lab Status: Preliminary result Specimen: Blood from Hand, Right Updated: 01/09/24 0600     Blood Culture No growth at 4 days    Blood Culture - Blood, Arm, Left [[id number]]  (Normal) Collected: 01/05/24 0508    Lab Status: Preliminary result Specimen: Blood from Arm, Left Updated: 01/09/24 0600     Blood Culture No growth at 4 days    Respiratory Culture - Sputum, Cough [[id number]] Collected: 01/06/24 1050    Lab Status: Final result Specimen: Sputum from Cough Updated: 01/08/24 1014     Respiratory Culture Scant growth (1+) Normal respiratory [place]. No S. aureus or Pseudomonas aeruginosa detected. Final report.     Gram Stain Moderate (3+) WBCs per low power field      Rare (1+) Epithelial cells per low power field       Few (2+) Gram positive cocci in pairs    AFB Culture - Sputum, Cough [[id number]] Collected: 01/06/24 1050    Lab Status: Preliminary result Specimen: Sputum from Cough Updated: 01/07/24 1209     AFB Stain No acid fast bacilli seen on concentrated smear    MRSA Screen, PCR (Inpatient) - Swab, Nares [[id number]]  (Normal) Collected: 01/05/24 0546    Lab Status: Final result Specimen: Swab from Nares Updated: 01/05/24 0843     MRSA PCR Negative    Narrative:      The negative predictive value of this diagnostic test is high and should only be used to consider de-escalating anti-MRSA therapy. A positive result may indicate colonization with MRSA and must be correlated clinically.  MRSA Negative    Respiratory Culture - Sputum, Cough [[id number]] Collected: 01/04/24 2319    Lab Status: Final result Specimen: Sputum from Cough Updated: 01/05/24 0654     Respiratory Culture Rejected     Gram Stain Many (4+) Gram positive cocci in pairs, chains and clusters      Moderate (3+) Gram negative bacilli      Moderate (3+) Epithelial cells per low power field      Rare (1+) WBCs per low power field    Narrative:      Specimen rejected due to oropharyngeal contamination. Please reorder and recollect specimen if clinically necessary.  Specimen rejected due to oropharyngeal contamination.            No radiology results from the last 24 hrs    Results for orders placed during the hospital encounter of 01/10/23    Adult Transthoracic Echo Complete W/ Cont if Necessary Per Protocol    Interpretation Summary    Left ventricular systolic function is mildly decreased. Left ventricular ejection fraction appears to be 46 - 50%.    Left ventricular wall thickness is consistent with mild concentric hypertrophy.    Left ventricular diastolic function is consistent with (grade II w/high LAP) pseudonormalization.    Estimated right ventricular systolic pressure from tricuspid regurgitation is normal (<35 mmHg).      Current  medications:  Scheduled Meds:albuterol sulfate HFA, 2 puff, Inhalation, 4x Daily - RT  budesonide-formoterol, 2 puff, Inhalation, BID - RT  carvedilol, 25 mg, Oral, BID With Meals  dexAMETHasone, 6 mg, Oral, Daily   Or  dexAMETHasone, 6 mg, Intravenous, Daily  doxycycline, 100 mg, Intravenous, Q12H  empagliflozin, 10 mg, Oral, Daily  enoxaparin, 40 mg, Subcutaneous, Q24H  pantoprazole, 40 mg, Oral, Q AM  piperacillin-tazobactam, 3.375 g, Intravenous, Q8H  sacubitril-valsartan, 1 tablet, Oral, BID      Continuous Infusions:   PRN Meds:.  !Patient Home Medications Stored in Pharmacy    bumetanide    nitroglycerin    sodium chloride    sodium chloride    Assessment & Plan   Assessment & Plan     Active Hospital Problems    Diagnosis  POA    **Hypoxia [R09.02]  Yes    COVID-19 virus infection [U07.1]  Unknown    COPD (chronic obstructive pulmonary disease) [J44.9]  Unknown    Cavitary lesion of lung [J98.4]  Unknown    Elevated troponin [R79.89]  Unknown    COPD with acute exacerbation [J44.1]  Yes    Congestive heart failure, unspecified HF chronicity, unspecified heart failure type [I50.9]  Yes    Essential hypertension [I10]  Yes      Resolved Hospital Problems   No resolved problems to display.        Brief Hospital Course to date:  Francisco Clark is a 63 y.o. male with hx of HFrEF, COPD, HTN, and tobacco abuse who presents due to worsening SOA over the past 10 days.      Copied text in this note has been reviewed and is accurate as of 01/09/24.    COVID-19  Coronavirus OC43  Hypoxia  COPD without exacerbation  --Hypoxic to 86% on room air, does not wear home oxygen  --Currently requiring 3 liters, wean as tolerated - likely will need home oxygen at discharge  --CTA chest with RUL cavitary lesion, no other infiltrates noted  --Respiratory PCR panel positive for COVID-19 as well as Coronavirus OC43  --Symptoms present x 10 days, may be out of window for benefit from Remdesivir but given presentation we will  continue (Pulmonary in agreement)  --Continue Remdesivir and Decadron - Remdesivir through 1/9/24  --Continue home inhaler, formulary substitute  --Scheduled albuterol MDI 4x daily   --Repeat CXR 1/6/24 no acute findings  -- Still need another AFB to be collected. Ordered.  -- Will likely need home oxygen on discharge,     Cavitary lung lesion of right upper lobe  --CTA chest: 25 mm cavitary lesion in right upper lobe with internal debris or septations, moderate emphysema   --Pulmonary/Dr. Mcgrath has evaluated, appears to be a small area of infiltrate, but cannot rule out malignancy: recommends treat as community acquired pneumonia and repeat CT chest in 3 months; has F/U with Kayla LANCE 1/19/24  --MRSA PCR negative, stopped Vanc  --Continue Zosyn and Doxycycline need total of 7 days  --Blood cultures NGTD, sputum culture with normal respiratory [place] thus far     HFrEF with recovery   Dilated nonischemic cardiomyopathy  --EF previously <20% in October 2022, seems to have recovered on Echo January 2023 (46-50%)  --Followed by Dr. Coresa/Cardiology  --Continue home meds Coreg, Entresto, Jardiance, Bumex    Expected Discharge Location and Transportation: home  Expected Discharge anticipate home with oxygen tomorrow after last Remdesivir dose  Expected Discharge Date: 1/10/2024; Expected Discharge Time:      DVT prophylaxis:  Medical DVT prophylaxis orders are present.     AM-PAC 6 Clicks Score (PT): 23 (01/08/24 2000)    CODE STATUS:   Code Status and Medical Interventions:   Ordered at: 01/05/24 0053     Code Status (Patient has no pulse and is not breathing):    CPR (Attempt to Resuscitate)     Medical Interventions (Patient has pulse or is breathing):    Full Support       Evy Ramirez MD  01/09/24        Electronically signed by Evy Ramirez MD at 01/09/24 1352       Yolanda Sosa MD at 01/08/24 1403          PULMONARY PROGRESS NOTE    Patient Care Team:  Charito Weiss APRN as PCP - General  "(Internal Medicine)  Anatoly Laird MD as Consulting Physician (Pulmonary Disease)    Reason for Consult     COVID-19 1/4/24  Dyspnea  Possible cavitary lesion  COPD/emphysema  Cardiomyopathy with EF 15%      Subjective     Interval History:     He is currently on 3 L nasal cannula with a saturation of 95%.  He does admit to a cough productive of clear mucoid secretions.  He denies hemoptysis.  He denies chest pain, edema, nausea or vomiting, wheezing    Review of Systems:     ROS negative except for: Shortness of air with any activity, productive cough      Objective     Vital Signs  Blood pressure 111/56, pulse 63, temperature 97.7 °F (36.5 °C), temperature source Oral, resp. rate 18, height 175.3 cm (69\"), weight 54.1 kg (119 lb 3.2 oz), SpO2 95%.    Physical Exam:  GENERAL: Thin middle-aged gentleman sitting upright in bed in no acute respiratory distress  HEENT: Atraumatic.  Conjunctiva pink.  Oral mucosa moist  NECK: Trachea midline, no palpable thyroid  CHEST: Diminished chest excursion with diffuse bilateral mid and end expiratory rhonchi  HEART: Regular rhythm, S1, S2 auscultated  ABDOMEN: Flat, bowel sounds present, soft, nontender  EXTREMITIES: No pitting edema or clubbing  NEURO: Awake, oriented,  equal, general decreased musculature     Results Review:    Lab Results (last 24 hours)       Procedure Component Value Units Date/Time    Fungal Antibodies, Quantitative Double Immunodiffusion [[id number]] Collected: 01/08/24 1020    Specimen: Blood Updated: 01/08/24 1045    Respiratory Culture - Sputum, Cough [[id number]] Collected: 01/06/24 1050    Specimen: Sputum from Cough Updated: 01/08/24 1014     Respiratory Culture Scant growth (1+) Normal respiratory [place]. No S. aureus or Pseudomonas aeruginosa detected. Final report.     Gram Stain Moderate (3+) WBCs per low power field      Rare (1+) Epithelial cells per low power field      Few (2+) Gram positive cocci in pairs    Comprehensive Metabolic " Panel [[id number]]  (Abnormal) Collected: 01/08/24 0642    Specimen: Blood Updated: 01/08/24 0747     Glucose 135 mg/dL      BUN 41 mg/dL      Creatinine 0.89 mg/dL      Sodium 137 mmol/L      Potassium 4.5 mmol/L      Comment: Slight hemolysis detected by analyzer. Result may be falsely elevated.        Chloride 99 mmol/L      CO2 26.0 mmol/L      Calcium 9.5 mg/dL      Total Protein 6.5 g/dL      Albumin 3.7 g/dL      ALT (SGPT) 15 U/L      AST (SGOT) 17 U/L      Alkaline Phosphatase 59 U/L      Total Bilirubin 0.2 mg/dL      Globulin 2.8 gm/dL      Comment: Calculated Result        A/G Ratio 1.3 g/dL      BUN/Creatinine Ratio 46.1     Anion Gap 12.0 mmol/L      eGFR 95.7 mL/min/1.73     Narrative:      GFR Normal >60  Chronic Kidney Disease <60  Kidney Failure <15      C-reactive Protein [[id number]]  (Abnormal) Collected: 01/08/24 0642    Specimen: Blood Updated: 01/08/24 0747     C-Reactive Protein 1.24 mg/dL     CBC & Differential [[id number]]  (Abnormal) Collected: 01/08/24 0642    Specimen: Blood Updated: 01/08/24 0721    Narrative:      The following orders were created for panel order CBC & Differential.  Procedure                               Abnormality         Status                     ---------                               -----------         ------                     CBC Auto Differential[[id number]]        Abnormal            Final result                 Please view results for these tests on the individual orders.    CBC Auto Differential [[id number]]  (Abnormal) Collected: 01/08/24 0642    Specimen: Blood Updated: 01/08/24 0721     WBC 7.90 10*3/mm3      RBC 4.82 10*6/mm3      Hemoglobin 15.1 g/dL      Hematocrit 47.4 %      MCV 98.3 fL      MCH 31.3 pg      MCHC 31.9 g/dL      RDW 13.4 %      RDW-SD 48.5 fl      MPV 9.3 fL      Platelets 220 10*3/mm3      Neutrophil % 73.1 %      Lymphocyte % 18.6 %      Monocyte % 6.2 %      Eosinophil % 0.0 %      Basophil % 0.3 %      Immature Grans % 1.8 %       Neutrophils, Absolute 5.78 10*3/mm3      Lymphocytes, Absolute 1.47 10*3/mm3      Monocytes, Absolute 0.49 10*3/mm3      Eosinophils, Absolute 0.00 10*3/mm3      Basophils, Absolute 0.02 10*3/mm3      Immature Grans, Absolute 0.14 10*3/mm3      nRBC 0.0 /100 WBC     Blood Culture - Blood, Hand, Right [[id number]]  (Normal) Collected: 01/05/24 0514    Specimen: Blood from Hand, Right Updated: 01/08/24 0600     Blood Culture No growth at 3 days    Blood Culture - Blood, Arm, Left [[id number]]  (Normal) Collected: 01/05/24 0508    Specimen: Blood from Arm, Left Updated: 01/08/24 0600     Blood Culture No growth at 3 days          Imaging Results (Last 24 Hours)       ** No results found for the last 24 hours. **         Impression: CTA of the chest January 5, 2024  1.No evidence of pulmonary embolism.   2.25 mm cavitary lesion in the right upper lobe with internal debris or septations. This could be related to an infectious or inflammatory process. Malignancy cannot be excluded. PET/CT follow-up could provide more information.   3.Moderate emphysema. Emphysema on CT is an independent risk factor for lung cancer. Low dose lung cancer screening should be considered if patient qualifies based on smoking history.   4.Mild coronary artery calcification and mild aortic atherosclerosis.         Electronically Signed: Tres Chau MD    1/5/2024 12:50 AM EST     albuterol sulfate HFA, 2 puff, Inhalation, 4x Daily - RT  budesonide-formoterol, 2 puff, Inhalation, BID - RT  carvedilol, 25 mg, Oral, BID With Meals  dexAMETHasone, 6 mg, Oral, Daily   Or  dexAMETHasone, 6 mg, Intravenous, Daily  doxycycline, 100 mg, Intravenous, Q12H  empagliflozin, 10 mg, Oral, Daily  enoxaparin, 40 mg, Subcutaneous, Q24H  pantoprazole, 40 mg, Oral, Q AM  piperacillin-tazobactam, 3.375 g, Intravenous, Q8H  remdesivir, 100 mg, Intravenous, Q24H  sacubitril-valsartan, 1 tablet, Oral, BID          Hypoxia    Essential hypertension    Congestive heart  failure, unspecified HF chronicity, unspecified heart failure type    COVID-19 virus infection    COPD (chronic obstructive pulmonary disease)    Cavitary lesion of lung    Elevated troponin    COPD with acute exacerbation      Assessment & Plan     63-year-old gentleman, lifelong smoker with cardiomyopathy, advanced emphysema, COVID-19, hospitalized January 4 with worsening oxygenation and dyspnea.  At baseline he does not require supplemental oxygen.  He did receive remdesivir and Decadron, completing today.  He was also empirically placed on vancomycin and Zosyn.  Vancomycin was stopped when MRSA swab was negative.  Doxycycline added for atypical coverage.  CT of the chest was negative for PE but revealed extensive emphysematous changes.  He had a possible cavitary lesion in the right upper lobe centrally.  It is a small area that is overlapping an area of emphysema.  Malignancy cannot be excluded but would not biopsy with his acute exacerbation.  It is more prominent now than it was on CT scan from 2018 and 2022.  He continues to require 3 L nasal cannula.  AFB smear is negative.  Respiratory culture revealed usual [place].  C-reactive protein is improving.    In terms of his COPD he had pulmonary function test in October 2022 that revealed severe obstruction with an FEV1 of 1.37 L, 39% and a ratio of 47%.  This is compatible with stage III.  Wheezing has resolved.  He completed therapy for COVID.  He remains on Zosyn for persistent productive cough.  He continues to require 3 L nasal cannula    He has a known history of chronic heart failure with an EF of 15%.  EF has improved and on his last echocardiogram January 2023 EF was 46 to 50%.  He does not clinically appear to be in congestive failure without effusions or edema.  He does have persistent cardiomegaly on CT and chest x-ray.  His proBNP is only 161.  Heart catheterization revealed no coronary disease October 2022.    -For his right upper lobe lesion will  need a repeat CT scan in 3 months, I have ordered fungal urinary antigens, and fungal serologies  -For his COPD exacerbation would continue Symbicort, bronchodilators, antibiotics, mucolytic's, and mobilize.  Continue supplemental oxygen, wean as able  -For his history of CHF, last EF was improved and he clinically does not appear to be in congestive failure.  He does not have any coronary artery disease.  He remains on carvedilol and Entresto which have dramatically improved his cardiac function.        Yolanda Sosa MD  24  14:03 EST      Time: 25 minutes    Electronically signed by Yolanda Sosa MD at 24 1426       Pastora Padron MD at 24 1344              [facility] Medicine Services  PROGRESS NOTE    Patient Name: Francisco Clark  : 1960  MRN: [id number]    Date of Admission: 2024  Primary Care Physician: Charito Weiss APRN    Subjective   Subjective     CC:  F/U SOA    HPI:  Seen this morning. No complaints.       Objective   Objective     Vital Signs:   Temp:  [97.7 °F (36.5 °C)-97.9 °F (36.6 °C)] 97.7 °F (36.5 °C)  Heart Rate:  [51-73] 63  Resp:  [16-20] 18  BP: ()/(56-67) 111/56  Flow (L/min):  [2-3] 3     Physical Exam:  Gen-no acute distress  HENT-NCAT, mucous membranes moist  CV-RRR, S1 S2 normal, no m/r/g  Resp-diminished bilaterally, no wheezes today, nonlabored on 3 liters   Abd-soft, NT, ND, +BS  Ext-no edema  Neuro-A&Ox3, no focal deficits  Skin-no rashes  Psych-appropriate mood      Results Reviewed:  LAB RESULTS:      Lab 24  0642 24  0557 24  0838 24  2124   WBC 7.90 8.81 8.54 10.27   HEMOGLOBIN 15.1 14.7 16.1 15.4   HEMATOCRIT 47.4 46.5 51.6* 48.3   PLATELETS 220 232 281 258   NEUTROS ABS 5.78 6.86 6.86 7.92*   IMMATURE GRANS (ABS) 0.14* 0.03 0.04 0.04   LYMPHS ABS 1.47 1.23 1.12 1.00   MONOS ABS 0.49 0.68 0.51 1.27*   EOS ABS 0.00 0.00 0.00 0.01   MCV 98.3* 97.1* 99.6* 97.4*   CRP 1.24*  1.97* 4.20* 8.20*   PROCALCITONIN  --   --   --  0.10   LACTATE  --   --   --  0.7   LDH  --   --   --  324*   D DIMER QUANT  --   --   --  0.38         Lab 01/08/24  0642 01/07/24  0557 01/06/24  0838 01/05/24  0514 01/04/24  2319   SODIUM 137 140 139 133* 134*   POTASSIUM 4.5 4.5 5.1 5.0 5.1   CHLORIDE 99 101 98 97* 97*   CO2 26.0 30.0* 34.0* 23.0 28.0   ANION GAP 12.0 9.0 7.0 13.0 9.0   BUN 41* 37* 37* 25* 25*   CREATININE 0.89 0.76 1.06 0.79 0.77   EGFR 95.7 100.4 78.4 99.2 100.0   GLUCOSE 135* 104* 149* 149* 118*   CALCIUM 9.5 10.4 11.3* 9.5 9.4         Lab 01/08/24  0642 01/07/24  0557 01/06/24  0838 01/05/24 0514 01/04/24  2319   TOTAL PROTEIN 6.5 6.3 7.3 7.7 7.4   ALBUMIN 3.7 3.4* 4.0 3.5 3.5   GLOBULIN 2.8 2.9 3.3 4.2 3.9   ALT (SGPT) 15 13 12 12 12   AST (SGOT) 17 16 16 21 16   BILIRUBIN 0.2 <0.2 0.2 0.2 0.3   ALK PHOS 59 56 71 69 66         Lab 01/06/24  0838 01/05/24  0707 01/05/24  0514 01/04/24  2319 01/04/24 2124   PROBNP 160.9  --   --   --  294.2   HSTROP T  --  23* 26* 32*  --                  Lab 01/04/24 2134   PH, ARTERIAL 7.365   PCO2, ARTERIAL 52.1*   PO2 ART 83.9   FIO2 32   HCO3 ART 29.7*   BASE EXCESS ART 3.1*   CARBOXYHEMOGLOBIN 1.8     Brief Urine Lab Results       None            Microbiology Results Abnormal       Procedure Component Value - Date/Time    Respiratory Culture - Sputum, Cough [[id number]] Collected: 01/06/24 1050    Lab Status: Final result Specimen: Sputum from Cough Updated: 01/08/24 1014     Respiratory Culture Scant growth (1+) Normal respiratory [place]. No S. aureus or Pseudomonas aeruginosa detected. Final report.     Gram Stain Moderate (3+) WBCs per low power field      Rare (1+) Epithelial cells per low power field      Few (2+) Gram positive cocci in pairs    Blood Culture - Blood, Hand, Right [[id number]]  (Normal) Collected: 01/05/24 0514    Lab Status: Preliminary result Specimen: Blood from Hand, Right Updated: 01/08/24 0600     Blood Culture No growth at 3  days    Blood Culture - Blood, Arm, Left [[id number]]  (Normal) Collected: 01/05/24 0508    Lab Status: Preliminary result Specimen: Blood from Arm, Left Updated: 01/08/24 0600     Blood Culture No growth at 3 days    AFB Culture - Sputum, Cough [[id number]] Collected: 01/06/24 1050    Lab Status: Preliminary result Specimen: Sputum from Cough Updated: 01/07/24 1209     AFB Stain No acid fast bacilli seen on concentrated smear    MRSA Screen, PCR (Inpatient) - Swab, Nares [[id number]]  (Normal) Collected: 01/05/24 0546    Lab Status: Final result Specimen: Swab from Nares Updated: 01/05/24 0843     MRSA PCR Negative    Narrative:      The negative predictive value of this diagnostic test is high and should only be used to consider de-escalating anti-MRSA therapy. A positive result may indicate colonization with MRSA and must be correlated clinically.  MRSA Negative    Respiratory Culture - Sputum, Cough [[id number]] Collected: 01/04/24 2319    Lab Status: Final result Specimen: Sputum from Cough Updated: 01/05/24 0654     Respiratory Culture Rejected     Gram Stain Many (4+) Gram positive cocci in pairs, chains and clusters      Moderate (3+) Gram negative bacilli      Moderate (3+) Epithelial cells per low power field      Rare (1+) WBCs per low power field    Narrative:      Specimen rejected due to oropharyngeal contamination. Please reorder and recollect specimen if clinically necessary.  Specimen rejected due to oropharyngeal contamination.            XR Chest 1 View    Result Date: 1/6/2024  XR CHEST 1 VW Date of Exam: 1/6/2024 1:42 PM EST Indication: F/U hypoxia Comparison: Chest x-ray 1/4/2024 Findings: Stable cardiomediastinal silhouette within normal limits. There is background emphysema. No focal consolidation. No pleural effusion or pneumothorax.     Impression: Impression: No acute cardiopulmonary findings. Unchanged emphysema. Electronically Signed: Thomas Allen MD  1/6/2024 2:36 PM EST  Workstation ID:  [id number]     Results for orders placed during the hospital encounter of 01/10/23    Adult Transthoracic Echo Complete W/ Cont if Necessary Per Protocol    Interpretation Summary    Left ventricular systolic function is mildly decreased. Left ventricular ejection fraction appears to be 46 - 50%.    Left ventricular wall thickness is consistent with mild concentric hypertrophy.    Left ventricular diastolic function is consistent with (grade II w/high LAP) pseudonormalization.    Estimated right ventricular systolic pressure from tricuspid regurgitation is normal (<35 mmHg).      Current medications:  Scheduled Meds:albuterol sulfate HFA, 2 puff, Inhalation, 4x Daily - RT  budesonide-formoterol, 2 puff, Inhalation, BID - RT  carvedilol, 25 mg, Oral, BID With Meals  dexAMETHasone, 6 mg, Oral, Daily   Or  dexAMETHasone, 6 mg, Intravenous, Daily  doxycycline, 100 mg, Intravenous, Q12H  empagliflozin, 10 mg, Oral, Daily  enoxaparin, 40 mg, Subcutaneous, Q24H  pantoprazole, 40 mg, Oral, Q AM  piperacillin-tazobactam, 3.375 g, Intravenous, Q8H  remdesivir, 100 mg, Intravenous, Q24H  sacubitril-valsartan, 1 tablet, Oral, BID      Continuous Infusions:   PRN Meds:.  !Patient Home Medications Stored in Pharmacy    bumetanide    nitroglycerin    sodium chloride    sodium chloride    Assessment & Plan   Assessment & Plan     Active Hospital Problems    Diagnosis  POA    **Hypoxia [R09.02]  Yes    COVID-19 virus infection [U07.1]  Unknown    COPD (chronic obstructive pulmonary disease) [J44.9]  Unknown    Cavitary lesion of lung [J98.4]  Unknown    Elevated troponin [R79.89]  Unknown    COPD with acute exacerbation [J44.1]  Yes    Congestive heart failure, unspecified HF chronicity, unspecified heart failure type [I50.9]  Yes    Essential hypertension [I10]  Yes      Resolved Hospital Problems   No resolved problems to display.        Brief Hospital Course to date:  Francisco Clark is a 63 y.o. male with hx of HFrEF, COPD,  HTN, and tobacco abuse who presents due to worsening SOA over the past 10 days.      COVID-19  Coronavirus OC43  Hypoxia  COPD without exacerbation  --Hypoxic to 86% on room air, does not wear home oxygen  --Currently requiring 3 liters, wean as tolerated - likely will need home oxygen at discharge  --CTA chest with RUL cavitary lesion, no other infiltrates noted  --Respiratory PCR panel positive for COVID-19 as well as Coronavirus OC43  --Symptoms present x 10 days, may be out of window for benefit from Remdesivir but given presentation we will continue (Pulmonary in agreement)  --Continue Remdesivir and Decadron - Remdesivir through 1/9/24  --Continue home inhaler, formulary substitute  --Scheduled albuterol MDI 4x daily   --Repeat CXR 1/6/24 no acute findings     Cavitary lung lesion of right upper lobe  --CTA chest: 25 mm cavitary lesion in right upper lobe with internal debris or septations, moderate emphysema   --Pulmonary/Dr. Mcgrath has evaluated, appears to be a small area of infiltrate, but cannot rule out malignancy: recommends treat as community acquired pneumonia and repeat CT chest in 3 months; has F/U with Kayla LANCE 1/19/24  --MRSA PCR negative, stopped Vanc  --Continue Zosyn and Doxycycline - plan to change to PO ATBx at discharge  --Blood cultures NGTD, sputum culture with normal respiratory [place] thus far     HFrEF with recovery   Dilated nonischemic cardiomyopathy  --EF previously <20% in October 2022, seems to have recovered on Echo January 2023 (46-50%)  --Followed by Dr. Coreas/Cardiology  --Continue home meds Coreg, Entresto, Jardiance, Bumex    Expected Discharge Location and Transportation: home  Expected Discharge anticipate home with oxygen tomorrow after last Remdesivir dose  Expected Discharge Date: 1/9/2024; Expected Discharge Time:      DVT prophylaxis:  Medical DVT prophylaxis orders are present.     AM-PAC 6 Clicks Score (PT): 24 (01/08/24 0815)    CODE STATUS:   Code Status  and Medical Interventions:   Ordered at: 24 0053     Code Status (Patient has no pulse and is not breathing):    CPR (Attempt to Resuscitate)     Medical Interventions (Patient has pulse or is breathing):    Full Support       Pastora Padron MD  24        Electronically signed by Pastora Padron MD at 24 1345       Pastora Padron MD at 24 115              [facility] Medicine Services  PROGRESS NOTE    Patient Name: Francisco Clark  : 1960  MRN: [id number]    Date of Admission: 2024  Primary Care Physician: Charito Weiss APRN    Subjective   Subjective     CC:  F/U SOA    HPI:  Seen this morning. Reports feeling better. On 3 liters.      Objective   Objective     Vital Signs:   Temp:  [97.7 °F (36.5 °C)-98.2 °F (36.8 °C)] 97.8 °F (36.6 °C)  Heart Rate:  [54-76] 72  Resp:  [18-20] 18  BP: (103-136)/(54-74) 125/61  Flow (L/min):  [3-4] 3     Physical Exam:  Gen-no acute distress  HENT-NCAT, mucous membranes moist  CV-RRR, S1 S2 normal, no m/r/g  Resp-diminished bilaterally, improved wheezes almost none today, nonlabored on 3 liters saturating 92%  Abd-soft, NT, ND, +BS  Ext-no edema  Neuro-A&Ox3, no focal deficits  Skin-no rashes  Psych-appropriate mood      Results Reviewed:  LAB RESULTS:      Lab 24  0557 24  0838 24  2124   WBC 8.81 8.54 10.27   HEMOGLOBIN 14.7 16.1 15.4   HEMATOCRIT 46.5 51.6* 48.3   PLATELETS 232 281 258   NEUTROS ABS 6.86 6.86 7.92*   IMMATURE GRANS (ABS) 0.03 0.04 0.04   LYMPHS ABS 1.23 1.12 1.00   MONOS ABS 0.68 0.51 1.27*   EOS ABS 0.00 0.00 0.01   MCV 97.1* 99.6* 97.4*   CRP 1.97* 4.20* 8.20*   PROCALCITONIN  --   --  0.10   LACTATE  --   --  0.7   LDH  --   --  324*   D DIMER QUANT  --   --  0.38         Lab 24  0557 24  0838 24  0514 24  2319   SODIUM 140 139 133* 134*   POTASSIUM 4.5 5.1 5.0 5.1   CHLORIDE 101 98 97* 97*   CO2 30.0* 34.0* 23.0 28.0   ANION GAP 9.0 7.0 13.0 9.0    BUN 37* 37* 25* 25*   CREATININE 0.76 1.06 0.79 0.77   EGFR 100.4 78.4 99.2 100.0   GLUCOSE 104* 149* 149* 118*   CALCIUM 10.4 11.3* 9.5 9.4         Lab 01/07/24  0557 01/06/24  0838 01/05/24  0514 01/04/24  2319   TOTAL PROTEIN 6.3 7.3 7.7 7.4   ALBUMIN 3.4* 4.0 3.5 3.5   GLOBULIN 2.9 3.3 4.2 3.9   ALT (SGPT) 13 12 12 12   AST (SGOT) 16 16 21 16   BILIRUBIN <0.2 0.2 0.2 0.3   ALK PHOS 56 71 69 66         Lab 01/06/24  0838 01/05/24  0707 01/05/24  0514 01/04/24  2319 01/04/24  2124   PROBNP 160.9  --   --   --  294.2   HSTROP T  --  23* 26* 32*  --                  Lab 01/04/24  2134   PH, ARTERIAL 7.365   PCO2, ARTERIAL 52.1*   PO2 ART 83.9   FIO2 32   HCO3 ART 29.7*   BASE EXCESS ART 3.1*   CARBOXYHEMOGLOBIN 1.8     Brief Urine Lab Results       None            Microbiology Results Abnormal       Procedure Component Value - Date/Time    Respiratory Culture - Sputum, Cough [[id number]] Collected: 01/06/24 1050    Lab Status: Preliminary result Specimen: Sputum from Cough Updated: 01/07/24 0924     Respiratory Culture Scant growth (1+) The culture consists of normal respiratory [place]. This is a preliminary report; final report to follow.     Gram Stain Moderate (3+) WBCs per low power field      Rare (1+) Epithelial cells per low power field      Few (2+) Gram positive cocci in pairs    Blood Culture - Blood, Hand, Right [[id number]]  (Normal) Collected: 01/05/24 0514    Lab Status: Preliminary result Specimen: Blood from Hand, Right Updated: 01/07/24 0600     Blood Culture No growth at 2 days    Blood Culture - Blood, Arm, Left [[id number]]  (Normal) Collected: 01/05/24 0508    Lab Status: Preliminary result Specimen: Blood from Arm, Left Updated: 01/07/24 0600     Blood Culture No growth at 2 days    MRSA Screen, PCR (Inpatient) - Swab, Nares [[id number]]  (Normal) Collected: 01/05/24 0546    Lab Status: Final result Specimen: Swab from Nares Updated: 01/05/24 0843     MRSA PCR Negative    Narrative:      The  negative predictive value of this diagnostic test is high and should only be used to consider de-escalating anti-MRSA therapy. A positive result may indicate colonization with MRSA and must be correlated clinically.  MRSA Negative    Respiratory Culture - Sputum, Cough [[id number]] Collected: 01/04/24 2317    Lab Status: Final result Specimen: Sputum from Cough Updated: 01/05/24 0614     Respiratory Culture Rejected     Gram Stain Many (4+) Gram positive cocci in pairs, chains and clusters      Moderate (3+) Gram negative bacilli      Moderate (3+) Epithelial cells per low power field      Rare (1+) WBCs per low power field    Narrative:      Specimen rejected due to oropharyngeal contamination. Please reorder and recollect specimen if clinically necessary.  Specimen rejected due to oropharyngeal contamination.            XR Chest 1 View    Result Date: 1/6/2024  XR CHEST 1 VW Date of Exam: 1/6/2024 1:42 PM EST Indication: F/U hypoxia Comparison: Chest x-ray 1/4/2024 Findings: Stable cardiomediastinal silhouette within normal limits. There is background emphysema. No focal consolidation. No pleural effusion or pneumothorax.     Impression: Impression: No acute cardiopulmonary findings. Unchanged emphysema. Electronically Signed: Thomas Allen MD  1/6/2024 2:36 PM EST  Workstation ID: [id number]     Results for orders placed during the hospital encounter of 01/10/23    Adult Transthoracic Echo Complete W/ Cont if Necessary Per Protocol    Interpretation Summary    Left ventricular systolic function is mildly decreased. Left ventricular ejection fraction appears to be 46 - 50%.    Left ventricular wall thickness is consistent with mild concentric hypertrophy.    Left ventricular diastolic function is consistent with (grade II w/high LAP) pseudonormalization.    Estimated right ventricular systolic pressure from tricuspid regurgitation is normal (<35 mmHg).      Current medications:  Scheduled Meds:albuterol sulfate  HFA, 2 puff, Inhalation, 4x Daily - RT  budesonide-formoterol, 2 puff, Inhalation, BID - RT  carvedilol, 25 mg, Oral, BID With Meals  dexAMETHasone, 6 mg, Oral, Daily   Or  dexAMETHasone, 6 mg, Intravenous, Daily  doxycycline, 100 mg, Intravenous, Q12H  empagliflozin, 10 mg, Oral, Daily  enoxaparin, 40 mg, Subcutaneous, Q24H  pantoprazole, 40 mg, Oral, Q AM  piperacillin-tazobactam, 3.375 g, Intravenous, Q8H  remdesivir, 100 mg, Intravenous, Q24H  sacubitril-valsartan, 1 tablet, Oral, BID      Continuous Infusions:   PRN Meds:.  !Patient Home Medications Stored in Pharmacy    bumetanide    nitroglycerin    sodium chloride    sodium chloride    Assessment & Plan   Assessment & Plan     Active Hospital Problems    Diagnosis  POA    **Hypoxia [R09.02]  Yes    COVID-19 virus infection [U07.1]  Unknown    COPD (chronic obstructive pulmonary disease) [J44.9]  Unknown    Cavitary lesion of lung [J98.4]  Unknown    Elevated troponin [R79.89]  Unknown    COPD with acute exacerbation [J44.1]  Yes    Congestive heart failure, unspecified HF chronicity, unspecified heart failure type [I50.9]  Yes    Essential hypertension [I10]  Yes      Resolved Hospital Problems   No resolved problems to display.        Brief Hospital Course to date:  Francisco Clark is a 63 y.o. male with hx of HFrEF, COPD, HTN, and tobacco abuse who presents due to worsening SOA over the past 10 days.      COVID-19  Coronavirus OC43  Hypoxia  COPD without exacerbation  --Hypoxic to 86% on room air, does not wear home oxygen  --Currently requiring 3 liters, wean as tolerated - may need home oxygen at discharge  --CTA chest with RUL cavitary lesion, no other infiltrates noted  --Respiratory PCR panel positive for COVID-19 as well as Coronavirus OC43  --Symptoms present x 10 days, may be out of window for benefit from Remdesivir but given presentation we will continue (Pulmonary in agreement)  --Continue Remdesivir and Decadron - Remdesivir through  24  --Continue home inhaler, formulary substitute  --Scheduled albuterol MDI 4x daily   --Repeat CXR 24 no acute findings     Cavitary lung lesion of right upper lobe  --CTA chest: 25 mm cavitary lesion in right upper lobe with internal debris or septations, moderate emphysema   --Pulmonary/Dr. Mcgrath has evaluated, appears to be a small area of infiltrate, but cannot rule out malignancy: recommends treat as community acquired pneumonia and repeat CT chest in 3 months; has F/U with Kayla LANCE 24  --MRSA PCR negative, stopped Vanc  --Continue Zosyn and Doxycycline  --Blood cultures NGTD, sputum culture with normal respiratory [place] thus far     HFrEF with recovery   Dilated nonischemic cardiomyopathy  --EF previously <20% in 2022, seems to have recovered on Echo 2023 (46-50%)  --Followed by Dr. Coreas/Cardiology  --Continue home meds Coreg, Entresto, Jardiance, Bumex    Expected Discharge Location and Transportation: home  Expected Discharge   Expected Discharge Date: 2024; Expected Discharge Time:      DVT prophylaxis:  Medical DVT prophylaxis orders are present.     AM-PAC 6 Clicks Score (PT): 24 (24)    CODE STATUS:   Code Status and Medical Interventions:   Ordered at: 24 0053     Code Status (Patient has no pulse and is not breathing):    CPR (Attempt to Resuscitate)     Medical Interventions (Patient has pulse or is breathing):    Full Support       Pastora Padron MD  24        Electronically signed by Pastora Padron MD at 24 1202       Pastora Padron MD at 24 1330              [facility] Medicine Services  PROGRESS NOTE    Patient Name: Francisco Clark  : 1960  MRN: [id number]    Date of Admission: 2024  Primary Care Physician: Charito Weiss APRN    Subjective   Subjective     CC:  F/U SOA    HPI:  Seen this morning. Reports feeling a little stronger today. Still on oxygen and has been turned  up to 4 liters. His cough is breaking loose and he was able to provide a sputum sample today.      Objective   Objective     Vital Signs:   Temp:  [97.6 °F (36.4 °C)-98 °F (36.7 °C)] 97.6 °F (36.4 °C)  Heart Rate:  [61-90] 78  Resp:  [17-20] 18  BP: (116-134)/(65-78) 116/65  Flow (L/min):  [3-4] 4     Physical Exam:  Gen-no acute distress  HENT-NCAT, mucous membranes moist  CV-RRR, S1 S2 normal, no m/r/g  Resp-diminished bilaterally, +wheezes, nonlabored on 4 liters saturating 92%  Abd-soft, NT, ND, +BS  Ext-no edema  Neuro-A&Ox3, no focal deficits  Skin-no rashes  Psych-appropriate mood      Results Reviewed:  LAB RESULTS:      Lab 01/06/24  0838 01/04/24  2124   WBC 8.54 10.27   HEMOGLOBIN 16.1 15.4   HEMATOCRIT 51.6* 48.3   PLATELETS 281 258   NEUTROS ABS 6.86 7.92*   IMMATURE GRANS (ABS) 0.04 0.04   LYMPHS ABS 1.12 1.00   MONOS ABS 0.51 1.27*   EOS ABS 0.00 0.01   MCV 99.6* 97.4*   CRP 4.20* 8.20*   PROCALCITONIN  --  0.10   LACTATE  --  0.7   LDH  --  324*   D DIMER QUANT  --  0.38         Lab 01/06/24  0838 01/05/24  0514 01/04/24  2319   SODIUM 139 133* 134*   POTASSIUM 5.1 5.0 5.1   CHLORIDE 98 97* 97*   CO2 34.0* 23.0 28.0   ANION GAP 7.0 13.0 9.0   BUN 37* 25* 25*   CREATININE 1.06 0.79 0.77   EGFR 78.4 99.2 100.0   GLUCOSE 149* 149* 118*   CALCIUM 11.3* 9.5 9.4         Lab 01/06/24  0838 01/05/24  0514 01/04/24  2319   TOTAL PROTEIN 7.3 7.7 7.4   ALBUMIN 4.0 3.5 3.5   GLOBULIN 3.3 4.2 3.9   ALT (SGPT) 12 12 12   AST (SGOT) 16 21 16   BILIRUBIN 0.2 0.2 0.3   ALK PHOS 71 69 66         Lab 01/05/24  0707 01/05/24  0514 01/04/24  2319 01/04/24 2124   PROBNP  --   --   --  294.2   HSTROP T 23* 26* 32*  --                  Lab 01/04/24 2134   PH, ARTERIAL 7.365   PCO2, ARTERIAL 52.1*   PO2 ART 83.9   FIO2 32   HCO3 ART 29.7*   BASE EXCESS ART 3.1*   CARBOXYHEMOGLOBIN 1.8     Brief Urine Lab Results       None            Microbiology Results Abnormal       Procedure Component Value - Date/Time    Respiratory  Culture - Sputum, Cough [[id number]] Collected: 01/06/24 1050    Lab Status: Preliminary result Specimen: Sputum from Cough Updated: 01/06/24 1320     Gram Stain Moderate (3+) WBCs per low power field      Rare (1+) Epithelial cells per low power field      Few (2+) Gram positive cocci in pairs    Blood Culture - Blood, Hand, Right [[id number]]  (Normal) Collected: 01/05/24 0514    Lab Status: Preliminary result Specimen: Blood from Hand, Right Updated: 01/06/24 0600     Blood Culture No growth at 24 hours    Blood Culture - Blood, Arm, Left [[id number]]  (Normal) Collected: 01/05/24 0508    Lab Status: Preliminary result Specimen: Blood from Arm, Left Updated: 01/06/24 0600     Blood Culture No growth at 24 hours    MRSA Screen, PCR (Inpatient) - Swab, Nares [[id number]]  (Normal) Collected: 01/05/24 0546    Lab Status: Final result Specimen: Swab from Nares Updated: 01/05/24 0843     MRSA PCR Negative    Narrative:      The negative predictive value of this diagnostic test is high and should only be used to consider de-escalating anti-MRSA therapy. A positive result may indicate colonization with MRSA and must be correlated clinically.  MRSA Negative    Respiratory Culture - Sputum, Cough [[id number]] Collected: 01/04/24 2319    Lab Status: Final result Specimen: Sputum from Cough Updated: 01/05/24 0654     Respiratory Culture Rejected     Gram Stain Many (4+) Gram positive cocci in pairs, chains and clusters      Moderate (3+) Gram negative bacilli      Moderate (3+) Epithelial cells per low power field      Rare (1+) WBCs per low power field    Narrative:      Specimen rejected due to oropharyngeal contamination. Please reorder and recollect specimen if clinically necessary.  Specimen rejected due to oropharyngeal contamination.            CT Angiogram Chest    Result Date: 1/5/2024  CT ANGIOGRAM CHEST Date of Exam: 1/5/2024 12:24 AM EST Indication: cough, SOA, COPD, hypoxia,. Comparison: None available. Technique:  CTA of the chest was performed after the uneventful intravenous administration of 80 mL of Isovue-370. Reconstructed coronal and sagittal images were also obtained. In addition, a 3-D volume rendered image was created for interpretation. Automated exposure control and iterative reconstruction methods were used. Findings: The thyroid, trachea and esophagus appear within normal limits. Heart size is normal. There is mild aortic atherosclerotic disease. No aneurysm. The main pulmonary artery is normal diameter. There is no evidence of pulmonary embolism. No pericardial effusion or mediastinal lymphadenopathy. There is mild coronary artery calcification. There is moderate emphysema. Emphysema on CT is an independent risk factor for lung cancer. Low dose lung cancer screening should be considered if patient qualifies based on smoking history or if not already enrolled in a screening program. There is an irregular cavitary lesion in the right perihilar aspect of the right upper lobe measuring 25 mm total diameter. There is internal debris or septations in this semisolid lesion. There is mild adjacent peribronchial thickening. There is a small amount of debris in the right bronchus intermedius. There is additional peribronchial thickening in the right lower lobe dependently. There is no pneumothorax or pleural effusion. No lobar consolidation. There are no acute findings in the superficial soft tissues. No acute findings in the upper abdomen. There are no acute osseous abnormalities or destructive bone lesions. There are mild to moderate lower cervical degenerative changes.     Impression: Impression: 1.No evidence of pulmonary embolism. 2.25 mm cavitary lesion in the right upper lobe with internal debris or septations. This could be related to an infectious or inflammatory process. Malignancy cannot be excluded. PET/CT follow-up could provide more information. 3.Moderate emphysema. Emphysema on CT is an independent risk  factor for lung cancer. Low dose lung cancer screening should be considered if patient qualifies based on smoking history. 4.Mild coronary artery calcification and mild aortic atherosclerosis. Electronically Signed: Tres Chau MD  1/5/2024 12:50 AM EST  Workstation ID: [id number]    XR Chest 1 View    Result Date: 1/4/2024  XR CHEST 1 VW Date of Exam: 1/4/2024 8:44 PM EST Indication: SOA triage protocol Comparison: 10/19/2022. Findings: Chronic interstitial and emphysematous changes are present. There is no focal consolidation. There is no effusion or pneumothorax. Unremarkable heart and mediastinal contours.     Impression: Impression: No evidence of acute disease. Electronically Signed: Vishnu Chan MD  1/4/2024 9:10 PM EST  Workstation ID: [id number]     Results for orders placed during the hospital encounter of 01/10/23    Adult Transthoracic Echo Complete W/ Cont if Necessary Per Protocol    Interpretation Summary    Left ventricular systolic function is mildly decreased. Left ventricular ejection fraction appears to be 46 - 50%.    Left ventricular wall thickness is consistent with mild concentric hypertrophy.    Left ventricular diastolic function is consistent with (grade II w/high LAP) pseudonormalization.    Estimated right ventricular systolic pressure from tricuspid regurgitation is normal (<35 mmHg).      Current medications:  Scheduled Meds:albuterol sulfate HFA, 2 puff, Inhalation, 4x Daily - RT  budesonide-formoterol, 2 puff, Inhalation, BID - RT  carvedilol, 25 mg, Oral, BID With Meals  dexAMETHasone, 6 mg, Oral, Daily   Or  dexAMETHasone, 6 mg, Intravenous, Daily  doxycycline, 100 mg, Intravenous, Q12H  empagliflozin, 10 mg, Oral, Daily  enoxaparin, 40 mg, Subcutaneous, Q24H  piperacillin-tazobactam, 3.375 g, Intravenous, Q8H  remdesivir, 100 mg, Intravenous, Q24H  sacubitril-valsartan, 1 tablet, Oral, BID      Continuous Infusions:   PRN Meds:.  bumetanide    nitroglycerin    sodium chloride     sodium chloride    Assessment & Plan   Assessment & Plan     Active Hospital Problems    Diagnosis  POA    **Hypoxia [R09.02]  Yes    COVID-19 virus infection [U07.1]  Unknown    COPD (chronic obstructive pulmonary disease) [J44.9]  Unknown    Cavitary lesion of lung [J98.4]  Unknown    Elevated troponin [R79.89]  Unknown    COPD with acute exacerbation [J44.1]  Yes    Congestive heart failure, unspecified HF chronicity, unspecified heart failure type [I50.9]  Yes    Essential hypertension [I10]  Yes      Resolved Hospital Problems   No resolved problems to display.        Brief Hospital Course to date:  Francisco Clark is a 63 y.o. male with hx of HFrEF, COPD, HTN, and tobacco abuse who presents due to worsening SOA over the past 10 days.      COVID-19  Coronavirus OC43  Hypoxia  COPD without exacerbation  --Hypoxic to 86% on room air, does not wear home oxygen  --Currently requiring 3-4 liters, wean as tolerated  --CTA chest with RUL cavitary lesion, no other infiltrates noted  --Respiratory PCR panel positive for COVID-19 as well as Coronavirus OC43  --Symptoms present x 10 days, may be out of window for benefit from Remdesivir but given presentation we will continue (Pulmonary in agreement)  --Continue Remdesivir and Decadron  --Continue home inhaler, formulary substitute  --Will scheduled albuterol MDI 4x daily due to wheezing on exam today  --Repeat CXR today     Cavitary lung lesion of right upper lobe  --CTA chest: 25 mm cavitary lesion in right upper lobe with internal debris or septations, moderate emphysema   --Pulmonary/Dr. Mcgrath has evaluated, appears to be a small area of infiltrate, but cannot rule out malignancy: recommends treat as community acquired pneumonia and repeat CT chest in 3 months  --MRSA PCR negative, stopped Vanc  --Continue Zosyn and Doxycycline  --F/U cultures     HFrEF with recovery   Dilated nonischemic cardiomyopathy  --EF previously <20% in October 2022, seems to have  recovered on Echo January 2023 (46-50%)  --Followed by Dr. Coreas/Cardiology  --Continue home meds Coreg, Entresto, Jardiance, Bumex    Expected Discharge Location and Transportation: home  Expected Discharge   Expected Discharge Date: 1/8/2024; Expected Discharge Time:      DVT prophylaxis:  Medical DVT prophylaxis orders are present.     AM-PAC 6 Clicks Score (PT): 24 (01/06/24 0800)    CODE STATUS:   Code Status and Medical Interventions:   Ordered at: 01/05/24 0053     Code Status (Patient has no pulse and is not breathing):    CPR (Attempt to Resuscitate)     Medical Interventions (Patient has pulse or is breathing):    Full Support       Pastora Padron MD  01/06/24        Electronically signed by Pastora Padron MD at 01/06/24 1333       Pastora Padron MD at 01/05/24 1346                [facility] Medicine Services  ADMISSION FOLLOW-UP NOTE          Patient admitted after midnight, H&P by my partner performed earlier on today's date reviewed.  Interim findings, labs, and charting also reviewed.        [facility] Problem List has been managed and updated to include any new diagnoses:  Active Hospital Problems    Diagnosis  POA    **Hypoxia [R09.02]  Yes    COVID-19 virus infection [U07.1]  Unknown    COPD (chronic obstructive pulmonary disease) [J44.9]  Unknown    Cavitary lesion of lung [J98.4]  Unknown    Elevated troponin [R79.89]  Unknown    COPD with acute exacerbation [J44.1]  Yes    Congestive heart failure, unspecified HF chronicity, unspecified heart failure type [I50.9]  Yes    Essential hypertension [I10]  Yes      Resolved Hospital Problems   No resolved problems to display.     64 yo M with hx of HFrEF, COPD, HTN, and tobacco abuse who presents due to worsening SOA over the past 10 days.     COVID-19  Coronavirus OC43  Hypoxia  COPD without exacerbation  --Hypoxic to 86% on room air, does not wear home oxygen  --Currently requiring 3-4 liters, wean as  tolerated  --CTA chest with RUL cavitary lesion, no other infiltrates noted  --Respiratory PCR panel positive for COVID-19 as well as Coronavirus OC43  --Symptoms present x 10 days, may be out of window for benefit from Remdesivir but given presentation we will continue (Pulmonary in agreement)  --Continue Remdesivir and Decadron  --Continue home inhaler, formulary substitute  --PRN albuterol MDI    Cavitary lung lesion of right upper lobe  --CTA chest: 25 mm cavitary lesion in right upper lobe with internal debris or septations, moderate emphysema   --Pulmonary/Dr. Mcgrath has evaluated, appears to be a small area of infiltrate, but cannot rule out malignancy: recommends treat as community acquired pneumonia and repeat CT chest in 3 months  --MRSA PCR negative, stopped Vanc  --Continue Zosyn, add Doxycycline  --F/U cultures    HFrEF with recovery   Dilated nonischemic cardiomyopathy  --EF previously <20% in 2022, seems to have recovered on Echo 2023 (46-50%)  --Followed by Dr. Coreas/Cardiology  --Continue home meds Coreg, Entresto, Jardiance, Bumex    Expected Discharge   Expected Discharge Date: 2024; Expected Discharge Time:      Pastora Padron MD  24        Electronically signed by Pastora Padron MD at 24 1355          Discharge Summary        Evy Ramirez MD at 01/10/24 1217              [facility] Medicine Services  DISCHARGE SUMMARY    Patient Name: Francisco Clark  : 1960  MRN: [id number]    Date of Admission: 2024  8:41 PM  Date of Discharge:  1/10/24  Primary Care Physician: Charito Weiss APRN    Consults       Date and Time Order Name Status Description    2024  3:19 AM Inpatient Pulmonology Consult Completed             Hospital Course     Presenting Problem: Dyspnea.    Active Hospital Problems    Diagnosis  POA    **Hypoxia [R09.02]  Yes    COVID-19 virus infection [U07.1]  Unknown    COPD (chronic obstructive pulmonary  disease) [J44.9]  Unknown    Cavitary lesion of lung [J98.4]  Unknown    Elevated troponin [R79.89]  Unknown    COPD with acute exacerbation [J44.1]  Yes    Congestive heart failure, unspecified HF chronicity, unspecified heart failure type [I50.9]  Yes    Essential hypertension [I10]  Yes      Resolved Hospital Problems   No resolved problems to display.          Hospital Course:  Francisco Clark is a 63 y.o. male with hx of HFrEF, COPD, HTN, and tobacco abuse who presents due to worsening SOA over the past 10 days.      Copied text in this note has been reviewed and is accurate as of 01/10/24.     Acute hypoxic respiratory failure 2/2 below. improving  COVID-19 infection  Coronavirus OC43  COPD  --Hypoxic to 86% on room air, does not wear home oxygen  -- Initially required 3 liters.  --CTA chest with RUL cavitary lesion, no other infiltrates noted  --Respiratory PCR panel positive for COVID-19 as well as Coronavirus OC43  --Started on Remdesivir and Dexamethasone. Continue Decadron 6 mg daily to complete a 10-day course   --Pt was started Zosyn and Doxy. Switched to Augmentin and doxy on discharge.   --Scheduled albuterol MDI 4x daily   --Repeat CXR 1/6/24 no acute findings  -- AFB negative. MRSA PCR negative, stopped Vanc  --Blood cultures NGTD, sputum culture with normal respiratory [place] thus far  --Started on Remdesivir and Dexamethasone. Continue Decadron 6 mg daily to complete a 10-day course   --Pt was started Zosyn and Doxy. Switched to Augmentin and doxy on discharge.   -- Will need home oxygen on discharge. 1-2 with activities.      Cavitary lung lesion of right upper lobe  --CTA chest: 25 mm cavitary lesion in right upper lobe with internal debris or septations, moderate emphysema   --Pulmonary/Dr. Mcgrath has evaluated, appears to be a small area of infiltrate, but cannot rule out malignancy: recommends treat as community acquired pneumonia and repeat CT chest in 3 months; has F/U with Kayla Ross  APRN 1/19/24       HFrEF with recovery   Dilated nonischemic cardiomyopathy  --EF previously <20% in October 2022, seems to have recovered on Echo January 2023 (46-50%)  --Followed by Dr. Coreas/Cardiology  --Continue home meds Coreg, Entresto, Jardiance, Bumex      Discharge Follow Up Recommendations for outpatient labs/diagnostics:  Follow up with pcp in 1 week. Blasto and histo urinary antigen pending.  Follow up with pulmonary in 3 months with repeated CT chest.    Day of Discharge     HPI:   Seen and examined.    Review of Systems  No fever, chills, CP or abd pain.    Vital Signs:   Temp:  [97.6 °F (36.4 °C)-98.7 °F (37.1 °C)] 98 °F (36.7 °C)  Heart Rate:  [66-96] 71  Resp:  [16-18] 16  BP: ()/(52-70) 98/61  Flow (L/min):  [1.5-3] 1.5      Physical Exam:  Gen-no acute distress  HENT-NCAT, mucous membranes moist  CV-RRR, S1 S2 normal, no m/r/g  Resp-diminished bilaterally, no wheezes today, nonlabored on 3 liters   Abd-soft, NT, ND, +BS  Ext-no edema  Neuro-A&Ox3, no focal deficits  Skin-no rashes  Psych-appropriate mood    Pertinent  and/or Most Recent Results     LAB RESULTS:      Lab 01/10/24  0622 01/08/24  0642 01/07/24  0557 01/06/24  0838 01/04/24  2124   WBC 9.24 7.90 8.81 8.54 10.27   HEMOGLOBIN 15.9 15.1 14.7 16.1 15.4   HEMATOCRIT 49.3 47.4 46.5 51.6* 48.3   PLATELETS 216 220 232 281 258   NEUTROS ABS 5.85 5.78 6.86 6.86 7.92*   IMMATURE GRANS (ABS) 0.44* 0.14* 0.03 0.04 0.04   LYMPHS ABS 2.10 1.47 1.23 1.12 1.00   MONOS ABS 0.75 0.49 0.68 0.51 1.27*   EOS ABS 0.01 0.00 0.00 0.00 0.01   MCV 95.5 98.3* 97.1* 99.6* 97.4*   CRP  --  1.24* 1.97* 4.20* 8.20*   PROCALCITONIN  --   --   --   --  0.10   LACTATE  --   --   --   --  0.7   LDH  --   --   --   --  324*   D DIMER QUANT  --   --   --   --  0.38         Lab 01/10/24  0622 01/08/24  0642 01/07/24  0557 01/06/24  0838 01/05/24  0514   SODIUM 137 137 140 139 133*   POTASSIUM 5.0 4.5 4.5 5.1 5.0   CHLORIDE 99 99 101 98 97*   CO2 31.0* 26.0 30.0*  34.0* 23.0   ANION GAP 7.0 12.0 9.0 7.0 13.0   BUN 41* 41* 37* 37* 25*   CREATININE 0.80 0.89 0.76 1.06 0.79   EGFR 98.8 95.7 100.4 78.4 99.2   GLUCOSE 91 135* 104* 149* 149*   CALCIUM 9.5 9.5 10.4 11.3* 9.5         Lab 01/10/24  0622 01/08/24  0642 01/07/24  0557 01/06/24  0838 01/05/24  0514   TOTAL PROTEIN 6.7 6.5 6.3 7.3 7.7   ALBUMIN 3.5 3.7 3.4* 4.0 3.5   GLOBULIN 3.2 2.8 2.9 3.3 4.2   ALT (SGPT) 24 15 13 12 12   AST (SGOT) 23 17 16 16 21   BILIRUBIN 0.2 0.2 <0.2 0.2 0.2   ALK PHOS 57 59 56 71 69         Lab 01/06/24  0838 01/05/24  0707 01/05/24  0514 01/04/24  2319 01/04/24  2124   PROBNP 160.9  --   --   --  294.2   HSTROP T  --  23* 26* 32*  --                  Lab 01/04/24  2134   PH, ARTERIAL 7.365   PCO2, ARTERIAL 52.1*   PO2 ART 83.9   FIO2 32   HCO3 ART 29.7*   BASE EXCESS ART 3.1*   CARBOXYHEMOGLOBIN 1.8     Brief Urine Lab Results       None          Microbiology Results (last 10 days)       Procedure Component Value - Date/Time    AFB Culture - Sputum, Cough [[id number]] Collected: 01/06/24 1050    Lab Status: Preliminary result Specimen: Sputum from Cough Updated: 01/07/24 1209     AFB Stain No acid fast bacilli seen on concentrated smear    Respiratory Culture - Sputum, Cough [[id number]] Collected: 01/06/24 1050    Lab Status: Final result Specimen: Sputum from Cough Updated: 01/08/24 1014     Respiratory Culture Scant growth (1+) Normal respiratory [place]. No S. aureus or Pseudomonas aeruginosa detected. Final report.     Gram Stain Moderate (3+) WBCs per low power field      Rare (1+) Epithelial cells per low power field      Few (2+) Gram positive cocci in pairs    MRSA Screen, PCR (Inpatient) - Swab, Nares [[id number]]  (Normal) Collected: 01/05/24 0546    Lab Status: Final result Specimen: Swab from Nares Updated: 01/05/24 0843     MRSA PCR Negative    Narrative:      The negative predictive value of this diagnostic test is high and should only be used to consider de-escalating anti-MRSA  therapy. A positive result may indicate colonization with MRSA and must be correlated clinically.  MRSA Negative    Blood Culture - Blood, Hand, Right [[id number]]  (Normal) Collected: 01/05/24 0514    Lab Status: Final result Specimen: Blood from Hand, Right Updated: 01/10/24 0600     Blood Culture No growth at 5 days    Blood Culture - Blood, Arm, Left [[id number]]  (Normal) Collected: 01/05/24 0508    Lab Status: Final result Specimen: Blood from Arm, Left Updated: 01/10/24 0600     Blood Culture No growth at 5 days    Respiratory Culture - Sputum, Cough [[id number]] Collected: 01/04/24 2319    Lab Status: Final result Specimen: Sputum from Cough Updated: 01/05/24 0654     Respiratory Culture Rejected     Gram Stain Many (4+) Gram positive cocci in pairs, chains and clusters      Moderate (3+) Gram negative bacilli      Moderate (3+) Epithelial cells per low power field      Rare (1+) WBCs per low power field    Narrative:      Specimen rejected due to oropharyngeal contamination. Please reorder and recollect specimen if clinically necessary.  Specimen rejected due to oropharyngeal contamination.    COVID PRE-OP / PRE-PROCEDURE SCREENING ORDER (NO ISOLATION) - Swab, Nasopharynx [[id number]]  (Abnormal) Collected: 01/04/24 2135    Lab Status: Final result Specimen: Swab from Nasopharynx Updated: 01/04/24 2324    Narrative:      The following orders were created for panel order COVID PRE-OP / PRE-PROCEDURE SCREENING ORDER (NO ISOLATION) - Swab, Nasopharynx.  Procedure                               Abnormality         Status                     ---------                               -----------         ------                     Respiratory Panel PCR w/...[[id number]]  Abnormal            Final result                 Please view results for these tests on the individual orders.    Respiratory Panel PCR w/COVID-19(SARS-CoV-2) [facility] In-House, NP Swab in UTM/VTM, 2 HR TAT - Swab, Nasopharynx [[id number]]   (Abnormal) Collected: 01/04/24 2135    Lab Status: Final result Specimen: Swab from Nasopharynx Updated: 01/04/24 2324     ADENOVIRUS, PCR Not Detected     Coronavirus 229E Not Detected     Coronavirus HKU1 Not Detected     Coronavirus NL63 Not Detected     Coronavirus OC43 Detected     COVID19 Detected     Human Metapneumovirus Not Detected     Human Rhinovirus/Enterovirus Not Detected     Influenza A PCR Not Detected     Influenza B PCR Not Detected     Parainfluenza Virus 1 Not Detected     Parainfluenza Virus 2 Not Detected     Parainfluenza Virus 3 Not Detected     Parainfluenza Virus 4 Not Detected     RSV, PCR Not Detected     Bordetella pertussis pcr Not Detected     Bordetella parapertussis PCR Not Detected     Chlamydophila pneumoniae PCR Not Detected     Mycoplasma pneumo by PCR Not Detected    Narrative:      In the setting of a positive respiratory panel with a viral infection PLUS a negative procalcitonin without other underlying concern for bacterial infection, consider observing off antibiotics or discontinuation of antibiotics and continue supportive care. If the respiratory panel is positive for atypical bacterial infection (Bordetella pertussis, Chlamydophila pneumoniae, or Mycoplasma pneumoniae), consider antibiotic de-escalation to target atypical bacterial infection.            XR Chest 1 View    Result Date: 1/6/2024  XR CHEST 1 VW Date of Exam: 1/6/2024 1:42 PM EST Indication: F/U hypoxia Comparison: Chest x-ray 1/4/2024 Findings: Stable cardiomediastinal silhouette within normal limits. There is background emphysema. No focal consolidation. No pleural effusion or pneumothorax.     Impression: No acute cardiopulmonary findings. Unchanged emphysema. Electronically Signed: Thomas Allen MD  1/6/2024 2:36 PM EST  Workstation ID: [id number]    CT Angiogram Chest    Result Date: 1/5/2024  CT ANGIOGRAM CHEST Date of Exam: 1/5/2024 12:24 AM EST Indication: cough, SOA, COPD, hypoxia,. Comparison:  None available. Technique: CTA of the chest was performed after the uneventful intravenous administration of 80 mL of Isovue-370. Reconstructed coronal and sagittal images were also obtained. In addition, a 3-D volume rendered image was created for interpretation. Automated exposure control and iterative reconstruction methods were used. Findings: The thyroid, trachea and esophagus appear within normal limits. Heart size is normal. There is mild aortic atherosclerotic disease. No aneurysm. The main pulmonary artery is normal diameter. There is no evidence of pulmonary embolism. No pericardial effusion or mediastinal lymphadenopathy. There is mild coronary artery calcification. There is moderate emphysema. Emphysema on CT is an independent risk factor for lung cancer. Low dose lung cancer screening should be considered if patient qualifies based on smoking history or if not already enrolled in a screening program. There is an irregular cavitary lesion in the right perihilar aspect of the right upper lobe measuring 25 mm total diameter. There is internal debris or septations in this semisolid lesion. There is mild adjacent peribronchial thickening. There is a small amount of debris in the right bronchus intermedius. There is additional peribronchial thickening in the right lower lobe dependently. There is no pneumothorax or pleural effusion. No lobar consolidation. There are no acute findings in the superficial soft tissues. No acute findings in the upper abdomen. There are no acute osseous abnormalities or destructive bone lesions. There are mild to moderate lower cervical degenerative changes.     Impression: 1.No evidence of pulmonary embolism. 2.25 mm cavitary lesion in the right upper lobe with internal debris or septations. This could be related to an infectious or inflammatory process. Malignancy cannot be excluded. PET/CT follow-up could provide more information. 3.Moderate emphysema. Emphysema on CT is an  independent risk factor for lung cancer. Low dose lung cancer screening should be considered if patient qualifies based on smoking history. 4.Mild coronary artery calcification and mild aortic atherosclerosis. Electronically Signed: Tres Chau MD  1/5/2024 12:50 AM EST  Workstation ID: [id number]    XR Chest 1 View    Result Date: 1/4/2024  XR CHEST 1 VW Date of Exam: 1/4/2024 8:44 PM EST Indication: SOA triage protocol Comparison: 10/19/2022. Findings: Chronic interstitial and emphysematous changes are present. There is no focal consolidation. There is no effusion or pneumothorax. Unremarkable heart and mediastinal contours.     Impression: No evidence of acute disease. Electronically Signed: Vishnu Chan MD  1/4/2024 9:10 PM EST  Workstation ID: [id number]             Results for orders placed during the hospital encounter of 01/10/23    Adult Transthoracic Echo Complete W/ Cont if Necessary Per Protocol    Interpretation Summary    Left ventricular systolic function is mildly decreased. Left ventricular ejection fraction appears to be 46 - 50%.    Left ventricular wall thickness is consistent with mild concentric hypertrophy.    Left ventricular diastolic function is consistent with (grade II w/high LAP) pseudonormalization.    Estimated right ventricular systolic pressure from tricuspid regurgitation is normal (<35 mmHg).      Plan for Follow-up of Pending Labs/Results:   Pending Labs       Order Current Status    AFB Culture - Sputum, Cough In process    Blastomyces Antigen - Urine, Urine, Clean Catch In process    Fungal Antibodies, Quantitative Double Immunodiffusion In process    Histoplasma Ag Ur - Urine, Urine, Clean Catch In process    AFB Culture - Sputum, Cough Preliminary result          Discharge Details        Discharge Medications        New Medications        Instructions Start Date   amoxicillin-clavulanate 875-125 MG per tablet  Commonly known as: AUGMENTIN   1 tablet, Oral, 2 Times Daily       dexAMETHasone 6 MG tablet  Commonly known as: DECADRON   6 mg, Oral, Daily   Start Date: January 11, 2024     doxycycline 100 MG capsule  Commonly known as: VIBRAMYCIN   100 mg, Oral, 2 Times Daily      guaifenesin-dextromethorphan  MG tablet sustained-release 12 hour tablet   2 tablets, Oral, 2 Times Daily      tiotropium bromide monohydrate 2.5 MCG/ACT aerosol solution inhaler  Commonly known as: SPIRIVA RESPIMAT   2 puffs, Inhalation, Daily - RT             Continue These Medications        Instructions Start Date   albuterol sulfate  (90 Base) MCG/ACT inhaler  Commonly known as: Proventil HFA   2 puffs, Inhalation, Every 4 Hours PRN      carvedilol 25 MG tablet  Commonly known as: Coreg   25 mg, Oral, 2 Times Daily With Meals      empagliflozin 10 MG tablet tablet  Commonly known as: JARDIANCE   10 mg, Oral, Daily      Entresto 24-26 MG tablet  Generic drug: sacubitril-valsartan   1 tablet, Oral, 2 Times Daily      mometasone-formoterol 200-5 MCG/ACT inhaler  Commonly known as: DULERA 200   2 puffs, Inhalation, 2 Times Daily - RT             ASK your doctor about these medications        Instructions Start Date   bumetanide 0.5 MG tablet  Commonly known as: BUMEX   0.5 mg, Oral, As Needed               No Known Allergies      Discharge Disposition:  Home or Self Care    Diet:  Hospital:  Diet Order   Procedures    Diet: Cardiac Diets; Healthy Heart (2-3 Na+); Texture: Regular Texture (IDDSI 7); Fluid Consistency: Thin (IDDSI 0)            Activity: As tolerated.           CODE STATUS:    Code Status and Medical Interventions:   Ordered at: 01/05/24 0053     Code Status (Patient has no pulse and is not breathing):    CPR (Attempt to Resuscitate)     Medical Interventions (Patient has pulse or is breathing):    Full Support       Future Appointments   Date Time Provider Department Center   1/19/2024  9:00 AM Kayla Ross APRN [facility] [place] [facility]   3/26/2024  9:30 AM Amador Coreas III, MD [facility]  [place] [facility]       Additional Instructions for the Follow-ups that You Need to Schedule       Discharge Follow-up with PCP   As directed       Currently Documented PCP:    Charito Weiss APRN    PCP Phone Number:    [phone]     Follow Up Details: 2 weeks        Discharge Follow-up with Specified Provider: Dr. Mcgrath, Pulmonary in 2-4 weeks   As directed      To: Dr. Mcgrath, Pulmonary in 2-4 weeks                      Evy Ramirez MD  01/10/24      Time Spent on Discharge:  I spent  35  minutes on this discharge activity which included: face-to-face encounter with the patient, reviewing the data in the system, coordination of the care with the nursing staff as well as consultants, documentation, and entering orders.           Electronically signed by Evy Ramirez MD at 01/10/24 7127

## 2024-01-20 LAB
MYCOBACTERIUM SPEC CULT: NORMAL
NIGHT BLUE STAIN TISS: NORMAL

## 2024-01-22 ENCOUNTER — OFFICE VISIT (OUTPATIENT)
Dept: PULMONOLOGY | Facility: CLINIC | Age: 64
End: 2024-01-22
Payer: COMMERCIAL

## 2024-01-22 VITALS
WEIGHT: 127 LBS | HEIGHT: 69 IN | HEART RATE: 73 BPM | OXYGEN SATURATION: 96 % | TEMPERATURE: 97.3 F | BODY MASS INDEX: 18.81 KG/M2 | SYSTOLIC BLOOD PRESSURE: 98 MMHG | DIASTOLIC BLOOD PRESSURE: 52 MMHG

## 2024-01-22 DIAGNOSIS — J98.4 CAVITARY LESION OF LUNG: ICD-10-CM

## 2024-01-22 DIAGNOSIS — Z72.0 TOBACCO ABUSE: ICD-10-CM

## 2024-01-22 DIAGNOSIS — J98.4 PULMONARY LESION, RIGHT: Primary | ICD-10-CM

## 2024-01-22 DIAGNOSIS — J44.9 CHRONIC OBSTRUCTIVE PULMONARY DISEASE, UNSPECIFIED COPD TYPE: ICD-10-CM

## 2024-01-22 PROBLEM — J44.1 COPD WITH ACUTE EXACERBATION: Status: RESOLVED | Noted: 2024-01-05 | Resolved: 2024-01-22

## 2024-01-22 PROCEDURE — 99214 OFFICE O/P EST MOD 30 MIN: CPT | Performed by: NURSE PRACTITIONER

## 2024-01-22 RX ORDER — ALBUTEROL SULFATE 90 UG/1
2 AEROSOL, METERED RESPIRATORY (INHALATION) EVERY 4 HOURS PRN
Qty: 18 G | Refills: 2 | Status: SHIPPED | OUTPATIENT
Start: 2024-01-22

## 2024-01-22 NOTE — PROGRESS NOTES
"[facility] Pulmonary Follow up      Chief Complaint  Hospital Follow Up Visit    Subjective          Francisco Clark presents to [facility] for posthospitalization follow-up.  He recently mid to the hospital for acute exacerbation of his COPD with COVID-19.  He was seen here once in the office by Dr. Laird in October 2022, he does have a severe COPD with an FEV1 in the 30s.    He was sent home to finish out some steroids and antibiotics.  He is actually doing much better now but not 100%.  He was also sent home on some oxygen but has never used it.  He says he has been 95 to 96%.  He denies any current cough or congestion.    He does have a history of COPD, he has a history of pack-a-day smoking for 35+ years but is down to 6 cigarettes a day.  Chronically he is on Dulera and was started on Spiriva Respimat at his discharge.  He denies any recurrent bronchitis episodes or exacerbations, this was his first episode in over 2 years.    He also was found to have an abnormal CT scan with a right upper lobe cavitary lesion, likely infectious or inflammatory in nature.  Plans for follow-up CT in 3 months.    Objective     Vital Signs:   BP 98/52 (BP Location: Left arm, Patient Position: Sitting, Cuff Size: Adult)   Pulse 73   Temp 97.3 °F (36.3 °C)   Ht 175.3 cm (69.02\")   Wt 57.6 kg (127 lb)   SpO2 96% Comment: Room air at rest  BMI 18.74 kg/m²       Immunization History   Administered Date(s) Administered    Fluzone (or Fluarix & Flulaval for VFC) >6mos 10/31/2022    Pneumococcal Conjugate 20-Valent (PCV20) 10/31/2022    Pneumococcal Polysaccharide (PPSV23) 02/01/2018       Physical Exam  Vitals reviewed.   Constitutional:       Appearance: He is well-developed.   HENT:      Head: Normocephalic and atraumatic.   Eyes:      Pupils: Pupils are equal, round, and reactive to light.   Cardiovascular:      Rate and Rhythm: Normal rate and regular rhythm.      Heart " sounds: No murmur heard.  Pulmonary:      Effort: Pulmonary effort is normal. No respiratory distress.      Breath sounds: Normal breath sounds. No wheezing or rales.   Abdominal:      General: Bowel sounds are normal. There is no distension.      Palpations: Abdomen is soft.   Musculoskeletal:         General: Normal range of motion.      Cervical back: Normal range of motion and neck supple.   Skin:     General: Skin is warm and dry.      Findings: No erythema.   Neurological:      Mental Status: He is alert and oriented to person, place, and time.   Psychiatric:         Behavior: Behavior normal.          Result Review :       Data reviewed : Radiologic studies CT       IMPRESSION:  Impression:  1.No evidence of pulmonary embolism.  2.25 mm cavitary lesion in the right upper lobe with internal debris or septations. This could be related to an infectious or inflammatory process. Malignancy cannot be excluded. PET/CT follow-up could provide more information.  3.Moderate emphysema. Emphysema on CT is an independent risk factor for lung cancer. Low dose lung cancer screening should be considered if patient qualifies based on smoking history.  4.Mild coronary artery calcification and mild aortic atherosclerosis.                 Assessment and Plan    Problem List Items Addressed This Visit          Pulmonary and Pneumonias    COPD (chronic obstructive pulmonary disease)    Relevant Medications    tiotropium bromide monohydrate (SPIRIVA RESPIMAT) 2.5 MCG/ACT aerosol solution inhaler    albuterol sulfate HFA (Proventil HFA) 108 (90 Base) MCG/ACT inhaler    Cavitary lesion of lung    Relevant Medications    tiotropium bromide monohydrate (SPIRIVA RESPIMAT) 2.5 MCG/ACT aerosol solution inhaler    albuterol sulfate HFA (Proventil HFA) 108 (90 Base) MCG/ACT inhaler       Tobacco    Tobacco abuse     Other Visit Diagnoses       Pulmonary lesion, right    -  Primary    Relevant Orders    CT Chest Without Contrast          At  this time he is recovering from his recent hospitalization with COVID-19 and exacerbation of his underlying COPD.    He will continue on his current chronic maintenance therapy including Dulera and Spiriva, we sent in refills for him today.    We can discontinue his oxygen in the next month or 2 if he continues to maintain his ox saturations above 92%.    We did review his CT scan with a right upper lobe cavitary lesion, we will follow-up on a CT scan in 3 months.  We also discussed following up on annual low-dose screening CT scans with his history of tobacco use.  He is trying to quit smoking, he is cut down to 6 cigarettes a day.  He has used nicotine replacement products in the past without much benefit.    Follow-up in 3 months with full PFTs.    Follow Up     Return in about 3 months (around 4/22/2024).  Patient was given instructions and counseling regarding his condition or for health maintenance advice. Please see specific information pulled into the AVS if appropriate.     I spent 34 minutes caring for Francisco on this date of service. This time includes time spent by me in the following activities:preparing for the visit, reviewing tests, obtaining and/or reviewing a separately obtained history, performing a medically appropriate examination and/or evaluation , counseling and educating the patient/family/caregiver, ordering medications, tests, or procedures, referring and communicating with other health care professionals , documenting information in the medical record, and independently interpreting results and communicating that information with the patient/family/caregiver    Excluding time spent on other separate services such as performing procedures or test interpretation, if applicable    Moderate level of Medical Decision Making complexity based on 2 or more chronic stable illnesses and prescription drug management.    Kayla Ross APRN, ACNP  [facility] Pulmonary Critical Care Medicine  Electronically  signed

## 2024-01-23 LAB
MYCOBACTERIUM SPEC CULT: NORMAL
NIGHT BLUE STAIN TISS: NORMAL

## 2024-01-25 ENCOUNTER — TELEPHONE (OUTPATIENT)
Dept: INTERNAL MEDICINE | Facility: CLINIC | Age: 64
End: 2024-01-25
Payer: COMMERCIAL

## 2024-01-25 NOTE — TELEPHONE ENCOUNTER
Caller: Francisco Clark    Relationship: Self    Best call back number: [phone]     What form or medical record are you requesting: [facility] DOCUMENTS     Who is requesting this form or medical record from you: PATIENTS EMPLOYER     How would you like to receive the form or medical records (pick-up, mail, fax):     PLEASE FAX TO THE NUMBER ON THE PAPERWORK    Timeframe paperwork needed: ASAP DEADLINE IS 01/30/2024    Additional notes: PATIENT STATED HE DROPPED OF PAPERWORK FOR NATALIO RIVERA TO FILL OUT AT HIS LAST APPOINTMENT 01/15/24. 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    PATIENT WANTS TO MAKE SURE THE DATES ARE ACCURATE. PATIENT WAS IN THE HOSPITAL 01/04/24 - 01/10/24. HE WAS RELEASED TO GO BACK TO WORK 01/22/24

## 2024-01-27 LAB
MYCOBACTERIUM SPEC CULT: NORMAL
NIGHT BLUE STAIN TISS: NORMAL

## 2024-01-30 LAB
MYCOBACTERIUM SPEC CULT: NORMAL
NIGHT BLUE STAIN TISS: NORMAL

## 2024-01-30 NOTE — TELEPHONE ENCOUNTER
Hub to relay    Left message for patient to return phone call to confirm dates requested. I will fax [facility] paperwork today and he can come  a copy.

## 2024-01-30 NOTE — TELEPHONE ENCOUNTER
Hub to relay    Left message for patient letting him know paperwork was faxed to both numbers listed on form. I will have the originals for him to  at our office at the .

## 2024-02-02 ENCOUNTER — TELEPHONE (OUTPATIENT)
Dept: INTERNAL MEDICINE | Facility: CLINIC | Age: 64
End: 2024-02-02

## 2024-02-02 NOTE — TELEPHONE ENCOUNTER
"    Caller: Francisco Clark    Relationship: Self    Best call back number: [phone]    What form or medical record are you requesting: [facility] PAPERWORK    Who is requesting this form or medical record from you: CB    How would you like to receive the form or medical records (pick-up, mail, fax):       Timeframe paperwork needed: ASAP    Additional notes: PATIENT STATED THAT CB RECEIVED THE [facility] PAPERWORK; THERE IS A COUPLE QUESTIONS THAT WOULD NEED TO BE COMPLETED AND WITH MORE DETAILS AND SENT BACK TO FAX # ON THE PAPERWORK ASAP PLEASE    #1 ON PAGE 2; POSSIBLY NEEDING A DATE OR MORE DETAILS ADDED TO \"SHORT TERM\"    #5 ON PAGE 5; NEEDING TO ADD THESE DATES;                             START DATE: 01/04/24                             END DATE: 01/23/24    PLEASE ADVISE  "

## 2024-02-03 LAB
MYCOBACTERIUM SPEC CULT: NORMAL
NIGHT BLUE STAIN TISS: NORMAL

## 2024-02-05 NOTE — PROGRESS NOTES
Transitional Care Follow Up Visit  Subjective     Francisco Clark is a 63 y.o. male who presents for a transitional care management visit.    Within 48 business hours after discharge our office contacted him via telephone to coordinate his care and needs.      I reviewed and discussed the details of that call along with the discharge summary, hospital problems, inpatient lab results, inpatient diagnostic studies, and consultation reports with Francisco.     Current outpatient and discharge medications have been reconciled for the patient.  Reviewed by: NATALIO Arroyo          1/10/2024     6:06 PM   Date of TCM Phone Call   [facility]   Date of Admission 1/4/2024   Date of Discharge 1/10/2024   Discharge Disposition Home or Self Care     Risk for Readmission (LACE) No data recorded    History of Present Illness  Course During Hospital Stay: Patient was admitted at [facility] from 1/4 - 1/10 for hypoxia associated with COVID-19, COPD with acute exacerbation, cavitary lesion of the lung, hypertension, and congestive heart failure.  Patient was seen and evaluated by pulmonology.  Patient is also a current cigarette smoker.  He reports he is down to about 6 cigarettes daily.  He was found to be hypoxic with oxygen saturation of 86% on room air.  He was initially on 3 L nasal cannula.  He was able to be weaned off oxygen prior to discharge.  He had a CTA of his chest that showed a right upper lobe cavitary lesion.  Respiratory panel was positive for COVID-19 as well as coronavirus OC43.  Patient was treated with remdesivir and dexamethasone.  He also received IV Zosyn and doxycycline.  He was discharged on oral Augmentin and doxycycline.  Repeat chest x-ray showed no acute findings.  AFB and sputum cultures were negative.  Blood cultures were negative.  Recommendations for repeat CT of the chest in 3 months.  Patient has an outpatient appointment with pulmonology on 1/19.  Patient  also follows with cardiology.  He continues on carvedilol, Entresto, Jardiance, and Bumex as needed.  He reports not needing the Bumex recently.  Overall, patient is feeling better.  He does still report some mild shortness of breath at times as well as generalized weakness.  He does need paperwork completed for his employer for coverage while he was hospitalized.  No further complaints or concerns at this time.  Pleasant visit with the patient today.     The following portions of the patient's history were reviewed and updated as appropriate: allergies, current medications, past family history, past medical history, past social history, past surgical history, and problem list.    Review of Systems   Constitutional:  Positive for fatigue.   Respiratory:  Positive for cough and shortness of breath.    Neurological:  Positive for weakness.   All other systems reviewed and are negative.      Objective   Vitals:    01/15/24 1149   BP: 98/70   Pulse: 83   Temp: 97.8 °F (36.6 °C)   SpO2: 94%     Body mass index is 18.3 kg/m².     Physical Exam  Constitutional:       Appearance: Normal appearance.   HENT:      Head: Normocephalic and atraumatic.      Nose: Nose normal.   Eyes:      Extraocular Movements: Extraocular movements intact.      Conjunctiva/sclera: Conjunctivae normal.      Pupils: Pupils are equal, round, and reactive to light.   Cardiovascular:      Rate and Rhythm: Normal rate and regular rhythm.   Pulmonary:      Effort: Pulmonary effort is normal. No respiratory distress.      Breath sounds: Normal breath sounds.      Comments: Breath sounds diminished in all lobes, no dyspnea or shortness of breath during exam  Musculoskeletal:         General: Normal range of motion.      Cervical back: Normal range of motion and neck supple.   Skin:     General: Skin is warm and dry.   Neurological:      General: No focal deficit present.      Mental Status: He is alert and oriented to person, place, and time. Mental  status is at baseline.   Psychiatric:         Mood and Affect: Mood normal.         Behavior: Behavior normal.         Thought Content: Thought content normal.         Judgment: Judgment normal.         Assessment & Plan   Diagnoses and all orders for this visit:    1. Hospital discharge follow-up (Primary)    2. COVID-19 virus infection  -     Discontinue: methylPREDNISolone (MEDROL) 4 MG dose pack; Take as directed on package instructions.  Dispense: 21 tablet; Refill: 0    3. COPD with acute exacerbation    4. Acute on chronic HFrEF (heart failure with reduced ejection fraction)    5. Cavitary lesion of lung    6. Essential hypertension             Return in about 3 months (around 4/15/2024) for Recheck.    NATALIO Arroyo    Part of this note may be an electronic transcription/translation of spoken language to printed text using the Dragon Dictation System.

## 2024-02-06 LAB
MYCOBACTERIUM SPEC CULT: NORMAL
NIGHT BLUE STAIN TISS: NORMAL

## 2024-02-07 NOTE — TELEPHONE ENCOUNTER
Patient called to provide the questions and answers needed to complete FMLA Paperwork. Please see below.    PAGE 2 QUESTION 1:    MAY POSSIBLY NEED MORE DETAILED DEFINITION PER PATIENT.     PAGE 3 QUESTION 5&6:     Patient advised he was out for the following dates:    01/04/2024 - 01/22/2024    Patient was released on:    01/23/2024 (going back to work day)

## 2024-02-10 LAB
MYCOBACTERIUM SPEC CULT: NORMAL
NIGHT BLUE STAIN TISS: NORMAL

## 2024-02-12 RX ORDER — EMPAGLIFLOZIN 10 MG/1
10 TABLET, FILM COATED ORAL DAILY
Qty: 90 TABLET | Refills: 0 | Status: SHIPPED | OUTPATIENT
Start: 2024-02-12

## 2024-02-12 NOTE — TELEPHONE ENCOUNTER
Faxed forms to both numbers provided on form. Notified patient and he verbalized understanding. He did not want a copy. Original will be scanned in patients chart.

## 2024-02-12 NOTE — TELEPHONE ENCOUNTER
PATIENT CAME INTO THE OFFICE TO CHECK STATUS OF LA PAPERWORK. PAPERWORK DROPPED OFF 1/15. WAS FAXED IN BUT WAS NOT COMPLETE. PATIENT CALLED ON 1/25 TO ADVISE:    ON PAGE 2, QUESTION 1 A DURATION DATE IS MISSING. PATIENT STATES THEY NEED A DATE FOR THIS QUESTION.     ON PAGE 3 QUESTION 5 A START DATE AND END DATE IS NEEDED. START DATE WAS 1/4/24 AND END DATE WAS 1/23/24.    PATIENT STATES THAT HE HAS ALREADY HAD TO EXTEND HIS DEADLINE ONCE AND THE CURRENT DEADLINE IS TOMORROW. PATIENT EMPHASIZED THAT HIS JOB IS ON THE LINE AND HE WILL GET FIRED IF THIS IS NOT RECEIVED.     PLEASE CORRECT PAPERWORK AND FAX BACK TO PHONE ON FILE. CALL THE PATIENT TO ADVISE OF STATUS.

## 2024-02-13 LAB
MYCOBACTERIUM SPEC CULT: NORMAL
NIGHT BLUE STAIN TISS: NORMAL

## 2024-02-17 LAB
MYCOBACTERIUM SPEC CULT: NORMAL
NIGHT BLUE STAIN TISS: NORMAL

## 2024-02-20 LAB
MYCOBACTERIUM SPEC CULT: NORMAL
NIGHT BLUE STAIN TISS: NORMAL

## 2024-02-29 RX ORDER — SACUBITRIL AND VALSARTAN 24; 26 MG/1; MG/1
1 TABLET, FILM COATED ORAL 2 TIMES DAILY
Qty: 60 TABLET | Refills: 1 | Status: SHIPPED | OUTPATIENT
Start: 2024-02-29

## 2024-03-06 ENCOUNTER — TELEPHONE (OUTPATIENT)
Dept: INTERNAL MEDICINE | Facility: CLINIC | Age: 64
End: 2024-03-06
Payer: COMMERCIAL

## 2024-03-06 DIAGNOSIS — J44.1 COPD WITH ACUTE EXACERBATION: Primary | ICD-10-CM

## 2024-03-06 RX ORDER — BUDESONIDE AND FORMOTEROL FUMARATE DIHYDRATE 160; 4.5 UG/1; UG/1
2 AEROSOL RESPIRATORY (INHALATION)
Qty: 10.2 G | Refills: 5 | Status: SHIPPED | OUTPATIENT
Start: 2024-03-06

## 2024-03-06 NOTE — TELEPHONE ENCOUNTER
Hub to relay    Left message for patient notifying him that medication has been sent to his pharmacy.

## 2024-03-06 NOTE — TELEPHONE ENCOUNTER
Not currently on medication list. I don't see it listed on past medications. Does patient need to be seen for prescription?

## 2024-03-06 NOTE — TELEPHONE ENCOUNTER
Caller: Francisco Clark    Relationship: Self    Best call back number: [phone]    Requested Prescriptions: SYMBICORT INHALER  - NOT ON MED LIST       Pharmacy where request should be sent:   [address] [phone] [facility] [phone]       Last office visit with prescribing clinician: 1/15/2024   Last telemedicine visit with prescribing clinician: Visit date not found   Next office visit with prescribing clinician: Visit date not found     Additional details provided by patient:     Does the patient have less than a 3 day supply:  [x] Yes  [] No    Would you like a call back once the refill request has been completed: [x] Yes [] No    If the office needs to give you a call back, can they leave a voicemail: [x] Yes [] No    Haroon Reza Rep   03/06/24 13:13 EST

## 2024-03-26 ENCOUNTER — OFFICE VISIT (OUTPATIENT)
Dept: CARDIOLOGY | Facility: CLINIC | Age: 64
End: 2024-03-26
Payer: COMMERCIAL

## 2024-03-26 VITALS
HEART RATE: 70 BPM | HEIGHT: 70 IN | OXYGEN SATURATION: 95 % | SYSTOLIC BLOOD PRESSURE: 98 MMHG | WEIGHT: 126 LBS | DIASTOLIC BLOOD PRESSURE: 60 MMHG | BODY MASS INDEX: 18.04 KG/M2

## 2024-03-26 DIAGNOSIS — I50.9 CONGESTIVE HEART FAILURE, UNSPECIFIED HF CHRONICITY, UNSPECIFIED HEART FAILURE TYPE: Primary | ICD-10-CM

## 2024-03-26 DIAGNOSIS — I42.0 DILATED CARDIOMYOPATHY: ICD-10-CM

## 2024-03-26 DIAGNOSIS — I10 ESSENTIAL HYPERTENSION: ICD-10-CM

## 2024-03-26 RX ORDER — SACUBITRIL AND VALSARTAN 24; 26 MG/1; MG/1
1 TABLET, FILM COATED ORAL 2 TIMES DAILY
Qty: 180 TABLET | Refills: 3 | Status: SHIPPED | OUTPATIENT
Start: 2024-03-26

## 2024-03-26 RX ORDER — CARVEDILOL 25 MG/1
25 TABLET ORAL 2 TIMES DAILY WITH MEALS
Qty: 180 TABLET | Refills: 3 | Status: SHIPPED | OUTPATIENT
Start: 2024-03-26 | End: 2025-03-21

## 2024-03-26 NOTE — PROGRESS NOTES
[facility] at [facility]  Office visit  Francisco Clark  1960  [phone]    VISIT DATE:  02/12/2018    PCP: Charito Weiss, APRN  [address] [facility] [address]    CC:  Chief Complaint   Patient presents with    Congestive Heart Failure       ASSESSMENT:   Diagnosis Plan   1. Congestive heart failure, unspecified HF chronicity, unspecified heart failure type        2. Dilated cardiomyopathy        3. Essential hypertension          Previous cardiac status and procedures:  October 2022  TTE    Estimated left ventricular EF = 18% Estimated left ventricular EF was in agreement with the calculated left ventricular EF. Left ventricular ejection fraction appears to be less than 20%. Left ventricular systolic function is severely decreased.    The left ventricular cavity is moderately dilated.    The following left ventricular wall segments are hypokinetic: mid anterior, apical anterior, basal anterolateral, mid anterolateral, apical lateral, basal inferolateral, mid inferolateral, apical inferior, mid inferior, apical septal, basal inferoseptal, mid inferoseptal, apex hypokinetic, mid anteroseptal, basal anterior, basal inferior and basal inferoseptal.    The findings are consistent with dilated cardiomyopathy.    Left ventricular diastolic function is consistent with (grade II w/high LAP) pseudonormalization.    Estimated right ventricular systolic pressure from tricuspid regurgitation is mildly elevated (35-45 mmHg).    There is a moderate sized left pleural effusion. There is a moderate sized right pleural effusion.    Cardiac catheterization: Normal coronary arteries, LVEDP 9 mmHg.    January 2023 TTE    Left ventricular systolic function is mildly decreased. Left ventricular ejection fraction appears to be 46 - 50%.    Left ventricular wall thickness is consistent with mild concentric hypertrophy.    Left ventricular diastolic function is consistent with (grade II w/high LAP)  "pseudonormalization.    Estimated right ventricular systolic pressure from tricuspid regurgitation is normal (<35 mmHg).    PLAN:  Dilated cardiomyopathy, nonischemic: Recovery from an EF of 20% up to 46 to 50% on medical therapy.  [place] heart class II.  Currently euvolemic and compensated.  Functional capacity also limited by underlying moderate to severe COPD.  Continue carvedilol 25 mg p.o. twice daily, Jardiance 10 mg p.o. daily and Entresto to 24-26 mg p.o. twice daily.  Developed hyperkalemia with spironolactone and developed symptomatic hypotension on higher doses of Entresto.    Subjective  Interval assessment: Stable intermittent orthostasis.  Back to working without limitation.  He is compliant with medical therapy.  Underlying significant COPD, ongoing evaluation for right upper lobe lesion.    Initial evaluation: 57-year-old active smoker who Recently presented with chest discomfort and hypertensive urgency in the setting of influenza.   transthoracic echocardiogram revealed an EF of 45% grade 1 diastolic dysfunction and mild aortic insufficiency.He still having intermittent episodes of mild chest tightness.  Often triggered by exertion.  Continues to smoke less than a pack per day.  Blood pressures were running less than 130/80 mmHg and intermittently less than 100/60 mmHg he had routine follow-up.  He is compliant with medical therapy.  Reports generalized fatigue on current medical therapy.    PHYSICAL EXAMINATION:  Vitals:    03/26/24 0910   BP: 98/60   BP Location: Right arm   Patient Position: Sitting   Pulse: 70   SpO2: 95%   Weight: 57.2 kg (126 lb)   Height: 177.8 cm (70\")       General Appearance:    Alert, cooperative, no distress, appears stated age   Head:    Normocephalic, without obvious abnormality, atraumatic   Eyes:    conjunctiva/corneas clear   Nose:   Nares normal, septum midline, mucosa normal, no drainage   Throat:   Lips, teeth and gums normal   Neck:   Supple, symmetrical, " trachea midline, no carotid    bruit or JVD   Lungs:     Clear to auscultation bilaterally, respirations unlabored   Chest Wall:    No tenderness or deformity    Heart:    Regular rate and rhythm, S1 and S2 normal, no murmur, rub   or gallop, normal carotid impulse bilaterally without bruit.   Abdomen:     Soft, non-tender   Extremities:   Extremities normal, atraumatic, no cyanosis or edema   Pulses:   2+ and symmetric all extremities   Skin:   Skin color, texture, turgor normal, no rashes or lesions       Diagnostic Data:  Procedures  Lab Results   Component Value Date    TRIG 62 10/17/2022    HDL 46 10/17/2022     Lab Results   Component Value Date    GLUCOSE 91 01/10/2024    BUN 41 (H) 01/10/2024    CREATININE 0.80 01/10/2024     01/10/2024    K 5.0 01/10/2024    CL 99 01/10/2024    CO2 31.0 (H) 01/10/2024     Lab Results   Component Value Date    HGBA1C 6.20 (H) 01/08/2018     Lab Results   Component Value Date    WBC 9.24 01/10/2024    HGB 15.9 01/10/2024    HCT 49.3 01/10/2024     01/10/2024       Allergies  No Known Allergies    Current Medications    Current Outpatient Medications:     albuterol sulfate HFA (Proventil HFA) 108 (90 Base) MCG/ACT inhaler, Inhale 2 puffs Every 4 (Four) Hours As Needed for Wheezing., Disp: 18 g, Rfl: 2    budesonide-formoterol (Symbicort) 160-4.5 MCG/ACT inhaler, Inhale 2 puffs 2 (Two) Times a Day., Disp: 10.2 g, Rfl: 5    bumetanide (BUMEX) 0.5 MG tablet, Take 1 tablet by mouth As Needed (daily as needed for edema) for up to 1 dose., Disp: 30 tablet, Rfl: 1    carvedilol (Coreg) 25 MG tablet, Take 1 tablet by mouth 2 (Two) Times a Day With Meals for 360 days., Disp: 180 tablet, Rfl: 3    empagliflozin (Jardiance) 10 MG tablet tablet, Take 1 tablet by mouth Daily., Disp: 90 tablet, Rfl: 3    sacubitril-valsartan (Entresto) 24-26 MG tablet, Take 1 tablet by mouth 2 (Two) Times a Day., Disp: 180 tablet, Rfl: 3    tiotropium bromide monohydrate (SPIRIVA RESPIMAT) 2.5  MCG/ACT aerosol solution inhaler, Inhale 2 puffs Daily., Disp: 4 g, Rfl: 3          ROS  Review of Systems   Cardiovascular:  Positive for chest pain and dyspnea on exertion. Negative for irregular heartbeat and palpitations.   Respiratory:  Positive for cough and shortness of breath. Negative for sputum production.          SOCIAL HX  Social History     Socioeconomic History    Marital status: Single   Tobacco Use    Smoking status: Every Day     Current packs/day: 1.00     Average packs/day: 1 pack/day for 35.0 years (35.0 ttl pk-yrs)     Types: Cigarettes     Passive exposure: Never    Smokeless tobacco: Never    Tobacco comments:     less than 1/2 ppd since getting sick   Vaping Use    Vaping status: Never Used   Substance and Sexual Activity    Alcohol use: No    Drug use: No    Sexual activity: Defer     Comment:        FAMILY HX  Family History   Problem Relation Age of Onset    Emphysema Mother     Heart disease Father     Migraines Father     Heart attack Father     Aneurysm Sister     Heart disease Brother         50's    Migraines Brother     No Known Problems Maternal Grandmother     No Known Problems Maternal Grandfather     No Known Problems Paternal Grandmother     No Known Problems Paternal Grandfather     Other Brother         killed in [place]    No Known Problems Son     No Known Problems Son     No Known Problems Daughter              Amador Coreas III, MD, FACC

## 2024-04-01 ENCOUNTER — HOSPITAL ENCOUNTER (OUTPATIENT)
Dept: CT IMAGING | Facility: HOSPITAL | Age: 64
Discharge: HOME | End: 2024-04-01
Admitting: NURSE PRACTITIONER
Payer: COMMERCIAL

## 2024-04-01 DIAGNOSIS — J98.4 PULMONARY LESION, RIGHT: ICD-10-CM

## 2024-04-01 PROCEDURE — 71250 CT THORAX DX C-: CPT

## 2024-04-09 ENCOUNTER — TELEPHONE (OUTPATIENT)
Dept: CARDIOLOGY | Facility: CLINIC | Age: 64
End: 2024-04-09
Payer: COMMERCIAL

## 2024-04-09 NOTE — TELEPHONE ENCOUNTER
04/09/20204 AT 11:10 AM  LVM FOR PT IN REGARDS TO LOCATION CHANGE TO [place] FOR NEXT APPOINTMENT. [address] [facility], [address].

## 2024-04-16 RX ORDER — CARVEDILOL 25 MG/1
25 TABLET ORAL 2 TIMES DAILY WITH MEALS
Qty: 180 TABLET | Refills: 3 | OUTPATIENT
Start: 2024-04-16 | End: 2025-04-11

## 2024-04-16 NOTE — TELEPHONE ENCOUNTER
Caller: Francisco Clark    Relationship: Self    Best call back number:      Requested Prescriptions:   Requested Prescriptions     Pending Prescriptions Disp Refills    carvedilol (Coreg) 25 MG tablet 180 tablet 3     Sig: Take 1 tablet by mouth 2 (Two) Times a Day With Meals for 360 days.        Pharmacy where request should be sent: [facility] [address] [place] [address] [phone] [facility] [phone] [facility]     Last office visit with prescribing clinician: 1/15/2024   Last telemedicine visit with prescribing clinician: Visit date not found   Next office visit with prescribing clinician: Visit date not found     Additional details provided by patient: PATIENT IS OUT OF MEDICATION    Does the patient have less than a 3 day supply:  [x] Yes  [] No    Haroon Calderon Rep   04/16/24 14:03 EDT

## 2024-04-29 ENCOUNTER — OFFICE VISIT (OUTPATIENT)
Dept: PULMONOLOGY | Facility: CLINIC | Age: 64
End: 2024-04-29
Payer: COMMERCIAL

## 2024-04-29 VITALS
SYSTOLIC BLOOD PRESSURE: 118 MMHG | HEART RATE: 78 BPM | HEIGHT: 70 IN | BODY MASS INDEX: 19 KG/M2 | WEIGHT: 132.7 LBS | TEMPERATURE: 98.3 F | OXYGEN SATURATION: 95 % | DIASTOLIC BLOOD PRESSURE: 60 MMHG

## 2024-04-29 DIAGNOSIS — J98.4 CAVITARY LESION OF LUNG: ICD-10-CM

## 2024-04-29 DIAGNOSIS — Z72.0 TOBACCO ABUSE: ICD-10-CM

## 2024-04-29 DIAGNOSIS — J44.9 CHRONIC OBSTRUCTIVE PULMONARY DISEASE, UNSPECIFIED COPD TYPE: Primary | ICD-10-CM

## 2024-04-29 PROCEDURE — 94375 RESPIRATORY FLOW VOLUME LOOP: CPT | Performed by: NURSE PRACTITIONER

## 2024-04-29 PROCEDURE — 99214 OFFICE O/P EST MOD 30 MIN: CPT | Performed by: NURSE PRACTITIONER

## 2024-04-29 PROCEDURE — 94729 DIFFUSING CAPACITY: CPT | Performed by: NURSE PRACTITIONER

## 2024-04-29 PROCEDURE — 94726 PLETHYSMOGRAPHY LUNG VOLUMES: CPT | Performed by: NURSE PRACTITIONER

## 2024-04-29 NOTE — PROGRESS NOTES
"[facility] [place] [facility] up      Chief Complaint  Emphysema and Follow-up    Subjective          Francisco Clark presents to [facility] for follow-up on a CT scan of his chest.  We have been following him here in the office for COPD as well as a cavitary lung lesion that was found during his hospitalization in January.    He continues on his Symbicort and Spiriva.  He says actually doing really well for the last month.  Feels like he is breathing better.  Denies any significant cough or sputum production.    He is trying to cut down on his smoking currently he is less than half pack a day.        Objective     Vital Signs:   /60   Pulse 78   Temp 98.3 °F (36.8 °C)   Ht 177.8 cm (70\")   Wt 60.2 kg (132 lb 11.2 oz)   SpO2 95% Comment: resting, room air  BMI 19.04 kg/m²       Immunization History   Administered Date(s) Administered    Fluzone (or Fluarix & Flulaval for VFC) >6mos 10/31/2022    Pneumococcal Conjugate 20-Valent (PCV20) 10/31/2022    Pneumococcal Polysaccharide (PPSV23) 02/01/2018       Physical Exam  Vitals reviewed.   Constitutional:       Appearance: He is well-developed.   HENT:      Head: Normocephalic and atraumatic.   Eyes:      Pupils: Pupils are equal, round, and reactive to light.   Cardiovascular:      Rate and Rhythm: Normal rate and regular rhythm.      Heart sounds: No murmur heard.  Pulmonary:      Effort: Pulmonary effort is normal. No respiratory distress.      Breath sounds: Normal breath sounds. No wheezing or rales.   Abdominal:      General: Bowel sounds are normal. There is no distension.      Palpations: Abdomen is soft.   Musculoskeletal:         General: Normal range of motion.      Cervical back: Normal range of motion and neck supple.   Skin:     General: Skin is warm and dry.      Findings: No erythema.   Neurological:      Mental Status: He is alert and oriented to person, place, and time.   Psychiatric:       "   Behavior: Behavior normal.          Result Review :       Data reviewed : Radiologic studies CT chest 4/1/2024      Findings:  MEDIASTINUM: Unremarkable. Aortic and heart size are normal. No mass nor pericardial effusion.  CORONARY ARTERIES: There is calcified atherosclerotic disease.  LUNGS: Lungs are clear. Resolution of previous cavitary nodule within the right upper lobe consistent with resolved infectious/inflammatory process. No single suspicious nodule identified currently. There is moderate to advanced emphysema. Recommend   evaluation of patient history and risk factors to see if patient qualifies for low dose lung cancer screening based upon evidence of emphysema.  PLEURAL SPACE: No effusion, mass, nor pneumothorax.  LYMPH NODES: There are no pathologically enlarged lymph nodes.     UPPER ABDOMEN: Unremarkable     OSSEOUS STRUCTURES: Appropriate for age with no acute process identified.       IMPRESSION:  Impression:  1.Resolution of previous cavitary process within the right upper lobe.  2.No acute process identified currently.  3.Stable chronic findings.        PFTs done in the office today:  Very severe to severe obstructive airway disease with an FEV1 of 1.10, 32% predicted evidence of air trapping with an elevated residual volume, decreased DLCO.               Assessment and Plan    Problem List Items Addressed This Visit          Pulmonary and Pneumonias    COPD (chronic obstructive pulmonary disease) - Primary    Relevant Orders    Spirometry with Diffusion Capacity & Lung Volumes (Completed)    Cavitary lesion of lung       Tobacco    Tobacco abuse    Relevant Orders    CT chest low dose wo     We reviewed his testing today.  He does have severe obstructive airway disease on continue Symbicort and Spiriva.  He feels like his shortness of breath is actually improved recently.      He was sent home on some oxygen after his hospitalization, we need to go ahead and discontinue that.      I did  encourage him on his smoking cessation efforts.    We followed up on his CT scan with resolution of the cavitary process, likely inflammatory infectious, I recommend continue annual low-dose screening CT scans with his history of tobacco use.    Follow Up     Return in about 1 year (around 4/29/2025).  Patient was given instructions and counseling regarding his condition or for health maintenance advice. Please see specific information pulled into the AVS if appropriate.     I spent 34 minutes caring for Francisco on this date of service. This time includes time spent by me in the following activities:preparing for the visit, reviewing tests, obtaining and/or reviewing a separately obtained history, performing a medically appropriate examination and/or evaluation , counseling and educating the patient/family/caregiver, ordering medications, tests, or procedures, referring and communicating with other health care professionals , documenting information in the medical record, and independently interpreting results and communicating that information with the patient/family/caregiver    Excluding time spent on other separate services such as performing procedures or test interpretation, if applicable    Moderate level of Medical Decision Making complexity based on 2 or more chronic stable illnesses and prescription drug management.    NATALIO Mortensen, ACNP  [facility]  Electronically signed

## 2024-04-30 DIAGNOSIS — J44.9 CHRONIC OBSTRUCTIVE PULMONARY DISEASE, UNSPECIFIED COPD TYPE: Primary | ICD-10-CM

## 2024-06-10 RX ORDER — TIOTROPIUM BROMIDE INHALATION SPRAY 3.12 UG/1
SPRAY, METERED RESPIRATORY (INHALATION)
Qty: 4 G | Refills: 0 | Status: SHIPPED | OUTPATIENT
Start: 2024-06-10

## 2024-07-08 RX ORDER — TIOTROPIUM BROMIDE INHALATION SPRAY 3.12 UG/1
SPRAY, METERED RESPIRATORY (INHALATION)
Qty: 4 G | Refills: 0 | Status: SHIPPED | OUTPATIENT
Start: 2024-07-08

## 2024-08-23 DIAGNOSIS — J44.1 COPD WITH ACUTE EXACERBATION: ICD-10-CM

## 2024-08-23 RX ORDER — BUDESONIDE AND FORMOTEROL FUMARATE 160; 4.5 UG/1; UG/1
2 AEROSOL, METERED RESPIRATORY (INHALATION) 2 TIMES DAILY
Qty: 11 G | Refills: 0 | OUTPATIENT
Start: 2024-08-23

## 2024-09-03 RX ORDER — TIOTROPIUM BROMIDE INHALATION SPRAY 3.12 UG/1
SPRAY, METERED RESPIRATORY (INHALATION)
Qty: 4 G | Refills: 0 | OUTPATIENT
Start: 2024-09-03

## 2024-09-03 NOTE — TELEPHONE ENCOUNTER
Caller: Francisco Clark    Relationship: Self    Best call back number: [phone]     Requested Prescriptions:   Requested Prescriptions     Pending Prescriptions Disp Refills    tiotropium bromide monohydrate (Spiriva Respimat) 2.5 MCG/ACT aerosol solution inhaler 4 g 0        Pharmacy where request should be sent: [facility] [address] [place] [address] [phone] [facility] [phone]      Last office visit with prescribing clinician: 1/15/2024   Last telemedicine visit with prescribing clinician: Visit date not found   Next office visit with prescribing clinician: Visit date not found     Additional details provided by patient: PATIENT IS COMPLETELY OUT OF THE MEDICATION AND HAS BEEN FOR ABOUT 10 DAYS     Does the patient have less than a 3 day supply:  [x] Yes  [] No    Would you like a call back once the refill request has been completed: [x] Yes [] No    If the office needs to give you a call back, can they leave a voicemail: [x] Yes [] No    Haroon Maldonado Rep   09/03/24 11:56 EDT

## 2024-09-30 RX ORDER — TIOTROPIUM BROMIDE INHALATION SPRAY 3.12 UG/1
SPRAY, METERED RESPIRATORY (INHALATION)
Qty: 4 G | Refills: 0 | Status: SHIPPED | OUTPATIENT
Start: 2024-09-30

## 2024-10-28 RX ORDER — TIOTROPIUM BROMIDE INHALATION SPRAY 3.12 UG/1
SPRAY, METERED RESPIRATORY (INHALATION)
Qty: 4 G | Refills: 0 | Status: SHIPPED | OUTPATIENT
Start: 2024-10-28

## 2024-11-25 RX ORDER — TIOTROPIUM BROMIDE INHALATION SPRAY 3.12 UG/1
SPRAY, METERED RESPIRATORY (INHALATION)
Qty: 4 G | Refills: 3 | Status: SHIPPED | OUTPATIENT
Start: 2024-11-25

## 2024-12-20 ENCOUNTER — TELEPHONE (OUTPATIENT)
Dept: INTERNAL MEDICINE | Facility: CLINIC | Age: 64
End: 2024-12-20
Payer: COMMERCIAL

## 2024-12-20 DIAGNOSIS — J44.1 COPD WITH ACUTE EXACERBATION: ICD-10-CM

## 2024-12-20 RX ORDER — BUDESONIDE AND FORMOTEROL FUMARATE DIHYDRATE 160; 4.5 UG/1; UG/1
2 AEROSOL RESPIRATORY (INHALATION)
Qty: 10.2 G | Refills: 5 | Status: SHIPPED | OUTPATIENT
Start: 2024-12-20

## 2024-12-20 NOTE — TELEPHONE ENCOUNTER
Caller: Francisco Clark    Relationship: Self    Best call back number: [phone]     Requested Prescriptions:   BREYNA INHALER     Pharmacy where request should be sent: [facility] [address] [place] [address] [phone] [facility] [phone]      Last office visit with prescribing clinician: 1/15/2024   Last telemedicine visit with prescribing clinician: Visit date not found   Next office visit with prescribing clinician: Visit date not found     Additional details provided by patient: PATIENT IS OUT.     Does the patient have less than a 3 day supply:  [x] Yes  [] No    Would you like a call back once the refill request has been completed: [] Yes [x] No    If the office needs to give you a call back, can they leave a voicemail: [] Yes [x] No    Cadance Dunaway, RegSched Rep   12/20/24 14:28 EST

## 2024-12-20 NOTE — TELEPHONE ENCOUNTER
Hub to relay    Attempted to contact patient. Unable to leave a message. Patient needs to schedule appointment with pcp for refills

## 2025-01-30 ENCOUNTER — APPOINTMENT (OUTPATIENT)
Dept: CT IMAGING | Facility: HOSPITAL | Age: 65
DRG: 190 | End: 2025-01-30
Payer: COMMERCIAL

## 2025-01-30 ENCOUNTER — HOSPITAL ENCOUNTER (INPATIENT)
Facility: HOSPITAL | Age: 65
LOS: 5 days | Discharge: HOME | DRG: 190 | End: 2025-02-04
Attending: EMERGENCY MEDICINE | Admitting: INTERNAL MEDICINE
Payer: COMMERCIAL

## 2025-01-30 ENCOUNTER — APPOINTMENT (OUTPATIENT)
Dept: GENERAL RADIOLOGY | Facility: HOSPITAL | Age: 65
DRG: 190 | End: 2025-01-30
Payer: COMMERCIAL

## 2025-01-30 DIAGNOSIS — I42.0 DILATED CARDIOMYOPATHY: ICD-10-CM

## 2025-01-30 DIAGNOSIS — R79.89 ELEVATED TROPONIN: ICD-10-CM

## 2025-01-30 DIAGNOSIS — J96.01 ACUTE HYPOXEMIC RESPIRATORY FAILURE: Primary | ICD-10-CM

## 2025-01-30 DIAGNOSIS — I50.23 ACUTE ON CHRONIC HFREF (HEART FAILURE WITH REDUCED EJECTION FRACTION): ICD-10-CM

## 2025-01-30 DIAGNOSIS — Z72.0 TOBACCO ABUSE: ICD-10-CM

## 2025-01-30 DIAGNOSIS — J43.2 CENTRILOBULAR EMPHYSEMA: ICD-10-CM

## 2025-01-30 DIAGNOSIS — J98.4 CAVITARY LESION OF LUNG: ICD-10-CM

## 2025-01-30 DIAGNOSIS — J44.1 COPD EXACERBATION: ICD-10-CM

## 2025-01-30 DIAGNOSIS — E43 SEVERE MALNUTRITION: ICD-10-CM

## 2025-01-30 DIAGNOSIS — I50.9 CONGESTIVE HEART FAILURE, UNSPECIFIED HF CHRONICITY, UNSPECIFIED HEART FAILURE TYPE: ICD-10-CM

## 2025-01-30 DIAGNOSIS — R07.2 PRECORDIAL PAIN: ICD-10-CM

## 2025-01-30 DIAGNOSIS — I10 ESSENTIAL HYPERTENSION: ICD-10-CM

## 2025-01-30 PROBLEM — D75.1 ERYTHROCYTOSIS: Status: ACTIVE | Noted: 2025-01-30

## 2025-01-30 LAB
ALBUMIN SERPL-MCNC: 3.9 G/DL (ref 3.5–5.2)
ALBUMIN/GLOB SERPL: 1.1 G/DL
ALP SERPL-CCNC: 93 U/L (ref 39–117)
ALT SERPL W P-5'-P-CCNC: 12 U/L (ref 1–41)
AMPHET+METHAMPHET UR QL: NEGATIVE
AMPHETAMINES UR QL: NEGATIVE
ANION GAP SERPL CALCULATED.3IONS-SCNC: 10 MMOL/L (ref 5–15)
AST SERPL-CCNC: 20 U/L (ref 1–40)
ATMOSPHERIC PRESS: ABNORMAL MM[HG]
B PARAPERT DNA SPEC QL NAA+PROBE: NOT DETECTED
B PERT DNA SPEC QL NAA+PROBE: NOT DETECTED
BARBITURATES UR QL SCN: NEGATIVE
BASE EXCESS BLDV CALC-SCNC: 0.3 MMOL/L (ref -2–2)
BASOPHILS # BLD AUTO: 0.04 10*3/MM3 (ref 0–0.2)
BASOPHILS NFR BLD AUTO: 0.5 % (ref 0–1.5)
BDY SITE: ABNORMAL
BENZODIAZ UR QL SCN: NEGATIVE
BILIRUB SERPL-MCNC: 0.3 MG/DL (ref 0–1.2)
BILIRUB UR QL STRIP: NEGATIVE
BODY TEMPERATURE: 37
BUN SERPL-MCNC: 28 MG/DL (ref 8–23)
BUN/CREAT SERPL: 29.5 (ref 7–25)
BUPRENORPHINE SERPL-MCNC: NEGATIVE NG/ML
C PNEUM DNA NPH QL NAA+NON-PROBE: NOT DETECTED
CALCIUM SPEC-SCNC: 9.7 MG/DL (ref 8.6–10.5)
CANNABINOIDS SERPL QL: NEGATIVE
CHLORIDE SERPL-SCNC: 98 MMOL/L (ref 98–107)
CHOLEST SERPL-MCNC: 182 MG/DL (ref 0–200)
CK SERPL-CCNC: 69 U/L (ref 20–200)
CLARITY UR: CLEAR
CO2 BLDA-SCNC: 31.7 MMOL/L (ref 22–33)
CO2 SERPL-SCNC: 28 MMOL/L (ref 22–29)
COCAINE UR QL: NEGATIVE
COHGB MFR BLD: 1.4 %
COLOR UR: YELLOW
CREAT SERPL-MCNC: 0.95 MG/DL (ref 0.76–1.27)
CRP SERPL-MCNC: 1.76 MG/DL (ref 0–0.5)
D DIMER PPP FEU-MCNC: <0.27 MCGFEU/ML (ref 0–0.65)
D-LACTATE SERPL-SCNC: 1.1 MMOL/L (ref 0.5–2)
DEPRECATED RDW RBC AUTO: 51.8 FL (ref 37–54)
EGFRCR SERPLBLD CKD-EPI 2021: 88.8 ML/MIN/1.73
EOSINOPHIL # BLD AUTO: 0.06 10*3/MM3 (ref 0–0.4)
EOSINOPHIL NFR BLD AUTO: 0.8 % (ref 0.3–6.2)
EPAP: 0
ERYTHROCYTE [DISTWIDTH] IN BLOOD BY AUTOMATED COUNT: 14.5 % (ref 12.3–15.4)
ERYTHROCYTE [SEDIMENTATION RATE] IN BLOOD: 53 MM/HR (ref 0–20)
FENTANYL UR-MCNC: NEGATIVE NG/ML
FERRITIN SERPL-MCNC: 80.07 NG/ML (ref 30–400)
FLUAV SUBTYP SPEC NAA+PROBE: NOT DETECTED
FLUBV RNA ISLT QL NAA+PROBE: NOT DETECTED
FOLATE SERPL-MCNC: >20 NG/ML (ref 4.78–24.2)
GEN 5 1HR TROPONIN T REFLEX: 17 NG/L
GLOBULIN UR ELPH-MCNC: 3.4 GM/DL
GLUCOSE SERPL-MCNC: 141 MG/DL (ref 65–99)
GLUCOSE UR STRIP-MCNC: ABNORMAL MG/DL
HADV DNA SPEC NAA+PROBE: NOT DETECTED
HBA1C MFR BLD: 6.2 % (ref 4.8–5.6)
HCO3 BLDV-SCNC: 29.7 MMOL/L (ref 22–28)
HCOV 229E RNA SPEC QL NAA+PROBE: NOT DETECTED
HCOV HKU1 RNA SPEC QL NAA+PROBE: NOT DETECTED
HCOV NL63 RNA SPEC QL NAA+PROBE: NOT DETECTED
HCOV OC43 RNA SPEC QL NAA+PROBE: NOT DETECTED
HCT VFR BLD AUTO: 56 % (ref 37.5–51)
HDLC SERPL-MCNC: 37 MG/DL (ref 40–60)
HGB BLD-MCNC: 17.9 G/DL (ref 13–17.7)
HGB BLDA-MCNC: 17.9 G/DL (ref 13.5–17.5)
HGB UR QL STRIP.AUTO: NEGATIVE
HMPV RNA NPH QL NAA+NON-PROBE: NOT DETECTED
HOLD SPECIMEN: NORMAL
HPIV1 RNA ISLT QL NAA+PROBE: NOT DETECTED
HPIV2 RNA SPEC QL NAA+PROBE: NOT DETECTED
HPIV3 RNA NPH QL NAA+PROBE: NOT DETECTED
HPIV4 P GENE NPH QL NAA+PROBE: NOT DETECTED
IMM GRANULOCYTES # BLD AUTO: 0.03 10*3/MM3 (ref 0–0.05)
IMM GRANULOCYTES NFR BLD AUTO: 0.4 % (ref 0–0.5)
INHALED O2 CONCENTRATION: 44 %
IPAP: 0
IRON 24H UR-MRATE: 56 MCG/DL (ref 59–158)
IRON SATN MFR SERPL: 15 % (ref 20–50)
KETONES UR QL STRIP: ABNORMAL
LDLC SERPL CALC-MCNC: 118 MG/DL (ref 0–100)
LDLC/HDLC SERPL: 3.12 {RATIO}
LEUKOCYTE ESTERASE UR QL STRIP.AUTO: NEGATIVE
LYMPHOCYTES # BLD AUTO: 1.73 10*3/MM3 (ref 0.7–3.1)
LYMPHOCYTES NFR BLD AUTO: 21.8 % (ref 19.6–45.3)
Lab: ABNORMAL
M PNEUMO IGG SER IA-ACNC: NOT DETECTED
MCH RBC QN AUTO: 30.8 PG (ref 26.6–33)
MCHC RBC AUTO-ENTMCNC: 32 G/DL (ref 31.5–35.7)
MCV RBC AUTO: 96.4 FL (ref 79–97)
METHADONE UR QL SCN: NEGATIVE
METHGB BLD QL: 0.3 %
MODALITY: ABNORMAL
MONOCYTES # BLD AUTO: 0.76 10*3/MM3 (ref 0.1–0.9)
MONOCYTES NFR BLD AUTO: 9.6 % (ref 5–12)
NEUTROPHILS NFR BLD AUTO: 5.31 10*3/MM3 (ref 1.7–7)
NEUTROPHILS NFR BLD AUTO: 66.9 % (ref 42.7–76)
NITRITE UR QL STRIP: NEGATIVE
NOTIFIED BY: ABNORMAL
NOTIFIED WHO: ABNORMAL
NRBC BLD AUTO-RTO: 0 /100 WBC (ref 0–0.2)
NT-PROBNP SERPL-MCNC: 109.2 PG/ML (ref 0–900)
OPIATES UR QL: NEGATIVE
OXYCODONE UR QL SCN: NEGATIVE
OXYHGB MFR BLDV: 80.5 %
PAW @ PEAK INSP FLOW SETTING VENT: 0 CMH2O
PCO2 BLDV: 65.6 MM HG (ref 41–51)
PCP UR QL SCN: NEGATIVE
PH BLDV: 7.26 PH UNITS (ref 7.31–7.41)
PH UR STRIP.AUTO: <=5 [PH] (ref 5–8)
PLATELET # BLD AUTO: 191 10*3/MM3 (ref 140–450)
PMV BLD AUTO: 9.5 FL (ref 6–12)
PO2 BLDV: 54.7 MM HG (ref 27–53)
POTASSIUM SERPL-SCNC: 4.5 MMOL/L (ref 3.5–5.2)
PROCALCITONIN SERPL-MCNC: 0.07 NG/ML (ref 0–0.25)
PROT SERPL-MCNC: 7.3 G/DL (ref 6–8.5)
PROT UR QL STRIP: ABNORMAL
QT INTERVAL: 376 MS
QTC INTERVAL: 436 MS
RBC # BLD AUTO: 5.81 10*6/MM3 (ref 4.14–5.8)
RHINOVIRUS RNA SPEC NAA+PROBE: NOT DETECTED
RSV RNA NPH QL NAA+NON-PROBE: NOT DETECTED
SARS-COV-2 RNA NPH QL NAA+NON-PROBE: NOT DETECTED
SODIUM SERPL-SCNC: 136 MMOL/L (ref 136–145)
SP GR UR STRIP: 1.03 (ref 1–1.03)
TIBC SERPL-MCNC: 378 MCG/DL (ref 298–536)
TOTAL RATE: 0 BREATHS/MINUTE
TRANSFERRIN SERPL-MCNC: 254 MG/DL (ref 200–360)
TRICYCLICS UR QL SCN: NEGATIVE
TRIGL SERPL-MCNC: 148 MG/DL (ref 0–150)
TROPONIN T NUMERIC DELTA: -2 NG/L
TROPONIN T SERPL HS-MCNC: 19 NG/L
TSH SERPL DL<=0.05 MIU/L-ACNC: 1.26 UIU/ML (ref 0.27–4.2)
UROBILINOGEN UR QL STRIP: ABNORMAL
VIT B12 BLD-MCNC: 642 PG/ML (ref 211–946)
VLDLC SERPL-MCNC: 27 MG/DL (ref 5–40)
WBC NRBC COR # BLD AUTO: 7.93 10*3/MM3 (ref 3.4–10.8)
WHOLE BLOOD HOLD COAG: NORMAL
WHOLE BLOOD HOLD SPECIMEN: NORMAL

## 2025-01-30 PROCEDURE — 85379 FIBRIN DEGRADATION QUANT: CPT | Performed by: EMERGENCY MEDICINE

## 2025-01-30 PROCEDURE — 82607 VITAMIN B-12: CPT

## 2025-01-30 PROCEDURE — 82805 BLOOD GASES W/O2 SATURATION: CPT

## 2025-01-30 PROCEDURE — 94799 UNLISTED PULMONARY SVC/PX: CPT

## 2025-01-30 PROCEDURE — 94640 AIRWAY INHALATION TREATMENT: CPT

## 2025-01-30 PROCEDURE — 94664 DEMO&/EVAL PT USE INHALER: CPT

## 2025-01-30 PROCEDURE — 25010000002 METHYLPREDNISOLONE PER 125 MG: Performed by: EMERGENCY MEDICINE

## 2025-01-30 PROCEDURE — 84466 ASSAY OF TRANSFERRIN: CPT

## 2025-01-30 PROCEDURE — 36415 COLL VENOUS BLD VENIPUNCTURE: CPT

## 2025-01-30 PROCEDURE — 80050 GENERAL HEALTH PANEL: CPT | Performed by: EMERGENCY MEDICINE

## 2025-01-30 PROCEDURE — 99223 1ST HOSP IP/OBS HIGH 75: CPT

## 2025-01-30 PROCEDURE — 94761 N-INVAS EAR/PLS OXIMETRY MLT: CPT

## 2025-01-30 PROCEDURE — 71250 CT THORAX DX C-: CPT

## 2025-01-30 PROCEDURE — 82550 ASSAY OF CK (CPK): CPT

## 2025-01-30 PROCEDURE — 83605 ASSAY OF LACTIC ACID: CPT

## 2025-01-30 PROCEDURE — 71045 X-RAY EXAM CHEST 1 VIEW: CPT

## 2025-01-30 PROCEDURE — 82728 ASSAY OF FERRITIN: CPT

## 2025-01-30 PROCEDURE — 82746 ASSAY OF FOLIC ACID SERUM: CPT

## 2025-01-30 PROCEDURE — 87449 NOS EACH ORGANISM AG IA: CPT

## 2025-01-30 PROCEDURE — 0202U NFCT DS 22 TRGT SARS-COV-2: CPT | Performed by: EMERGENCY MEDICINE

## 2025-01-30 PROCEDURE — 80307 DRUG TEST PRSMV CHEM ANLYZR: CPT

## 2025-01-30 PROCEDURE — 25810000003 SODIUM CHLORIDE 0.9 % SOLUTION 250 ML FLEX CONT

## 2025-01-30 PROCEDURE — 93005 ELECTROCARDIOGRAM TRACING: CPT | Performed by: EMERGENCY MEDICINE

## 2025-01-30 PROCEDURE — 94660 CPAP INITIATION&MGMT: CPT

## 2025-01-30 PROCEDURE — 83540 ASSAY OF IRON: CPT

## 2025-01-30 PROCEDURE — 84484 ASSAY OF TROPONIN QUANT: CPT | Performed by: EMERGENCY MEDICINE

## 2025-01-30 PROCEDURE — 83036 HEMOGLOBIN GLYCOSYLATED A1C: CPT

## 2025-01-30 PROCEDURE — 85652 RBC SED RATE AUTOMATED: CPT

## 2025-01-30 PROCEDURE — 80061 LIPID PANEL: CPT

## 2025-01-30 PROCEDURE — 83880 ASSAY OF NATRIURETIC PEPTIDE: CPT | Performed by: EMERGENCY MEDICINE

## 2025-01-30 PROCEDURE — 86140 C-REACTIVE PROTEIN: CPT

## 2025-01-30 PROCEDURE — 25010000002 ENOXAPARIN PER 10 MG

## 2025-01-30 PROCEDURE — 84145 PROCALCITONIN (PCT): CPT

## 2025-01-30 PROCEDURE — 25010000002 AZITHROMYCIN PER 500 MG

## 2025-01-30 PROCEDURE — 99291 CRITICAL CARE FIRST HOUR: CPT

## 2025-01-30 PROCEDURE — 81003 URINALYSIS AUTO W/O SCOPE: CPT

## 2025-01-30 RX ORDER — SODIUM CHLORIDE 0.9 % (FLUSH) 0.9 %
10 SYRINGE (ML) INJECTION AS NEEDED
Status: DISCONTINUED | OUTPATIENT
Start: 2025-01-30 | End: 2025-02-04 | Stop reason: HOSPADM

## 2025-01-30 RX ORDER — METHYLPREDNISOLONE SODIUM SUCCINATE 125 MG/2ML
125 INJECTION, POWDER, LYOPHILIZED, FOR SOLUTION INTRAMUSCULAR; INTRAVENOUS ONCE
Status: COMPLETED | OUTPATIENT
Start: 2025-01-30 | End: 2025-01-30

## 2025-01-30 RX ORDER — BISACODYL 10 MG
10 SUPPOSITORY, RECTAL RECTAL DAILY PRN
Status: DISCONTINUED | OUTPATIENT
Start: 2025-01-30 | End: 2025-02-04 | Stop reason: HOSPADM

## 2025-01-30 RX ORDER — ACETAMINOPHEN 650 MG/1
650 SUPPOSITORY RECTAL EVERY 4 HOURS PRN
Status: DISCONTINUED | OUTPATIENT
Start: 2025-01-30 | End: 2025-02-04 | Stop reason: HOSPADM

## 2025-01-30 RX ORDER — NITROGLYCERIN 0.4 MG/1
0.4 TABLET SUBLINGUAL
Status: DISCONTINUED | OUTPATIENT
Start: 2025-01-30 | End: 2025-02-04 | Stop reason: HOSPADM

## 2025-01-30 RX ORDER — BUDESONIDE AND FORMOTEROL FUMARATE DIHYDRATE 160; 4.5 UG/1; UG/1
2 AEROSOL RESPIRATORY (INHALATION)
Status: ON HOLD | COMMUNITY
End: 2025-02-04

## 2025-01-30 RX ORDER — ACETAMINOPHEN 325 MG/1
650 TABLET ORAL EVERY 4 HOURS PRN
Status: DISCONTINUED | OUTPATIENT
Start: 2025-01-30 | End: 2025-02-04 | Stop reason: HOSPADM

## 2025-01-30 RX ORDER — IPRATROPIUM BROMIDE AND ALBUTEROL SULFATE 2.5; .5 MG/3ML; MG/3ML
3 SOLUTION RESPIRATORY (INHALATION) ONCE
Status: COMPLETED | OUTPATIENT
Start: 2025-01-30 | End: 2025-01-30

## 2025-01-30 RX ORDER — POLYETHYLENE GLYCOL 3350 17 G/17G
17 POWDER, FOR SOLUTION ORAL DAILY PRN
Status: DISCONTINUED | OUTPATIENT
Start: 2025-01-30 | End: 2025-02-04 | Stop reason: HOSPADM

## 2025-01-30 RX ORDER — CARVEDILOL 6.25 MG/1
25 TABLET ORAL 2 TIMES DAILY WITH MEALS
Status: DISCONTINUED | OUTPATIENT
Start: 2025-01-30 | End: 2025-02-04 | Stop reason: HOSPADM

## 2025-01-30 RX ORDER — SODIUM CHLORIDE 0.9 % (FLUSH) 0.9 %
10 SYRINGE (ML) INJECTION EVERY 12 HOURS SCHEDULED
Status: DISCONTINUED | OUTPATIENT
Start: 2025-01-30 | End: 2025-02-04 | Stop reason: HOSPADM

## 2025-01-30 RX ORDER — BUDESONIDE AND FORMOTEROL FUMARATE DIHYDRATE 160; 4.5 UG/1; UG/1
2 AEROSOL RESPIRATORY (INHALATION)
Status: DISCONTINUED | OUTPATIENT
Start: 2025-01-30 | End: 2025-02-04 | Stop reason: HOSPADM

## 2025-01-30 RX ORDER — GUAIFENESIN 600 MG/1
600 TABLET, EXTENDED RELEASE ORAL EVERY 12 HOURS SCHEDULED
Status: DISCONTINUED | OUTPATIENT
Start: 2025-01-30 | End: 2025-02-04 | Stop reason: HOSPADM

## 2025-01-30 RX ORDER — AMOXICILLIN 250 MG
2 CAPSULE ORAL 2 TIMES DAILY PRN
Status: DISCONTINUED | OUTPATIENT
Start: 2025-01-30 | End: 2025-02-04 | Stop reason: HOSPADM

## 2025-01-30 RX ORDER — ENOXAPARIN SODIUM 100 MG/ML
40 INJECTION SUBCUTANEOUS DAILY
Status: DISCONTINUED | OUTPATIENT
Start: 2025-01-30 | End: 2025-02-04 | Stop reason: HOSPADM

## 2025-01-30 RX ORDER — SODIUM CHLORIDE FOR INHALATION 7 %
4 VIAL, NEBULIZER (ML) INHALATION ONCE
Status: DISCONTINUED | OUTPATIENT
Start: 2025-01-30 | End: 2025-02-04 | Stop reason: HOSPADM

## 2025-01-30 RX ORDER — ALBUTEROL SULFATE 90 UG/1
2 INHALANT RESPIRATORY (INHALATION) EVERY 4 HOURS PRN
Status: DISCONTINUED | OUTPATIENT
Start: 2025-01-30 | End: 2025-02-04 | Stop reason: HOSPADM

## 2025-01-30 RX ORDER — PREDNISONE 20 MG/1
40 TABLET ORAL
Status: DISCONTINUED | OUTPATIENT
Start: 2025-01-31 | End: 2025-01-31

## 2025-01-30 RX ORDER — DOXYCYCLINE 100 MG/1
100 CAPSULE ORAL ONCE
Status: COMPLETED | OUTPATIENT
Start: 2025-01-30 | End: 2025-01-30

## 2025-01-30 RX ORDER — BISACODYL 5 MG/1
5 TABLET, DELAYED RELEASE ORAL DAILY PRN
Status: DISCONTINUED | OUTPATIENT
Start: 2025-01-30 | End: 2025-02-04 | Stop reason: HOSPADM

## 2025-01-30 RX ORDER — SODIUM CHLORIDE 9 MG/ML
40 INJECTION, SOLUTION INTRAVENOUS AS NEEDED
Status: DISCONTINUED | OUTPATIENT
Start: 2025-01-30 | End: 2025-02-04 | Stop reason: HOSPADM

## 2025-01-30 RX ORDER — ACETAMINOPHEN 160 MG/5ML
650 SOLUTION ORAL EVERY 4 HOURS PRN
Status: DISCONTINUED | OUTPATIENT
Start: 2025-01-30 | End: 2025-02-04 | Stop reason: HOSPADM

## 2025-01-30 RX ADMIN — Medication 10 ML: at 14:30

## 2025-01-30 RX ADMIN — ENOXAPARIN SODIUM 40 MG: 100 INJECTION SUBCUTANEOUS at 14:55

## 2025-01-30 RX ADMIN — BUDESONIDE AND FORMOTEROL FUMARATE DIHYDRATE 2 PUFF: 160; 4.5 AEROSOL RESPIRATORY (INHALATION) at 20:26

## 2025-01-30 RX ADMIN — IPRATROPIUM BROMIDE AND ALBUTEROL SULFATE 3 ML: 2.5; .5 SOLUTION RESPIRATORY (INHALATION) at 13:25

## 2025-01-30 RX ADMIN — IPRATROPIUM BROMIDE AND ALBUTEROL SULFATE 3 ML: 2.5; .5 SOLUTION RESPIRATORY (INHALATION) at 10:52

## 2025-01-30 RX ADMIN — Medication 10 ML: at 20:42

## 2025-01-30 RX ADMIN — GUAIFENESIN 600 MG: 600 TABLET ORAL at 14:55

## 2025-01-30 RX ADMIN — SACUBITRIL AND VALSARTAN 1 TABLET: 24; 26 TABLET, FILM COATED ORAL at 20:40

## 2025-01-30 RX ADMIN — CARVEDILOL 25 MG: 6.25 TABLET, FILM COATED ORAL at 17:23

## 2025-01-30 RX ADMIN — METHYLPREDNISOLONE SODIUM SUCCINATE 125 MG: 125 INJECTION INTRAMUSCULAR; INTRAVENOUS at 11:38

## 2025-01-30 RX ADMIN — DOXYCYCLINE 100 MG: 100 CAPSULE ORAL at 12:49

## 2025-01-30 RX ADMIN — Medication 5 MG: at 20:39

## 2025-01-30 RX ADMIN — AZITHROMYCIN DIHYDRATE 500 MG: 500 INJECTION, POWDER, LYOPHILIZED, FOR SOLUTION INTRAVENOUS at 20:35

## 2025-01-30 RX ADMIN — GUAIFENESIN 600 MG: 600 TABLET ORAL at 20:39

## 2025-01-30 NOTE — H&P
[facility] Medicine Services  HISTORY AND PHYSICAL    Patient Name: Francisco Clark  : 1960  MRN: [id number]  Primary Care Physician: Charito Weiss APRN  Date of admission: 2025      Subjective   Subjective     Chief Complaint:  COPD exacerbation    HPI:  Francisco Clark is a 64 y.o. male PMH significant for HFrEF, COPD with pulmonary cachexia, previous cavitary lung lesion, current tobacco use, HTN and HLD.  Presenting to [facility] ED due to significant shortness of breath, fatigue, malaise, subjective chills as well as chest tightness with wheezing.  Also endorses symptoms of palpitations, orthopnea with PND.  He also endorses increased phlegm production however does not recall the color.  Currently new oxygen requirement.  He states he was recently exposed to someone with flulike symptoms at work and since then his symptoms have acutely worsened.  He denies wearing oxygen at home.  Continues to endorse tobacco use.  Also states decline in p.o. intake.  Denies any chest pain, recent, fevers, nausea, vomiting, loss of consciousness or trauma.    In the ED, patient was hemodynamically stable however desatted to 77% on room air, currently requiring 6 L nasal cannula oxygen to saturate at 90%.  Troponins 19 with subsequent recheck 17, equivocal with no significant delta.  proBNP 109.  BUN 28, glucose 141.  Lactic acid 1.1.  D-dimer less than 0.27.  No leukocytosis.  Hemoglobin 17.9.  Respiratory viral panel is negative.  CXR revealed pulmonary emphysema with hyperinflated lungs on independent review, no acute cardiopulmonary process at this time.  Received doxycycline, DuoNebs and Solu-Medrol 125 in the ED.  Hospitalist team was contacted, agreed to admit.      Personal History     Past Medical History:   Diagnosis Date    CHF (congestive heart failure)     COPD (chronic obstructive pulmonary disease)     History of stomach ulcers     Hypertension             Past Surgical History:   Procedure Laterality Date    CARDIAC CATHETERIZATION N/A 10/18/2022    Procedure: LEFT HEART CATH;  Surgeon: Kd Roque MD;  Location: [facility] CATH INVASIVE LOCATION;  Service: Cardiovascular;  Laterality: N/A;    COLONOSCOPY  03/05/2018    Dr. AGNES Olea. Normal. Repeat 1- yrs if wihtout significant family history or 5 years if first degree relative younger than age 60 with high rish polyp or colorectal cancer.     NO PAST SURGERIES         Family History: family history includes Aneurysm in his sister; Emphysema in his mother; Heart attack in his father; Heart disease in his brother and father; Migraines in his brother and father; No Known Problems in his daughter, maternal grandfather, maternal grandmother, paternal grandfather, paternal grandmother, son, and son; Other in his brother.     Social History:  reports that he has been smoking cigarettes. He has a 35 pack-year smoking history. He has never been exposed to tobacco smoke. He has never used smokeless tobacco. He reports that he does not drink alcohol and does not use drugs.  Social History     Social History Narrative    Caffeine use: none.    Patient lives at home with his son.          at [employer]        Medications:  Available home medication information reviewed.  albuterol sulfate HFA, budesonide-formoterol, carvedilol, empagliflozin, sacubitril-valsartan, and tiotropium bromide monohydrate    No Known Allergies    Objective   Objective     Vital Signs:   Temp:  [89.6 °F (32 °C)-97.5 °F (36.4 °C)] 97.5 °F (36.4 °C)  Heart Rate:  [74-94] 76  Resp:  [18] 18  BP: (114-132)/(67-76) 123/74  Flow (L/min) (Oxygen Therapy):  [4-6] 6       Physical Exam   Constitutional: Awake, alert appears cachectic on physical examination  Eyes: PERRLA, sclerae anicteric, no conjunctival injection  HENT: NCAT, mucous membranes moist  Neck: Supple, no thyromegaly, no lymphadenopathy, trachea midline  Respiratory:  Wheezing on auscultation bilaterally with decreased air entry into the lungs.  Cardiovascular: RRR, no murmurs, rubs, or gallops, palpable pedal pulses bilaterally  Gastrointestinal: Positive bowel sounds, soft, nontender, nondistended  Musculoskeletal: No bilateral ankle edema, no clubbing or cyanosis to extremities  Psychiatric: Appropriate affect, cooperative  Neurologic: Oriented x 3, strength symmetric in all extremities, Cranial Nerves grossly intact to confrontation, speech clear  Skin: No rashes      Result Review:  I have personally reviewed the results from the time of this admission to 1/30/2025 14:39 EST and agree with these findings:  [x]  Laboratory list / accordion  [x]  Microbiology  [x]  Radiology  [x]  EKG/Telemetry   [x]  Cardiology/Vascular   []  Pathology  [x]  Old records  []  Other:      LAB RESULTS:      Lab 01/30/25  1229 01/30/25  1011   WBC  --  7.93   HEMOGLOBIN  --  17.9*   HEMATOCRIT  --  56.0*   PLATELETS  --  191   NEUTROS ABS  --  5.31   IMMATURE GRANS (ABS)  --  0.03   LYMPHS ABS  --  1.73   MONOS ABS  --  0.76   EOS ABS  --  0.06   MCV  --  96.4   SED RATE  --  53*   CRP 1.76*  --    LACTATE  --  1.1   D DIMER QUANT  --  <0.27         Lab 01/30/25  1229 01/30/25  1011   SODIUM  --  136   POTASSIUM  --  4.5   CHLORIDE  --  98   CO2  --  28.0   ANION GAP  --  10.0   BUN  --  28*   CREATININE  --  0.95   EGFR  --  88.8   GLUCOSE  --  141*   CALCIUM  --  9.7   HEMOGLOBIN A1C  --  6.20*   TSH 1.260  --          Lab 01/30/25  1011   TOTAL PROTEIN 7.3   ALBUMIN 3.9   GLOBULIN 3.4   ALT (SGPT) 12   AST (SGOT) 20   BILIRUBIN 0.3   ALK PHOS 93         Lab 01/30/25  1229 01/30/25  1011   PROBNP  --  109.2   HSTROP T 17 19         Lab 01/30/25  1229   CHOLESTEROL 182   LDL CHOL 118*   HDL CHOL 37*   TRIGLYCERIDES 148         Lab 01/30/25  1229   IRON 56*   IRON SATURATION (TSAT) 15*   TIBC 378   TRANSFERRIN 254   FERRITIN 80.07             Microbiology Results (last 10 days)       Procedure  Component Value - Date/Time    Respiratory Panel PCR w/COVID-19(SARS-CoV-2) TREVA/ROCKY/BETINA/PAD/COR/YSABEL In-House, NP Swab in UTM/VTM, 2 HR TAT - Swab, Nasopharynx [[id number]]  (Normal) Collected: 01/30/25 1011    Lab Status: Final result Specimen: Swab from Nasopharynx Updated: 01/30/25 1131     ADENOVIRUS, PCR Not Detected     Coronavirus 229E Not Detected     Coronavirus HKU1 Not Detected     Coronavirus NL63 Not Detected     Coronavirus OC43 Not Detected     COVID19 Not Detected     Human Metapneumovirus Not Detected     Human Rhinovirus/Enterovirus Not Detected     Influenza A PCR Not Detected     Influenza B PCR Not Detected     Parainfluenza Virus 1 Not Detected     Parainfluenza Virus 2 Not Detected     Parainfluenza Virus 3 Not Detected     Parainfluenza Virus 4 Not Detected     RSV, PCR Not Detected     Bordetella pertussis pcr Not Detected     Bordetella parapertussis PCR Not Detected     Chlamydophila pneumoniae PCR Not Detected     Mycoplasma pneumo by PCR Not Detected    Narrative:      In the setting of a positive respiratory panel with a viral infection PLUS a negative procalcitonin without other underlying concern for bacterial infection, consider observing off antibiotics or discontinuation of antibiotics and continue supportive care. If the respiratory panel is positive for atypical bacterial infection (Bordetella pertussis, Chlamydophila pneumoniae, or Mycoplasma pneumoniae), consider antibiotic de-escalation to target atypical bacterial infection.            XR Chest 1 View    Result Date: 1/30/2025  XR CHEST 1 VW Date of Exam: 1/30/2025 10:13 AM EST Indication: SOA triage protocol Comparison: 1/6/2024 Findings: Heart size and pulmonary vasculature are within normal limits. Lungs appear hyperinflated. No acute pulmonary abnormality demonstrated. Costophrenic angles sharp     Impression: Impression: Pulmonary emphysema. No acute cardiopulmonary process demonstrated Electronically Signed: [place]  Siri  1/30/2025 10:46 AM EST  Workstation ID: [id number]     Results for orders placed during the hospital encounter of 01/10/23    Adult Transthoracic Echo Complete W/ Cont if Necessary Per Protocol    Interpretation Summary    Left ventricular systolic function is mildly decreased. Left ventricular ejection fraction appears to be 46 - 50%.    Left ventricular wall thickness is consistent with mild concentric hypertrophy.    Left ventricular diastolic function is consistent with (grade II w/high LAP) pseudonormalization.    Estimated right ventricular systolic pressure from tricuspid regurgitation is normal (<35 mmHg).      Assessment & Plan   Assessment & Plan       COPD exacerbation    Centrilobular emphysema    Tobacco abuse    Essential hypertension    Dilated cardiomyopathy    Congestive heart failure, unspecified HF chronicity, unspecified heart failure type    Severe malnutrition    Acute on chronic HFrEF (heart failure with reduced ejection fraction)    Cavitary lesion of lung    Francisco Clark is a 64 y.o. male PMH significant for HFrEF, COPD with pulmonary cachexia, previous cavitary lung lesion, current tobacco use, HTN and HLD.  Presenting to [facility] ED due to significant shortness of breath, fatigue, malaise, subjective chills as well as chest tightness with wheezing.  Also endorses symptoms of palpitations, orthopnea with PND.  He also endorses increased phlegm production however does not recall the color.  Not on oxygen at home, requiring 6 L nasal cannula oxygen desaturated 90%.  Currently in COPD exacerbation.    COPD exacerbation  Central lobar emphysema  Cavitary lung lesion  Acute hypoxemic respiratory failure  Tobacco use current  Chronic history of COPD, current smoker, previous PFTs April 2024 revealed a FEV1 to FVC ratio of 58% dictated, low DLCO, FVC 55% predicted with FEV1 32% predicted, no postbronchodilator testing done, patient received albuterol 2 hours prior to  testing.  - Follows [facility] pulmonology last seen in April 2024.  - On room air at home, requiring 6 L nasal cannula oxygen to sat at 90%, desatted to 77% on room air  - Endorsing phlegm production however does not recall the color  - Will check inflammatory markers with CRP and ESR with procalcitonin  - Currently in COPD exacerbation, received Solu-Medrol 125 in the ED, as well as doxycycline as well as DuoNebs  - Will order respiratory sputum cultures, streptococcal and Legionella urine antigens, viral PCR panel was negative.  Will also order VBG.  - Prednisone 40 for 4 additional days as well as azithromycin for 3 days  - Will check a CT chest without contrast as patient had previous teary lesion which was solved in RUL on CT from April 2024.  CT will also be helpful in ruling out pneumonia.  - Incentive spirometry, aggressive pulmonary toilet, albuterol, Mucinex and hypertonic saline.  - Continue Symbicort and Spiriva  - Initiate BiPAP    Dilated cardiomyopathy  Acute on chronic HFrEF  HTN  Previous echo TTE January 2023, revealed EF of 46 to 50%, mild concentric hypertrophy of LV, G2 DD with RVSP <35mmhg.  - On Coreg, Jardiance and Entresto, will continue for now, renal function is at baseline.    Severe malnutrition  Pulmonary cachexia  Significant COPD, patient has evidence of pulmonary cachexia on physical examination  - Total protein and albumin is okay on physical examination, will check HbA1c, TSH and lipid panel  - Consulted nutritional services        VTE Prophylaxis:  Pharmacologic & mechanical VTE prophylaxis orders are present.    Total time spent: 75 minutes  Time spent includes time reviewing chart, face-to-face time, counseling patient/family/caregiver, ordering medications/tests/procedures, communicating with other health care professionals, documenting clinical information in the electronic health record, and coordination of care.        CODE STATUS:    Code Status and Medical Interventions: CPR  (Attempt to Resuscitate); Full Support   Ordered at: 01/30/25 6759     Level Of Support Discussed With:    Patient     Code Status (Patient has no pulse and is not breathing):    CPR (Attempt to Resuscitate)     Medical Interventions (Patient has pulse or is breathing):    Full Support       Expected Discharge   Expected discharge date/ time has not been documented.     Bela Leary MD  01/30/25

## 2025-01-30 NOTE — CASE MANAGEMENT/SOCIAL WORK
Discharge Planning Assessment  [facility]     Patient Name: Francisco Clark  MRN: [id number]  Today's Date: 1/30/2025    Admit Date: 1/30/2025    Plan: Home   Discharge Needs Assessment       Row Name 01/30/25 1450       Living Environment    People in Home child(jose martin), adult    Name(s) of People in Home Alley Webb [phone]    Current Living Arrangements home    Potentially Unsafe Housing Conditions none    In the past 12 months has the electric, gas, oil, or water company threatened to shut off services in your home? No    Primary Care Provided by self    Provides Primary Care For no one    Family Caregiver if Needed child(jose martin), adult    Family Caregiver Names Alley Webb [phone]    Quality of Family Relationships disruptive;helpful;supportive    Able to Return to Prior Arrangements yes       Resource/Environmental Concerns    Resource/Environmental Concerns none    Transportation Concerns none       Transportation Needs    In the past 12 months, has lack of transportation kept you from medical appointments or from getting medications? no    In the past 12 months, has lack of transportation kept you from meetings, work, or from getting things needed for daily living? No       Food Insecurity    Within the past 12 months, you worried that your food would run out before you got the money to buy more. Never true    Within the past 12 months, the food you bought just didn't last and you didn't have money to get more. Never true       Transition Planning    Patient/Family Anticipates Transition to home    Patient/Family Anticipated Services at Transition none    Transportation Anticipated family or friend will provide       Discharge Needs Assessment    Readmission Within the Last 30 Days no previous admission in last 30 days    Equipment Currently Used at Home none    Concerns to be Addressed discharge planning    Do you want help finding or keeping work or a job? I do not need or want  help    Do you want help with school or training? For example, starting or completing job training or getting a high school diploma, GED or equivalent No    Anticipated Changes Related to Illness none    Equipment Needed After Discharge none                   Discharge Plan       Row Name 01/30/25 1451       Plan    Plan Home    Patient/Family in Agreement with Plan yes    Plan Comments CM spoke with patient at bedside regarding Dc planning. Patient resides in [facility] with his son. Patient is independent with ADL's, denies any DME. Patient denies any current home health or outpatient services. Patient has medical insurance, prescription coverage and is able to afford/obtain medications without difficulty. Patient has no advanced directives. Patient denies any discharge planning needs at this time. Goal is home. CM will continue to follow    Final Discharge Disposition Code 01 - home or self-care                  Continued Care and Services - Admitted Since 1/30/2025    No active coordination exists for this encounter.          Demographic Summary       Row Name 01/30/25 1449       General Information    Arrived From home    Referral Source emergency department    Reason for Consult discharge planning    Preferred Language English       Contact Information    Contact Information Comments Alley Webb Daughter [phone]                   Functional Status       Row Name 01/30/25 1450       Functional Status    Usual Activity Tolerance moderate    Current Activity Tolerance moderate       Physical Activity    On average, how many days per week do you engage in moderate to strenuous exercise (like a brisk walk)? 0 days    On average, how many minutes do you engage in exercise at this level? 0 min    Number of minutes of exercise per week 0       Assessment of Health Literacy    How often do you have someone help you read hospital materials? Never    How often do you have problems learning about your medical  condition because of difficulty understanding written information? Never    How often do you have a problem understanding what is told to you about your medical condition? Never    How confident are you filling out medical forms by yourself? Quite a bit    Health Literacy Good       Functional Status, IADL    Medications independent    Meal Preparation independent    Housekeeping independent    Laundry independent    Shopping independent    If for any reason you need help with day-to-day activities such as bathing, preparing meals, shopping, managing finances, etc., do you get the help you need? I don't need any help       Mental Status    General Appearance WDL WDL       Mental Status Summary    Recent Changes in Mental Status/Cognitive Functioning no changes       Employment/    Employment Status employed part-time                   Psychosocial    No documentation.                  Abuse/Neglect    No documentation.                  Legal    No documentation.                  Substance Abuse    No documentation.                  Patient Forms    No documentation.                     Felicita Bronson RN

## 2025-01-30 NOTE — ED PROVIDER NOTES
Subjective   History of Present Illness  64-year-old male presents for evaluation of shortness of breath.  Of note, the patient is not oxygen dependent.  He has a history of COPD.  He is accompanied by his son who helps corroborate his history.  The patient tells me that he works at [employer] and is constantly around sick people.  Over the past 3 days or so he has not felt well and over that span has been experiencing cough, congestion, and gradually worsening dyspnea.  He was hoping that his symptoms would improve spontaneously but they have not.  Given his ongoing symptoms, he came here to the ED to be evaluated today.  He denies any fevers.      Review of Systems   HENT:  Positive for congestion.    Respiratory:  Positive for cough, shortness of breath and wheezing.    All other systems reviewed and are negative.      Past Medical History:   Diagnosis Date    CHF (congestive heart failure)     COPD (chronic obstructive pulmonary disease)     History of stomach ulcers     Hypertension        No Known Allergies    Past Surgical History:   Procedure Laterality Date    CARDIAC CATHETERIZATION N/A 10/18/2022    Procedure: LEFT HEART CATH;  Surgeon: Kd Roque MD;  [facility];  Service: Cardiovascular;  Laterality: N/A;    COLONOSCOPY  03/05/2018    Dr. AGNES Olea. Normal. Repeat 1- yrs if wihtout significant family history or 5 years if first degree relative younger than age 60 with high rish polyp or colorectal cancer.     NO PAST SURGERIES         Family History   Problem Relation Age of Onset    Emphysema Mother     Heart disease Father     Migraines Father     Heart attack Father     Aneurysm Sister     Heart disease Brother         50's    Migraines Brother     No Known Problems Maternal Grandmother     No Known Problems Maternal Grandfather     No Known Problems Paternal Grandmother     No Known Problems Paternal Grandfather     Other Brother         killed in [place]    No  Known Problems Son     No Known Problems Son     No Known Problems Daughter        Social History     Socioeconomic History    Marital status: Single   Tobacco Use    Smoking status: Every Day     Current packs/day: 1.00     Average packs/day: 1 pack/day for 35.0 years (35.0 ttl pk-yrs)     Types: Cigarettes     Passive exposure: Never    Smokeless tobacco: Never    Tobacco comments:     less than 1/2 ppd since getting sick   Vaping Use    Vaping status: Never Used   Substance and Sexual Activity    Alcohol use: No    Drug use: No    Sexual activity: Defer     Comment:            Objective   Physical Exam  Vitals and nursing note reviewed.   Constitutional:       Appearance: He is well-developed. He is not diaphoretic.   HENT:      Head: Normocephalic and atraumatic.   Neck:      Vascular: No JVD.   Cardiovascular:      Rate and Rhythm: Normal rate and regular rhythm.      Heart sounds: Normal heart sounds. No murmur heard.     No friction rub. No gallop.   Pulmonary:      Breath sounds: Wheezing present. No rales.      Comments: Audible wheezes noted, speaking in full sentences, no accessory muscle use or retractions noted  Abdominal:      General: Bowel sounds are normal. There is no distension.      Palpations: Abdomen is soft. There is no mass.      Tenderness: There is no abdominal tenderness. There is no guarding.   Musculoskeletal:         General: Normal range of motion.      Cervical back: Normal range of motion.      Right lower leg: No edema.      Left lower leg: No edema.   Skin:     General: Skin is warm and dry.      Coloration: Skin is not pale.      Findings: No erythema or rash.   Neurological:      Mental Status: He is alert and oriented to person, place, and time.   Psychiatric:         Mood and Affect: Mood normal.         Thought Content: Thought content normal.         Judgment: Judgment normal.         Critical Care    Performed by: Francisco Ruiz MD  Authorized by: Francisco Ruiz  MD Hector    Critical care provider statement:     Critical care time (minutes):  37    Critical care was necessary to treat or prevent imminent or life-threatening deterioration of the following conditions:  Respiratory failure    Critical care was time spent personally by me on the following activities:  Development of treatment plan with patient or surrogate, discussions with consultants, evaluation of patient's response to treatment, examination of patient, obtaining history from patient or surrogate, ordering and performing treatments and interventions, ordering and review of laboratory studies, ordering and review of radiographic studies, pulse oximetry, review of old charts and re-evaluation of patient's condition             ED Course  ED Course as of 01/30/25 1859   Thu Jan 30, 2025   1007 64-year-old male presents for evaluation of shortness of breath.  Of note, the patient is not oxygen dependent.  He has a history of COPD.  He is accompanied by his son who helps corroborate his history.  The patient tells me that he works at [employer] and is constantly around sick people.  Over the past 3 days or so he has not felt well and over that span has been experiencing cough, congestion, gradually worsening dyspnea.  Given his ongoing symptoms, he came here to the ED to be evaluated today.  On arrival, the patient has room air oxygen saturations of around 80%.  Supplemental oxygen given via nasal cannula.  Exam remarkable for audible wheezes.  Nebs and steroids administered.  Differential diagnosis is quite broad.  We will obtain labs and imaging, and we will reassess following initial interventions. [DD]   1128 I personally and independently viewed the patient's x-ray images myself, and I am in agreement with the radiologist's reading for final interpretation, particularly there is no pneumonia noted. [DD]   1128   Labs are bland/unrevealing.  Low risk Wells and D-dimer is negative [DD]   1137 Respiratory viral  panel is negative. [DD]   1233 Upon reevaluation, the patient looks improved.  However, he is currently requiring 4 L of oxygen, and given his oxygen requirement and overall clinical picture, I feel that he warrants admission to the hospital at this point.  Doxycycline given.  I discussed the patient's case with our hospitalist, Dr. Padron, and the patient will be admitted under her care for further evaluation and treatment.  The patient is hemodynamically stable at this time and is aware/agreeable with the plan. [DD]   1233 Additionally, the patient's initial temperature was documented erroneously.  He is not hypothermic. [DD]   1447 While boarding in the emergency department, and ABG obtained by our inpatient team revealed respiratory acidosis.  BiPAP initiated. [DD]      ED Course User Index  [DD] Francisco Ruiz MD                                           Recent Results (from the past 24 hours)   Comprehensive Metabolic Panel    Collection Time: 01/30/25 10:11 AM    Specimen: Arm, Right; Blood   Result Value Ref Range    Glucose 141 (H) 65 - 99 mg/dL    BUN 28 (H) 8 - 23 mg/dL    Creatinine 0.95 0.76 - 1.27 mg/dL    Sodium 136 136 - 145 mmol/L    Potassium 4.5 3.5 - 5.2 mmol/L    Chloride 98 98 - 107 mmol/L    CO2 28.0 22.0 - 29.0 mmol/L    Calcium 9.7 8.6 - 10.5 mg/dL    Total Protein 7.3 6.0 - 8.5 g/dL    Albumin 3.9 3.5 - 5.2 g/dL    ALT (SGPT) 12 1 - 41 U/L    AST (SGOT) 20 1 - 40 U/L    Alkaline Phosphatase 93 39 - 117 U/L    Total Bilirubin 0.3 0.0 - 1.2 mg/dL    Globulin 3.4 gm/dL    A/G Ratio 1.1 g/dL    BUN/Creatinine Ratio 29.5 (H) 7.0 - 25.0    Anion Gap 10.0 5.0 - 15.0 mmol/L    eGFR 88.8 >60.0 mL/min/1.73   BNP    Collection Time: 01/30/25 10:11 AM    Specimen: Arm, Right; Blood   Result Value Ref Range    proBNP 109.2 0.0 - 900.0 pg/mL   High Sensitivity Troponin T    Collection Time: 01/30/25 10:11 AM    Specimen: Arm, Right; Blood   Result Value Ref Range    HS Troponin T 19 <22 ng/L   Green  Top (Gel)    Collection Time: 01/30/25 10:11 AM   Result Value Ref Range    Extra Tube Hold for add-ons.    Lavender Top    Collection Time: 01/30/25 10:11 AM   Result Value Ref Range    Extra Tube hold for add-on    Gold Top - SST    Collection Time: 01/30/25 10:11 AM   Result Value Ref Range    Extra Tube Hold for add-ons.    Gray Top    Collection Time: 01/30/25 10:11 AM   Result Value Ref Range    Extra Tube Hold for add-ons.    Light Blue Top    Collection Time: 01/30/25 10:11 AM   Result Value Ref Range    Extra Tube Hold for add-ons.    CBC Auto Differential    Collection Time: 01/30/25 10:11 AM    Specimen: Arm, Right; Blood   Result Value Ref Range    WBC 7.93 3.40 - 10.80 10*3/mm3    RBC 5.81 (H) 4.14 - 5.80 10*6/mm3    Hemoglobin 17.9 (H) 13.0 - 17.7 g/dL    Hematocrit 56.0 (H) 37.5 - 51.0 %    MCV 96.4 79.0 - 97.0 fL    MCH 30.8 26.6 - 33.0 pg    MCHC 32.0 31.5 - 35.7 g/dL    RDW 14.5 12.3 - 15.4 %    RDW-SD 51.8 37.0 - 54.0 fl    MPV 9.5 6.0 - 12.0 fL    Platelets 191 140 - 450 10*3/mm3    Neutrophil % 66.9 42.7 - 76.0 %    Lymphocyte % 21.8 19.6 - 45.3 %    Monocyte % 9.6 5.0 - 12.0 %    Eosinophil % 0.8 0.3 - 6.2 %    Basophil % 0.5 0.0 - 1.5 %    Immature Grans % 0.4 0.0 - 0.5 %    Neutrophils, Absolute 5.31 1.70 - 7.00 10*3/mm3    Lymphocytes, Absolute 1.73 0.70 - 3.10 10*3/mm3    Monocytes, Absolute 0.76 0.10 - 0.90 10*3/mm3    Eosinophils, Absolute 0.06 0.00 - 0.40 10*3/mm3    Basophils, Absolute 0.04 0.00 - 0.20 10*3/mm3    Immature Grans, Absolute 0.03 0.00 - 0.05 10*3/mm3    nRBC 0.0 0.0 - 0.2 /100 WBC   Respiratory Panel PCR w/COVID-19(SARS-CoV-2) TREVA/ROCKY/BETINA/PAD/COR/YSABEL In-House, NP Swab in UTM/VTM, 2 HR TAT - Swab, Nasopharynx    Collection Time: 01/30/25 10:11 AM    Specimen: Nasopharynx; Swab   Result Value Ref Range    ADENOVIRUS, PCR Not Detected Not Detected    Coronavirus 229E Not Detected Not Detected    Coronavirus HKU1 Not Detected Not Detected    Coronavirus NL63 Not Detected Not  Detected    Coronavirus OC43 Not Detected Not Detected    COVID19 Not Detected Not Detected - Ref. Range    Human Metapneumovirus Not Detected Not Detected    Human Rhinovirus/Enterovirus Not Detected Not Detected    Influenza A PCR Not Detected Not Detected    Influenza B PCR Not Detected Not Detected    Parainfluenza Virus 1 Not Detected Not Detected    Parainfluenza Virus 2 Not Detected Not Detected    Parainfluenza Virus 3 Not Detected Not Detected    Parainfluenza Virus 4 Not Detected Not Detected    RSV, PCR Not Detected Not Detected    Bordetella pertussis pcr Not Detected Not Detected    Bordetella parapertussis PCR Not Detected Not Detected    Chlamydophila pneumoniae PCR Not Detected Not Detected    Mycoplasma pneumo by PCR Not Detected Not Detected   D-dimer, Quantitative    Collection Time: 01/30/25 10:11 AM    Specimen: Arm, Right; Blood   Result Value Ref Range    D-Dimer, Quantitative <0.27 0.00 - 0.65 MCGFEU/mL   Hemoglobin A1c    Collection Time: 01/30/25 10:11 AM    Specimen: Arm, Right; Blood   Result Value Ref Range    Hemoglobin A1C 6.20 (H) 4.80 - 5.60 %   Lactic Acid, Plasma    Collection Time: 01/30/25 10:11 AM    Specimen: Arm, Right; Blood   Result Value Ref Range    Lactate 1.1 0.5 - 2.0 mmol/L   Sedimentation Rate    Collection Time: 01/30/25 10:11 AM    Specimen: Arm, Right; Blood   Result Value Ref Range    Sed Rate 53 (H) 0 - 20 mm/hr   Folate    Collection Time: 01/30/25 10:11 AM    Specimen: Blood   Result Value Ref Range    Folate >20.00 4.78 - 24.20 ng/mL   Vitamin B12    Collection Time: 01/30/25 10:11 AM    Specimen: Blood   Result Value Ref Range    Vitamin B-12 642 211 - 946 pg/mL   ECG 12 Lead Dyspnea    Collection Time: 01/30/25 10:15 AM   Result Value Ref Range    QT Interval 376 ms    QTC Interval 436 ms   High Sensitivity Troponin T 1Hr    Collection Time: 01/30/25 12:29 PM    Specimen: Arm, Right; Blood   Result Value Ref Range    HS Troponin T 17 <22 ng/L    Troponin T  Numeric Delta -2 Abnormal if >/=3 ng/L   CK    Collection Time: 01/30/25 12:29 PM    Specimen: Arm, Right; Blood   Result Value Ref Range    Creatine Kinase 69 20 - 200 U/L   Lipid Panel    Collection Time: 01/30/25 12:29 PM    Specimen: Arm, Right; Blood   Result Value Ref Range    Total Cholesterol 182 0 - 200 mg/dL    Triglycerides 148 0 - 150 mg/dL    HDL Cholesterol 37 (L) 40 - 60 mg/dL    LDL Cholesterol  118 (H) 0 - 100 mg/dL    VLDL Cholesterol 27 5 - 40 mg/dL    LDL/HDL Ratio 3.12    Procalcitonin    Collection Time: 01/30/25 12:29 PM    Specimen: Arm, Right; Blood   Result Value Ref Range    Procalcitonin 0.07 0.00 - 0.25 ng/mL   TSH Rfx On Abnormal To Free T4    Collection Time: 01/30/25 12:29 PM    Specimen: Arm, Right; Blood   Result Value Ref Range    TSH 1.260 0.270 - 4.200 uIU/mL   Ferritin    Collection Time: 01/30/25 12:29 PM    Specimen: Arm, Right; Blood   Result Value Ref Range    Ferritin 80.07 30.00 - 400.00 ng/mL   Iron Profile    Collection Time: 01/30/25 12:29 PM    Specimen: Arm, Right; Blood   Result Value Ref Range    Iron 56 (L) 59 - 158 mcg/dL    Iron Saturation (TSAT) 15 (L) 20 - 50 %    Transferrin 254 200 - 360 mg/dL    TIBC 378 298 - 536 mcg/dL   C-reactive Protein    Collection Time: 01/30/25 12:29 PM    Specimen: Arm, Right; Blood   Result Value Ref Range    C-Reactive Protein 1.76 (H) 0.00 - 0.50 mg/dL   Blood Gas, Venous With Co-Ox    Collection Time: 01/30/25  2:47 PM    Specimen: Venous Blood   Result Value Ref Range    Site Nurse/Dr Draw     pH, Venous 7.264 (C) 7.310 - 7.410 pH Units    pCO2, Venous 65.6 (H) 41.0 - 51.0 mm Hg    pO2, Venous 54.7 (H) 27.0 - 53.0 mm Hg    HCO3, Venous 29.7 (H) 22.0 - 28.0 mmol/L    Base Excess, Venous 0.3 -2.0 - 2.0 mmol/L    Hemoglobin, Blood Gas 17.9 (H) 13.5 - 17.5 g/dL    Oxyhemoglobin Venous 80.5 %    Methemoglobin Venous 0.3 %    Carboxyhemoglobin Venous 1.4 %    CO2 Content 31.7 22 - 33 mmol/L    Temperature 37.0     Barometric  Pressure for Blood Gas      Modality Nasal Cannula     FIO2 44 %    Rate 0 Breaths/minute    PIP 0 cmH2O    IPAP 0     EPAP 0     Notified Who vladimir olmstead md     Notified By [id number]     Notified Time 01/30/2025 14:45    Urine Drug Screen - Urine, Clean Catch    Collection Time: 01/30/25  2:56 PM    Specimen: Urine, Clean Catch   Result Value Ref Range    THC, Screen, Urine Negative Negative    Phencyclidine (PCP), Urine Negative Negative    Cocaine Screen, Urine Negative Negative    Methamphetamine, Ur Negative Negative    Opiate Screen Negative Negative    Amphetamine Screen, Urine Negative Negative    Benzodiazepine Screen, Urine Negative Negative    Tricyclic Antidepressants Screen Negative Negative    Methadone Screen, Urine Negative Negative    Barbiturates Screen, Urine Negative Negative    Oxycodone Screen, Urine Negative Negative    Buprenorphine, Screen, Urine Negative Negative   Urinalysis With Microscopic If Indicated (No Culture) - Urine, Clean Catch    Collection Time: 01/30/25  2:56 PM    Specimen: Urine, Clean Catch   Result Value Ref Range    Color, UA Yellow Yellow, Straw    Appearance, UA Clear Clear    pH, UA <=5.0 5.0 - 8.0    Specific Gravity, UA 1.035 (H) 1.001 - 1.030    Glucose, UA >=1000 mg/dL (3+) (A) Negative    Ketones, UA Trace (A) Negative    Bilirubin, UA Negative Negative    Blood, UA Negative Negative    Protein, UA Trace (A) Negative    Leuk Esterase, UA Negative Negative    Nitrite, UA Negative Negative    Urobilinogen, UA 1.0 E.U./dL 0.2 - 1.0 E.U./dL   Fentanyl, Urine - Urine, Clean Catch    Collection Time: 01/30/25  2:56 PM    Specimen: Urine, Clean Catch   Result Value Ref Range    Fentanyl, Urine Negative Negative     Note: In addition to lab results from this visit, the labs listed above may include labs taken at another facility or during a different encounter within the last 24 hours. Please correlate lab times with ED admission and discharge times for further clarification of  "the services performed during this visit.    CT Chest Without Contrast Diagnostic   Final Result   Impression:   No acute intrathoracic findings.      Smoking-related lung disease.            Electronically Signed: Rodney Ley     1/30/2025 4:09 PM EST     Workstation ID: [id number]      XR Chest 1 View   Final Result   Impression:   Pulmonary emphysema. No acute cardiopulmonary process demonstrated         Electronically Signed: Cleveland Horner     1/30/2025 10:46 AM EST     Workstation ID: [id number]        Vitals:    01/30/25 1400 01/30/25 1500 01/30/25 1600 01/30/25 1713   BP: 123/74 123/76 112/87 124/74   BP Location:    Left arm   Patient Position:    Lying   Pulse: 76 75 79 75   Resp:    23   Temp:    96.9 °F (36.1 °C)   TempSrc:    Axillary   SpO2: 93% 96% 97% 97%   Weight:    59.6 kg (131 lb 4.8 oz)   Height:    167.6 cm (65.98\")     Medications   sodium chloride 0.9 % flush 10 mL (has no administration in time range)   albuterol sulfate HFA (PROVENTIL HFA;VENTOLIN HFA;PROAIR HFA) inhaler 2 puff (has no administration in time range)   budesonide-formoterol (SYMBICORT) 160-4.5 MCG/ACT inhaler 2 puff (has no administration in time range)   carvedilol (COREG) tablet 25 mg (25 mg Oral Given 1/30/25 1723)   sacubitril-valsartan (ENTRESTO) 24-26 MG tablet 1 tablet (has no administration in time range)   tiotropium (SPIRIVA RESPIMAT) 2.5 mcg/act aerosol solution inhaler (has no administration in time range)   sodium chloride 0.9 % flush 10 mL (10 mL Intravenous Given 1/30/25 1430)   sodium chloride 0.9 % flush 10 mL (10 mL Intravenous Given 1/30/25 1430)   sodium chloride 0.9 % infusion 40 mL (has no administration in time range)   nitroglycerin (NITROSTAT) SL tablet 0.4 mg (has no administration in time range)   Potassium Replacement - Follow Nurse / BPA Driven Protocol (has no administration in time range)   Magnesium Standard Dose Replacement - Follow Nurse / BPA Driven Protocol (has no administration in time " range)   Phosphorus Replacement - Follow Nurse / BPA Driven Protocol (has no administration in time range)   Calcium Replacement - Follow Nurse / BPA Driven Protocol (has no administration in time range)   acetaminophen (TYLENOL) tablet 650 mg (has no administration in time range)     Or   acetaminophen (TYLENOL) 160 MG/5ML oral solution 650 mg (has no administration in time range)     Or   acetaminophen (TYLENOL) suppository 650 mg (has no administration in time range)   melatonin tablet 5 mg (has no administration in time range)   sennosides-docusate (PERICOLACE) 8.6-50 MG per tablet 2 tablet (has no administration in time range)     And   polyethylene glycol (MIRALAX) packet 17 g (has no administration in time range)     And   bisacodyl (DULCOLAX) EC tablet 5 mg (has no administration in time range)     And   bisacodyl (DULCOLAX) suppository 10 mg (has no administration in time range)   Enoxaparin Sodium (LOVENOX) syringe 40 mg (40 mg Subcutaneous Given 1/30/25 1455)   guaiFENesin (MUCINEX) 12 hr tablet 600 mg (600 mg Oral Given 1/30/25 1455)   sodium chloride 7 % nebulizer solution nebulizer solution 4 mL (4 mL Nebulization Not Given 1/30/25 1500)   predniSONE (DELTASONE) tablet 40 mg (has no administration in time range)   azithromycin (ZITHROMAX) 500 mg in sodium chloride 0.9 % 250 mL IVPB-VTB (has no administration in time range)   methylPREDNISolone sodium succinate (SOLU-Medrol) injection 125 mg (125 mg Intravenous Given 1/30/25 1138)   ipratropium-albuterol (DUO-NEB) nebulizer solution 3 mL (3 mL Nebulization Given 1/30/25 1052)   doxycycline (MONODOX) capsule 100 mg (100 mg Oral Given 1/30/25 1249)   ipratropium-albuterol (DUO-NEB) nebulizer solution 3 mL (3 mL Nebulization Given 1/30/25 1325)     ECG/EMG Results (last 24 hours)       Procedure Component Value Units Date/Time    ECG 12 Lead Dyspnea [[id number]] Collected: 01/30/25 1015     Updated: 01/30/25 1705     QT Interval 376 ms      QTC Interval 436  ms     Narrative:      Test Reason : Dyspnea  Blood Pressure :   */*   mmHG  Vent. Rate :  81 BPM     Atrial Rate :  81 BPM     P-R Int : 154 ms          QRS Dur : 132 ms      QT Int : 376 ms       P-R-T Axes :  75 -67  96 degrees    QTcB Int : 436 ms    Normal sinus rhythm  Left bundle branch block  Abnormal ECG  When compared with ECG of 04-Jan-2024 20:49,  Left bundle branch block is now present  Confirmed by MD Ruiz Michael ([id number]) on 1/30/2025 5:04:16 PM    Referred By: MOUNIKA           Confirmed By: Hector Ruiz MD          ECG 12 Lead Dyspnea   Final Result   Test Reason : Dyspnea   Blood Pressure :   */*   mmHG   Vent. Rate :  81 BPM     Atrial Rate :  81 BPM      P-R Int : 154 ms          QRS Dur : 132 ms       QT Int : 376 ms       P-R-T Axes :  75 -67  96 degrees     QTcB Int : 436 ms      Normal sinus rhythm   Left bundle branch block   Abnormal ECG   When compared with ECG of 04-Jan-2024 20:49,   Left bundle branch block is now present   Confirmed by MD Ruiz Michael ([id number]) on 1/30/2025 5:04:16 PM      Referred By: MOUNIKA           Confirmed By: Hector Ruiz MD                      Medical Decision Making      Final diagnoses:   Acute hypoxemic respiratory failure   COPD exacerbation       ED Disposition  ED Disposition       ED Disposition   Decision to Admit    Condition   --    Comment   Level of Care: Telemetry [5]   Diagnosis: COPD exacerbation [800723]                 No follow-up provider specified.       Medication List      No changes were made to your prescriptions during this visit.            Francisco Ruiz MD  01/30/25 1902

## 2025-01-30 NOTE — Clinical Note
Level of Care: Telemetry [5]   Diagnosis: COPD exacerbation [148720]   Certification: I Certify That Inpatient Hospital Services Are Medically Necessary For Greater Than 2 Midnights

## 2025-01-30 NOTE — ED NOTES
[facility]    Nursing Report ED to Floor:  Mental status: AOx4  Ambulatory status: Standby  Oxygen Therapy:  Bipap  Cardiac Rhythm: NSR  Admitted from: Home  Safety Concerns:  Fall risk  Precautions: None  Social Issues: None  ED Room #:  33    ED Nurse Phone Extension - [phone] or may call [phone].      HPI:   Chief Complaint   Patient presents with    Shortness of Breath       Past Medical History:  Past Medical History:   Diagnosis Date    CHF (congestive heart failure)     COPD (chronic obstructive pulmonary disease)     History of stomach ulcers     Hypertension         Past Surgical History:  Past Surgical History:   Procedure Laterality Date    CARDIAC CATHETERIZATION N/A 10/18/2022    Procedure: LEFT HEART CATH;  Surgeon: Kd Roque MD;  Location: [facility] CATH INVASIVE LOCATION;  Service: Cardiovascular;  Laterality: N/A;    COLONOSCOPY  03/05/2018    Dr. AGNES Olea. Normal. Repeat 1- yrs if wihtout significant family history or 5 years if first degree relative younger than age 60 with high rish polyp or colorectal cancer.     NO PAST SURGERIES          Admitting Doctor:   Bela Leary MD    Consulting Provider(s):  Consults       No orders found from 1/1/2025 to 1/31/2025.             Admitting Diagnosis:       Most Recent Vitals:   Vitals:    01/30/25 1330 01/30/25 1400 01/30/25 1500 01/30/25 1600   BP: 132/75 123/74 123/76 112/87   BP Location:       Patient Position:       Pulse: 78 76 75 79   Resp:       Temp:       TempSrc:       SpO2: 98% 93% 96% 97%   Weight:       Height:           Active LDAs/IV Access:   Lines, Drains & Airways       Active LDAs       Name Placement date Placement time Site Days    Peripheral IV 01/30/25 1013 Right Antecubital 01/30/25  1013  Antecubital  less than 1                    Labs (abnormal labs have a star):   Labs Reviewed   COMPREHENSIVE METABOLIC PANEL - Abnormal; Notable for the following components:       Result Value    Glucose 141 (*)     BUN 28  (*)     BUN/Creatinine Ratio 29.5 (*)     All other components within normal limits    Narrative:     GFR Categories in Chronic Kidney Disease (CKD)      GFR Category          GFR (mL/min/1.73)    Interpretation  G1                     90 or greater         Normal or high (1)  G2                      60-89                Mild decrease (1)  G3a                   45-59                Mild to moderate decrease  G3b                   30-44                Moderate to severe decrease  G4                    15-29                Severe decrease  G5                    14 or less           Kidney failure          (1)In the absence of evidence of kidney disease, neither GFR category G1 or G2 fulfill the criteria for CKD.    eGFR calculation 2021 CKD-EPI creatinine equation, which does not include race as a factor   CBC WITH AUTO DIFFERENTIAL - Abnormal; Notable for the following components:    RBC 5.81 (*)     Hemoglobin 17.9 (*)     Hematocrit 56.0 (*)     All other components within normal limits   HEMOGLOBIN A1C - Abnormal; Notable for the following components:    Hemoglobin A1C 6.20 (*)     All other components within normal limits    Narrative:     Hemoglobin A1C Ranges:    Increased Risk for Diabetes  5.7% to 6.4%  Diabetes                     >= 6.5%  Diabetic Goal                < 7.0%   LIPID PANEL - Abnormal; Notable for the following components:    HDL Cholesterol 37 (*)     LDL Cholesterol  118 (*)     All other components within normal limits    Narrative:     Cholesterol Reference Ranges  (U.S. Department of Health and Human Services ATP III Classifications)    Desirable          <200 mg/dL  Borderline High    200-239 mg/dL  High Risk          >240 mg/dL      Triglyceride Reference Ranges  (U.S. Department of Health and Human Services ATP III Classifications)    Normal           <150 mg/dL  Borderline High  150-199 mg/dL  High             200-499 mg/dL  Very High        >500 mg/dL    HDL Reference Ranges  (U.S.  Department of Health and Human Services ATP III Classifications)    Low     <40 mg/dl (major risk factor for CHD)  High    >60 mg/dl ('negative' risk factor for CHD)        LDL Reference Ranges  (U.S. Department of Health and Human Services ATP III Classifications)    Optimal          <100 mg/dL  Near Optimal     100-129 mg/dL  Borderline High  130-159 mg/dL  High             160-189 mg/dL  Very High        >189 mg/dL    LDL is calculated using the NIH LDL-C calculation.     URINALYSIS W/ MICROSCOPIC IF INDICATED (NO CULTURE) - Abnormal; Notable for the following components:    Specific Gravity, UA 1.035 (*)     Glucose, UA >=1000 mg/dL (3+) (*)     Ketones, UA Trace (*)     Protein, UA Trace (*)     All other components within normal limits    Narrative:     Urine microscopic not indicated.   IRON PROFILE - Abnormal; Notable for the following components:    Iron 56 (*)     Iron Saturation (TSAT) 15 (*)     All other components within normal limits   C-REACTIVE PROTEIN - Abnormal; Notable for the following components:    C-Reactive Protein 1.76 (*)     All other components within normal limits   SEDIMENTATION RATE - Abnormal; Notable for the following components:    Sed Rate 53 (*)     All other components within normal limits   BLOOD GAS, VENOUS W/CO-OXIMETRY - Abnormal; Notable for the following components:    pH, Venous 7.264 (*)     pCO2, Venous 65.6 (*)     pO2, Venous 54.7 (*)     HCO3, Venous 29.7 (*)     Hemoglobin, Blood Gas 17.9 (*)     All other components within normal limits   RESPIRATORY PANEL PCR W/ COVID-19 (SARS-COV-2), NP SWAB IN UTM/VTP, 2 HR TAT - Normal    Narrative:     In the setting of a positive respiratory panel with a viral infection PLUS a negative procalcitonin without other underlying concern for bacterial infection, consider observing off antibiotics or discontinuation of antibiotics and continue supportive care. If the respiratory panel is positive for atypical bacterial infection  (Bordetella pertussis, Chlamydophila pneumoniae, or Mycoplasma pneumoniae), consider antibiotic de-escalation to target atypical bacterial infection.   BNP (IN-HOUSE) - Normal    Narrative:     This assay is used as an aid in the diagnosis of individuals suspected of having heart failure. It can be used as an aid in the diagnosis of acute decompensated heart failure (ADHF) in patients presenting with signs and symptoms of ADHF to the emergency department (ED). In addition, NT-proBNP of <300 pg/mL indicates ADHF is not likely.    Age Range Result Interpretation  NT-proBNP Concentration (pg/mL:      <50             Positive            >450                   Gray                 300-450                    Negative             <300    50-75           Positive            >900                  Gray                300-900                  Negative            <300      >75             Positive            >1800                  Gray                300-1800                  Negative            <300   TROPONIN - Normal    Narrative:     High Sensitive Troponin T Reference Range:  <14.0 ng/L- Negative Female for AMI  <22.0 ng/L- Negative Male for AMI  >=14 - Abnormal Female indicating possible myocardial injury.  >=22 - Abnormal Male indicating possible myocardial injury.   Clinicians would have to utilize clinical acumen, EKG, Troponin, and serial changes to determine if it is an Acute Myocardial Infarction or myocardial injury due to an underlying chronic condition.        D-DIMER, QUANTITATIVE - Normal    Narrative:     According to the assay 's published package insert, a normal (<0.50 MCGFEU/mL) D-dimer result in conjunction with a non-high clinical probability assessment, excludes deep vein thrombosis (DVT) and pulmonary embolism (PE) with high sensitivity.    D-dimer values increase with age and this can make VTE exclusion of an older population difficult. To address this, the American College of Physicians,  "based on best available evidence and recent guidelines, recommends that clinicians use age-adjusted D-dimer thresholds in patients greater than 50 years of age with: a) a low probability of PE who do not meet all Pulmonary Embolism Rule Out Criteria, or b) in those with intermediate probability of PE.   The formula for an age-adjusted D-dimer cut-off is \"age/100\".  For example, a 60 year old patient would have an age-adjusted cut-off of 0.60 MCGFEU/mL and an 80 year old 0.80 MCGFEU/mL.   HIGH SENSITIVITIY TROPONIN T 1HR - Normal    Narrative:     High Sensitive Troponin T Reference Range:  <14.0 ng/L- Negative Female for AMI  <22.0 ng/L- Negative Male for AMI  >=14 - Abnormal Female indicating possible myocardial injury.  >=22 - Abnormal Male indicating possible myocardial injury.   Clinicians would have to utilize clinical acumen, EKG, Troponin, and serial changes to determine if it is an Acute Myocardial Infarction or myocardial injury due to an underlying chronic condition.        CK - Normal   LACTIC ACID, PLASMA - Normal   PROCALCITONIN - Normal    Narrative:     As a Marker for Sepsis (Non-Neonates):    1. <0.5 ng/mL represents a low risk of severe sepsis and/or septic shock.  2. >2 ng/mL represents a high risk of severe sepsis and/or septic shock.    As a Marker for Lower Respiratory Tract Infections that require antibiotic therapy:    PCT on Admission    Antibiotic Therapy       6-12 Hrs later    >0.5                Strongly Recommended  >0.25 - <0.5        Recommended   0.1 - 0.25          Discouraged              Remeasure/reassess PCT  <0.1                Strongly Discouraged     Remeasure/reassess PCT    As 28 day mortality risk marker: \"Change in Procalcitonin Result\" (>80% or <=80%) if Day 0 (or Day 1) and Day 4 values are available. Refer to http://www.[employer]-pct-calculator.com    Change in PCT <=80%  A decrease of PCT levels below or equal to 80% defines a positive change in PCT test result " representing a higher risk for 28-day all-cause mortality of patients diagnosed with severe sepsis for septic shock.    Change in PCT >80%  A decrease of PCT levels of more than 80% defines a negative change in PCT result representing a lower risk for 28-day all-cause mortality of patients diagnosed with severe sepsis or septic shock.      TSH RFX ON ABNORMAL TO FREE T4 - Normal   URINE DRUG SCREEN - Normal    Narrative:     Cutoff For Drugs Screened:    Amphetamines               500 ng/ml  Barbiturates               200 ng/ml  Benzodiazepines            150 ng/ml  Cocaine                    150 ng/ml  Methadone                  200 ng/ml  Opiates                    100 ng/ml  Phencyclidine               25 ng/ml  THC                         50 ng/ml  Methamphetamine            500 ng/ml  Tricyclic Antidepressants  300 ng/ml  Oxycodone                  100 ng/ml  Buprenorphine               10 ng/ml    The normal value for all drugs tested is negative. This report includes unconfirmed screening results, with the cutoff values listed, to be used for medical treatment purposes only.  Unconfirmed results must not be used for non-medical purposes such as employment or legal testing.  Clinical consideration should be applied to any drug of abuse test, particularly when unconfirmed results are used.     FERRITIN - Normal    Narrative:     Results may be falsely decreased if patient taking Biotin.     FENTANYL, URINE - Normal    Narrative:     Negative Threshold:      Fentanyl 5 ng/mL     The normal value for the drug tested is negative. This report includes final unconfirmed screening results to be used for medical treatment purposes only. Unconfirmed results must not be used for non-medical purposes such as employment or legal testing. Clinical consideration should be applied to any drug of abuse test, particularly when unconfirmed results are used.          RESPIRATORY CULTURE   STREP PNEUMO AG, URINE OR CSF    LEGIONELLA ANTIGEN, URINE   RAINBOW DRAW    Narrative:     The following orders were created for panel order [place] Draw.  Procedure                               Abnormality         Status                     ---------                               -----------         ------                     Green Top (Gel)[[id number]]                                  Final result               Lavender Top[[id number]]                                     Final result               Gold Top - SST[[id number]]                                   Final result               Ramirez Top[[id number]]                                         Final result               Light Blue Top[[id number]]                                   Final result                 Please view results for these tests on the individual orders.   BLOOD GAS, VENOUS   FOLATE   VITAMIN B12   CBC AND DIFFERENTIAL    Narrative:     The following orders were created for panel order CBC & Differential.  Procedure                               Abnormality         Status                     ---------                               -----------         ------                     CBC Auto Differential[[id number]]        Abnormal            Final result                 Please view results for these tests on the individual orders.   GREEN TOP   LAVENDER TOP   GOLD TOP - SST   GRAY TOP   LIGHT BLUE TOP       Meds Given in ED:   Medications   sodium chloride 0.9 % flush 10 mL (has no administration in time range)   sodium chloride 0.9 % flush 10 mL (10 mL Intravenous Given 1/30/25 1430)   sodium chloride 0.9 % flush 10 mL (10 mL Intravenous Given 1/30/25 1430)   sodium chloride 0.9 % infusion 40 mL (has no administration in time range)   nitroglycerin (NITROSTAT) SL tablet 0.4 mg (has no administration in time range)   Potassium Replacement - Follow Nurse / BPA Driven Protocol (has no administration in time range)   Magnesium Standard Dose Replacement - Follow Nurse / BPA Driven Protocol  (has no administration in time range)   Phosphorus Replacement - Follow Nurse / BPA Driven Protocol (has no administration in time range)   Calcium Replacement - Follow Nurse / BPA Driven Protocol (has no administration in time range)   acetaminophen (TYLENOL) tablet 650 mg (has no administration in time range)     Or   acetaminophen (TYLENOL) 160 MG/5ML oral solution 650 mg (has no administration in time range)     Or   acetaminophen (TYLENOL) suppository 650 mg (has no administration in time range)   melatonin tablet 5 mg (has no administration in time range)   sennosides-docusate (PERICOLACE) 8.6-50 MG per tablet 2 tablet (has no administration in time range)     And   polyethylene glycol (MIRALAX) packet 17 g (has no administration in time range)     And   bisacodyl (DULCOLAX) EC tablet 5 mg (has no administration in time range)     And   bisacodyl (DULCOLAX) suppository 10 mg (has no administration in time range)   Enoxaparin Sodium (LOVENOX) syringe 40 mg (40 mg Subcutaneous Given 1/30/25 1455)   guaiFENesin (MUCINEX) 12 hr tablet 600 mg (600 mg Oral Given 1/30/25 1455)   sodium chloride 7 % nebulizer solution nebulizer solution 4 mL (4 mL Nebulization Not Given 1/30/25 1500)   predniSONE (DELTASONE) tablet 40 mg (has no administration in time range)   azithromycin (ZITHROMAX) 500 mg in sodium chloride 0.9 % 250 mL IVPB-VTB (has no administration in time range)   methylPREDNISolone sodium succinate (SOLU-Medrol) injection 125 mg (125 mg Intravenous Given 1/30/25 1138)   ipratropium-albuterol (DUO-NEB) nebulizer solution 3 mL (3 mL Nebulization Given 1/30/25 1052)   doxycycline (MONODOX) capsule 100 mg (100 mg Oral Given 1/30/25 1249)   ipratropium-albuterol (DUO-NEB) nebulizer solution 3 mL (3 mL Nebulization Given 1/30/25 1325)           Last NIH score:                                                          Dysphagia screening results:  Patient Factors Component (Dysphagia:Stroke or Rule-out)  Best Eye  Response: 4-->(E4) spontaneous (01/30/25 1037)  Best Motor Response: 6-->(M6) obeys commands (01/30/25 1037)  Best Verbal Response: 5-->(V5) oriented (01/30/25 1037)  [place] Coma Scale Score: 15 (01/30/25 1037)     [place] Coma Scale:  No data recorded     CIWA:        Restraint Type:            Isolation Status:  No active isolations

## 2025-01-31 LAB
ANION GAP SERPL CALCULATED.3IONS-SCNC: 9 MMOL/L (ref 5–15)
BUN SERPL-MCNC: 53 MG/DL (ref 8–23)
BUN/CREAT SERPL: 53.5 (ref 7–25)
CALCIUM SPEC-SCNC: 9.5 MG/DL (ref 8.6–10.5)
CHLORIDE SERPL-SCNC: 99 MMOL/L (ref 98–107)
CO2 SERPL-SCNC: 29 MMOL/L (ref 22–29)
CREAT SERPL-MCNC: 0.99 MG/DL (ref 0.76–1.27)
DEPRECATED RDW RBC AUTO: 53.7 FL (ref 37–54)
EGFRCR SERPLBLD CKD-EPI 2021: 84.5 ML/MIN/1.73
ERYTHROCYTE [DISTWIDTH] IN BLOOD BY AUTOMATED COUNT: 14.5 % (ref 12.3–15.4)
GLUCOSE SERPL-MCNC: 153 MG/DL (ref 65–99)
HCT VFR BLD AUTO: 53.7 % (ref 37.5–51)
HGB BLD-MCNC: 16.5 G/DL (ref 13–17.7)
L PNEUMO1 AG UR QL IA: NEGATIVE
MAGNESIUM SERPL-MCNC: 2.1 MG/DL (ref 1.6–2.4)
MCH RBC QN AUTO: 30.8 PG (ref 26.6–33)
MCHC RBC AUTO-ENTMCNC: 30.7 G/DL (ref 31.5–35.7)
MCV RBC AUTO: 100.2 FL (ref 79–97)
PLATELET # BLD AUTO: 186 10*3/MM3 (ref 140–450)
PMV BLD AUTO: 9.6 FL (ref 6–12)
POTASSIUM SERPL-SCNC: 5.1 MMOL/L (ref 3.5–5.2)
RBC # BLD AUTO: 5.36 10*6/MM3 (ref 4.14–5.8)
S PNEUM AG SPEC QL LA: NEGATIVE
SODIUM SERPL-SCNC: 137 MMOL/L (ref 136–145)
WBC NRBC COR # BLD AUTO: 8.28 10*3/MM3 (ref 3.4–10.8)

## 2025-01-31 PROCEDURE — 25010000002 ENOXAPARIN PER 10 MG

## 2025-01-31 PROCEDURE — 85027 COMPLETE CBC AUTOMATED: CPT

## 2025-01-31 PROCEDURE — 94799 UNLISTED PULMONARY SVC/PX: CPT

## 2025-01-31 PROCEDURE — 94664 DEMO&/EVAL PT USE INHALER: CPT

## 2025-01-31 PROCEDURE — 99232 SBSQ HOSP IP/OBS MODERATE 35: CPT | Performed by: INTERNAL MEDICINE

## 2025-01-31 PROCEDURE — 25010000002 AZITHROMYCIN PER 500 MG

## 2025-01-31 PROCEDURE — 97162 PT EVAL MOD COMPLEX 30 MIN: CPT

## 2025-01-31 PROCEDURE — 25010000002 METHYLPREDNISOLONE PER 125 MG: Performed by: INTERNAL MEDICINE

## 2025-01-31 PROCEDURE — 63710000001 PREDNISONE PER 1 MG

## 2025-01-31 PROCEDURE — 83735 ASSAY OF MAGNESIUM: CPT

## 2025-01-31 PROCEDURE — 25810000003 SODIUM CHLORIDE 0.9 % SOLUTION 250 ML FLEX CONT

## 2025-01-31 PROCEDURE — 80048 BASIC METABOLIC PNL TOTAL CA: CPT

## 2025-01-31 RX ORDER — WATER 10 ML/10ML
INJECTION INTRAMUSCULAR; INTRAVENOUS; SUBCUTANEOUS
Status: COMPLETED
Start: 2025-01-31 | End: 2025-01-31

## 2025-01-31 RX ORDER — METHYLPREDNISOLONE SODIUM SUCCINATE 125 MG/2ML
60 INJECTION, POWDER, LYOPHILIZED, FOR SOLUTION INTRAMUSCULAR; INTRAVENOUS EVERY 8 HOURS
Status: DISCONTINUED | OUTPATIENT
Start: 2025-01-31 | End: 2025-02-03

## 2025-01-31 RX ADMIN — PREDNISONE 40 MG: 20 TABLET ORAL at 08:29

## 2025-01-31 RX ADMIN — SACUBITRIL AND VALSARTAN 1 TABLET: 24; 26 TABLET, FILM COATED ORAL at 08:29

## 2025-01-31 RX ADMIN — Medication 10 ML: at 08:29

## 2025-01-31 RX ADMIN — ENOXAPARIN SODIUM 40 MG: 100 INJECTION SUBCUTANEOUS at 08:29

## 2025-01-31 RX ADMIN — SACUBITRIL AND VALSARTAN 1 TABLET: 24; 26 TABLET, FILM COATED ORAL at 20:05

## 2025-01-31 RX ADMIN — BUDESONIDE AND FORMOTEROL FUMARATE DIHYDRATE 2 PUFF: 160; 4.5 AEROSOL RESPIRATORY (INHALATION) at 08:56

## 2025-01-31 RX ADMIN — GUAIFENESIN 600 MG: 600 TABLET ORAL at 20:05

## 2025-01-31 RX ADMIN — Medication 5 MG: at 20:06

## 2025-01-31 RX ADMIN — GUAIFENESIN 600 MG: 600 TABLET ORAL at 08:29

## 2025-01-31 RX ADMIN — METHYLPREDNISOLONE SODIUM SUCCINATE 60 MG: 125 INJECTION INTRAMUSCULAR; INTRAVENOUS at 23:30

## 2025-01-31 RX ADMIN — CARVEDILOL 25 MG: 6.25 TABLET, FILM COATED ORAL at 17:07

## 2025-01-31 RX ADMIN — BUDESONIDE AND FORMOTEROL FUMARATE DIHYDRATE 2 PUFF: 160; 4.5 AEROSOL RESPIRATORY (INHALATION) at 19:56

## 2025-01-31 RX ADMIN — WATER 10 ML: 1 INJECTION INTRAMUSCULAR; INTRAVENOUS; SUBCUTANEOUS at 14:30

## 2025-01-31 RX ADMIN — TIOTROPIUM BROMIDE INHALATION SPRAY 2 PUFF: 3.12 SPRAY, METERED RESPIRATORY (INHALATION) at 08:55

## 2025-01-31 RX ADMIN — AZITHROMYCIN DIHYDRATE 500 MG: 500 INJECTION, POWDER, LYOPHILIZED, FOR SOLUTION INTRAVENOUS at 20:06

## 2025-01-31 RX ADMIN — CARVEDILOL 25 MG: 6.25 TABLET, FILM COATED ORAL at 08:29

## 2025-01-31 RX ADMIN — METHYLPREDNISOLONE SODIUM SUCCINATE 60 MG: 125 INJECTION INTRAMUSCULAR; INTRAVENOUS at 14:30

## 2025-01-31 NOTE — PAYOR COMM NOTE
"Annette Marks (64 y.o. Male)     [id number]     Brunilda Whitehead RN  Utilization Review  Vhsgf-[phone]  [phone]        Date of Birth   1960    Social Security Number       Address   [address] [place] [address]    Home Phone   [phone]    MRN   [id number]       Taoism   None    Marital Status   Single                            Admission Date   25    Admission Type   Emergency    Admitting Provider   Bucky Bustillos MD    Attending Provider   Bucky Bustillos MD    Department, Room/Bed   [facility] 6B, N637/1       Discharge Date       Discharge Disposition       Discharge Destination                                 Attending Provider: Bucky Bustillos MD    Allergies: No Known Allergies    Isolation: None   Infection: None   Code Status: CPR    Ht: 167.6 cm (65.98\")   Wt: 59.6 kg (131 lb 4.8 oz)    Admission Cmt: None   Principal Problem: COPD exacerbation [J44.1]                   Active Insurance as of 2025       Primary Coverage       Payor Plan Insurance Group Employer/Plan Group    [employer] PPO [id number]       Payor Plan Address Payor Plan Phone Number Payor Plan Fax Number Effective Dates    [id number] [phone]  2014 - None Entered    [place] [address]         Subscriber Name Subscriber Birth Date Member ID       ANNETTE MARKS 1960 [id number]                     Emergency Contacts        (Rel.) Home Phone Work Phone Mobile Phone    WebbAlley aranda (Daughter) [phone] -- --    Maximiliano Marks (Son) [phone] -- [phone]                 History & Physical        Bela Leary MD at 25 1438              [facility] Medicine Services  HISTORY AND PHYSICAL    Patient Name: Annette Marks  : 1960  MRN: [id number]  Primary Care Physician: Charito Weiss APRN  Date of admission: " 1/30/2025      Subjective  Subjective     Chief Complaint:  COPD exacerbation    HPI:  Francisco Clark is a 64 y.o. male PMH significant for HFrEF, COPD with pulmonary cachexia, previous cavitary lung lesion, current tobacco use, HTN and HLD.  Presenting to [facility] ED due to significant shortness of breath, fatigue, malaise, subjective chills as well as chest tightness with wheezing.  Also endorses symptoms of palpitations, orthopnea with PND.  He also endorses increased phlegm production however does not recall the color.  Currently new oxygen requirement.  He states he was recently exposed to someone with flulike symptoms at work and since then his symptoms have acutely worsened.  He denies wearing oxygen at home.  Continues to endorse tobacco use.  Also states decline in p.o. intake.  Denies any chest pain, recent, fevers, nausea, vomiting, loss of consciousness or trauma.    In the ED, patient was hemodynamically stable however desatted to 77% on room air, currently requiring 6 L nasal cannula oxygen to saturate at 90%.  Troponins 19 with subsequent recheck 17, equivocal with no significant delta.  proBNP 109.  BUN 28, glucose 141.  Lactic acid 1.1.  D-dimer less than 0.27.  No leukocytosis.  Hemoglobin 17.9.  Respiratory viral panel is negative.  CXR revealed pulmonary emphysema with hyperinflated lungs on independent review, no acute cardiopulmonary process at this time.  Received doxycycline, DuoNebs and Solu-Medrol 125 in the ED.  Hospitalist team was contacted, agreed to admit.      Personal History     Past Medical History:   Diagnosis Date    CHF (congestive heart failure)     COPD (chronic obstructive pulmonary disease)     History of stomach ulcers     Hypertension            Past Surgical History:   Procedure Laterality Date    CARDIAC CATHETERIZATION N/A 10/18/2022    Procedure: LEFT HEART CATH;  Surgeon: Kd Roque MD;  [facility];  Service:  Cardiovascular;  Laterality: N/A;    COLONOSCOPY  03/05/2018    Dr. AGNES Olea. Normal. Repeat 1- yrs if wihtout significant family history or 5 years if first degree relative younger than age 60 with high rish polyp or colorectal cancer.     NO PAST SURGERIES         Family History: family history includes Aneurysm in his sister; Emphysema in his mother; Heart attack in his father; Heart disease in his brother and father; Migraines in his brother and father; No Known Problems in his daughter, maternal grandfather, maternal grandmother, paternal grandfather, paternal grandmother, son, and son; Other in his brother.     Social History:  reports that he has been smoking cigarettes. He has a 35 pack-year smoking history. He has never been exposed to tobacco smoke. He has never used smokeless tobacco. He reports that he does not drink alcohol and does not use drugs.  Social History     Social History Narrative    Caffeine use: none.    Patient lives at home with his son.          at [employer]        Medications:  Available home medication information reviewed.  albuterol sulfate HFA, budesonide-formoterol, carvedilol, empagliflozin, sacubitril-valsartan, and tiotropium bromide monohydrate    No Known Allergies    Objective  Objective     Vital Signs:   Temp:  [89.6 °F (32 °C)-97.5 °F (36.4 °C)] 97.5 °F (36.4 °C)  Heart Rate:  [74-94] 76  Resp:  [18] 18  BP: (114-132)/(67-76) 123/74  Flow (L/min) (Oxygen Therapy):  [4-6] 6       Physical Exam   Constitutional: Awake, alert appears cachectic on physical examination  Eyes: PERRLA, sclerae anicteric, no conjunctival injection  HENT: NCAT, mucous membranes moist  Neck: Supple, no thyromegaly, no lymphadenopathy, trachea midline  Respiratory: Wheezing on auscultation bilaterally with decreased air entry into the lungs.  Cardiovascular: RRR, no murmurs, rubs, or gallops, palpable pedal pulses bilaterally  Gastrointestinal: Positive bowel sounds, soft,  nontender, nondistended  Musculoskeletal: No bilateral ankle edema, no clubbing or cyanosis to extremities  Psychiatric: Appropriate affect, cooperative  Neurologic: Oriented x 3, strength symmetric in all extremities, Cranial Nerves grossly intact to confrontation, speech clear  Skin: No rashes      Result Review:  I have personally reviewed the results from the time of this admission to 1/30/2025 14:39 EST and agree with these findings:  [x]  Laboratory list / accordion  [x]  Microbiology  [x]  Radiology  [x]  EKG/Telemetry   [x]  Cardiology/Vascular   []  Pathology  [x]  Old records  []  Other:      LAB RESULTS:      Lab 01/30/25  1229 01/30/25  1011   WBC  --  7.93   HEMOGLOBIN  --  17.9*   HEMATOCRIT  --  56.0*   PLATELETS  --  191   NEUTROS ABS  --  5.31   IMMATURE GRANS (ABS)  --  0.03   LYMPHS ABS  --  1.73   MONOS ABS  --  0.76   EOS ABS  --  0.06   MCV  --  96.4   SED RATE  --  53*   CRP 1.76*  --    LACTATE  --  1.1   D DIMER QUANT  --  <0.27         Lab 01/30/25  1229 01/30/25  1011   SODIUM  --  136   POTASSIUM  --  4.5   CHLORIDE  --  98   CO2  --  28.0   ANION GAP  --  10.0   BUN  --  28*   CREATININE  --  0.95   EGFR  --  88.8   GLUCOSE  --  141*   CALCIUM  --  9.7   HEMOGLOBIN A1C  --  6.20*   TSH 1.260  --          Lab 01/30/25  1011   TOTAL PROTEIN 7.3   ALBUMIN 3.9   GLOBULIN 3.4   ALT (SGPT) 12   AST (SGOT) 20   BILIRUBIN 0.3   ALK PHOS 93         Lab 01/30/25  1229 01/30/25  1011   PROBNP  --  109.2   HSTROP T 17 19         Lab 01/30/25  1229   CHOLESTEROL 182   LDL CHOL 118*   HDL CHOL 37*   TRIGLYCERIDES 148         Lab 01/30/25  1229   IRON 56*   IRON SATURATION (TSAT) 15*   TIBC 378   TRANSFERRIN 254   FERRITIN 80.07             Microbiology Results (last 10 days)       Procedure Component Value - Date/Time    Respiratory Panel PCR w/COVID-19(SARS-CoV-2) TREVA/ROCKY/BETINA/PAD/COR/YSABEL In-House, NP Swab in UTM/VTM, 2 HR TAT - Swab, Nasopharynx [[id number]]  (Normal) Collected: 01/30/25 1011    Lab  Status: Final result Specimen: Swab from Nasopharynx Updated: 01/30/25 1131     ADENOVIRUS, PCR Not Detected     Coronavirus 229E Not Detected     Coronavirus HKU1 Not Detected     Coronavirus NL63 Not Detected     Coronavirus OC43 Not Detected     COVID19 Not Detected     Human Metapneumovirus Not Detected     Human Rhinovirus/Enterovirus Not Detected     Influenza A PCR Not Detected     Influenza B PCR Not Detected     Parainfluenza Virus 1 Not Detected     Parainfluenza Virus 2 Not Detected     Parainfluenza Virus 3 Not Detected     Parainfluenza Virus 4 Not Detected     RSV, PCR Not Detected     Bordetella pertussis pcr Not Detected     Bordetella parapertussis PCR Not Detected     Chlamydophila pneumoniae PCR Not Detected     Mycoplasma pneumo by PCR Not Detected    Narrative:      In the setting of a positive respiratory panel with a viral infection PLUS a negative procalcitonin without other underlying concern for bacterial infection, consider observing off antibiotics or discontinuation of antibiotics and continue supportive care. If the respiratory panel is positive for atypical bacterial infection (Bordetella pertussis, Chlamydophila pneumoniae, or Mycoplasma pneumoniae), consider antibiotic de-escalation to target atypical bacterial infection.            XR Chest 1 View    Result Date: 1/30/2025  XR CHEST 1 VW Date of Exam: 1/30/2025 10:13 AM EST Indication: SOA triage protocol Comparison: 1/6/2024 Findings: Heart size and pulmonary vasculature are within normal limits. Lungs appear hyperinflated. No acute pulmonary abnormality demonstrated. Costophrenic angles sharp     Impression: Impression: Pulmonary emphysema. No acute cardiopulmonary process demonstrated Electronically Signed: Cleveland Horner  1/30/2025 10:46 AM EST  Workstation ID: [id number]     Results for orders placed during the hospital encounter of 01/10/23    Adult Transthoracic Echo Complete W/ Cont if Necessary Per Protocol    Interpretation  Summary    Left ventricular systolic function is mildly decreased. Left ventricular ejection fraction appears to be 46 - 50%.    Left ventricular wall thickness is consistent with mild concentric hypertrophy.    Left ventricular diastolic function is consistent with (grade II w/high LAP) pseudonormalization.    Estimated right ventricular systolic pressure from tricuspid regurgitation is normal (<35 mmHg).      Assessment & Plan  Assessment & Plan       COPD exacerbation    Centrilobular emphysema    Tobacco abuse    Essential hypertension    Dilated cardiomyopathy    Congestive heart failure, unspecified HF chronicity, unspecified heart failure type    Severe malnutrition    Acute on chronic HFrEF (heart failure with reduced ejection fraction)    Cavitary lesion of lung    Francisco Clark is a 64 y.o. male PMH significant for HFrEF, COPD with pulmonary cachexia, previous cavitary lung lesion, current tobacco use, HTN and HLD.  Presenting to [facility] ED due to significant shortness of breath, fatigue, malaise, subjective chills as well as chest tightness with wheezing.  Also endorses symptoms of palpitations, orthopnea with PND.  He also endorses increased phlegm production however does not recall the color.  Not on oxygen at home, requiring 6 L nasal cannula oxygen desaturated 90%.  Currently in COPD exacerbation.    COPD exacerbation  Central lobar emphysema  Cavitary lung lesion  Acute hypoxemic respiratory failure  Tobacco use current  Chronic history of COPD, current smoker, previous PFTs April 2024 revealed a FEV1 to FVC ratio of 58% dictated, low DLCO, FVC 55% predicted with FEV1 32% predicted, no postbronchodilator testing done, patient received albuterol 2 hours prior to testing.  - Follows [facility] pulmonology last seen in April 2024.  - On room air at home, requiring 6 L nasal cannula oxygen to sat at 90%, desatted to 77% on room air  - Endorsing phlegm production however does not recall  the color  - Will check inflammatory markers with CRP and ESR with procalcitonin  - Currently in COPD exacerbation, received Solu-Medrol 125 in the ED, as well as doxycycline as well as DuoNebs  - Will order respiratory sputum cultures, streptococcal and Legionella urine antigens, viral PCR panel was negative.  Will also order VBG.  - Prednisone 40 for 4 additional days as well as azithromycin for 3 days  - Will check a CT chest without contrast as patient had previous teary lesion which was solved in RUL on CT from April 2024.  CT will also be helpful in ruling out pneumonia.  - Incentive spirometry, aggressive pulmonary toilet, albuterol, Mucinex and hypertonic saline.  - Continue Symbicort and Spiriva  - Initiate BiPAP    Dilated cardiomyopathy  Acute on chronic HFrEF  HTN  Previous echo TTE January 2023, revealed EF of 46 to 50%, mild concentric hypertrophy of LV, G2 DD with RVSP <35mmhg.  - On Coreg, Jardiance and Entresto, will continue for now, renal function is at baseline.    Severe malnutrition  Pulmonary cachexia  Significant COPD, patient has evidence of pulmonary cachexia on physical examination  - Total protein and albumin is okay on physical examination, will check HbA1c, TSH and lipid panel  - Consulted nutritional services        VTE Prophylaxis:  Pharmacologic & mechanical VTE prophylaxis orders are present.    Total time spent: 75 minutes  Time spent includes time reviewing chart, face-to-face time, counseling patient/family/caregiver, ordering medications/tests/procedures, communicating with other health care professionals, documenting clinical information in the electronic health record, and coordination of care.        CODE STATUS:    Code Status and Medical Interventions: CPR (Attempt to Resuscitate); Full Support   Ordered at: 01/30/25 1317     Level Of Support Discussed With:    Patient     Code Status (Patient has no pulse and is not breathing):    CPR (Attempt to Resuscitate)     Medical  Interventions (Patient has pulse or is breathing):    Full Support       Expected Discharge   Expected discharge date/ time has not been documented.     Beal Leary MD  01/30/25      Electronically signed by Beal Leary MD at 01/30/25 1500          Emergency Department Notes        Francisco Ruiz MD at 01/30/25 1859        Procedure Orders    1. Critical Care [[id number]] ordered by Francisco Ruiz MD                 Subjective   History of Present Illness  64-year-old male presents for evaluation of shortness of breath.  Of note, the patient is not oxygen dependent.  He has a history of COPD.  He is accompanied by his son who helps corroborate his history.  The patient tells me that he works at [employer] and is constantly around sick people.  Over the past 3 days or so he has not felt well and over that span has been experiencing cough, congestion, and gradually worsening dyspnea.  He was hoping that his symptoms would improve spontaneously but they have not.  Given his ongoing symptoms, he came here to the ED to be evaluated today.  He denies any fevers.      Review of Systems   HENT:  Positive for congestion.    Respiratory:  Positive for cough, shortness of breath and wheezing.    All other systems reviewed and are negative.      Past Medical History:   Diagnosis Date    CHF (congestive heart failure)     COPD (chronic obstructive pulmonary disease)     History of stomach ulcers     Hypertension        No Known Allergies    Past Surgical History:   Procedure Laterality Date    CARDIAC CATHETERIZATION N/A 10/18/2022    Procedure: LEFT HEART CATH;  Surgeon: Kd Roque MD;  Location: [facility] CATH INVASIVE LOCATION;  Service: Cardiovascular;  Laterality: N/A;    COLONOSCOPY  03/05/2018    Dr. AGNES Olea. Normal. Repeat 1- yrs if wihtout significant family history or 5 years if first degree relative younger than age 60 with high rish polyp or colorectal cancer.     NO PAST SURGERIES          Family History   Problem Relation Age of Onset    Emphysema Mother     Heart disease Father     Migraines Father     Heart attack Father     Aneurysm Sister     Heart disease Brother         50's    Migraines Brother     No Known Problems Maternal Grandmother     No Known Problems Maternal Grandfather     No Known Problems Paternal Grandmother     No Known Problems Paternal Grandfather     Other Brother         killed in [place]    No Known Problems Son     No Known Problems Son     No Known Problems Daughter        Social History     Socioeconomic History    Marital status: Single   Tobacco Use    Smoking status: Every Day     Current packs/day: 1.00     Average packs/day: 1 pack/day for 35.0 years (35.0 ttl pk-yrs)     Types: Cigarettes     Passive exposure: Never    Smokeless tobacco: Never    Tobacco comments:     less than 1/2 ppd since getting sick   Vaping Use    Vaping status: Never Used   Substance and Sexual Activity    Alcohol use: No    Drug use: No    Sexual activity: Defer     Comment:            Objective   Physical Exam  Vitals and nursing note reviewed.   Constitutional:       Appearance: He is well-developed. He is not diaphoretic.   HENT:      Head: Normocephalic and atraumatic.   Neck:      Vascular: No JVD.   Cardiovascular:      Rate and Rhythm: Normal rate and regular rhythm.      Heart sounds: Normal heart sounds. No murmur heard.     No friction rub. No gallop.   Pulmonary:      Breath sounds: Wheezing present. No rales.      Comments: Audible wheezes noted, speaking in full sentences, no accessory muscle use or retractions noted  Abdominal:      General: Bowel sounds are normal. There is no distension.      Palpations: Abdomen is soft. There is no mass.      Tenderness: There is no abdominal tenderness. There is no guarding.   Musculoskeletal:         General: Normal range of motion.      Cervical back: Normal range of motion.      Right lower leg: No edema.      Left lower  leg: No edema.   Skin:     General: Skin is warm and dry.      Coloration: Skin is not pale.      Findings: No erythema or rash.   Neurological:      Mental Status: He is alert and oriented to person, place, and time.   Psychiatric:         Mood and Affect: Mood normal.         Thought Content: Thought content normal.         Judgment: Judgment normal.         Critical Care    Performed by: Francisco Ruiz MD  Authorized by: Francisco Ruiz MD    Critical care provider statement:     Critical care time (minutes):  37    Critical care was necessary to treat or prevent imminent or life-threatening deterioration of the following conditions:  Respiratory failure    Critical care was time spent personally by me on the following activities:  Development of treatment plan with patient or surrogate, discussions with consultants, evaluation of patient's response to treatment, examination of patient, obtaining history from patient or surrogate, ordering and performing treatments and interventions, ordering and review of laboratory studies, ordering and review of radiographic studies, pulse oximetry, review of old charts and re-evaluation of patient's condition            ED Course  ED Course as of 01/30/25 1859   Thu Jan 30, 2025   1007 64-year-old male presents for evaluation of shortness of breath.  Of note, the patient is not oxygen dependent.  He has a history of COPD.  He is accompanied by his son who helps corroborate his history.  The patient tells me that he works at [employer] and is constantly around sick people.  Over the past 3 days or so he has not felt well and over that span has been experiencing cough, congestion, gradually worsening dyspnea.  Given his ongoing symptoms, he came here to the ED to be evaluated today.  On arrival, the patient has room air oxygen saturations of around 80%.  Supplemental oxygen given via nasal cannula.  Exam remarkable for audible wheezes.  Nebs and steroids administered.   Differential diagnosis is quite broad.  We will obtain labs and imaging, and we will reassess following initial interventions. [DD]   1128 I personally and independently viewed the patient's x-ray images myself, and I am in agreement with the radiologist's reading for final interpretation, particularly there is no pneumonia noted. [DD]   1128   Labs are bland/unrevealing.  Low risk Wells and D-dimer is negative [DD]   1137 Respiratory viral panel is negative. [DD]   1233 Upon reevaluation, the patient looks improved.  However, he is currently requiring 4 L of oxygen, and given his oxygen requirement and overall clinical picture, I feel that he warrants admission to the hospital at this point.  Doxycycline given.  I discussed the patient's case with our hospitalist, Dr. Padron, and the patient will be admitted under her care for further evaluation and treatment.  The patient is hemodynamically stable at this time and is aware/agreeable with the plan. [DD]   1233 Additionally, the patient's initial temperature was documented erroneously.  He is not hypothermic. [DD]   1447 While boarding in the emergency department, and ABG obtained by our inpatient team revealed respiratory acidosis.  BiPAP initiated. [DD]      ED Course User Index  [DD] Francisco Ruiz MD                                           Recent Results (from the past 24 hours)   Comprehensive Metabolic Panel    Collection Time: 01/30/25 10:11 AM    Specimen: Arm, Right; Blood   Result Value Ref Range    Glucose 141 (H) 65 - 99 mg/dL    BUN 28 (H) 8 - 23 mg/dL    Creatinine 0.95 0.76 - 1.27 mg/dL    Sodium 136 136 - 145 mmol/L    Potassium 4.5 3.5 - 5.2 mmol/L    Chloride 98 98 - 107 mmol/L    CO2 28.0 22.0 - 29.0 mmol/L    Calcium 9.7 8.6 - 10.5 mg/dL    Total Protein 7.3 6.0 - 8.5 g/dL    Albumin 3.9 3.5 - 5.2 g/dL    ALT (SGPT) 12 1 - 41 U/L    AST (SGOT) 20 1 - 40 U/L    Alkaline Phosphatase 93 39 - 117 U/L    Total Bilirubin 0.3 0.0 - 1.2 mg/dL     Globulin 3.4 gm/dL    A/G Ratio 1.1 g/dL    BUN/Creatinine Ratio 29.5 (H) 7.0 - 25.0    Anion Gap 10.0 5.0 - 15.0 mmol/L    eGFR 88.8 >60.0 mL/min/1.73   BNP    Collection Time: 01/30/25 10:11 AM    Specimen: Arm, Right; Blood   Result Value Ref Range    proBNP 109.2 0.0 - 900.0 pg/mL   High Sensitivity Troponin T    Collection Time: 01/30/25 10:11 AM    Specimen: Arm, Right; Blood   Result Value Ref Range    HS Troponin T 19 <22 ng/L   Green Top (Gel)    Collection Time: 01/30/25 10:11 AM   Result Value Ref Range    Extra Tube Hold for add-ons.    Lavender Top    Collection Time: 01/30/25 10:11 AM   Result Value Ref Range    Extra Tube hold for add-on    Gold Top - SST    Collection Time: 01/30/25 10:11 AM   Result Value Ref Range    Extra Tube Hold for add-ons.    Gray Top    Collection Time: 01/30/25 10:11 AM   Result Value Ref Range    Extra Tube Hold for add-ons.    Light Blue Top    Collection Time: 01/30/25 10:11 AM   Result Value Ref Range    Extra Tube Hold for add-ons.    CBC Auto Differential    Collection Time: 01/30/25 10:11 AM    Specimen: Arm, Right; Blood   Result Value Ref Range    WBC 7.93 3.40 - 10.80 10*3/mm3    RBC 5.81 (H) 4.14 - 5.80 10*6/mm3    Hemoglobin 17.9 (H) 13.0 - 17.7 g/dL    Hematocrit 56.0 (H) 37.5 - 51.0 %    MCV 96.4 79.0 - 97.0 fL    MCH 30.8 26.6 - 33.0 pg    MCHC 32.0 31.5 - 35.7 g/dL    RDW 14.5 12.3 - 15.4 %    RDW-SD 51.8 37.0 - 54.0 fl    MPV 9.5 6.0 - 12.0 fL    Platelets 191 140 - 450 10*3/mm3    Neutrophil % 66.9 42.7 - 76.0 %    Lymphocyte % 21.8 19.6 - 45.3 %    Monocyte % 9.6 5.0 - 12.0 %    Eosinophil % 0.8 0.3 - 6.2 %    Basophil % 0.5 0.0 - 1.5 %    Immature Grans % 0.4 0.0 - 0.5 %    Neutrophils, Absolute 5.31 1.70 - 7.00 10*3/mm3    Lymphocytes, Absolute 1.73 0.70 - 3.10 10*3/mm3    Monocytes, Absolute 0.76 0.10 - 0.90 10*3/mm3    Eosinophils, Absolute 0.06 0.00 - 0.40 10*3/mm3    Basophils, Absolute 0.04 0.00 - 0.20 10*3/mm3    Immature Grans, Absolute 0.03 0.00  - 0.05 10*3/mm3    nRBC 0.0 0.0 - 0.2 /100 WBC   Respiratory Panel PCR w/COVID-19(SARS-CoV-2) TREVA/ROCKY/BETINA/PAD/COR/YSABEL In-House, NP Swab in UTM/VTM, 2 HR TAT - Swab, Nasopharynx    Collection Time: 01/30/25 10:11 AM    Specimen: Nasopharynx; Swab   Result Value Ref Range    ADENOVIRUS, PCR Not Detected Not Detected    Coronavirus 229E Not Detected Not Detected    Coronavirus HKU1 Not Detected Not Detected    Coronavirus NL63 Not Detected Not Detected    Coronavirus OC43 Not Detected Not Detected    COVID19 Not Detected Not Detected - Ref. Range    Human Metapneumovirus Not Detected Not Detected    Human Rhinovirus/Enterovirus Not Detected Not Detected    Influenza A PCR Not Detected Not Detected    Influenza B PCR Not Detected Not Detected    Parainfluenza Virus 1 Not Detected Not Detected    Parainfluenza Virus 2 Not Detected Not Detected    Parainfluenza Virus 3 Not Detected Not Detected    Parainfluenza Virus 4 Not Detected Not Detected    RSV, PCR Not Detected Not Detected    Bordetella pertussis pcr Not Detected Not Detected    Bordetella parapertussis PCR Not Detected Not Detected    Chlamydophila pneumoniae PCR Not Detected Not Detected    Mycoplasma pneumo by PCR Not Detected Not Detected   D-dimer, Quantitative    Collection Time: 01/30/25 10:11 AM    Specimen: Arm, Right; Blood   Result Value Ref Range    D-Dimer, Quantitative <0.27 0.00 - 0.65 MCGFEU/mL   Hemoglobin A1c    Collection Time: 01/30/25 10:11 AM    Specimen: Arm, Right; Blood   Result Value Ref Range    Hemoglobin A1C 6.20 (H) 4.80 - 5.60 %   Lactic Acid, Plasma    Collection Time: 01/30/25 10:11 AM    Specimen: Arm, Right; Blood   Result Value Ref Range    Lactate 1.1 0.5 - 2.0 mmol/L   Sedimentation Rate    Collection Time: 01/30/25 10:11 AM    Specimen: Arm, Right; Blood   Result Value Ref Range    Sed Rate 53 (H) 0 - 20 mm/hr   Folate    Collection Time: 01/30/25 10:11 AM    Specimen: Blood   Result Value Ref Range    Folate >20.00 4.78 -  24.20 ng/mL   Vitamin B12    Collection Time: 01/30/25 10:11 AM    Specimen: Blood   Result Value Ref Range    Vitamin B-12 642 211 - 946 pg/mL   ECG 12 Lead Dyspnea    Collection Time: 01/30/25 10:15 AM   Result Value Ref Range    QT Interval 376 ms    QTC Interval 436 ms   High Sensitivity Troponin T 1Hr    Collection Time: 01/30/25 12:29 PM    Specimen: Arm, Right; Blood   Result Value Ref Range    HS Troponin T 17 <22 ng/L    Troponin T Numeric Delta -2 Abnormal if >/=3 ng/L   CK    Collection Time: 01/30/25 12:29 PM    Specimen: Arm, Right; Blood   Result Value Ref Range    Creatine Kinase 69 20 - 200 U/L   Lipid Panel    Collection Time: 01/30/25 12:29 PM    Specimen: Arm, Right; Blood   Result Value Ref Range    Total Cholesterol 182 0 - 200 mg/dL    Triglycerides 148 0 - 150 mg/dL    HDL Cholesterol 37 (L) 40 - 60 mg/dL    LDL Cholesterol  118 (H) 0 - 100 mg/dL    VLDL Cholesterol 27 5 - 40 mg/dL    LDL/HDL Ratio 3.12    Procalcitonin    Collection Time: 01/30/25 12:29 PM    Specimen: Arm, Right; Blood   Result Value Ref Range    Procalcitonin 0.07 0.00 - 0.25 ng/mL   TSH Rfx On Abnormal To Free T4    Collection Time: 01/30/25 12:29 PM    Specimen: Arm, Right; Blood   Result Value Ref Range    TSH 1.260 0.270 - 4.200 uIU/mL   Ferritin    Collection Time: 01/30/25 12:29 PM    Specimen: Arm, Right; Blood   Result Value Ref Range    Ferritin 80.07 30.00 - 400.00 ng/mL   Iron Profile    Collection Time: 01/30/25 12:29 PM    Specimen: Arm, Right; Blood   Result Value Ref Range    Iron 56 (L) 59 - 158 mcg/dL    Iron Saturation (TSAT) 15 (L) 20 - 50 %    Transferrin 254 200 - 360 mg/dL    TIBC 378 298 - 536 mcg/dL   C-reactive Protein    Collection Time: 01/30/25 12:29 PM    Specimen: Arm, Right; Blood   Result Value Ref Range    C-Reactive Protein 1.76 (H) 0.00 - 0.50 mg/dL   Blood Gas, Venous With Co-Ox    Collection Time: 01/30/25  2:47 PM    Specimen: Venous Blood   Result Value Ref Range    Site Nurse/Dr Puente      pH, Venous 7.264 (C) 7.310 - 7.410 pH Units    pCO2, Venous 65.6 (H) 41.0 - 51.0 mm Hg    pO2, Venous 54.7 (H) 27.0 - 53.0 mm Hg    HCO3, Venous 29.7 (H) 22.0 - 28.0 mmol/L    Base Excess, Venous 0.3 -2.0 - 2.0 mmol/L    Hemoglobin, Blood Gas 17.9 (H) 13.5 - 17.5 g/dL    Oxyhemoglobin Venous 80.5 %    Methemoglobin Venous 0.3 %    Carboxyhemoglobin Venous 1.4 %    CO2 Content 31.7 22 - 33 mmol/L    Temperature 37.0     Barometric Pressure for Blood Gas      Modality Nasal Cannula     FIO2 44 %    Rate 0 Breaths/minute    PIP 0 cmH2O    IPAP 0     EPAP 0     Notified Who vladimir olmstead md     Notified By [id number]     Notified Time 01/30/2025 14:45    Urine Drug Screen - Urine, Clean Catch    Collection Time: 01/30/25  2:56 PM    Specimen: Urine, Clean Catch   Result Value Ref Range    THC, Screen, Urine Negative Negative    Phencyclidine (PCP), Urine Negative Negative    Cocaine Screen, Urine Negative Negative    Methamphetamine, Ur Negative Negative    Opiate Screen Negative Negative    Amphetamine Screen, Urine Negative Negative    Benzodiazepine Screen, Urine Negative Negative    Tricyclic Antidepressants Screen Negative Negative    Methadone Screen, Urine Negative Negative    Barbiturates Screen, Urine Negative Negative    Oxycodone Screen, Urine Negative Negative    Buprenorphine, Screen, Urine Negative Negative   Urinalysis With Microscopic If Indicated (No Culture) - Urine, Clean Catch    Collection Time: 01/30/25  2:56 PM    Specimen: Urine, Clean Catch   Result Value Ref Range    Color, UA Yellow Yellow, Straw    Appearance, UA Clear Clear    pH, UA <=5.0 5.0 - 8.0    Specific Gravity, UA 1.035 (H) 1.001 - 1.030    Glucose, UA >=1000 mg/dL (3+) (A) Negative    Ketones, UA Trace (A) Negative    Bilirubin, UA Negative Negative    Blood, UA Negative Negative    Protein, UA Trace (A) Negative    Leuk Esterase, UA Negative Negative    Nitrite, UA Negative Negative    Urobilinogen, UA 1.0 E.U./dL 0.2 - 1.0 E.U./dL  "  Fentanyl, Urine - Urine, Clean Catch    Collection Time: 01/30/25  2:56 PM    Specimen: Urine, Clean Catch   Result Value Ref Range    Fentanyl, Urine Negative Negative     Note: In addition to lab results from this visit, the labs listed above may include labs taken at another facility or during a different encounter within the last 24 hours. Please correlate lab times with ED admission and discharge times for further clarification of the services performed during this visit.    CT Chest Without Contrast Diagnostic   Final Result   Impression:   No acute intrathoracic findings.      Smoking-related lung disease.            Electronically Signed: Rodney Ley     1/30/2025 4:09 PM EST     Workstation ID: [id number]      XR Chest 1 View   Final Result   Impression:   Pulmonary emphysema. No acute cardiopulmonary process demonstrated         Electronically Signed: Cleveland Horner     1/30/2025 10:46 AM EST     Workstation ID: [id number]        Vitals:    01/30/25 1400 01/30/25 1500 01/30/25 1600 01/30/25 1713   BP: 123/74 123/76 112/87 124/74   BP Location:    Left arm   Patient Position:    Lying   Pulse: 76 75 79 75   Resp:    23   Temp:    96.9 °F (36.1 °C)   TempSrc:    Axillary   SpO2: 93% 96% 97% 97%   Weight:    59.6 kg (131 lb 4.8 oz)   Height:    167.6 cm (65.98\")     Medications   sodium chloride 0.9 % flush 10 mL (has no administration in time range)   albuterol sulfate HFA (PROVENTIL HFA;VENTOLIN HFA;PROAIR HFA) inhaler 2 puff (has no administration in time range)   budesonide-formoterol (SYMBICORT) 160-4.5 MCG/ACT inhaler 2 puff (has no administration in time range)   carvedilol (COREG) tablet 25 mg (25 mg Oral Given 1/30/25 1723)   sacubitril-valsartan (ENTRESTO) 24-26 MG tablet 1 tablet (has no administration in time range)   tiotropium (SPIRIVA RESPIMAT) 2.5 mcg/act aerosol solution inhaler (has no administration in time range)   sodium chloride 0.9 % flush 10 mL (10 mL Intravenous Given 1/30/25 1430) "   sodium chloride 0.9 % flush 10 mL (10 mL Intravenous Given 1/30/25 1430)   sodium chloride 0.9 % infusion 40 mL (has no administration in time range)   nitroglycerin (NITROSTAT) SL tablet 0.4 mg (has no administration in time range)   Potassium Replacement - Follow Nurse / BPA Driven Protocol (has no administration in time range)   Magnesium Standard Dose Replacement - Follow Nurse / BPA Driven Protocol (has no administration in time range)   Phosphorus Replacement - Follow Nurse / BPA Driven Protocol (has no administration in time range)   Calcium Replacement - Follow Nurse / BPA Driven Protocol (has no administration in time range)   acetaminophen (TYLENOL) tablet 650 mg (has no administration in time range)     Or   acetaminophen (TYLENOL) 160 MG/5ML oral solution 650 mg (has no administration in time range)     Or   acetaminophen (TYLENOL) suppository 650 mg (has no administration in time range)   melatonin tablet 5 mg (has no administration in time range)   sennosides-docusate (PERICOLACE) 8.6-50 MG per tablet 2 tablet (has no administration in time range)     And   polyethylene glycol (MIRALAX) packet 17 g (has no administration in time range)     And   bisacodyl (DULCOLAX) EC tablet 5 mg (has no administration in time range)     And   bisacodyl (DULCOLAX) suppository 10 mg (has no administration in time range)   Enoxaparin Sodium (LOVENOX) syringe 40 mg (40 mg Subcutaneous Given 1/30/25 1455)   guaiFENesin (MUCINEX) 12 hr tablet 600 mg (600 mg Oral Given 1/30/25 1455)   sodium chloride 7 % nebulizer solution nebulizer solution 4 mL (4 mL Nebulization Not Given 1/30/25 1500)   predniSONE (DELTASONE) tablet 40 mg (has no administration in time range)   azithromycin (ZITHROMAX) 500 mg in sodium chloride 0.9 % 250 mL IVPB-VTB (has no administration in time range)   methylPREDNISolone sodium succinate (SOLU-Medrol) injection 125 mg (125 mg Intravenous Given 1/30/25 1138)   ipratropium-albuterol (DUO-NEB)  nebulizer solution 3 mL (3 mL Nebulization Given 1/30/25 1052)   doxycycline (MONODOX) capsule 100 mg (100 mg Oral Given 1/30/25 1249)   ipratropium-albuterol (DUO-NEB) nebulizer solution 3 mL (3 mL Nebulization Given 1/30/25 1325)     ECG/EMG Results (last 24 hours)       Procedure Component Value Units Date/Time    ECG 12 Lead Dyspnea [[id number]] Collected: 01/30/25 1015     Updated: 01/30/25 1705     QT Interval 376 ms      QTC Interval 436 ms     Narrative:      Test Reason : Dyspnea  Blood Pressure :   */*   mmHG  Vent. Rate :  81 BPM     Atrial Rate :  81 BPM     P-R Int : 154 ms          QRS Dur : 132 ms      QT Int : 376 ms       P-R-T Axes :  75 -67  96 degrees    QTcB Int : 436 ms    Normal sinus rhythm  Left bundle branch block  Abnormal ECG  When compared with ECG of 04-Jan-2024 20:49,  Left bundle branch block is now present  Confirmed by MD Ruiz Michael ([id number]) on 1/30/2025 5:04:16 PM    Referred By: MOUNIKA           Confirmed By: Hector Ruiz MD          ECG 12 Lead Dyspnea   Final Result   Test Reason : Dyspnea   Blood Pressure :   */*   mmHG   Vent. Rate :  81 BPM     Atrial Rate :  81 BPM      P-R Int : 154 ms          QRS Dur : 132 ms       QT Int : 376 ms       P-R-T Axes :  75 -67  96 degrees     QTcB Int : 436 ms      Normal sinus rhythm   Left bundle branch block   Abnormal ECG   When compared with ECG of 04-Jan-2024 20:49,   Left bundle branch block is now present   Confirmed by MD Ruiz Michael ([id number]) on 1/30/2025 5:04:16 PM      Referred By: MOUNIKA           Confirmed By: Hector Ruiz MD                      Medical Decision Making      Final diagnoses:   Acute hypoxemic respiratory failure   COPD exacerbation       ED Disposition  ED Disposition       ED Disposition   Decision to Admit    Condition   --    Comment   Level of Care: Telemetry [5]   Diagnosis: COPD exacerbation [449166]                 No follow-up provider specified.       Medication List      No changes were made to your  prescriptions during this visit.            Francisco Ruiz MD  01/30/25 1902      Electronically signed by Francisco Ruiz MD at 01/30/25 1902       Hemant Mcdonnell, RN at 01/30/25 1631           [facility]    Nursing Report ED to Floor:  Mental status: AOx4  Ambulatory status: Standby  Oxygen Therapy:  Bipap  Cardiac Rhythm: NSR  Admitted from: Home  Safety Concerns:  Fall risk  Precautions: None  Social Issues: None  ED Room #:  33    ED Nurse Phone Extension - [phone] or may call [phone].      HPI:   Chief Complaint   Patient presents with    Shortness of Breath       Past Medical History:  Past Medical History:   Diagnosis Date    CHF (congestive heart failure)     COPD (chronic obstructive pulmonary disease)     History of stomach ulcers     Hypertension         Past Surgical History:  Past Surgical History:   Procedure Laterality Date    CARDIAC CATHETERIZATION N/A 10/18/2022    Procedure: LEFT HEART CATH;  Surgeon: Kd Roque MD;  Location:  [facility];  Service: Cardiovascular;  Laterality: N/A;    COLONOSCOPY  03/05/2018    Dr. AGNES Olea. Normal. Repeat 1- yrs if wihtout significant family history or 5 years if first degree relative younger than age 60 with high rish polyp or colorectal cancer.     NO PAST SURGERIES          Admitting Doctor:   Bela Leary MD    Consulting Provider(s):  Consults       No orders found from 1/1/2025 to 1/31/2025.             Admitting Diagnosis:       Most Recent Vitals:   Vitals:    01/30/25 1330 01/30/25 1400 01/30/25 1500 01/30/25 1600   BP: 132/75 123/74 123/76 112/87   BP Location:       Patient Position:       Pulse: 78 76 75 79   Resp:       Temp:       TempSrc:       SpO2: 98% 93% 96% 97%   Weight:       Height:           Active LDAs/IV Access:   Lines, Drains & Airways       Active LDAs       Name Placement date Placement time Site Days    Peripheral IV 01/30/25 1013 Right Antecubital 01/30/25  1013  Antecubital  less  than 1                    Labs (abnormal labs have a star):   Labs Reviewed   COMPREHENSIVE METABOLIC PANEL - Abnormal; Notable for the following components:       Result Value    Glucose 141 (*)     BUN 28 (*)     BUN/Creatinine Ratio 29.5 (*)     All other components within normal limits    Narrative:     GFR Categories in Chronic Kidney Disease (CKD)      GFR Category          GFR (mL/min/1.73)    Interpretation  G1                     90 or greater         Normal or high (1)  G2                      60-89                Mild decrease (1)  G3a                   45-59                Mild to moderate decrease  G3b                   30-44                Moderate to severe decrease  G4                    15-29                Severe decrease  G5                    14 or less           Kidney failure          (1)In the absence of evidence of kidney disease, neither GFR category G1 or G2 fulfill the criteria for CKD.    eGFR calculation 2021 CKD-EPI creatinine equation, which does not include race as a factor   CBC WITH AUTO DIFFERENTIAL - Abnormal; Notable for the following components:    RBC 5.81 (*)     Hemoglobin 17.9 (*)     Hematocrit 56.0 (*)     All other components within normal limits   HEMOGLOBIN A1C - Abnormal; Notable for the following components:    Hemoglobin A1C 6.20 (*)     All other components within normal limits    Narrative:     Hemoglobin A1C Ranges:    Increased Risk for Diabetes  5.7% to 6.4%  Diabetes                     >= 6.5%  Diabetic Goal                < 7.0%   LIPID PANEL - Abnormal; Notable for the following components:    HDL Cholesterol 37 (*)     LDL Cholesterol  118 (*)     All other components within normal limits    Narrative:     Cholesterol Reference Ranges  (U.S. Department of Health and Human Services ATP III Classifications)    Desirable          <200 mg/dL  Borderline High    200-239 mg/dL  High Risk          >240 mg/dL      Triglyceride Reference Ranges  (U.S. Department of  Health and Human Services ATP III Classifications)    Normal           <150 mg/dL  Borderline High  150-199 mg/dL  High             200-499 mg/dL  Very High        >500 mg/dL    HDL Reference Ranges  (U.S. Department of Health and Human Services ATP III Classifications)    Low     <40 mg/dl (major risk factor for CHD)  High    >60 mg/dl ('negative' risk factor for CHD)        LDL Reference Ranges  (U.S. Department of Health and Human Services ATP III Classifications)    Optimal          <100 mg/dL  Near Optimal     100-129 mg/dL  Borderline High  130-159 mg/dL  High             160-189 mg/dL  Very High        >189 mg/dL    LDL is calculated using the NIH LDL-C calculation.     URINALYSIS W/ MICROSCOPIC IF INDICATED (NO CULTURE) - Abnormal; Notable for the following components:    Specific Gravity, UA 1.035 (*)     Glucose, UA >=1000 mg/dL (3+) (*)     Ketones, UA Trace (*)     Protein, UA Trace (*)     All other components within normal limits    Narrative:     Urine microscopic not indicated.   IRON PROFILE - Abnormal; Notable for the following components:    Iron 56 (*)     Iron Saturation (TSAT) 15 (*)     All other components within normal limits   C-REACTIVE PROTEIN - Abnormal; Notable for the following components:    C-Reactive Protein 1.76 (*)     All other components within normal limits   SEDIMENTATION RATE - Abnormal; Notable for the following components:    Sed Rate 53 (*)     All other components within normal limits   BLOOD GAS, VENOUS W/CO-OXIMETRY - Abnormal; Notable for the following components:    pH, Venous 7.264 (*)     pCO2, Venous 65.6 (*)     pO2, Venous 54.7 (*)     HCO3, Venous 29.7 (*)     Hemoglobin, Blood Gas 17.9 (*)     All other components within normal limits   RESPIRATORY PANEL PCR W/ COVID-19 (SARS-COV-2), NP SWAB IN UTM/VTP, 2 HR TAT - Normal    Narrative:     In the setting of a positive respiratory panel with a viral infection PLUS a negative procalcitonin without other underlying  concern for bacterial infection, consider observing off antibiotics or discontinuation of antibiotics and continue supportive care. If the respiratory panel is positive for atypical bacterial infection (Bordetella pertussis, Chlamydophila pneumoniae, or Mycoplasma pneumoniae), consider antibiotic de-escalation to target atypical bacterial infection.   BNP (IN-HOUSE) - Normal    Narrative:     This assay is used as an aid in the diagnosis of individuals suspected of having heart failure. It can be used as an aid in the diagnosis of acute decompensated heart failure (ADHF) in patients presenting with signs and symptoms of ADHF to the emergency department (ED). In addition, NT-proBNP of <300 pg/mL indicates ADHF is not likely.    Age Range Result Interpretation  NT-proBNP Concentration (pg/mL:      <50             Positive            >450                   Gray                 300-450                    Negative             <300    50-75           Positive            >900                  Gray                300-900                  Negative            <300      >75             Positive            >1800                  Gray                300-1800                  Negative            <300   TROPONIN - Normal    Narrative:     High Sensitive Troponin T Reference Range:  <14.0 ng/L- Negative Female for AMI  <22.0 ng/L- Negative Male for AMI  >=14 - Abnormal Female indicating possible myocardial injury.  >=22 - Abnormal Male indicating possible myocardial injury.   Clinicians would have to utilize clinical acumen, EKG, Troponin, and serial changes to determine if it is an Acute Myocardial Infarction or myocardial injury due to an underlying chronic condition.        D-DIMER, QUANTITATIVE - Normal    Narrative:     According to the assay 's published package insert, a normal (<0.50 MCGFEU/mL) D-dimer result in conjunction with a non-high clinical probability assessment, excludes deep vein thrombosis (DVT) and  "pulmonary embolism (PE) with high sensitivity.    D-dimer values increase with age and this can make VTE exclusion of an older population difficult. To address this, the [employer], based on best available evidence and recent guidelines, recommends that clinicians use age-adjusted D-dimer thresholds in patients greater than 50 years of age with: a) a low probability of PE who do not meet all Pulmonary Embolism Rule Out Criteria, or b) in those with intermediate probability of PE.   The formula for an age-adjusted D-dimer cut-off is \"age/100\".  For example, a 60 year old patient would have an age-adjusted cut-off of 0.60 MCGFEU/mL and an 80 year old 0.80 MCGFEU/mL.   HIGH SENSITIVITIY TROPONIN T 1HR - Normal    Narrative:     High Sensitive Troponin T Reference Range:  <14.0 ng/L- Negative Female for AMI  <22.0 ng/L- Negative Male for AMI  >=14 - Abnormal Female indicating possible myocardial injury.  >=22 - Abnormal Male indicating possible myocardial injury.   Clinicians would have to utilize clinical acumen, EKG, Troponin, and serial changes to determine if it is an Acute Myocardial Infarction or myocardial injury due to an underlying chronic condition.        CK - Normal   LACTIC ACID, PLASMA - Normal   PROCALCITONIN - Normal    Narrative:     As a Marker for Sepsis (Non-Neonates):    1. <0.5 ng/mL represents a low risk of severe sepsis and/or septic shock.  2. >2 ng/mL represents a high risk of severe sepsis and/or septic shock.    As a Marker for Lower Respiratory Tract Infections that require antibiotic therapy:    PCT on Admission    Antibiotic Therapy       6-12 Hrs later    >0.5                Strongly Recommended  >0.25 - <0.5        Recommended   0.1 - 0.25          Discouraged              Remeasure/reassess PCT  <0.1                Strongly Discouraged     Remeasure/reassess PCT    As 28 day mortality risk marker: \"Change in Procalcitonin Result\" (>80% or <=80%) if Day 0 (or Day 1) " and Day 4 values are available. Refer to http://www.[facility]-pct-calculator.com    Change in PCT <=80%  A decrease of PCT levels below or equal to 80% defines a positive change in PCT test result representing a higher risk for 28-day all-cause mortality of patients diagnosed with severe sepsis for septic shock.    Change in PCT >80%  A decrease of PCT levels of more than 80% defines a negative change in PCT result representing a lower risk for 28-day all-cause mortality of patients diagnosed with severe sepsis or septic shock.      TSH RFX ON ABNORMAL TO FREE T4 - Normal   URINE DRUG SCREEN - Normal    Narrative:     Cutoff For Drugs Screened:    Amphetamines               500 ng/ml  Barbiturates               200 ng/ml  Benzodiazepines            150 ng/ml  Cocaine                    150 ng/ml  Methadone                  200 ng/ml  Opiates                    100 ng/ml  Phencyclidine               25 ng/ml  THC                         50 ng/ml  Methamphetamine            500 ng/ml  Tricyclic Antidepressants  300 ng/ml  Oxycodone                  100 ng/ml  Buprenorphine               10 ng/ml    The normal value for all drugs tested is negative. This report includes unconfirmed screening results, with the cutoff values listed, to be used for medical treatment purposes only.  Unconfirmed results must not be used for non-medical purposes such as employment or legal testing.  Clinical consideration should be applied to any drug of abuse test, particularly when unconfirmed results are used.     FERRITIN - Normal    Narrative:     Results may be falsely decreased if patient taking Biotin.     FENTANYL, URINE - Normal    Narrative:     Negative Threshold:      Fentanyl 5 ng/mL     The normal value for the drug tested is negative. This report includes final unconfirmed screening results to be used for medical treatment purposes only. Unconfirmed results must not be used for non-medical purposes such as employment or legal  testing. Clinical consideration should be applied to any drug of abuse test, particularly when unconfirmed results are used.          RESPIRATORY CULTURE   STREP PNEUMO AG, URINE OR CSF   LEGIONELLA ANTIGEN, URINE   RAINBOW DRAW    Narrative:     The following orders were created for panel order [place] Draw.  Procedure                               Abnormality         Status                     ---------                               -----------         ------                     Green Top (Gel)[[id number]]                                  Final result               Lavender Top[[id number]]                                     Final result               Gold Top - SST[[id number]]                                   Final result               Ramirez Top[[id number]]                                         Final result               Light Blue Top[[id number]]                                   Final result                 Please view results for these tests on the individual orders.   BLOOD GAS, VENOUS   FOLATE   VITAMIN B12   CBC AND DIFFERENTIAL    Narrative:     The following orders were created for panel order CBC & Differential.  Procedure                               Abnormality         Status                     ---------                               -----------         ------                     CBC Auto Differential[[id number]]        Abnormal            Final result                 Please view results for these tests on the individual orders.   GREEN TOP   LAVENDER TOP   GOLD TOP - SST   GRAY TOP   LIGHT BLUE TOP       Meds Given in ED:   Medications   sodium chloride 0.9 % flush 10 mL (has no administration in time range)   sodium chloride 0.9 % flush 10 mL (10 mL Intravenous Given 1/30/25 1430)   sodium chloride 0.9 % flush 10 mL (10 mL Intravenous Given 1/30/25 1430)   sodium chloride 0.9 % infusion 40 mL (has no administration in time range)   nitroglycerin (NITROSTAT) SL tablet 0.4 mg (has no administration in  time range)   Potassium Replacement - Follow Nurse / BPA Driven Protocol (has no administration in time range)   Magnesium Standard Dose Replacement - Follow Nurse / BPA Driven Protocol (has no administration in time range)   Phosphorus Replacement - Follow Nurse / BPA Driven Protocol (has no administration in time range)   Calcium Replacement - Follow Nurse / BPA Driven Protocol (has no administration in time range)   acetaminophen (TYLENOL) tablet 650 mg (has no administration in time range)     Or   acetaminophen (TYLENOL) 160 MG/5ML oral solution 650 mg (has no administration in time range)     Or   acetaminophen (TYLENOL) suppository 650 mg (has no administration in time range)   melatonin tablet 5 mg (has no administration in time range)   sennosides-docusate (PERICOLACE) 8.6-50 MG per tablet 2 tablet (has no administration in time range)     And   polyethylene glycol (MIRALAX) packet 17 g (has no administration in time range)     And   bisacodyl (DULCOLAX) EC tablet 5 mg (has no administration in time range)     And   bisacodyl (DULCOLAX) suppository 10 mg (has no administration in time range)   Enoxaparin Sodium (LOVENOX) syringe 40 mg (40 mg Subcutaneous Given 1/30/25 1455)   guaiFENesin (MUCINEX) 12 hr tablet 600 mg (600 mg Oral Given 1/30/25 1455)   sodium chloride 7 % nebulizer solution nebulizer solution 4 mL (4 mL Nebulization Not Given 1/30/25 1500)   predniSONE (DELTASONE) tablet 40 mg (has no administration in time range)   azithromycin (ZITHROMAX) 500 mg in sodium chloride 0.9 % 250 mL IVPB-VTB (has no administration in time range)   methylPREDNISolone sodium succinate (SOLU-Medrol) injection 125 mg (125 mg Intravenous Given 1/30/25 1138)   ipratropium-albuterol (DUO-NEB) nebulizer solution 3 mL (3 mL Nebulization Given 1/30/25 1052)   doxycycline (MONODOX) capsule 100 mg (100 mg Oral Given 1/30/25 1249)   ipratropium-albuterol (DUO-NEB) nebulizer solution 3 mL (3 mL Nebulization Given 1/30/25  1885)           Last NIH score:                                                          Dysphagia screening results:  Patient Factors Component (Dysphagia:Stroke or Rule-out)  Best Eye Response: 4-->(E4) spontaneous (01/30/25 1037)  Best Motor Response: 6-->(M6) obeys commands (01/30/25 1037)  Best Verbal Response: 5-->(V5) oriented (01/30/25 1037)  Lando Coma Scale Score: 15 (01/30/25 1037)     Erin Coma Scale:  No data recorded     CIWA:        Restraint Type:            Isolation Status:  No active isolations           Electronically signed by Hemant Mcdonnell RN at 01/30/25 1632       Vital Signs (last day)       Date/Time Temp Temp src Pulse Resp BP Patient Position SpO2    01/31/25 0500 -- -- 76 -- -- -- 90    01/31/25 0458 -- -- 78 -- -- -- 89    01/31/25 0453 -- -- 87 -- -- -- 90    01/31/25 0420 -- -- 72 -- -- -- 90    01/31/25 0400 97.9 (36.6) Oral 66 20 117/71 Lying --    01/30/25 2302 97.7 (36.5) Oral 79 20 109/59 Lying --    01/30/25 2040 -- -- 73 -- 119/64 -- 92    01/30/25 2026 -- -- 76 20 -- -- 91    01/30/25 1915 97.6 (36.4) Axillary 74 20 122/69 Lying --    01/30/25 1713 96.9 (36.1) Axillary 75 23 124/74 Lying 97    01/30/25 1600 -- -- 79 -- 112/87 -- 97    01/30/25 1500 -- -- 75 -- 123/76 -- 96    01/30/25 1400 -- -- 76 -- 123/74 -- 93    01/30/25 1330 -- -- 78 -- 132/75 -- 98    01/30/25 1310 -- -- -- -- -- -- 91    01/30/25 1300 -- -- 78 -- 116/75 -- 90    01/30/25 1247 -- -- 77 -- -- -- 86    01/30/25 1243 -- -- 78 -- -- -- 89    01/30/25 1200 -- -- 74 -- 115/67 -- 93    01/30/25 1141 97.5 (36.4) Oral -- -- -- -- --    01/30/25 1136 -- -- 75 -- 127/68 -- 90    01/30/25 1052 -- -- -- 18 -- -- --    01/30/25 1036 -- -- 81 -- -- -- 94    01/30/25 1015 -- -- 81 -- -- -- 90    01/30/25 1009 -- -- 90 -- -- -- 77    01/30/25 0959 89.6 (32) Oral 94 18 114/76 Sitting 81          Oxygen Therapy (last day)       Date/Time SpO2 Device (Oxygen Therapy) Flow (L/min) (Oxygen Therapy) Oxygen Concentration  (%) ETCO2 (mmHg)    01/31/25 0600 -- nasal cannula;humidified 6 -- --    01/31/25 0500 90 -- -- -- --    01/31/25 0458 89 -- -- -- --    01/31/25 0453 90 -- -- -- --    01/31/25 0420 90 -- -- -- --    01/31/25 0400 -- nasal cannula;humidified 6 -- --    01/30/25 2043 -- nasal cannula 6 -- --    01/30/25 2040 92 -- -- -- --    01/30/25 2026 91 nasal cannula 6 -- --    01/30/25 1736 -- NPPV/NIV -- 50 --    01/30/25 1717 -- -- -- 50 --    01/30/25 1713 97 NPPV/NIV -- 50 --    01/30/25 1600 97 -- -- -- --    01/30/25 1500 96 -- -- -- --    01/30/25 1459 -- -- -- 50 --    01/30/25 1400 93 -- -- -- --    01/30/25 1330 98 -- -- -- --    01/30/25 1310 91 -- -- -- --    01/30/25 1300 90 -- -- -- --    01/30/25 12:51:51 -- nasal cannula 6 -- --    01/30/25 1247 86 -- -- -- --    01/30/25 1243 89 -- -- -- --    01/30/25 1200 93 -- -- -- --    01/30/25 1136 90 -- -- -- --    01/30/25 1130 -- nasal cannula 4 -- --    01/30/25 1036 94 -- -- -- --    01/30/25 1015 90 -- -- -- --    01/30/25 1009 77 -- -- -- --    01/30/25 0959 81 room air -- -- --          Lines, Drains & Airways       Active LDAs       Name Placement date Placement time Site Days    Peripheral IV 01/30/25 1013 Right Antecubital 01/30/25  1013  Antecubital  less than 1                  Current Facility-Administered Medications   Medication Dose Route Frequency Provider Last Rate Last Admin    acetaminophen (TYLENOL) tablet 650 mg  650 mg Oral Q4H PRN Bela Leary MD        Or    acetaminophen (TYLENOL) 160 MG/5ML oral solution 650 mg  650 mg Oral Q4H PRN Bela Leary MD        Or    acetaminophen (TYLENOL) suppository 650 mg  650 mg Rectal Q4H PRN Bela Leary MD        albuterol sulfate HFA (PROVENTIL HFA;VENTOLIN HFA;PROAIR HFA) inhaler 2 puff  2 puff Inhalation Q4H PRN Bela Leary MD        azithromycin (ZITHROMAX) 500 mg in sodium chloride 0.9 % 250 mL IVPB-VTB  500 mg Intravenous Q24H Bela Leary MD   500 mg at 01/30/25 2035     sennosides-docusate (PERICOLACE) 8.6-50 MG per tablet 2 tablet  2 tablet Oral BID PRN Bela Leary MD        And    polyethylene glycol (MIRALAX) packet 17 g  17 g Oral Daily PRN Bela Leary MD        And    bisacodyl (DULCOLAX) EC tablet 5 mg  5 mg Oral Daily PRN Bela Leary MD        And    bisacodyl (DULCOLAX) suppository 10 mg  10 mg Rectal Daily PRN Bela Leary MD        budesonide-formoterol (SYMBICORT) 160-4.5 MCG/ACT inhaler 2 puff  2 puff Inhalation BID - RT Bela Leary MD   2 puff at 01/30/25 2026    Calcium Replacement - Follow Nurse / BPA Driven Protocol   Not Applicable PRBela Berger MD        carvedilol (COREG) tablet 25 mg  25 mg Oral BID With Meals Bela Leary MD   25 mg at 01/31/25 0829    Enoxaparin Sodium (LOVENOX) syringe 40 mg  40 mg Subcutaneous Daily Bela Leary MD   40 mg at 01/31/25 0829    guaiFENesin (MUCINEX) 12 hr tablet 600 mg  600 mg Oral Q12H Bela Leary MD   600 mg at 01/31/25 0829    Magnesium Standard Dose Replacement - Follow Nurse / BPA Driven Protocol   Not Applicable Bela Mckeon MD        melatonin tablet 5 mg  5 mg Oral Nightly Bela Leary MD   5 mg at 01/30/25 2039    nitroglycerin (NITROSTAT) SL tablet 0.4 mg  0.4 mg Sublingual Q5 Min PRN Bela Leary MD        Phosphorus Replacement - Follow Nurse / BPA Driven Protocol   Not Applicable PRBela Berger MD        Potassium Replacement - Follow Nurse / BPA Driven Protocol   Not Applicable PRBela Berger MD        predniSONE (DELTASONE) tablet 40 mg  40 mg Oral Daily With Breakfast Bela Leary MD   40 mg at 01/31/25 0829    sacubitril-valsartan (ENTRESTO) 24-26 MG tablet 1 tablet  1 tablet Oral BID Bela Leary MD   1 tablet at 01/31/25 0829    sodium chloride 0.9 % flush 10 mL  10 mL Intravenous PRN Bela Leary MD        sodium chloride 0.9 % flush 10 mL  10 mL Intravenous Q12H Bela Leary MD   10  mL at 01/31/25 0829    sodium chloride 0.9 % flush 10 mL  10 mL Intravenous PRN Bela Leary MD   10 mL at 01/30/25 1430    sodium chloride 0.9 % infusion 40 mL  40 mL Intravenous PRN Bela Leary MD        sodium chloride 7 % nebulizer solution nebulizer solution 4 mL  4 mL Nebulization Once Bela Leary MD        tiotropium (SPIRIVA RESPIMAT) 2.5 mcg/act aerosol solution inhaler  2 puff Inhalation Daily - RT Bela Leary MD         Lab Results (last 24 hours)       Procedure Component Value Units Date/Time    S. Pneumo Ag Urine or CSF - Urine, Urine, Clean Catch [[id number]]  (Normal) Collected: 01/30/25 1456    Specimen: Urine, Clean Catch Updated: 01/31/25 0113     Strep Pneumo Ag Negative    Legionella Antigen, Urine - Urine, Urine, Clean Catch [[id number]]  (Normal) Collected: 01/30/25 1456    Specimen: Urine, Clean Catch Updated: 01/31/25 0113     LEGIONELLA ANTIGEN, URINE Negative    Folate [[id number]]  (Normal) Collected: 01/30/25 1011    Specimen: Blood Updated: 01/30/25 1847     Folate >20.00 ng/mL     Narrative:      Results may be falsely increased if patient taking Biotin.      Vitamin B12 [[id number]]  (Normal) Collected: 01/30/25 1011    Specimen: Blood Updated: 01/30/25 1847     Vitamin B-12 642 pg/mL     Narrative:      Results may be falsely increased if patient taking Biotin.      Fentanyl, Urine - Urine, Clean Catch [[id number]]  (Normal) Collected: 01/30/25 1456    Specimen: Urine, Clean Catch Updated: 01/30/25 1605     Fentanyl, Urine Negative    Narrative:      Negative Threshold:      Fentanyl 5 ng/mL     The normal value for the drug tested is negative. This report includes final unconfirmed screening results to be used for medical treatment purposes only. Unconfirmed results must not be used for non-medical purposes such as employment or legal testing. Clinical consideration should be applied to any drug of abuse test, particularly when unconfirmed results are used.            Urine Drug Screen - Urine, Clean Catch [[id number]]  (Normal) Collected: 01/30/25 1456    Specimen: Urine, Clean Catch Updated: 01/30/25 1531     THC, Screen, Urine Negative     Phencyclidine (PCP), Urine Negative     Cocaine Screen, Urine Negative     Methamphetamine, Ur Negative     Opiate Screen Negative     Amphetamine Screen, Urine Negative     Benzodiazepine Screen, Urine Negative     Tricyclic Antidepressants Screen Negative     Methadone Screen, Urine Negative     Barbiturates Screen, Urine Negative     Oxycodone Screen, Urine Negative     Buprenorphine, Screen, Urine Negative    Narrative:      Cutoff For Drugs Screened:    Amphetamines               500 ng/ml  Barbiturates               200 ng/ml  Benzodiazepines            150 ng/ml  Cocaine                    150 ng/ml  Methadone                  200 ng/ml  Opiates                    100 ng/ml  Phencyclidine               25 ng/ml  THC                         50 ng/ml  Methamphetamine            500 ng/ml  Tricyclic Antidepressants  300 ng/ml  Oxycodone                  100 ng/ml  Buprenorphine               10 ng/ml    The normal value for all drugs tested is negative. This report includes unconfirmed screening results, with the cutoff values listed, to be used for medical treatment purposes only.  Unconfirmed results must not be used for non-medical purposes such as employment or legal testing.  Clinical consideration should be applied to any drug of abuse test, particularly when unconfirmed results are used.      Urinalysis With Microscopic If Indicated (No Culture) - Urine, Clean Catch [[id number]]  (Abnormal) Collected: 01/30/25 1456    Specimen: Urine, Clean Catch Updated: 01/30/25 1522     Color, UA Yellow     Appearance, UA Clear     pH, UA <=5.0     Specific Gravity, UA 1.035     Glucose, UA >=1000 mg/dL (3+)     Ketones, UA Trace     Bilirubin, UA Negative     Blood, UA Negative     Protein, UA Trace     Leuk Esterase, UA Negative      "Nitrite, UA Negative     Urobilinogen, UA 1.0 E.U./dL    Narrative:      Urine microscopic not indicated.    Blood Gas, Venous With Co-Ox [[id number]]  (Abnormal) Collected: 01/30/25 1447    Specimen: Venous Blood Updated: 01/30/25 1448     Site Nurse/Dr Draw     pH, Venous 7.264 pH Units      Comment: 84 Value below reference range        pCO2, Venous 65.6 mm Hg      Comment: 86 Value above critical limit        pO2, Venous 54.7 mm Hg      Comment: 83 Value above reference range        HCO3, Venous 29.7 mmol/L      Base Excess, Venous 0.3 mmol/L      Hemoglobin, Blood Gas 17.9 g/dL      Oxyhemoglobin Venous 80.5 %      Comment: 83 Value above reference range        Methemoglobin Venous 0.3 %      Carboxyhemoglobin Venous 1.4 %      CO2 Content 31.7 mmol/L      Temperature 37.0     Barometric Pressure for Blood Gas --     Comment: N/A        Modality Nasal Cannula     FIO2 44 %      Rate 0 Breaths/minute      PIP 0 cmH2O      Comment: Meter: [id number]     :  [id number]        IPAP 0     EPAP 0     Notified [facility] vladimir olmstead md     Notified By [id number]     Notified Time 01/30/2025 14:45    Procalcitonin [[id number]]  (Normal) Collected: 01/30/25 1229    Specimen: Blood from Arm, Right Updated: 01/30/25 1443     Procalcitonin 0.07 ng/mL     Narrative:      As a Marker for Sepsis (Non-Neonates):    1. <0.5 ng/mL represents a low risk of severe sepsis and/or septic shock.  2. >2 ng/mL represents a high risk of severe sepsis and/or septic shock.    As a Marker for Lower Respiratory Tract Infections that require antibiotic therapy:    PCT on Admission    Antibiotic Therapy       6-12 Hrs later    >0.5                Strongly Recommended  >0.25 - <0.5        Recommended   0.1 - 0.25          Discouraged              Remeasure/reassess PCT  <0.1                Strongly Discouraged     Remeasure/reassess PCT    As 28 day mortality risk marker: \"Change in Procalcitonin Result\" (>80% or <=80%) if Day 0 (or Day 1) and Day 4 " values are available. Refer to [email or web address]    Change in PCT <=80%  A decrease of PCT levels below or equal to 80% defines a positive change in PCT test result representing a higher risk for 28-day all-cause mortality of patients diagnosed with severe sepsis for septic shock.    Change in PCT >80%  A decrease of PCT levels of more than 80% defines a negative change in PCT result representing a lower risk for 28-day all-cause mortality of patients diagnosed with severe sepsis or septic shock.       Hemoglobin A1c [[id number]]  (Abnormal) Collected: 01/30/25 1011    Specimen: Blood from Arm, Right Updated: 01/30/25 1435     Hemoglobin A1C 6.20 %     Narrative:      Hemoglobin A1C Ranges:    Increased Risk for Diabetes  5.7% to 6.4%  Diabetes                     >= 6.5%  Diabetic Goal                < 7.0%    TSH Rfx On Abnormal To Free T4 [[id number]]  (Normal) Collected: 01/30/25 1229    Specimen: Blood from Arm, Right Updated: 01/30/25 1344     TSH 1.260 uIU/mL     Ferritin [[id number]]  (Normal) Collected: 01/30/25 1229    Specimen: Blood from Arm, Right Updated: 01/30/25 1344     Ferritin 80.07 ng/mL     Narrative:      Results may be falsely decreased if patient taking Biotin.      CK [[id number]]  (Normal) Collected: 01/30/25 1229    Specimen: Blood from Arm, Right Updated: 01/30/25 1339     Creatine Kinase 69 U/L     Lipid Panel [[id number]]  (Abnormal) Collected: 01/30/25 1229    Specimen: Blood from Arm, Right Updated: 01/30/25 1338     Total Cholesterol 182 mg/dL      Triglycerides 148 mg/dL      HDL Cholesterol 37 mg/dL      LDL Cholesterol  118 mg/dL      VLDL Cholesterol 27 mg/dL      LDL/HDL Ratio 3.12    Narrative:      Cholesterol Reference Ranges  (U.S. Department of Health and Human Services ATP III Classifications)    Desirable          <200 mg/dL  Borderline High    200-239 mg/dL  High Risk          >240 mg/dL      Triglyceride Reference Ranges  (U.S. Department of Health and  Human Services ATP III Classifications)    Normal           <150 mg/dL  Borderline High  150-199 mg/dL  High             200-499 mg/dL  Very High        >500 mg/dL    HDL Reference Ranges  (U.S. Department of Health and Human Services ATP III Classifications)    Low     <40 mg/dl (major risk factor for CHD)  High    >60 mg/dl ('negative' risk factor for CHD)        LDL Reference Ranges  (U.S. Department of Health and Human Services ATP III Classifications)    Optimal          <100 mg/dL  Near Optimal     100-129 mg/dL  Borderline High  130-159 mg/dL  High             160-189 mg/dL  Very High        >189 mg/dL    LDL is calculated using the NIH LDL-C calculation.      Iron Profile [[id number]]  (Abnormal) Collected: 01/30/25 1229    Specimen: Blood from Arm, Right Updated: 01/30/25 1338     Iron 56 mcg/dL      Iron Saturation (TSAT) 15 %      Transferrin 254 mg/dL      TIBC 378 mcg/dL     C-reactive Protein [[id number]]  (Abnormal) Collected: 01/30/25 1229    Specimen: Blood from Arm, Right Updated: 01/30/25 1338     C-Reactive Protein 1.76 mg/dL     Sedimentation Rate [[id number]]  (Abnormal) Collected: 01/30/25 1011    Specimen: Blood from Arm, Right Updated: 01/30/25 1335     Sed Rate 53 mm/hr     Lactic Acid, Plasma [[id number]]  (Normal) Collected: 01/30/25 1011    Specimen: Blood from Arm, Right Updated: 01/30/25 1335     Lactate 1.1 mmol/L      Comment: Falsely depressed results may occur on samples drawn from patients receiving N-Acetylcysteine (NAC) or Metamizole.       High Sensitivity Troponin T 1Hr [[id number]]  (Normal) Collected: 01/30/25 1229    Specimen: Blood from Arm, Right Updated: 01/30/25 1303     HS Troponin T 17 ng/L      Troponin T Numeric Delta -2 ng/L     Narrative:      High Sensitive Troponin T Reference Range:  <14.0 ng/L- Negative Female for AMI  <22.0 ng/L- Negative Male for AMI  >=14 - Abnormal Female indicating possible myocardial injury.  >=22 - Abnormal Male indicating possible  myocardial injury.   Clinicians would have to utilize clinical acumen, EKG, Troponin, and serial changes to determine if it is an Acute Myocardial Infarction or myocardial injury due to an underlying chronic condition.         Respiratory Panel PCR w/COVID-19(SARS-CoV-2) TREVA/ROCKY/BETINA/PAD/COR/YSABEL In-House, NP Swab in UTM/VTM, 2 HR TAT - Swab, Nasopharynx [[id number]]  (Normal) Collected: 01/30/25 1011    Specimen: Swab from Nasopharynx Updated: 01/30/25 1131     ADENOVIRUS, PCR Not Detected     Coronavirus 229E Not Detected     Coronavirus HKU1 Not Detected     Coronavirus NL63 Not Detected     Coronavirus OC43 Not Detected     COVID19 Not Detected     Human Metapneumovirus Not Detected     Human Rhinovirus/Enterovirus Not Detected     Influenza A PCR Not Detected     Influenza B PCR Not Detected     Parainfluenza Virus 1 Not Detected     Parainfluenza Virus 2 Not Detected     Parainfluenza Virus 3 Not Detected     Parainfluenza Virus 4 Not Detected     RSV, PCR Not Detected     Bordetella pertussis pcr Not Detected     Bordetella parapertussis PCR Not Detected     Chlamydophila pneumoniae PCR Not Detected     Mycoplasma pneumo by PCR Not Detected    Narrative:      In the setting of a positive respiratory panel with a viral infection PLUS a negative procalcitonin without other underlying concern for bacterial infection, consider observing off antibiotics or discontinuation of antibiotics and continue supportive care. If the respiratory panel is positive for atypical bacterial infection (Bordetella pertussis, Chlamydophila pneumoniae, or Mycoplasma pneumoniae), consider antibiotic de-escalation to target atypical bacterial infection.    Comprehensive Metabolic Panel [[id number]]  (Abnormal) Collected: 01/30/25 1011    Specimen: Blood from Arm, Right Updated: 01/30/25 1057     Glucose 141 mg/dL      BUN 28 mg/dL      Creatinine 0.95 mg/dL      Sodium 136 mmol/L      Potassium 4.5 mmol/L      Chloride 98 mmol/L      CO2  28.0 mmol/L      Calcium 9.7 mg/dL      Total Protein 7.3 g/dL      Albumin 3.9 g/dL      ALT (SGPT) 12 U/L      AST (SGOT) 20 U/L      Alkaline Phosphatase 93 U/L      Total Bilirubin 0.3 mg/dL      Globulin 3.4 gm/dL      Comment: Calculated Result        A/G Ratio 1.1 g/dL      BUN/Creatinine Ratio 29.5     Anion Gap 10.0 mmol/L      eGFR 88.8 mL/min/1.73     Narrative:      GFR Categories in Chronic Kidney Disease (CKD)      GFR Category          GFR (mL/min/1.73)    Interpretation  G1                     90 or greater         Normal or high (1)  G2                      60-89                Mild decrease (1)  G3a                   45-59                Mild to moderate decrease  G3b                   30-44                Moderate to severe decrease  G4                    15-29                Severe decrease  G5                    14 or less           Kidney failure          (1)In the absence of evidence of kidney disease, neither GFR category G1 or G2 fulfill the criteria for CKD.    eGFR calculation 2021 CKD-EPI creatinine equation, which does not include race as a factor    D-dimer, Quantitative [[id number]]  (Normal) Collected: 01/30/25 1011    Specimen: Blood from Arm, Right Updated: 01/30/25 1051     D-Dimer, Quantitative <0.27 MCGFEU/mL     Narrative:      According to the assay 's published package insert, a normal (<0.50 MCGFEU/mL) D-dimer result in conjunction with a non-high clinical probability assessment, excludes deep vein thrombosis (DVT) and pulmonary embolism (PE) with high sensitivity.    D-dimer values increase with age and this can make VTE exclusion of an older population difficult. To address this, the [employer], based on best available evidence and recent guidelines, recommends that clinicians use age-adjusted D-dimer thresholds in patients greater than 50 years of age with: a) a low probability of PE who do not meet all Pulmonary Embolism Rule Out Criteria,  "or b) in those with intermediate probability of PE.   The formula for an age-adjusted D-dimer cut-off is \"age/100\".  For example, a 60 year old patient would have an age-adjusted cut-off of 0.60 MCGFEU/mL and an 80 year old 0.80 MCGFEU/mL.    BNP [[id number]]  (Normal) Collected: 01/30/25 1011    Specimen: Blood from Arm, Right Updated: 01/30/25 1045     proBNP 109.2 pg/mL     Narrative:      This assay is used as an aid in the diagnosis of individuals suspected of having heart failure. It can be used as an aid in the diagnosis of acute decompensated heart failure (ADHF) in patients presenting with signs and symptoms of ADHF to the emergency department (ED). In addition, NT-proBNP of <300 pg/mL indicates ADHF is not likely.    Age Range Result Interpretation  NT-proBNP Concentration (pg/mL:      <50             Positive            >450                   Gray                 300-450                    Negative             <300    50-75           Positive            >900                  Gray                300-900                  Negative            <300      >75             Positive            >1800                  Gray                300-1800                  Negative            <300    High Sensitivity Troponin T [[id number]]  (Normal) Collected: 01/30/25 1011    Specimen: Blood from Arm, Right Updated: 01/30/25 1045     HS Troponin T 19 ng/L     Narrative:      High Sensitive Troponin T Reference Range:  <14.0 ng/L- Negative Female for AMI  <22.0 ng/L- Negative Male for AMI  >=14 - Abnormal Female indicating possible myocardial injury.  >=22 - Abnormal Male indicating possible myocardial injury.   Clinicians would have to utilize clinical acumen, EKG, Troponin, and serial changes to determine if it is an Acute Myocardial Infarction or myocardial injury due to an underlying chronic condition.         CBC & Differential [[id number]]  (Abnormal) Collected: 01/30/25 1011    Specimen: Blood from Arm, Right Updated: " 01/30/25 1036    Narrative:      The following orders were created for panel order CBC & Differential.  Procedure                               Abnormality         Status                     ---------                               -----------         ------                     CBC Auto Differential[[id number]]        Abnormal            Final result                 Please view results for these tests on the individual orders.    CBC Auto Differential [[id number]]  (Abnormal) Collected: 01/30/25 1011    Specimen: Blood from Arm, Right Updated: 01/30/25 1036     WBC 7.93 10*3/mm3      RBC 5.81 10*6/mm3      Hemoglobin 17.9 g/dL      Hematocrit 56.0 %      MCV 96.4 fL      MCH 30.8 pg      MCHC 32.0 g/dL      RDW 14.5 %      RDW-SD 51.8 fl      MPV 9.5 fL      Platelets 191 10*3/mm3      Neutrophil % 66.9 %      Lymphocyte % 21.8 %      Monocyte % 9.6 %      Eosinophil % 0.8 %      Basophil % 0.5 %      Immature Grans % 0.4 %      Neutrophils, Absolute 5.31 10*3/mm3      Lymphocytes, Absolute 1.73 10*3/mm3      Monocytes, Absolute 0.76 10*3/mm3      Eosinophils, Absolute 0.06 10*3/mm3      Basophils, Absolute 0.04 10*3/mm3      Immature Grans, Absolute 0.03 10*3/mm3      nRBC 0.0 /100 WBC     [place] Draw [[id number]] Collected: 01/30/25 1011    Specimen: Blood Updated: 01/30/25 1030    Narrative:      The following orders were created for panel order [place] Draw.  Procedure                               Abnormality         Status                     ---------                               -----------         ------                     Green Top (Gel)[[id number]]                                  Final result               Lavender Top[[id number]]                                     Final result               Gold Top - SST[[id number]]                                   Final result               Ramirez Top[[id number]]                                         Final result               Light Blue Top[[id number]]                                    Final result                 Please view results for these tests on the individual orders.    Green Top (Gel) [[id number]] Collected: 01/30/25 1011    Specimen: Blood from Arm, Right Updated: 01/30/25 1030     Extra Tube Hold for add-ons.     Comment: Auto resulted.       Lavender Top [[id number]] Collected: 01/30/25 1011    Specimen: Blood from Arm, Right Updated: 01/30/25 1030     Extra Tube hold for add-on     Comment: Auto resulted       Gold Top - SST [[id number]] Collected: 01/30/25 1011    Specimen: Blood Updated: 01/30/25 1030     Extra Tube Hold for add-ons.     Comment: Auto resulted.       Ramirez Top [[id number]] Collected: 01/30/25 1011    Specimen: Blood from Arm, Right Updated: 01/30/25 1030     Extra Tube Hold for add-ons.     Comment: Auto resulted.       Light Blue Top [[id number]] Collected: 01/30/25 1011    Specimen: Blood from Arm, Right Updated: 01/30/25 1030     Extra Tube Hold for add-ons.     Comment: Auto resulted             Imaging Results (Last 24 Hours)       Procedure Component Value Units Date/Time    CT Chest Without Contrast Diagnostic [[id number]] Collected: 01/30/25 1602     Updated: 01/30/25 1613    Narrative:      CT CHEST WO CONTRAST DIAGNOSTIC    Date of Exam: 1/30/2025 3:42 PM EST    Indication: COPD exacerbation/PNA.    Comparison: Chest CT 4/1/2024. Chest radiograph 1/30/2025.    Technique: Axial CT images were obtained of the chest without contrast administration.  Reconstructed coronal and sagittal images were also obtained. Automated exposure control and iterative construction methods were used.      Findings:    Thyroid and thoracic inlet: No significant abnormality.    Lymph nodes: No pathologic appearing lymph nodes by imaging criteria.    Cardiovascular: Normal appearing heart size. No pericardial effusion. Aorta and main pulmonary artery diameters are within normal range.. There are coronary artery calcifications. Aortic annular calcifications. Aortic  atherosclerosis.    Esophagus: Small hiatal hernia.    Lung parenchyma: Emphysema. Scattered atelectasis and/or scarring. Calcified granulomas. No suspicious appearing pulmonary nodules, masses, or opacities.    Airways: Small volume secretions in the lower trachea and right mainstem bronchus.    Pleura: No pleural effusion or pneumothorax.    Chest wall and osseous structures: Degenerative changes of the imaged spine. No acute osseous abnormality.    Included abdomen: Cholelithiasis. Unchanged appearing 1 cm hypoattenuating nodule adjacent to versus arising from the left adrenal gland, possibly an adenoma or a nonspecific mildly enlarged lymph node.      Impression:      Impression:  No acute intrathoracic findings.    Smoking-related lung disease.        Electronically Signed: Rodney Ley    1/30/2025 4:09 PM EST    Workstation ID: [id number]    XR Chest 1 View [[id number]] Collected: 01/30/25 1045     Updated: 01/30/25 1049    Narrative:      XR CHEST 1 VW    Date of Exam: 1/30/2025 10:13 AM EST    Indication: SOA triage protocol    Comparison: 1/6/2024    Findings:  Heart size and pulmonary vasculature are within normal limits. Lungs appear hyperinflated. No acute pulmonary abnormality demonstrated. Costophrenic angles sharp      Impression:      Impression:  Pulmonary emphysema. No acute cardiopulmonary process demonstrated      Electronically Signed: Cleveland Horner    1/30/2025 10:46 AM EST    Workstation ID: [id number]          ECG/EMG Results (last 24 hours)       Procedure Component Value Units Date/Time    ECG 12 Lead Dyspnea [[id number]] Collected: 01/30/25 1015     Updated: 01/30/25 1705     QT Interval 376 ms      QTC Interval 436 ms     Narrative:      Test Reason : Dyspnea  Blood Pressure :   */*   mmHG  Vent. Rate :  81 BPM     Atrial Rate :  81 BPM     P-R Int : 154 ms          QRS Dur : 132 ms      QT Int : 376 ms       P-R-T Axes :  75 -67  96 degrees    QTcB Int : 436 ms    Normal sinus rhythm  Left  bundle branch block  Abnormal ECG  When compared with ECG of 04-Jan-2024 20:49,  Left bundle branch block is now present  Confirmed by MD Ruiz Michael ([id number]) on 1/30/2025 5:04:16 PM    Referred By: EDMD           Confirmed By: Hector Ruiz MD          Physician Progress Notes (last 24 hours)  Notes from 01/30/25 0850 through 01/31/25 0850   No notes of this type exist for this encounter.       Consult Notes (last 24 hours)  Notes from 01/30/25 0850 through 01/31/25 0850   No notes of this type exist for this encounter.

## 2025-01-31 NOTE — PROGRESS NOTES
"    [facility] Medicine Services  PROGRESS NOTE    Patient Name: Francisco Clark  : 1960  MRN: [id number]    Date of Admission: 2025  Primary Care Physician: Charito Weiss APRN    Subjective   Subjective     CC:  soa    HPI:  States that \"not bad.\"  Says he's breathing about the same.  Not on oxygen at home.       Objective   Objective     Vital Signs:   Temp:  [96.9 °F (36.1 °C)-98.1 °F (36.7 °C)] 98.1 °F (36.7 °C)  Heart Rate:  [66-87] 74  Resp:  [16-23] 16  BP: (109-124)/(59-87) 117/72  Flow (L/min) (Oxygen Therapy):  [6] 6     Physical Exam:  Constitutional: No acute distress, awake, alert, sitting up in chair  HENT: NCAT, mucous membranes moist  Respiratory: bilateral wheezing, respiratory effort normal, on 6LNC  Cardiovascular: RRR, no murmurs, rubs, or gallops  Gastrointestinal: Positive bowel sounds, soft, nontender, nondistended  Musculoskeletal: No bilateral ankle edema  Psychiatric: Appropriate affect, cooperative  Neurologic: Oriented x 3, BUNCH, speech clear  Skin: No rashes      Results Reviewed:  LAB RESULTS:      Lab 25  1229 25  1011   WBC  --  7.93   HEMOGLOBIN  --  17.9*   HEMATOCRIT  --  56.0*   PLATELETS  --  191   NEUTROS ABS  --  5.31   IMMATURE GRANS (ABS)  --  0.03   LYMPHS ABS  --  1.73   MONOS ABS  --  0.76   EOS ABS  --  0.06   MCV  --  96.4   SED RATE  --  53*   CRP 1.76*  --    PROCALCITONIN 0.07  --    LACTATE  --  1.1   D DIMER QUANT  --  <0.27   HSTROP T 17 19         Lab 25  1229 25  1011   SODIUM  --  136   POTASSIUM  --  4.5   CHLORIDE  --  98   CO2  --  28.0   ANION GAP  --  10.0   BUN  --  28*   CREATININE  --  0.95   EGFR  --  88.8   GLUCOSE  --  141*   CALCIUM  --  9.7   HEMOGLOBIN A1C  --  6.20*   TSH 1.260  --          Lab 25  1011   TOTAL PROTEIN 7.3   ALBUMIN 3.9   GLOBULIN 3.4   ALT (SGPT) 12   AST (SGOT) 20   BILIRUBIN 0.3   ALK PHOS 93         Lab 25  1229 25  1011   PROBNP  --  109.2 "   HSTROP T 17 19         Lab 01/30/25  1229   CHOLESTEROL 182   LDL CHOL 118*   HDL CHOL 37*   TRIGLYCERIDES 148         Lab 01/30/25  1229 01/30/25  1011   IRON 56*  --    IRON SATURATION (TSAT) 15*  --    TIBC 378  --    TRANSFERRIN 254  --    FERRITIN 80.07  --    FOLATE  --  >20.00   VITAMIN B 12  --  642         Lab 01/30/25  1447   FIO2 44   CARBOXYHEMOGLOBIN (VENOUS) 1.4     Brief Urine Lab Results  (Last result in the past 365 days)        Color   Clarity   Blood   Leuk Est   Nitrite   Protein   CREAT   Urine HCG        01/30/25 1456 Yellow   Clear   Negative   Negative   Negative   Trace                   Microbiology Results Abnormal       None            CT Chest Without Contrast Diagnostic    Result Date: 1/30/2025  CT CHEST WO CONTRAST DIAGNOSTIC Date of Exam: 1/30/2025 3:42 PM EST Indication: COPD exacerbation/PNA. Comparison: Chest CT 4/1/2024. Chest radiograph 1/30/2025. Technique: Axial CT images were obtained of the chest without contrast administration.  Reconstructed coronal and sagittal images were also obtained. Automated exposure control and iterative construction methods were used. Findings: Thyroid and thoracic inlet: No significant abnormality. Lymph nodes: No pathologic appearing lymph nodes by imaging criteria. Cardiovascular: Normal appearing heart size. No pericardial effusion. Aorta and main pulmonary artery diameters are within normal range.. There are coronary artery calcifications. Aortic annular calcifications. Aortic atherosclerosis. Esophagus: Small hiatal hernia. Lung parenchyma: Emphysema. Scattered atelectasis and/or scarring. Calcified granulomas. No suspicious appearing pulmonary nodules, masses, or opacities. Airways: Small volume secretions in the lower trachea and right mainstem bronchus. Pleura: No pleural effusion or pneumothorax. Chest wall and osseous structures: Degenerative changes of the imaged spine. No acute osseous abnormality. Included abdomen: Cholelithiasis.  Unchanged appearing 1 cm hypoattenuating nodule adjacent to versus arising from the left adrenal gland, possibly an adenoma or a nonspecific mildly enlarged lymph node.     Impression: Impression: No acute intrathoracic findings. Smoking-related lung disease. Electronically Signed: Rodney Ley  1/30/2025 4:09 PM EST  Workstation ID: [id number]    XR Chest 1 View    Result Date: 1/30/2025  XR CHEST 1 VW Date of Exam: 1/30/2025 10:13 AM EST Indication: SOA triage protocol Comparison: 1/6/2024 Findings: Heart size and pulmonary vasculature are within normal limits. Lungs appear hyperinflated. No acute pulmonary abnormality demonstrated. Costophrenic angles sharp     Impression: Impression: Pulmonary emphysema. No acute cardiopulmonary process demonstrated Electronically Signed: Cleveland Siri  1/30/2025 10:46 AM EST  Workstation ID: [id number]     Results for orders placed during the hospital encounter of 01/10/23    Adult Transthoracic Echo Complete W/ Cont if Necessary Per Protocol    Interpretation Summary    Left ventricular systolic function is mildly decreased. Left ventricular ejection fraction appears to be 46 - 50%.    Left ventricular wall thickness is consistent with mild concentric hypertrophy.    Left ventricular diastolic function is consistent with (grade II w/high LAP) pseudonormalization.    Estimated right ventricular systolic pressure from tricuspid regurgitation is normal (<35 mmHg).      Current medications:  Scheduled Meds:azithromycin, 500 mg, Intravenous, Q24H  budesonide-formoterol, 2 puff, Inhalation, BID - RT  carvedilol, 25 mg, Oral, BID With Meals  enoxaparin, 40 mg, Subcutaneous, Daily  guaiFENesin, 600 mg, Oral, Q12H  melatonin, 5 mg, Oral, Nightly  predniSONE, 40 mg, Oral, Daily With Breakfast  sacubitril-valsartan, 1 tablet, Oral, BID  sodium chloride, 10 mL, Intravenous, Q12H  sodium chloride, 4 mL, Nebulization, Once  tiotropium bromide monohydrate, 2 puff, Inhalation, Daily -  RT      Continuous Infusions:   PRN Meds:.  acetaminophen **OR** acetaminophen **OR** acetaminophen    albuterol sulfate HFA    senna-docusate sodium **AND** polyethylene glycol **AND** bisacodyl **AND** bisacodyl    Calcium Replacement - Follow Nurse / BPA Driven Protocol    Magnesium Standard Dose Replacement - Follow Nurse / BPA Driven Protocol    nitroglycerin    Phosphorus Replacement - Follow Nurse / BPA Driven Protocol    Potassium Replacement - Follow Nurse / BPA Driven Protocol    sodium chloride    sodium chloride    sodium chloride    Assessment & Plan   Assessment & Plan     Active Hospital Problems    Diagnosis  POA    **COPD exacerbation [J44.1]  Yes    Erythrocytosis [D75.1]  Unknown    Cavitary lesion of lung [J98.4]  Yes    Acute on chronic HFrEF (heart failure with reduced ejection fraction) [I50.23]  Yes    Severe malnutrition [E43]  Yes    Congestive heart failure, unspecified HF chronicity, unspecified heart failure type [I50.9]  Yes    Dilated cardiomyopathy [I42.0]  Yes    Tobacco abuse [Z72.0]  Yes    Essential hypertension [I10]  Yes    Centrilobular emphysema [J43.2]  Yes      Resolved Hospital Problems   No resolved problems to display.        Brief Hospital Course to date:  Francisco Clark is a 64 y.o. male  PMH significant for HFrEF, COPD with pulmonary cachexia, previous cavitary lung lesion, current tobacco use, HTN and HLD.  Presenting to [facility] ED due to significant shortness of breath, fatigue, malaise, subjective chills as well as chest tightness with wheezing.  Also endorses symptoms of palpitations, orthopnea with PND.  He also endorses increased phlegm production however does not recall the color.  Not on oxygen at home, requiring 6 L nasal cannula oxygen desaturated 90%.  Currently in COPD exacerbation.     COPD exacerbation  Central lobar emphysema  Cavitary lung lesion  Acute hypoxemic respiratory failure  Tobacco use current  Chronic history of COPD,  current smoker, previous PFTs April 2024 revealed a FEV1 to FVC ratio of 58% dictated, low DLCO, FVC 55% predicted with FEV1 32% predicted, no postbronchodilator testing done, patient received albuterol 2 hours prior to testing.  - Follows [facility] pulmonology last seen in April 2024.  - On room air at home, requiring 6 L nasal cannula oxygen to sat at 90%, desatted to 77% on room air  - streptococcal and Legionella urine antigens negative, viral PCR panel was negative.   - Will check a CT chest without contrast as patient had previous teary lesion which was solved in RUL on CT from April 2024.    --CT chest shows no acute findings, smoking related lung disease  - Incentive spirometry, aggressive pulmonary toilet, albuterol, Mucinex and hypertonic saline.  - Continue Symbicort and Spiriva  - s/p bipap overnight.  Doing well on 6LNC currenlty  --increase steroids to solumedrol 60mg q8h, continue nebs,      Dilated cardiomyopathy  Acute on chronic HFrEF  HTN  Previous echo TTE January 2023, revealed EF of 46 to 50%, mild concentric hypertrophy of LV, G2 DD with RVSP <35mmhg.  - On Coreg, Jardiance and Entresto, will continue for now, renal function is at baseline.     Severe malnutrition  Pulmonary cachexia  Significant COPD, patient has evidence of pulmonary cachexia on physical examination  - Total protein and albumin is okay on physical examination, HbA1c 6.1, TSH wnl  - Consulted nutritional services        Expected Discharge Location and Transportation:   Expected Discharge   Expected Discharge Date: 2/3/2025; Expected Discharge Time:      VTE Prophylaxis:  Pharmacologic & mechanical VTE prophylaxis orders are present.         AM-PAC 6 Clicks Score (PT): 23 (01/31/25 9636)    CODE STATUS:   Code Status and Medical Interventions: CPR (Attempt to Resuscitate); Full Support   Ordered at: 01/30/25 2903     Level Of Support Discussed With:    Patient     Code Status (Patient has no pulse and is not breathing):    CPR  (Attempt to Resuscitate)     Medical Interventions (Patient has pulse or is breathing):    Full Support       Bucky Bustillos MD  01/31/25

## 2025-01-31 NOTE — PROGRESS NOTES
"          Clinical Nutrition Assessment     Patient Name: Francisco Clark  YOB: 1960  MRN: [id number]  Date of Encounter: 01/31/25 13:30 EST  Admission date: 1/30/2025  Reason for Visit: MST score 2+, Malnutrition Severity Assessment, Consult    Assessment   Nutrition Assessment   Admission Diagnosis:  COPD exacerbation [J44.1]    Problem List:    COPD exacerbation    Centrilobular emphysema    Tobacco abuse    Essential hypertension    Dilated cardiomyopathy    Congestive heart failure, unspecified HF chronicity, unspecified heart failure type    Severe malnutrition    Acute on chronic HFrEF (heart failure with reduced ejection fraction)    Cavitary lesion of lung    Erythrocytosis      PMH:   He  has a past medical history of CHF (congestive heart failure), COPD (chronic obstructive pulmonary disease), History of stomach ulcers, and Hypertension.    PSH:  He  has a past surgical history that includes No past surgeries; Colonoscopy (03/05/2018); and Cardiac catheterization (N/A, 10/18/2022).    Applicable Nutrition History:   COPD  HTN  CHF  Stomach ulcers      Anthropometrics     Height: Height: 167.6 cm (65.98\")  Last Filed Weight: Weight: 59.6 kg (131 lb 4.8 oz) (01/30/25 0943)  Method: Weight Method: Bed scale  BMI: BMI (Calculated): 21.2    UBW:  137 lb., per patient  Weight change: Pt. Reported weight loss, but unable to confirm reported UBW.      Weight       Weight (kg) Weight (lbs) Weight Method Visit Report   1/4/2024 54.432 kg  120 lb      1/5/2024 54.069 kg  119 lb 3.2 oz      1/15/2024 56.246 kg  124 lb   --     56.246 kg  124 lb   --        --        --    1/22/2024 57.607 kg  127 lb   --    3/26/2024 57.153 kg  126 lb   --    4/29/2024 60.192 kg  132 lb 11.2 oz   --    1/30/2025 59.557 kg  131 lb 4.8 oz  Bed scale      61.236 kg  135 lb  Stated       Nutrition Focused Physical Exam    Date:  01/31/25        Patient meets criteria for malnutrition diagnosis, see MSA note. "     Subjective   Reported/Observed/Food/Nutrition Related History:     Pt reported a loss of appetite 3 days before admission. Says before that he was eating well. Unsure of baseline intake. Reported he has lost weight, because his UBW is 137 lb. Reported that since admission he has an appetite and has eaten everything they have given him. Declined difficulty chewing or swallowing. NKFA.     Current Nutrition Prescription   PO: Diet: Regular/House; Fluid Consistency: Thin (IDDSI 0)  Oral Nutrition Supplement: N/A  Intake: insufficient data, 100% of meals per pr report    Assessment & Plan   Nutrition Diagnosis   Date:  01/31/25  Updated:  Problem Malnutrition, chronic severe   Etiology Inability to consume sufficient energy 2/2 to COPD   Signs/Symptoms </= 75% of EEN x >/= 1 month, severe muscle wasting, and severe subcutaneous fat loss   Status: New     Goal:   Nutrition to support treatment and Maintain intake      Nutrition Intervention      Follow treatment progress, Care plan reviewed    Will provide ONS as warranted.     Monitoring/Evaluation:   Per protocol, PO intake, Weight    Eleanor Ma  Time Spent: 30 min

## 2025-01-31 NOTE — THERAPY EVALUATION
Patient Name: Francisco Clark  : 1960    MRN: [id number]                              Today's Date: 2025       Admit Date: 2025    Visit Dx:     ICD-10-CM ICD-9-CM   1. Acute hypoxemic respiratory failure  J96.01 518.81   2. COPD exacerbation  J44.1 491.21     Patient Active Problem List   Diagnosis    Chest pain    Centrilobular emphysema    Tobacco abuse    Essential hypertension    Precordial pain    Dilated cardiomyopathy    Congestive heart failure, unspecified HF chronicity, unspecified heart failure type    Severe malnutrition    Acute on chronic HFrEF (heart failure with reduced ejection fraction)    Oropharyngeal dysphagia    Hypoxia    COVID-19 virus infection    COPD (chronic obstructive pulmonary disease)    Cavitary lesion of lung    Elevated troponin    COPD exacerbation    Erythrocytosis     Past Medical History:   Diagnosis Date    CHF (congestive heart failure)     COPD (chronic obstructive pulmonary disease)     History of stomach ulcers     Hypertension      Past Surgical History:   Procedure Laterality Date    CARDIAC CATHETERIZATION N/A 10/18/2022    Procedure: LEFT HEART CATH;  Surgeon: Kd Roque MD;  Location[facility];  Service: Cardiovascular;  Laterality: N/A;    COLONOSCOPY  2018    Dr. AGNES Olea. Normal. Repeat 1- yrs if wihtout significant family history or 5 years if first degree relative younger than age 60 with high rish polyp or colorectal cancer.     NO PAST SURGERIES        General Information       Row Name 25 0915          Physical Therapy Time and Intention    Document Type evaluation  -ER     Mode of Treatment physical therapy  -ER       Row Name 25 0915          General Information    Patient Profile Reviewed yes  -ER     Prior Level of Function independent:;all household mobility;community mobility;gait;transfer;bed mobility  working, driving, ind w ADLs  -ER     Existing Precautions/Restrictions oxygen  therapy device and L/min  -ER     Barriers to Rehab medically complex  -ER       Row Name 01/31/25 0915          Living Environment    People in Home child(jose martin), adult  -ER       Row Name 01/31/25 0915          Home Main Entrance    Number of Stairs, Main Entrance none  -ER       Row Name 01/31/25 0915          Stairs Within Home, Primary    Number of Stairs, Within Home, Primary none  -ER       Row Name 01/31/25 0915          Cognition    Orientation Status (Cognition) oriented x 4  -ER       Row Name 01/31/25 0915          Safety Issues/Impairments Affecting Functional Mobility    Impairments Affecting Function (Mobility) endurance/activity tolerance;shortness of breath  -ER               User Key  (r) = Recorded By, (t) = Taken By, (c) = Cosigned By      Initials Name Provider Type    ER Nathalie Stallworth, PT Physical Therapist                   Mobility       Row Name 01/31/25 0923          Bed Mobility    Bed Mobility rolling right;supine-sit  -ER     Rolling Right [place] (Bed Mobility) standby assist  -ER     Supine-Sit [place] (Bed Mobility) standby assist  -ER     Assistive Device (Bed Mobility) head of bed elevated;bed rails  -ER       Row Name 01/31/25 0923          Bed-Chair Transfer    Bed-Chair [place] (Transfers) standby assist  -ER     Comment, (Bed-Chair Transfer) no AD  -ER       Row Name 01/31/25 0923          Sit-Stand Transfer    Sit-Stand [place] (Transfers) standby assist  -ER     Comment, (Sit-Stand Transfer) no issues  -ER       Row Name 01/31/25 0923          Gait/Stairs (Locomotion)    [place] Level (Gait) standby assist  -ER     Patient was able to Ambulate yes  -ER     Distance in Feet (Gait) 16  -ER     Deviations/Abnormal Patterns (Gait) gait speed decreased;cathryn decreased;stride length decreased  -ER     Comment, (Gait/Stairs) pt reports he feels weak during ambulation and is walking much slower  compared to BL  -ER               User Key  (r) = Recorded By,  (t) = Taken By, (c) = Cosigned By      Initials Name Provider Type    ER Nathalie Stallworth, PT Physical Therapist                   Obj/Interventions       Row Name 01/31/25 0926          Range of Motion Comprehensive    General Range of Motion no range of motion deficits identified  -ER       Row Name 01/31/25 0926          Strength Comprehensive (MMT)    General Manual Muscle Testing (MMT) Assessment no strength deficits identified  -ER     Comment, General Manual Muscle Testing (MMT) Assessment gross BLE 5/5 MMT  -ER       Row Name 01/31/25 0926          Balance    Balance Assessment sitting static balance;sitting dynamic balance;standing static balance;standing dynamic balance  -ER     Static Sitting Balance independent  -ER     Dynamic Sitting Balance independent  -ER     Position, Sitting Balance unsupported;sitting edge of bed  -ER     Static Standing Balance standby assist  -ER     Dynamic Standing Balance standby assist  -ER     Position/Device Used, Standing Balance unsupported  -ER     Balance Interventions sitting;standing;sit to stand;static;supported;dynamic;minimal challenge  -ER       Row Name 01/31/25 0926          Sensory Assessment (Somatosensory)    Sensory Assessment (Somatosensory) sensation intact  -ER               User Key  (r) = Recorded By, (t) = Taken By, (c) = Cosigned By      Initials Name Provider Type    ER Nathalie Stallworth, PT Physical Therapist                   Goals/Plan       Row Name 01/31/25 0941          Bed Mobility Goal 1 (PT)    Activity/Assistive Device (Bed Mobility Goal 1, PT) bridging;rolling to left;rolling to right;scooting;sit to supine;supine to sit  -ER     [place] Level/Cues Needed (Bed Mobility Goal 1, PT) independent  -ER     Time Frame (Bed Mobility Goal 1, PT) short term goal (STG);5 days  -ER       Row Name 01/31/25 0941          Transfer Goal 1 (PT)    Activity/Assistive Device (Transfer Goal 1, PT) sit-to-stand/stand-to-sit;bed-to-chair/chair-to-bed;toilet   -ER     [place] Level/Cues Needed (Transfer Goal 1, PT) independent  -ER     Time Frame (Transfer Goal 1, PT) long term goal (LTG);10 days  -ER       Row Name 01/31/25 0941          Gait Training Goal 1 (PT)    Activity/Assistive Device (Gait Training Goal 1, PT) gait (walking locomotion);increase endurance/gait distance;increase energy conservation  -ER     [place] Level (Gait Training Goal 1, PT) independent  -ER     Distance (Gait Training Goal 1, PT) 200  -ER     Time Frame (Gait Training Goal 1, PT) long term goal (LTG);10 days  -ER       Row Name 01/31/25 0941          Therapy Assessment/Plan (PT)    Planned Therapy Interventions (PT) balance training;bed mobility training;gait training;home exercise program;patient/family education;neuromuscular re-education;strengthening;transfer training  -ER               User Key  (r) = Recorded By, (t) = Taken By, (c) = Cosigned By      Initials Name Provider Type    ER Nathalie Stallworth, PT Physical Therapist                   Clinical Impression       Row Name 01/31/25 0927          Pain    Pretreatment Pain Rating 0/10 - no pain  -ER     Posttreatment Pain Rating 0/10 - no pain  -ER       Row Name 01/31/25 0927          Plan of Care Review    Plan of Care Reviewed With patient  -ER     Outcome Evaluation PT eval complete. 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 At this time, he presents below functional mobility baseline for poor endurance and decreased O2 sats w activity. Recommend PT during his stay at [facility]. Recommend home w assist and OP PT upon d/c.  -ER       Row Name 01/31/25 0927          Therapy Assessment/Plan (PT)    Patient/Family Therapy Goals Statement (PT) to be able to return to work  -ER     Rehab Potential (PT) good  -ER     Criteria for Skilled Interventions Met (PT) yes;meets criteria;skilled treatment is necessary  -ER     Therapy Frequency (PT) daily  -ER     Predicted Duration of Therapy Intervention (PT) 10 days  -ER        Row Name 01/31/25 0927          Vital Signs    Pre Systolic BP Rehab 125  -ER     Pre Treatment Diastolic BP 73  -ER     Posttreatment Heart Rate (beats/min) 71  -ER     Pre SpO2 (%) 91  -ER     O2 Delivery Pre Treatment nasal cannula  6L  -ER     Intra SpO2 (%) 88   -ER     O2 Delivery Intra Treatment nasal cannula  -ER     Post SpO2 (%) 92  -ER     O2 Delivery Post Treatment nasal cannula  -ER     Pre Patient Position Supine  -ER     Intra Patient Position Standing  -ER     Post Patient Position Sitting  -ER       Row Name 01/31/25 0927          Positioning and Restraints    Pre-Treatment Position in bed  -ER     Post Treatment Position chair  -ER     In Chair notified nsg;sitting;call light within reach;encouraged to call for assist;waffle cushion  alarm status off, alerted nursing  -ER               User Key  (r) = Recorded By, (t) = Taken By, (c) = Cosigned By      Initials Name Provider Type    Nathalie Hayden, PT Physical Therapist                   Outcome Measures       Row Name 01/31/25 0943          How much help from another person do you currently need...    Turning from your back to your side while in flat bed without using bedrails? 4  -ER     Moving from lying on back to sitting on the side of a flat bed without bedrails? 4  -ER     Moving to and from a bed to a chair (including a wheelchair)? 4  -ER     Standing up from a chair using your arms (e.g., wheelchair, bedside chair)? 4  -ER     Climbing 3-5 steps with a railing? 3  -ER     To walk in hospital room? 4  -ER     AM-PAC 6 Clicks Score (PT) 23  -ER     Highest Level of Mobility Goal 7 --> Walk 25 feet or more  -ER       Row Name 01/31/25 0943          Functional Assessment    Outcome Measure Options AM-PAC 6 Clicks Basic Mobility (PT)  -ER               User Key  (r) = Recorded By, (t) = Taken By, (c) = Cosigned By      Initials Name Provider Type    Nathalie Hayden, PT Physical Therapist                                 Physical  Therapy Education       Title: PT OT SLP Therapies (Done)       Topic: Physical Therapy (Done)       Point: Mobility training (Done)       Learning Progress Summary            Patient Acceptance, E, VU by ER at 1/31/2025 0943    Comment: progress, POC, need for PT                      Point: Home exercise program (Done)       Learning Progress Summary            Patient Acceptance, E, VU by ER at 1/31/2025 0943    Comment: progress, POC, need for PT                      Point: Body mechanics (Done)       Learning Progress Summary            Patient Acceptance, E, VU by ER at 1/31/2025 0943    Comment: progress, POC, need for PT                      Point: Precautions (Done)       Learning Progress Summary            Patient Acceptance, E, VU by ER at 1/31/2025 0943    Comment: progress, POC, need for PT                                      User Key       Initials Effective Dates Name Provider Type Discipline    ER 12/13/24 -  Nathalie Stallworth, PT Physical Therapist PT                  PT Recommendation and Plan  Planned Therapy Interventions (PT): balance training, bed mobility training, gait training, home exercise program, patient/family education, neuromuscular re-education, strengthening, transfer training  Outcome Evaluation: PT eval complete. 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 At this time, he presents below functional mobility baseline for poor endurance and decreased O2 sats w activity. Recommend PT during his stay at [facility]. Recommend home w assist and OP PT upon d/c.     Time Calculation:   PT Evaluation Complexity  History, PT Evaluation Complexity: 1-2 personal factors and/or comorbidities  Examination of Body Systems (PT Eval Complexity): total of 3 or more elements  Clinical Presentation (PT Evaluation Complexity): evolving  Clinical Decision Making (PT Evaluation Complexity): moderate complexity  Overall Complexity (PT Evaluation Complexity): moderate complexity     PT Charges        Row Name 01/31/25 0944             Time Calculation    Start Time 0853  -ER      PT Received On 01/31/25  -ER      PT Goal Re-Cert Due Date 02/10/25  -ER         Untimed Charges    PT Eval/Re-eval Minutes 52  -ER         Total Minutes    Untimed Charges Total Minutes 52  -ER       Total Minutes 52  -ER                User Key  (r) = Recorded By, (t) = Taken By, (c) = Cosigned By      Initials Name Provider Type    ER Nathalie Stallworth, PT Physical Therapist                  Therapy Charges for Today       Code Description Service Date Service Provider Modifiers Qty    54219457006 HC PT EVAL MOD COMPLEXITY 4 1/31/2025 Nathalie Stallworth, PT GP 1            PT G-Codes  Outcome Measure Options: AM-PAC 6 Clicks Basic Mobility (PT)  AM-PAC 6 Clicks Score (PT): 23  PT Discharge Summary  Anticipated Discharge Disposition (PT): home with assist, home with outpatient therapy services    Nathalie Stallworth, PT  1/31/2025

## 2025-01-31 NOTE — PLAN OF CARE
Goal Outcome Evaluation:  Plan of Care Reviewed With: patient           Outcome Evaluation: PT eval complete. 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 At this time, he presents below functional mobility baseline for poor endurance and decreased O2 sats w activity. Recommend PT during his stay at [facility]. Recommend home w assist and OP PT upon d/c.    Anticipated Discharge Disposition (PT): home with assist, home with outpatient therapy services

## 2025-01-31 NOTE — CASE MANAGEMENT/SOCIAL WORK
Continued Stay Note   Zachary     Patient Name: Francisco Clark  MRN: [id number]  Today's Date: 1/31/2025    Admit Date: 1/30/2025    Plan: Home   Discharge Plan       Row Name 01/31/25 1013       Plan    Plan Home    Patient/Family in Agreement with Plan yes    Plan Comments Spoke with patient at bedside. Patient discharge goal is home. He is seating up in chair while CM was in room. He is wearing oxygen currently, he doesn't use at home O2. May need oxygen at discharge. CM will follow for any discharge needs.    Final Discharge Disposition Code 01 - home or self-care                   Discharge Codes    No documentation.                       Rebeca Colmenares RN

## 2025-01-31 NOTE — PROGRESS NOTES
Malnutrition Severity Assessment    Patient Name:  Francisco Clark  YOB: 1960  MRN: [id number]  Admit Date:  1/30/2025    Patient meets criteria for : (P) Severe Malnutrition    Comments:      Malnutrition Severity Assessment  Malnutrition Type: (P) Chronic Disease - Related Malnutrition  Malnutrition Type (Last 8 Hours)       Malnutrition Severity Assessment       Row Name 01/31/25 1359       Malnutrition Severity Assessment    Malnutrition Type Chronic Disease - Related Malnutrition (P)       Row Name 01/31/25 1359       Insufficient Energy Intake     Insufficient Energy Intake Findings Moderate (P)     Insufficient Energy Intake  <75% of est. energy requirement for > or equal to 1 month (P)       Row Name 01/31/25 1359       Muscle Loss    Loss of Muscle Mass Findings Severe (P)     [place] Moderate - slight depression (P)     Clavicle Bone Region Severe - protruding prominent bone (P)     Acromion Bone Region Severe - squared shoulders, bones, and acromion process protrusion prominent (P)     Dorsal Hand Region Moderate - slight depression (P)     Patellar Region Severe - prominent bone, square looking, very little muscle definition (P)     Posterior Calf Region Moderate - some roundness, slight firmness (P)       Row Name 01/31/25 1359       Fat Loss    Subcutaneous Fat Loss Findings Severe (P)     Orbital Region  Severe - pronounced hollowness/depression, dark circles, loose saggy skin (P)     Upper Arm Region Moderate - some fat tissue, not ample (P)       Row Name 01/31/25 1352       Criteria Met (Must meet criteria for severity in at least 2 of these categories: M Wasting, Fat Loss, Fluid, Secondary Signs, Wt. Status, Intake)    Patient meets criteria for  Severe Malnutrition (P)                     Electronically signed by:  Eleanor Ma  01/31/25 14:09 EST

## 2025-02-01 ENCOUNTER — APPOINTMENT (OUTPATIENT)
Dept: CARDIOLOGY | Facility: HOSPITAL | Age: 65
DRG: 190 | End: 2025-02-01
Payer: COMMERCIAL

## 2025-02-01 LAB
ANION GAP SERPL CALCULATED.3IONS-SCNC: 4 MMOL/L (ref 5–15)
AV MEAN PRESS GRAD SYS DOP V1V2: 5 MMHG
AV VMAX SYS DOP: 157 CM/SEC
BH CV ECHO LEFT VENTRICLE GLOBAL LONGITUDINAL STRAIN: -20.4 %
BH CV ECHO MEAS - AO MAX PG: 9.9 MMHG
BH CV ECHO MEAS - AO ROOT DIAM: 3.1 CM
BH CV ECHO MEAS - AO V2 VTI: 32.5 CM
BH CV ECHO MEAS - AVA(I,D): 2.6 CM2
BH CV ECHO MEAS - EDV(CUBED): 54.9 ML
BH CV ECHO MEAS - EDV(MOD-SP2): 92 ML
BH CV ECHO MEAS - EDV(MOD-SP4): 106 ML
BH CV ECHO MEAS - EF(MOD-SP2): 70.7 %
BH CV ECHO MEAS - EF(MOD-SP4): 64.9 %
BH CV ECHO MEAS - ESV(CUBED): 19.7 ML
BH CV ECHO MEAS - ESV(MOD-SP2): 27 ML
BH CV ECHO MEAS - ESV(MOD-SP4): 37.2 ML
BH CV ECHO MEAS - FS: 28.9 %
BH CV ECHO MEAS - IVS/LVPW: 1.17 CM
BH CV ECHO MEAS - IVSD: 1.4 CM
BH CV ECHO MEAS - LA DIMENSION: 3.1 CM
BH CV ECHO MEAS - LAT PEAK E' VEL: 8.7 CM/SEC
BH CV ECHO MEAS - LV MASS(C)D: 173.1 GRAMS
BH CV ECHO MEAS - LV MAX PG: 3.8 MMHG
BH CV ECHO MEAS - LV MEAN PG: 2 MMHG
BH CV ECHO MEAS - LV V1 MAX: 96.9 CM/SEC
BH CV ECHO MEAS - LV V1 VTI: 20.2 CM
BH CV ECHO MEAS - LVIDD: 3.8 CM
BH CV ECHO MEAS - LVIDS: 2.7 CM
BH CV ECHO MEAS - LVOT AREA: 4.2 CM2
BH CV ECHO MEAS - LVOT DIAM: 2.3 CM
BH CV ECHO MEAS - LVPWD: 1.2 CM
BH CV ECHO MEAS - MED PEAK E' VEL: 7.1 CM/SEC
BH CV ECHO MEAS - MV A MAX VEL: 67.6 CM/SEC
BH CV ECHO MEAS - MV DEC TIME: 0.26 SEC
BH CV ECHO MEAS - MV E MAX VEL: 65.5 CM/SEC
BH CV ECHO MEAS - MV E/A: 0.97
BH CV ECHO MEAS - RAP SYSTOLE: 3 MMHG
BH CV ECHO MEAS - RVSP: 36 MMHG
BH CV ECHO MEAS - SV(LVOT): 83.9 ML
BH CV ECHO MEAS - SV(MOD-SP2): 65 ML
BH CV ECHO MEAS - SV(MOD-SP4): 68.8 ML
BH CV ECHO MEAS - TAPSE (>1.6): 2.25 CM
BH CV ECHO MEAS - TR MAX PG: 33.4 MMHG
BH CV ECHO MEAS - TR MAX VEL: 289 CM/SEC
BH CV ECHO MEASUREMENTS AVERAGE E/E' RATIO: 8.29
BH CV XLRA - RV BASE: 4 CM
BH CV XLRA - RV LENGTH: 6.7 CM
BH CV XLRA - RV MID: 3.3 CM
BH CV XLRA - TDI S': 13.1 CM/SEC
BUN SERPL-MCNC: 41 MG/DL (ref 8–23)
BUN/CREAT SERPL: 69.5 (ref 7–25)
CALCIUM SPEC-SCNC: 10.5 MG/DL (ref 8.6–10.5)
CHLORIDE SERPL-SCNC: 101 MMOL/L (ref 98–107)
CO2 SERPL-SCNC: 34 MMOL/L (ref 22–29)
CREAT SERPL-MCNC: 0.59 MG/DL (ref 0.76–1.27)
DEPRECATED RDW RBC AUTO: 51.2 FL (ref 37–54)
EGFRCR SERPLBLD CKD-EPI 2021: 107.7 ML/MIN/1.73
ERYTHROCYTE [DISTWIDTH] IN BLOOD BY AUTOMATED COUNT: 14.3 % (ref 12.3–15.4)
GLUCOSE SERPL-MCNC: 140 MG/DL (ref 65–99)
HCT VFR BLD AUTO: 52.9 % (ref 37.5–51)
HGB BLD-MCNC: 16.8 G/DL (ref 13–17.7)
LEFT ATRIUM VOLUME INDEX: 19.2 ML/M2
LV EF BIPLANE MOD: 66.8 %
MAGNESIUM SERPL-MCNC: 1.9 MG/DL (ref 1.6–2.4)
MCH RBC QN AUTO: 30.8 PG (ref 26.6–33)
MCHC RBC AUTO-ENTMCNC: 31.8 G/DL (ref 31.5–35.7)
MCV RBC AUTO: 96.9 FL (ref 79–97)
PLATELET # BLD AUTO: 195 10*3/MM3 (ref 140–450)
PMV BLD AUTO: 9.4 FL (ref 6–12)
POTASSIUM SERPL-SCNC: 5.1 MMOL/L (ref 3.5–5.2)
RBC # BLD AUTO: 5.46 10*6/MM3 (ref 4.14–5.8)
SODIUM SERPL-SCNC: 139 MMOL/L (ref 136–145)
WBC NRBC COR # BLD AUTO: 10.7 10*3/MM3 (ref 3.4–10.8)

## 2025-02-01 PROCEDURE — 87070 CULTURE OTHR SPECIMN AEROBIC: CPT

## 2025-02-01 PROCEDURE — 93306 TTE W/DOPPLER COMPLETE: CPT | Performed by: INTERNAL MEDICINE

## 2025-02-01 PROCEDURE — 93356 MYOCRD STRAIN IMG SPCKL TRCK: CPT

## 2025-02-01 PROCEDURE — 83735 ASSAY OF MAGNESIUM: CPT

## 2025-02-01 PROCEDURE — 94799 UNLISTED PULMONARY SVC/PX: CPT

## 2025-02-01 PROCEDURE — 87205 SMEAR GRAM STAIN: CPT

## 2025-02-01 PROCEDURE — 99232 SBSQ HOSP IP/OBS MODERATE 35: CPT | Performed by: INTERNAL MEDICINE

## 2025-02-01 PROCEDURE — 85027 COMPLETE CBC AUTOMATED: CPT

## 2025-02-01 PROCEDURE — 94664 DEMO&/EVAL PT USE INHALER: CPT

## 2025-02-01 PROCEDURE — 25010000002 METHYLPREDNISOLONE PER 125 MG: Performed by: INTERNAL MEDICINE

## 2025-02-01 PROCEDURE — 93306 TTE W/DOPPLER COMPLETE: CPT

## 2025-02-01 PROCEDURE — 97165 OT EVAL LOW COMPLEX 30 MIN: CPT

## 2025-02-01 PROCEDURE — 80048 BASIC METABOLIC PNL TOTAL CA: CPT

## 2025-02-01 PROCEDURE — 93356 MYOCRD STRAIN IMG SPCKL TRCK: CPT | Performed by: INTERNAL MEDICINE

## 2025-02-01 PROCEDURE — 25010000002 ENOXAPARIN PER 10 MG

## 2025-02-01 RX ORDER — IPRATROPIUM BROMIDE AND ALBUTEROL SULFATE 2.5; .5 MG/3ML; MG/3ML
3 SOLUTION RESPIRATORY (INHALATION)
Status: DISCONTINUED | OUTPATIENT
Start: 2025-02-01 | End: 2025-02-04 | Stop reason: HOSPADM

## 2025-02-01 RX ORDER — WATER 10 ML/10ML
INJECTION INTRAMUSCULAR; INTRAVENOUS; SUBCUTANEOUS
Status: COMPLETED
Start: 2025-02-01 | End: 2025-02-01

## 2025-02-01 RX ADMIN — METHYLPREDNISOLONE SODIUM SUCCINATE 60 MG: 125 INJECTION INTRAMUSCULAR; INTRAVENOUS at 06:11

## 2025-02-01 RX ADMIN — SACUBITRIL AND VALSARTAN 1 TABLET: 24; 26 TABLET, FILM COATED ORAL at 09:08

## 2025-02-01 RX ADMIN — IPRATROPIUM BROMIDE AND ALBUTEROL SULFATE 3 ML: 2.5; .5 SOLUTION RESPIRATORY (INHALATION) at 16:00

## 2025-02-01 RX ADMIN — ENOXAPARIN SODIUM 40 MG: 100 INJECTION SUBCUTANEOUS at 09:08

## 2025-02-01 RX ADMIN — METHYLPREDNISOLONE SODIUM SUCCINATE 60 MG: 125 INJECTION INTRAMUSCULAR; INTRAVENOUS at 23:30

## 2025-02-01 RX ADMIN — TIOTROPIUM BROMIDE INHALATION SPRAY 2 PUFF: 3.12 SPRAY, METERED RESPIRATORY (INHALATION) at 10:27

## 2025-02-01 RX ADMIN — BUDESONIDE AND FORMOTEROL FUMARATE DIHYDRATE 2 PUFF: 160; 4.5 AEROSOL RESPIRATORY (INHALATION) at 10:28

## 2025-02-01 RX ADMIN — BUDESONIDE AND FORMOTEROL FUMARATE DIHYDRATE 2 PUFF: 160; 4.5 AEROSOL RESPIRATORY (INHALATION) at 19:24

## 2025-02-01 RX ADMIN — WATER 10 ML: 1 INJECTION INTRAMUSCULAR; INTRAVENOUS; SUBCUTANEOUS at 14:33

## 2025-02-01 RX ADMIN — Medication 10 ML: at 09:08

## 2025-02-01 RX ADMIN — CARVEDILOL 25 MG: 6.25 TABLET, FILM COATED ORAL at 17:49

## 2025-02-01 RX ADMIN — IPRATROPIUM BROMIDE AND ALBUTEROL SULFATE 3 ML: 2.5; .5 SOLUTION RESPIRATORY (INHALATION) at 19:24

## 2025-02-01 RX ADMIN — GUAIFENESIN 600 MG: 600 TABLET ORAL at 19:40

## 2025-02-01 RX ADMIN — METHYLPREDNISOLONE SODIUM SUCCINATE 60 MG: 125 INJECTION INTRAMUSCULAR; INTRAVENOUS at 14:32

## 2025-02-01 RX ADMIN — CARVEDILOL 25 MG: 6.25 TABLET, FILM COATED ORAL at 09:08

## 2025-02-01 RX ADMIN — SACUBITRIL AND VALSARTAN 1 TABLET: 24; 26 TABLET, FILM COATED ORAL at 19:40

## 2025-02-01 RX ADMIN — Medication 5 MG: at 19:40

## 2025-02-01 RX ADMIN — GUAIFENESIN 600 MG: 600 TABLET ORAL at 09:08

## 2025-02-01 NOTE — THERAPY EVALUATION
Patient Name: Francisco Clark  : 1960    MRN: [id number]                              Today's Date: 2025       Admit Date: 2025    Visit Dx:     ICD-10-CM ICD-9-CM   1. Acute hypoxemic respiratory failure  J96.01 518.81   2. COPD exacerbation  J44.1 491.21     Patient Active Problem List   Diagnosis    Chest pain    Centrilobular emphysema    Tobacco abuse    Essential hypertension    Precordial pain    Dilated cardiomyopathy    Congestive heart failure, unspecified HF chronicity, unspecified heart failure type    Severe malnutrition    Acute on chronic HFrEF (heart failure with reduced ejection fraction)    Oropharyngeal dysphagia    Hypoxia    COVID-19 virus infection    COPD (chronic obstructive pulmonary disease)    Cavitary lesion of lung    Elevated troponin    COPD exacerbation    Erythrocytosis     Past Medical History:   Diagnosis Date    CHF (congestive heart failure)     COPD (chronic obstructive pulmonary disease)     History of stomach ulcers     Hypertension      Past Surgical History:   Procedure Laterality Date    CARDIAC CATHETERIZATION N/A 10/18/2022    Procedure: LEFT HEART CATH;  Surgeon: Kd Roque MD;  Location: [facility] CATH INVASIVE LOCATION;  Service: Cardiovascular;  Laterality: N/A;    COLONOSCOPY  2018    Dr. AGNES Olea. Normal. Repeat 1- yrs if wihtout significant family history or 5 years if first degree relative younger than age 60 with high rish polyp or colorectal cancer.     NO PAST SURGERIES        General Information       Row Name 25 1615          OT Time and Intention    Document Type evaluation  -LR     Mode of Treatment occupational therapy  -LR       Row Name 25 1615          General Information    Patient Profile Reviewed yes  -LR     Prior Level of Function independent:;bed mobility;driving;transfer;gait;all household mobility;ADL's;work  -LR     Existing Precautions/Restrictions oxygen therapy device and L/min  -LR      Barriers to Rehab medically complex  -LR       Row Name 02/01/25 1615          Living Environment    People in Home child(jose martin), adult  -LR       Row Name 02/01/25 1615          Home Main Entrance    Number of Stairs, Main Entrance none  -LR       Row Name 02/01/25 1615          Stairs Within Home, Primary    Number of Stairs, Within Home, Primary none  -LR       Row Name 02/01/25 1615          Cognition    Orientation Status (Cognition) oriented x 4  -LR       Row Name 02/01/25 1615          Safety Issues/Impairments Affecting Functional Mobility    Safety Issues Affecting Function (Mobility) safety precaution awareness;awareness of need for assistance;insight into deficits/self-awareness  -LR     Impairments Affecting Function (Mobility) endurance/activity tolerance;shortness of breath;strength  -LR               User Key  (r) = Recorded By, (t) = Taken By, (c) = Cosigned By      Initials Name Provider Type    Ria Ariza, OT Occupational Therapist                     Mobility/ADL's       Row Name 02/01/25 1616          Bed Mobility    Bed Mobility supine-sit  -LR     Supine-Sit [place] (Bed Mobility) standby assist  -LR     Assistive Device (Bed Mobility) head of bed elevated;bed rails  -LR       Row Name 02/01/25 1616          Transfers    Transfers sit-stand transfer;stand-sit transfer;bed-chair transfer  -LR       Row Name 02/01/25 1616          Bed-Chair Transfer    Bed-Chair [place] (Transfers) contact guard;verbal cues  -LR     Assistive Device (Bed-Chair Transfers) other (see comments)  none  -LR       Row Name 02/01/25 1616          Sit-Stand Transfer    Sit-Stand [place] (Transfers) standby assist  -LR     Assistive Device (Sit-Stand Transfers) other (see comments)  none  -LR       Row Name 02/01/25 1616          Stand-Sit Transfer    Stand-Sit [place] (Transfers) standby assist  -LR     Assistive Device (Stand-Sit Transfers) other (see comments)  none  -LR       Row Name  02/01/25 1616          Functional Mobility    Functional Mobility- Ind. Level contact guard assist;verbal cues required  -LR     Functional Mobility- Device other (see comments)  none  -LR     Functional Mobility-Distance (Feet) --  <HH distance  -LR     Functional Mobility- Comment O2 remaining above 89%  -LR     Patient was able to Ambulate yes  -LR       Row Name 02/01/25 1616          Activities of Daily Living    BADL Assessment/Intervention grooming;lower body dressing  -LR       Row Name 02/01/25 1616          Grooming Assessment/Training    [place] Level (Grooming) wash face, hands;set up  -LR     Position (Grooming) supported sitting  -LR       Row Name 02/01/25 1616          Lower Body Dressing Assessment/Training    [place] Level (Lower Body Dressing) don;doff;socks;independent  -LR     Position (Lower Body Dressing) long sitting  -LR               User Key  (r) = Recorded By, (t) = Taken By, (c) = Cosigned By      Initials Name Provider Type    Ria Ariza OT Occupational Therapist                   Obj/Interventions       Row Name 02/01/25 1618          Sensory Assessment (Somatosensory)    Sensory Assessment (Somatosensory) UE sensation intact  -LR       Row Name 02/01/25 1618          Vision Assessment/Intervention    Visual Impairment/Limitations WFL  -LR       Row Name 02/01/25 1618          Range of Motion Comprehensive    General Range of Motion bilateral upper extremity ROM WNL  -LR       Row Name 02/01/25 1618          Strength Comprehensive (MMT)    General Manual Muscle Testing (MMT) Assessment no strength deficits identified  -LR       Row Name 02/01/25 1618          Balance    Balance Assessment sitting static balance;sitting dynamic balance;sit to stand dynamic balance;standing static balance;standing dynamic balance  -LR     Static Sitting Balance independent  -LR     Dynamic Sitting Balance independent  -LR     Position, Sitting Balance unsupported;sitting edge of bed   -LR     Static Standing Balance standby assist  -LR     Dynamic Standing Balance contact guard  -LR     Position/Device Used, Standing Balance unsupported  -LR     Balance Interventions sitting;standing;sit to stand;supported;static;dynamic;occupation based/functional task  -LR               User Key  (r) = Recorded By, (t) = Taken By, (c) = Cosigned By      Initials Name Provider Type    Ria Ariza, OT Occupational Therapist                   Goals/Plan       Row Name 02/01/25 1620          Transfer Goal 1 (OT)    Activity/Assistive Device (Transfer Goal 1, OT) sit-to-stand/stand-to-sit;bed-to-chair/chair-to-bed;toilet  -LR     [place] Level/Cues Needed (Transfer Goal 1, OT) independent  -LR     Time Frame (Transfer Goal 1, OT) long term goal (LTG);1 week  -LR     Strategies/Barriers (Transfers Goal 1, OT) O2 remaining above 90%  -LR     Progress/Outcome (Transfer Goal 1, OT) new goal;goal ongoing  -LR       Row Name 02/01/25 1620          Grooming Goal 1 (OT)    Activity/Device (Grooming Goal 1, OT) wash face, hands;oral care;hair care  -LR     [place] (Grooming Goal 1, OT) independent  -LR     Time Frame (Grooming Goal 1, OT) short term goal (STG);4 days  -LR     Strategies/Barriers (Grooming Goal 1, OT) sink side with O2 remaining above 90%  -LR     Progress/Outcome (Grooming Goal 1, OT) goal ongoing;new goal  -LR       Row Name 02/01/25 1620          Therapy Assessment/Plan (OT)    Planned Therapy Interventions (OT) activity tolerance training;adaptive equipment training;BADL retraining;ROM/therapeutic exercise;occupation/activity based interventions;strengthening exercise;functional balance retraining;IADL retraining;passive ROM/stretching;transfer/mobility retraining;patient/caregiver education/training  -LR               User Key  (r) = Recorded By, (t) = Taken By, (c) = Cosigned By      Initials Name Provider Type    Ria Ariza OT Occupational Therapist                    Clinical Impression       Row Name 02/01/25 1618          Pain Assessment    Pretreatment Pain Rating 0/10 - no pain  -LR     Posttreatment Pain Rating 0/10 - no pain  -LR       Row Name 02/01/25 1618          Plan of Care Review    Plan of Care Reviewed With patient  -LR     Progress no change  -LR     Outcome Evaluation OT eval complete. Pt presents below baseline with SOA and limited endurance. Pt was able to ambulate in hallway with O2 remaining above 89% on 6LNC. Educated pt on breathing exercises with good demo of learning. Pt very motivated to work with therapy. Recommend IPOT POC and home with assist at D/C.  -LR       Row Name 02/01/25 1618          Therapy Assessment/Plan (OT)    Patient/Family Therapy Goal Statement (OT) PLOF  -LR     Rehab Potential (OT) good  -LR     Criteria for Skilled Therapeutic Interventions Met (OT) yes;skilled treatment is necessary  -LR     Therapy Frequency (OT) daily  -LR     Predicted Duration of Therapy Intervention (OT) 7 days  -LR       Row Name 02/01/25 1618          Therapy Plan Review/Discharge Plan (OT)    Anticipated Discharge Disposition (OT) home with assist  -LR       Row Name 02/01/25 1618          Vital Signs    Pre Systolic BP Rehab 130  -LR     Pre Treatment Diastolic BP 64  -LR     Pre SpO2 (%) 94  -LR     O2 Delivery Pre Treatment nasal cannula  -LR     Intra SpO2 (%) 89  -LR     O2 Delivery Intra Treatment nasal cannula  -LR     Post SpO2 (%) 92  -LR     O2 Delivery Post Treatment nasal cannula  -LR     Pre Patient Position Supine  -LR     Intra Patient Position Standing  -LR     Post Patient Position Sitting  -LR       Row Name 02/01/25 1618          Positioning and Restraints    Pre-Treatment Position in bed  -LR     Post Treatment Position chair  -LR     In Chair notified nsg;reclined;call light within reach;encouraged to call for assist;exit alarm on;waffle cushion;legs elevated  -LR               User Key  (r) = Recorded By, (t) = Taken By, (c) =  Cosigned By      Initials Name Provider Type    Ria Ariza OT Occupational Therapist                   Outcome Measures       Row Name 02/01/25 1621          How much help from another is currently needed...    Putting on and taking off regular lower body clothing? 4  -LR     Bathing (including washing, rinsing, and drying) 3  -LR     Toileting (which includes using toilet bed pan or urinal) 3  -LR     Putting on and taking off regular upper body clothing 4  -LR     Taking care of personal grooming (such as brushing teeth) 3  -LR     Eating meals 3  -LR     AM-PAC 6 Clicks Score (OT) 20  -LR       Row Name 02/01/25 1621          Functional Assessment    Outcome Measure Options AM-PAC 6 Clicks Daily Activity (OT)  -LR               User Key  (r) = Recorded By, (t) = Taken By, (c) = Cosigned By      Initials Name Provider Type    Ria Ariza OT Occupational Therapist                    Occupational Therapy Education       Title: PT OT SLP Therapies (In Progress)       Topic: Occupational Therapy (In Progress)       Point: ADL training (In Progress)       Description:   Instruct learner(s) on proper safety adaptation and remediation techniques during self care or transfers.   Instruct in proper use of assistive devices.                  Learning Progress Summary            Patient Acceptance, E, NR by LR at 2/1/2025 1445                      Point: Home exercise program (In Progress)       Description:   Instruct learner(s) on appropriate technique for monitoring, assisting and/or progressing therapeutic exercises/activities.                  Learning Progress Summary            Patient Acceptance, E, NR by LR at 2/1/2025 1445                      Point: Precautions (In Progress)       Description:   Instruct learner(s) on prescribed precautions during self-care and functional transfers.                  Learning Progress Summary            Patient Acceptance, E, NR by LR at 2/1/2025 1445                       Point: Body mechanics (In Progress)       Description:   Instruct learner(s) on proper positioning and spine alignment during self-care, functional mobility activities and/or exercises.                  Learning Progress Summary            Patient Acceptance, E, NR by LR at 2/1/2025 1445                                      User Key       Initials Effective Dates Name Provider Type Discipline    LR 10/09/24 -  Ria Rodriguez, OT Occupational Therapist OT                  OT Recommendation and Plan  Planned Therapy Interventions (OT): activity tolerance training, adaptive equipment training, BADL retraining, ROM/therapeutic exercise, occupation/activity based interventions, strengthening exercise, functional balance retraining, IADL retraining, passive ROM/stretching, transfer/mobility retraining, patient/caregiver education/training  Therapy Frequency (OT): daily  Plan of Care Review  Plan of Care Reviewed With: patient  Progress: no change  Outcome Evaluation: OT eval complete. Pt presents below baseline with SOA and limited endurance. Pt was able to ambulate in hallway with O2 remaining above 89% on 6LNC. Educated pt on breathing exercises with good demo of learning. Pt very motivated to work with therapy. Recommend IPOT POC and home with assist at D/C.     Time Calculation:   Evaluation Complexity (OT)  Review Occupational Profile/Medical/Therapy History Complexity: brief/low complexity  Assessment, Occupational Performance/Identification of Deficit Complexity: 1-3 performance deficits  Clinical Decision Making Complexity (OT): problem focused assessment/low complexity  Overall Complexity of Evaluation (OT): low complexity     Time Calculation- OT       Row Name 02/01/25 1622             Time Calculation- OT    OT Start Time 1445  -LR      OT Received On 02/01/25  -LR      OT Goal Re-Cert Due Date 02/11/25  -LR         Untimed Charges    OT Eval/Re-eval Minutes 49  -LR         Total Minutes     Untimed Charges Total Minutes 49  -LR       Total Minutes 49  -LR                User Key  (r) = Recorded By, (t) = Taken By, (c) = Cosigned By      Initials Name Provider Type    LR Ria Rodriguze OT Occupational Therapist                  Therapy Charges for Today       Code Description Service Date Service Provider Modifiers Qty    [id number]  OT EVAL LOW COMPLEXITY 4 2/1/2025 Ria Rodriguez OT GO 1                 Ria Rodriguez OT  2/1/2025

## 2025-02-01 NOTE — PLAN OF CARE
Goal Outcome Evaluation:      Patient is up ad lula. No complaints of pain or discomfort. Patient is SOB upon exercise. Patient Sinus Bradycardia on tele monitor. No unmet needs.

## 2025-02-01 NOTE — PROGRESS NOTES
[facility] Medicine Services  PROGRESS NOTE    Patient Name: Francisco Clark  : 1960  MRN: [id number]    Date of Admission: 2025  Primary Care Physician: Charito Weiss APRN    Subjective   Subjective     CC:  soa    HPI:  States that he feels good.  Denies SOA.        Objective   Objective     Vital Signs:   Temp:  [97.7 °F (36.5 °C)-98.2 °F (36.8 °C)] 98.2 °F (36.8 °C)  Heart Rate:  [65-75] 67  Resp:  [16-18] 17  BP: (108-134)/(53-68) 130/64  Flow (L/min) (Oxygen Therapy):  [6] 6     Physical Exam:  Constitutional: No acute distress, awake, alert, sitting up in bed  HENT: NCAT, mucous membranes moist  Respiratory: decreased BS, respiratory effort normal, on 6LNC  Cardiovascular: RRR, no murmurs, rubs, or gallops  Gastrointestinal: Positive bowel sounds, soft, nontender, nondistended  Musculoskeletal: No bilateral ankle edema  Psychiatric: Appropriate affect, cooperative  Neurologic: Oriented x 3, BUNCH, speech clear  Skin: No rashes      Results Reviewed:  LAB RESULTS:      Lab 25  0743 25  1439 25  1229 25  1011   WBC 10.70 8.28  --  7.93   HEMOGLOBIN 16.8 16.5  --  17.9*   HEMATOCRIT 52.9* 53.7*  --  56.0*   PLATELETS 195 186  --  191   NEUTROS ABS  --   --   --  5.31   IMMATURE GRANS (ABS)  --   --   --  0.03   LYMPHS ABS  --   --   --  1.73   MONOS ABS  --   --   --  0.76   EOS ABS  --   --   --  0.06   MCV 96.9 100.2*  --  96.4   SED RATE  --   --   --  53*   CRP  --   --  1.76*  --    PROCALCITONIN  --   --  0.07  --    LACTATE  --   --   --  1.1   D DIMER QUANT  --   --   --  <0.27   HSTROP T  --   --  17 19         Lab 25  0743 25  1439 25  1229 25  1011   SODIUM 139 137  --  136   POTASSIUM 5.1 5.1  --  4.5   CHLORIDE 101 99  --  98   CO2 34.0* 29.0  --  28.0   ANION GAP 4.0* 9.0  --  10.0   BUN 41* 53*  --  28*   CREATININE 0.59* 0.99  --  0.95   EGFR 107.7 84.5  --  88.8   GLUCOSE 140* 153*  --  141*   CALCIUM 10.5  9.5  --  9.7   MAGNESIUM 1.9 2.1  --   --    HEMOGLOBIN A1C  --   --   --  6.20*   TSH  --   --  1.260  --          Lab 01/30/25  1011   TOTAL PROTEIN 7.3   ALBUMIN 3.9   GLOBULIN 3.4   ALT (SGPT) 12   AST (SGOT) 20   BILIRUBIN 0.3   ALK PHOS 93         Lab 01/30/25  1229 01/30/25  1011   PROBNP  --  109.2   HSTROP T 17 19         Lab 01/30/25  1229   CHOLESTEROL 182   LDL CHOL 118*   HDL CHOL 37*   TRIGLYCERIDES 148         Lab 01/30/25  1229 01/30/25  1011   IRON 56*  --    IRON SATURATION (TSAT) 15*  --    TIBC 378  --    TRANSFERRIN 254  --    FERRITIN 80.07  --    FOLATE  --  >20.00   VITAMIN B 12  --  642         Lab 01/30/25  1447   FIO2 44   CARBOXYHEMOGLOBIN (VENOUS) 1.4     Brief Urine Lab Results  (Last result in the past 365 days)        Color   Clarity   Blood   Leuk Est   Nitrite   Protein   CREAT   Urine HCG        01/30/25 1456 Yellow   Clear   Negative   Negative   Negative   Trace                   Microbiology Results Abnormal       None            CT Chest Without Contrast Diagnostic    Result Date: 1/30/2025  CT CHEST WO CONTRAST DIAGNOSTIC Date of Exam: 1/30/2025 3:42 PM EST Indication: COPD exacerbation/PNA. Comparison: Chest CT 4/1/2024. Chest radiograph 1/30/2025. Technique: Axial CT images were obtained of the chest without contrast administration.  Reconstructed coronal and sagittal images were also obtained. Automated exposure control and iterative construction methods were used. Findings: Thyroid and thoracic inlet: No significant abnormality. Lymph nodes: No pathologic appearing lymph nodes by imaging criteria. Cardiovascular: Normal appearing heart size. No pericardial effusion. Aorta and main pulmonary artery diameters are within normal range.. There are coronary artery calcifications. Aortic annular calcifications. Aortic atherosclerosis. Esophagus: Small hiatal hernia. Lung parenchyma: Emphysema. Scattered atelectasis and/or scarring. Calcified granulomas. No suspicious appearing  pulmonary nodules, masses, or opacities. Airways: Small volume secretions in the lower trachea and right mainstem bronchus. Pleura: No pleural effusion or pneumothorax. Chest wall and osseous structures: Degenerative changes of the imaged spine. No acute osseous abnormality. Included abdomen: Cholelithiasis. Unchanged appearing 1 cm hypoattenuating nodule adjacent to versus arising from the left adrenal gland, possibly an adenoma or a nonspecific mildly enlarged lymph node.     Impression: Impression: No acute intrathoracic findings. Smoking-related lung disease. Electronically Signed: Rodney Ley  1/30/2025 4:09 PM EST  Workstation ID: [id number]     Results for orders placed during the hospital encounter of 01/30/25    Adult Transthoracic Echo Complete w/ Color, Spectral and Contrast if necessary per protocol    Interpretation Summary    Left ventricular systolic function is normal. Calculated left ventricular EF = 66.8% Left ventricular ejection fraction appears to be 56 - 60%.    Left ventricular wall thickness is consistent with mild concentric hypertrophy.    Left ventricular diastolic function was normal.    Estimated right ventricular systolic pressure from tricuspid regurgitation is mildly elevated (35-45 mmHg).    Normal global longitudinal LV strain (GLS) = -20.4%    Improved ejection fraction compared to the echo from 2023      Current medications:  Scheduled Meds:azithromycin, 500 mg, Intravenous, Q24H  budesonide-formoterol, 2 puff, Inhalation, BID - RT  carvedilol, 25 mg, Oral, BID With Meals  enoxaparin, 40 mg, Subcutaneous, Daily  guaiFENesin, 600 mg, Oral, Q12H  melatonin, 5 mg, Oral, Nightly  methylPREDNISolone sodium succinate, 60 mg, Intravenous, Q8H  sacubitril-valsartan, 1 tablet, Oral, BID  sodium chloride, 10 mL, Intravenous, Q12H  sodium chloride, 4 mL, Nebulization, Once  tiotropium bromide monohydrate, 2 puff, Inhalation, Daily - RT      Continuous Infusions:   PRN Meds:.  acetaminophen  **OR** acetaminophen **OR** acetaminophen    albuterol sulfate HFA    senna-docusate sodium **AND** polyethylene glycol **AND** bisacodyl **AND** bisacodyl    Calcium Replacement - Follow Nurse / BPA Driven Protocol    Magnesium Standard Dose Replacement - Follow Nurse / BPA Driven Protocol    nitroglycerin    Phosphorus Replacement - Follow Nurse / BPA Driven Protocol    Potassium Replacement - Follow Nurse / BPA Driven Protocol    sodium chloride    sodium chloride    sodium chloride    Assessment & Plan   Assessment & Plan     Active Hospital Problems    Diagnosis  POA    **COPD exacerbation [J44.1]  Yes    Erythrocytosis [D75.1]  Unknown    Cavitary lesion of lung [J98.4]  Yes    Acute on chronic HFrEF (heart failure with reduced ejection fraction) [I50.23]  Yes    Severe malnutrition [E43]  Yes    Congestive heart failure, unspecified HF chronicity, unspecified heart failure type [I50.9]  Yes    Dilated cardiomyopathy [I42.0]  Yes    Tobacco abuse [Z72.0]  Yes    Essential hypertension [I10]  Yes    Centrilobular emphysema [J43.2]  Yes      Resolved Hospital Problems   No resolved problems to display.        Brief Hospital Course to date:  Francisco Clark is a 64 y.o. male  PMH significant for HFrEF, COPD with pulmonary cachexia, previous cavitary lung lesion, current tobacco use, HTN and HLD.  Presenting to [facility] ED due to significant shortness of breath, fatigue, malaise, subjective chills as well as chest tightness with wheezing.  Also endorses symptoms of palpitations, orthopnea with PND.  He also endorses increased phlegm production however does not recall the color.  Not on oxygen at home, requiring 6 L nasal cannula oxygen desaturated 90%.  Currently in COPD exacerbation.     COPD exacerbation  Central lobar emphysema  Cavitary lung lesion  Acute hypoxemic respiratory failure  Tobacco use current  Chronic history of COPD, current smoker, previous PFTs April 2024 revealed a FEV1 to  FVC ratio of 58% dictated, low DLCO, FVC 55% predicted with FEV1 32% predicted, no postbronchodilator testing done, patient received albuterol 2 hours prior to testing.  - Follows [facility] pulmonology last seen in April 2024.  - On room air at home, requiring 6 L nasal cannula oxygen to sat at 90%, desatted to 77% on room air  - streptococcal and Legionella urine antigens negative, viral PCR panel was negative.   - Will check a CT chest without contrast as patient had previous teary lesion which was solved in RUL on CT from April 2024.    --CT chest shows no acute findings, smoking related lung disease  - Incentive spirometry, aggressive pulmonary toilet, albuterol, Mucinex and hypertonic saline.  - Continue Symbicort and Spiriva  - s/p bipap overnight.  Doing well on 6LNC currenlty  --continue solumedrol 60mg q8h, continue nebs, zmax day 3  --encouraged good pulmonary toilet.  IS and flutter valve     Dilated cardiomyopathy  Acute on chronic HFrEF  HTN  Previous echo TTE January 2023, revealed EF of 46 to 50%, mild concentric hypertrophy of LV, G2 DD with RVSP <35mmhg.  - On Coreg, Jardiance and Entresto, will continue for now, renal function is at baseline.     Severe malnutrition  Pulmonary cachexia  Significant COPD, patient has evidence of pulmonary cachexia on physical examination  - Total protein and albumin is okay on physical examination, HbA1c 6.1, TSH wnl  - Consulted nutritional services        Expected Discharge Location and Transportation:   Expected Discharge   Expected Discharge Date: 2/3/2025; Expected Discharge Time:      VTE Prophylaxis:  Pharmacologic & mechanical VTE prophylaxis orders are present.         AM-PAC 6 Clicks Score (PT): 23 (01/31/25 2000)    CODE STATUS:   Code Status and Medical Interventions: CPR (Attempt to Resuscitate); Full Support   Ordered at: 01/30/25 1317     Level Of Support Discussed With:    Patient     Code Status (Patient has no pulse and is not breathing):    CPR  (Attempt to Resuscitate)     Medical Interventions (Patient has pulse or is breathing):    Full Support       Bucky Bustillos MD  02/01/25

## 2025-02-01 NOTE — PLAN OF CARE
Goal Outcome Evaluation:  Plan of Care Reviewed With: patient        Progress: no change  Outcome Evaluation: OT eval complete. Pt presents below baseline with SOA and limited endurance. Pt was able to ambulate in hallway with O2 remaining above 89% on 6LNC. Educated pt on breathing exercises with good demo of learning. Pt very motivated to work with therapy. Recommend IPOT POC and home with assist at D/C.    Anticipated Discharge Disposition (OT): home with assist

## 2025-02-02 LAB
ANION GAP SERPL CALCULATED.3IONS-SCNC: 2 MMOL/L (ref 5–15)
BUN SERPL-MCNC: 33 MG/DL (ref 8–23)
BUN/CREAT SERPL: 55.9 (ref 7–25)
CALCIUM SPEC-SCNC: 10.3 MG/DL (ref 8.6–10.5)
CHLORIDE SERPL-SCNC: 103 MMOL/L (ref 98–107)
CO2 SERPL-SCNC: 36 MMOL/L (ref 22–29)
CREAT SERPL-MCNC: 0.59 MG/DL (ref 0.76–1.27)
DEPRECATED RDW RBC AUTO: 51.7 FL (ref 37–54)
EGFRCR SERPLBLD CKD-EPI 2021: 107.7 ML/MIN/1.73
ERYTHROCYTE [DISTWIDTH] IN BLOOD BY AUTOMATED COUNT: 14.1 % (ref 12.3–15.4)
GLUCOSE SERPL-MCNC: 146 MG/DL (ref 65–99)
HCT VFR BLD AUTO: 53.4 % (ref 37.5–51)
HGB BLD-MCNC: 16.4 G/DL (ref 13–17.7)
MAGNESIUM SERPL-MCNC: 1.9 MG/DL (ref 1.6–2.4)
MCH RBC QN AUTO: 30.5 PG (ref 26.6–33)
MCHC RBC AUTO-ENTMCNC: 30.7 G/DL (ref 31.5–35.7)
MCV RBC AUTO: 99.4 FL (ref 79–97)
PLATELET # BLD AUTO: 203 10*3/MM3 (ref 140–450)
PMV BLD AUTO: 9.5 FL (ref 6–12)
POTASSIUM SERPL-SCNC: 5.7 MMOL/L (ref 3.5–5.2)
RBC # BLD AUTO: 5.37 10*6/MM3 (ref 4.14–5.8)
SODIUM SERPL-SCNC: 141 MMOL/L (ref 136–145)
WBC NRBC COR # BLD AUTO: 11.39 10*3/MM3 (ref 3.4–10.8)

## 2025-02-02 PROCEDURE — 80048 BASIC METABOLIC PNL TOTAL CA: CPT

## 2025-02-02 PROCEDURE — 99232 SBSQ HOSP IP/OBS MODERATE 35: CPT | Performed by: INTERNAL MEDICINE

## 2025-02-02 PROCEDURE — 83735 ASSAY OF MAGNESIUM: CPT

## 2025-02-02 PROCEDURE — 25010000002 ENOXAPARIN PER 10 MG

## 2025-02-02 PROCEDURE — 94664 DEMO&/EVAL PT USE INHALER: CPT

## 2025-02-02 PROCEDURE — 85027 COMPLETE CBC AUTOMATED: CPT

## 2025-02-02 PROCEDURE — 94799 UNLISTED PULMONARY SVC/PX: CPT

## 2025-02-02 PROCEDURE — 25010000002 METHYLPREDNISOLONE PER 125 MG: Performed by: INTERNAL MEDICINE

## 2025-02-02 RX ORDER — WATER 10 ML/10ML
INJECTION INTRAMUSCULAR; INTRAVENOUS; SUBCUTANEOUS
Status: COMPLETED
Start: 2025-02-02 | End: 2025-02-02

## 2025-02-02 RX ADMIN — CARVEDILOL 25 MG: 6.25 TABLET, FILM COATED ORAL at 09:39

## 2025-02-02 RX ADMIN — BUDESONIDE AND FORMOTEROL FUMARATE DIHYDRATE 2 PUFF: 160; 4.5 AEROSOL RESPIRATORY (INHALATION) at 09:53

## 2025-02-02 RX ADMIN — METHYLPREDNISOLONE SODIUM SUCCINATE 60 MG: 125 INJECTION INTRAMUSCULAR; INTRAVENOUS at 06:37

## 2025-02-02 RX ADMIN — ENOXAPARIN SODIUM 40 MG: 100 INJECTION SUBCUTANEOUS at 09:41

## 2025-02-02 RX ADMIN — GUAIFENESIN 600 MG: 600 TABLET ORAL at 09:41

## 2025-02-02 RX ADMIN — SACUBITRIL AND VALSARTAN 1 TABLET: 24; 26 TABLET, FILM COATED ORAL at 20:05

## 2025-02-02 RX ADMIN — METHYLPREDNISOLONE SODIUM SUCCINATE 60 MG: 125 INJECTION INTRAMUSCULAR; INTRAVENOUS at 23:14

## 2025-02-02 RX ADMIN — Medication 10 ML: at 20:06

## 2025-02-02 RX ADMIN — SODIUM ZIRCONIUM CYCLOSILICATE 10 G: 10 POWDER, FOR SUSPENSION ORAL at 10:37

## 2025-02-02 RX ADMIN — TIOTROPIUM BROMIDE INHALATION SPRAY 2 PUFF: 3.12 SPRAY, METERED RESPIRATORY (INHALATION) at 09:53

## 2025-02-02 RX ADMIN — IPRATROPIUM BROMIDE AND ALBUTEROL SULFATE 3 ML: 2.5; .5 SOLUTION RESPIRATORY (INHALATION) at 16:17

## 2025-02-02 RX ADMIN — IPRATROPIUM BROMIDE AND ALBUTEROL SULFATE 3 ML: 2.5; .5 SOLUTION RESPIRATORY (INHALATION) at 19:14

## 2025-02-02 RX ADMIN — IPRATROPIUM BROMIDE AND ALBUTEROL SULFATE 3 ML: 2.5; .5 SOLUTION RESPIRATORY (INHALATION) at 09:52

## 2025-02-02 RX ADMIN — Medication 5 MG: at 20:05

## 2025-02-02 RX ADMIN — BUDESONIDE AND FORMOTEROL FUMARATE DIHYDRATE 2 PUFF: 160; 4.5 AEROSOL RESPIRATORY (INHALATION) at 19:14

## 2025-02-02 RX ADMIN — IPRATROPIUM BROMIDE AND ALBUTEROL SULFATE 3 ML: 2.5; .5 SOLUTION RESPIRATORY (INHALATION) at 12:27

## 2025-02-02 RX ADMIN — WATER 2 ML: 1 INJECTION INTRAMUSCULAR; INTRAVENOUS; SUBCUTANEOUS at 16:21

## 2025-02-02 RX ADMIN — METHYLPREDNISOLONE SODIUM SUCCINATE 60 MG: 125 INJECTION INTRAMUSCULAR; INTRAVENOUS at 16:21

## 2025-02-02 RX ADMIN — CARVEDILOL 25 MG: 6.25 TABLET, FILM COATED ORAL at 17:24

## 2025-02-02 RX ADMIN — SACUBITRIL AND VALSARTAN 1 TABLET: 24; 26 TABLET, FILM COATED ORAL at 09:41

## 2025-02-02 RX ADMIN — Medication 10 ML: at 09:43

## 2025-02-02 RX ADMIN — GUAIFENESIN 600 MG: 600 TABLET ORAL at 20:05

## 2025-02-02 NOTE — PLAN OF CARE
Goal Outcome Evaluation:     VSS  he is currently on 4L NC. No complaints of SOA. NSR. No complaints of pain.

## 2025-02-02 NOTE — PROGRESS NOTES
[facility] Medicine Services  PROGRESS NOTE    Patient Name: Francisco Clark  : 1960  MRN: [id number]    Date of Admission: 2025  Primary Care Physician: Charito Weiss APRN    Subjective   Subjective     CC:  soa    HPI:  Reports that he feels ok.  Breathing ok.        Objective   Objective     Vital Signs:   Temp:  [97.6 °F (36.4 °C)-98 °F (36.7 °C)] 97.6 °F (36.4 °C)  Heart Rate:  [60-89] 66  Resp:  [17-19] 17  BP: (113-139)/(64-75) 113/68  Flow (L/min) (Oxygen Therapy):  [4-6] 4     Physical Exam:  Constitutional: No acute distress, awake, alert, sitting up in bed  HENT: NCAT, mucous membranes moist  Respiratory: decreased BS, respiratory effort normal, on 6LNC  Cardiovascular: RRR, no murmurs, rubs, or gallops  Gastrointestinal: Positive bowel sounds, soft, nontender, nondistended  Musculoskeletal: No bilateral ankle edema  Psychiatric: Appropriate affect, cooperative  Neurologic: Oriented x 3, BUNCH, speech clear  Skin: No rashes      Results Reviewed:  LAB RESULTS:      Lab 25  0628 25  0743 25  1439 25  1229 25  1011   WBC 11.39* 10.70 8.28  --  7.93   HEMOGLOBIN 16.4 16.8 16.5  --  17.9*   HEMATOCRIT 53.4* 52.9* 53.7*  --  56.0*   PLATELETS 203 195 186  --  191   NEUTROS ABS  --   --   --   --  5.31   IMMATURE GRANS (ABS)  --   --   --   --  0.03   LYMPHS ABS  --   --   --   --  1.73   MONOS ABS  --   --   --   --  0.76   EOS ABS  --   --   --   --  0.06   MCV 99.4* 96.9 100.2*  --  96.4   SED RATE  --   --   --   --  53*   CRP  --   --   --  1.76*  --    PROCALCITONIN  --   --   --  0.07  --    LACTATE  --   --   --   --  1.1   D DIMER QUANT  --   --   --   --  <0.27   HSTROP T  --   --   --  17 19         Lab 25  0628 25  0743 25  1439 25  1229 25  1011   SODIUM 141 139 137  --  136   POTASSIUM 5.7* 5.1 5.1  --  4.5   CHLORIDE 103 101 99  --  98   CO2 36.0* 34.0* 29.0  --  28.0   ANION GAP 2.0* 4.0* 9.0   --  10.0   BUN 33* 41* 53*  --  28*   CREATININE 0.59* 0.59* 0.99  --  0.95   EGFR 107.7 107.7 84.5  --  88.8   GLUCOSE 146* 140* 153*  --  141*   CALCIUM 10.3 10.5 9.5  --  9.7   MAGNESIUM 1.9 1.9 2.1  --   --    HEMOGLOBIN A1C  --   --   --   --  6.20*   TSH  --   --   --  1.260  --          Lab 01/30/25  1011   TOTAL PROTEIN 7.3   ALBUMIN 3.9   GLOBULIN 3.4   ALT (SGPT) 12   AST (SGOT) 20   BILIRUBIN 0.3   ALK PHOS 93         Lab 01/30/25  1229 01/30/25  1011   PROBNP  --  109.2   HSTROP T 17 19         Lab 01/30/25  1229   CHOLESTEROL 182   LDL CHOL 118*   HDL CHOL 37*   TRIGLYCERIDES 148         Lab 01/30/25  1229 01/30/25  1011   IRON 56*  --    IRON SATURATION (TSAT) 15*  --    TIBC 378  --    TRANSFERRIN 254  --    FERRITIN 80.07  --    FOLATE  --  >20.00   VITAMIN B 12  --  642         Lab 01/30/25  1447   FIO2 44   CARBOXYHEMOGLOBIN (VENOUS) 1.4     Brief Urine Lab Results  (Last result in the past 365 days)        Color   Clarity   Blood   Leuk Est   Nitrite   Protein   CREAT   Urine HCG        01/30/25 1456 Yellow   Clear   Negative   Negative   Negative   Trace                   Microbiology Results Abnormal       None            No radiology results from the last 24 hrs    Results for orders placed during the hospital encounter of 01/30/25    Adult Transthoracic Echo Complete w/ Color, Spectral and Contrast if necessary per protocol    Interpretation Summary    Left ventricular systolic function is normal. Calculated left ventricular EF = 66.8% Left ventricular ejection fraction appears to be 56 - 60%.    Left ventricular wall thickness is consistent with mild concentric hypertrophy.    Left ventricular diastolic function was normal.    Estimated right ventricular systolic pressure from tricuspid regurgitation is mildly elevated (35-45 mmHg).    Normal global longitudinal LV strain (GLS) = -20.4%    Improved ejection fraction compared to the echo from 2023      Current medications:  Scheduled  Meds:azithromycin, 500 mg, Intravenous, Q24H  budesonide-formoterol, 2 puff, Inhalation, BID - RT  carvedilol, 25 mg, Oral, BID With Meals  enoxaparin, 40 mg, Subcutaneous, Daily  guaiFENesin, 600 mg, Oral, Q12H  ipratropium-albuterol, 3 mL, Nebulization, 4x Daily - RT  melatonin, 5 mg, Oral, Nightly  methylPREDNISolone sodium succinate, 60 mg, Intravenous, Q8H  sacubitril-valsartan, 1 tablet, Oral, BID  sodium chloride, 10 mL, Intravenous, Q12H  sodium chloride, 4 mL, Nebulization, Once  tiotropium bromide monohydrate, 2 puff, Inhalation, Daily - RT      Continuous Infusions:   PRN Meds:.  acetaminophen **OR** acetaminophen **OR** acetaminophen    albuterol sulfate HFA    senna-docusate sodium **AND** polyethylene glycol **AND** bisacodyl **AND** bisacodyl    Calcium Replacement - Follow Nurse / BPA Driven Protocol    Magnesium Standard Dose Replacement - Follow Nurse / BPA Driven Protocol    nitroglycerin    Phosphorus Replacement - Follow Nurse / BPA Driven Protocol    Potassium Replacement - Follow Nurse / BPA Driven Protocol    sodium chloride    sodium chloride    sodium chloride    Assessment & Plan   Assessment & Plan     Active Hospital Problems    Diagnosis  POA    **COPD exacerbation [J44.1]  Yes    Erythrocytosis [D75.1]  Unknown    Cavitary lesion of lung [J98.4]  Yes    Acute on chronic HFrEF (heart failure with reduced ejection fraction) [I50.23]  Yes    Severe malnutrition [E43]  Yes    Congestive heart failure, unspecified HF chronicity, unspecified heart failure type [I50.9]  Yes    Dilated cardiomyopathy [I42.0]  Yes    Tobacco abuse [Z72.0]  Yes    Essential hypertension [I10]  Yes    Centrilobular emphysema [J43.2]  Yes      Resolved Hospital Problems   No resolved problems to display.        Brief Hospital Course to date:  Francisco Clark is a 64 y.o. male  PMH significant for HFrEF, COPD with pulmonary cachexia, previous cavitary lung lesion, current tobacco use, HTN and HLD.  Presenting to  [facility] ED due to significant shortness of breath, fatigue, malaise, subjective chills as well as chest tightness with wheezing.  Also endorses symptoms of palpitations, orthopnea with PND.  He also endorses increased phlegm production however does not recall the color.  Not on oxygen at home, requiring 6 L nasal cannula oxygen desaturated 90%.  Currently in COPD exacerbation.     COPD exacerbation  Central lobar emphysema  Previous Cavitary lung lesion which resolved  Acute hypoxemic respiratory failure  Tobacco use current  Chronic history of COPD, current smoker, previous PFTs April 2024 revealed a FEV1 to FVC ratio of 58% dictated, low DLCO, FVC 55% predicted with FEV1 32% predicted, no postbronchodilator testing done, patient received albuterol 2 hours prior to testing.  - Follows [facility] pulmonology last seen in April 2024.  -Not home O2 dependent, desatted to 77% on room air in ER  - streptococcal and Legionella urine antigens negative, viral PCR panel was negative.   --CT chest shows no acute findings, smoking related lung disease  - Incentive spirometry, aggressive pulmonary toilet, albuterol, Mucinex and hypertonic saline.  - Continue Symbicort and Spiriva  - s/p bipap overnight.  Doing well on 6LNC currently, d/w respiratory who was at bedside and asked to try to wean down  --continue solumedrol 60mg q8h, continue nebs, s/p 3  days zmax  --encouraged good pulmonary toilet.  IS and flutter valve     Dilated cardiomyopathy  Acute on chronic HFrEF  HTN  Previous echo TTE January 2023, revealed EF of 46 to 50%, mild concentric hypertrophy of LV, G2 DD with RVSP <35mmhg.  - On Coreg, Jardiance and Entresto, will continue for now, renal function is at baseline.    Hyperkalemia  --lokelma x1     Severe malnutrition  Pulmonary cachexia  Significant COPD, patient has evidence of pulmonary cachexia on physical examination  - Total protein and albumin is okay on physical examination, HbA1c 6.1, TSH  wnl  -Consulted nutritional services        Expected Discharge Location and Transportation:   Expected Discharge   Expected Discharge Date: 2/4/2025; Expected Discharge Time:      VTE Prophylaxis:  Pharmacologic & mechanical VTE prophylaxis orders are present.         AM-PAC 6 Clicks Score (PT): 24 (02/01/25 2000)    CODE STATUS:   Code Status and Medical Interventions: CPR (Attempt to Resuscitate); Full Support   Ordered at: 01/30/25 1317     Level Of Support Discussed With:    Patient     Code Status (Patient has no pulse and is not breathing):    CPR (Attempt to Resuscitate)     Medical Interventions (Patient has pulse or is breathing):    Full Support       Bucky Bustillos MD  02/02/25

## 2025-02-02 NOTE — PLAN OF CARE
Goal Outcome Evaluation:                      Patient feeling a little frustrated over lack of progress with respiratory issues. Patient is using IS. No unmet needs.

## 2025-02-03 LAB
ANION GAP SERPL CALCULATED.3IONS-SCNC: 4 MMOL/L (ref 5–15)
BACTERIA SPEC RESP CULT: NORMAL
BUN SERPL-MCNC: 39 MG/DL (ref 8–23)
BUN/CREAT SERPL: 65 (ref 7–25)
CALCIUM SPEC-SCNC: 10.1 MG/DL (ref 8.6–10.5)
CHLORIDE SERPL-SCNC: 98 MMOL/L (ref 98–107)
CO2 SERPL-SCNC: 37 MMOL/L (ref 22–29)
CREAT SERPL-MCNC: 0.6 MG/DL (ref 0.76–1.27)
DEPRECATED RDW RBC AUTO: 52.4 FL (ref 37–54)
EGFRCR SERPLBLD CKD-EPI 2021: 107.1 ML/MIN/1.73
ERYTHROCYTE [DISTWIDTH] IN BLOOD BY AUTOMATED COUNT: 14.3 % (ref 12.3–15.4)
GLUCOSE SERPL-MCNC: 127 MG/DL (ref 65–99)
GRAM STN SPEC: NORMAL
HCT VFR BLD AUTO: 53.6 % (ref 37.5–51)
HGB BLD-MCNC: 16.5 G/DL (ref 13–17.7)
MAGNESIUM SERPL-MCNC: 1.9 MG/DL (ref 1.6–2.4)
MCH RBC QN AUTO: 30.5 PG (ref 26.6–33)
MCHC RBC AUTO-ENTMCNC: 30.8 G/DL (ref 31.5–35.7)
MCV RBC AUTO: 99.1 FL (ref 79–97)
PLATELET # BLD AUTO: 200 10*3/MM3 (ref 140–450)
PMV BLD AUTO: 9.5 FL (ref 6–12)
POTASSIUM SERPL-SCNC: 5.1 MMOL/L (ref 3.5–5.2)
RBC # BLD AUTO: 5.41 10*6/MM3 (ref 4.14–5.8)
SODIUM SERPL-SCNC: 139 MMOL/L (ref 136–145)
WBC NRBC COR # BLD AUTO: 10.29 10*3/MM3 (ref 3.4–10.8)

## 2025-02-03 PROCEDURE — 63710000001 PREDNISONE PER 1 MG: Performed by: INTERNAL MEDICINE

## 2025-02-03 PROCEDURE — 83735 ASSAY OF MAGNESIUM: CPT

## 2025-02-03 PROCEDURE — 25010000002 METHYLPREDNISOLONE PER 125 MG: Performed by: INTERNAL MEDICINE

## 2025-02-03 PROCEDURE — 85027 COMPLETE CBC AUTOMATED: CPT

## 2025-02-03 PROCEDURE — 94664 DEMO&/EVAL PT USE INHALER: CPT

## 2025-02-03 PROCEDURE — 25010000002 ENOXAPARIN PER 10 MG

## 2025-02-03 PROCEDURE — 94799 UNLISTED PULMONARY SVC/PX: CPT

## 2025-02-03 PROCEDURE — 99232 SBSQ HOSP IP/OBS MODERATE 35: CPT | Performed by: INTERNAL MEDICINE

## 2025-02-03 PROCEDURE — 80048 BASIC METABOLIC PNL TOTAL CA: CPT

## 2025-02-03 RX ORDER — PREDNISONE 20 MG/1
40 TABLET ORAL
Status: DISCONTINUED | OUTPATIENT
Start: 2025-02-03 | End: 2025-02-04 | Stop reason: HOSPADM

## 2025-02-03 RX ADMIN — ENOXAPARIN SODIUM 40 MG: 100 INJECTION SUBCUTANEOUS at 07:53

## 2025-02-03 RX ADMIN — PREDNISONE 40 MG: 20 TABLET ORAL at 13:16

## 2025-02-03 RX ADMIN — METHYLPREDNISOLONE SODIUM SUCCINATE 60 MG: 125 INJECTION INTRAMUSCULAR; INTRAVENOUS at 07:53

## 2025-02-03 RX ADMIN — Medication 5 MG: at 20:49

## 2025-02-03 RX ADMIN — Medication 10 ML: at 07:53

## 2025-02-03 RX ADMIN — IPRATROPIUM BROMIDE AND ALBUTEROL SULFATE 3 ML: 2.5; .5 SOLUTION RESPIRATORY (INHALATION) at 15:35

## 2025-02-03 RX ADMIN — CARVEDILOL 25 MG: 6.25 TABLET, FILM COATED ORAL at 07:53

## 2025-02-03 RX ADMIN — IPRATROPIUM BROMIDE AND ALBUTEROL SULFATE 3 ML: 2.5; .5 SOLUTION RESPIRATORY (INHALATION) at 19:41

## 2025-02-03 RX ADMIN — IPRATROPIUM BROMIDE AND ALBUTEROL SULFATE 3 ML: 2.5; .5 SOLUTION RESPIRATORY (INHALATION) at 07:55

## 2025-02-03 RX ADMIN — GUAIFENESIN 600 MG: 600 TABLET ORAL at 20:49

## 2025-02-03 RX ADMIN — BUDESONIDE AND FORMOTEROL FUMARATE DIHYDRATE 2 PUFF: 160; 4.5 AEROSOL RESPIRATORY (INHALATION) at 07:57

## 2025-02-03 RX ADMIN — IPRATROPIUM BROMIDE AND ALBUTEROL SULFATE 3 ML: 2.5; .5 SOLUTION RESPIRATORY (INHALATION) at 13:02

## 2025-02-03 RX ADMIN — SACUBITRIL AND VALSARTAN 1 TABLET: 24; 26 TABLET, FILM COATED ORAL at 20:50

## 2025-02-03 RX ADMIN — TIOTROPIUM BROMIDE INHALATION SPRAY 2 PUFF: 3.12 SPRAY, METERED RESPIRATORY (INHALATION) at 07:58

## 2025-02-03 RX ADMIN — GUAIFENESIN 600 MG: 600 TABLET ORAL at 07:53

## 2025-02-03 RX ADMIN — SACUBITRIL AND VALSARTAN 1 TABLET: 24; 26 TABLET, FILM COATED ORAL at 07:53

## 2025-02-03 RX ADMIN — CARVEDILOL 25 MG: 6.25 TABLET, FILM COATED ORAL at 17:21

## 2025-02-03 RX ADMIN — BUDESONIDE AND FORMOTEROL FUMARATE DIHYDRATE 2 PUFF: 160; 4.5 AEROSOL RESPIRATORY (INHALATION) at 19:41

## 2025-02-03 NOTE — PLAN OF CARE
Goal Outcome Evaluation:  Plan of Care Reviewed With: patient        Progress: no change  Outcome Evaluation: VSS. Pt remains on 4L NC with no signs of SOA. Pt remains NSR on the monitor. No pain noted.

## 2025-02-03 NOTE — PAYOR COMM NOTE
"Annette Marks (64 y.o. Male)     [id number]     Brunilda Whitehead RN  Utilization Review  Ceoks-[phone]  [phone]        Date of Birth   1960    Social Security Number       Address   [address] [place] [address]    Home Phone   [phone]    MRN   [id number]       Islam   None    Marital Status   Single                            Admission Date   1/30/25    Admission Type   Emergency    Admitting Provider   Gris Olea MD    Attending Provider   Gris Olea MD    Department, Room/Bed   [address], [address]/1       Discharge Date       Discharge Disposition       Discharge Destination                                 Attending Provider: Gris Olea MD    Allergies: No Known Allergies    Isolation: None   Infection: None   Code Status: CPR    Ht: 167.6 cm (65.98\")   Wt: 59.6 kg (131 lb 4.8 oz)    Admission Cmt: None   Principal Problem: COPD exacerbation [J44.1]                   Active Insurance as of 1/30/2025       Primary Coverage       Payor Plan Insurance Group Employer/Plan Group    [employer] PPO [id number]       Payor Plan Address Payor Plan Phone Number Payor Plan Fax Number Effective Dates    [id number] [phone]  1/1/2014 - None Entered    [facility] [id number]         Subscriber Name Subscriber Birth Date Member ID       ANNETTE MARKS 1960 [id number]                     Emergency Contacts        (Rel.) Home Phone Work Phone Mobile Phone    Alley Webb (Daughter) [phone] -- --    Maximiliano Marks (Son) [phone] -- [phone]              Vital Signs (last 2 days)       Date/Time Temp Temp src Pulse Resp BP Patient Position SpO2    02/03/25 1308 -- -- 86 -- -- -- 96    02/03/25 1302 -- -- -- 18 -- -- --    02/03/25 1110 98.3 (36.8) Oral 69 16 175/78 Sitting 90    02/03/25 0737 98.2 (36.8) Oral 60 16 154/75 Lying 94    02/03/25 0349 " 98.2 (36.8) Oral 72 17 155/64 Lying 93    02/02/25 2318 97.8 (36.6) Oral -- 17 125/71 Lying --    02/02/25 2005 -- -- 73 -- -- -- --    02/02/25 1929 98 (36.7) Oral 75 18 133/64 Lying 92    02/02/25 1914 -- -- 77 18 -- -- 90    02/02/25 1724 -- -- 73 -- 122/66 -- --    02/02/25 1618 -- -- 73 18 -- Lying 92    02/02/25 1446 97.9 (36.6) Oral 73 17 117/63 Lying 90    02/02/25 1227 -- -- -- 17 -- -- --    02/02/25 1124 97.6 (36.4) Oral 66 18 113/68 Lying 90    02/02/25 0939 -- -- 89 -- 132/69 -- --    02/02/25 0355 97.9 (36.6) Oral 69 18 137/73 Lying 91    02/02/25 0004 97.7 (36.5) Oral 62 18 131/67 Lying 91    02/01/25 1930 98 (36.7) Oral 63 18 139/75 Lying 97    02/01/25 1924 -- -- 60 18 -- -- 95    02/01/25 1601 -- -- -- 19 -- -- --    02/01/25 1449 -- -- 67 -- -- -- --    02/01/25 1448 97.9 (36.6) Oral 67 18 134/64 Lying 93    02/01/25 1028 -- -- -- 17 -- -- --    02/01/25 0754 98.2 (36.8) Oral 67 18 130/64 Lying 92    02/01/25 0336 97.8 (36.6) Oral 75 18 134/68 Lying 95          Oxygen Therapy (last 2 days)       Date/Time SpO2 Device (Oxygen Therapy) Flow (L/min) (Oxygen Therapy) Oxygen Concentration (%) ETCO2 (mmHg)    02/03/25 1308 96 -- 4 -- --    02/03/25 1302 -- -- 4 -- --    02/03/25 1110 90 -- -- -- --    02/03/25 1010 -- nasal cannula 4 -- --    02/03/25 0758 -- nasal cannula;humidified 4 -- --    02/03/25 0756 -- nasal cannula 4 -- --    02/03/25 0737 94 -- -- -- --    02/03/25 0600 -- nasal cannula;humidified 4 -- --    02/03/25 0400 -- nasal cannula;humidified 4 -- --    02/03/25 0349 93 -- -- -- --    02/02/25 2200 -- humidified;nasal cannula 4 -- --    02/02/25 2005 -- humidified;nasal cannula 4 -- --    02/02/25 1929 92 -- -- -- --    02/02/25 1914 90 humidified;nasal cannula 4 -- --    02/02/25 1618 92 nasal cannula 4 -- --    02/02/25 1446 90 -- -- -- --    02/02/25 1430 -- nasal cannula 4 -- --    02/02/25 1244 -- nasal cannula 4 -- --    02/02/25 1227 -- -- 4 -- --    02/02/25 1124 90 -- -- -- --     02/02/25 1000 -- -- 4 -- --    02/02/25 0953 -- -- 6 -- --    02/02/25 0941 -- nasal cannula 6 -- --    02/02/25 0810 -- nasal cannula 6 -- --    02/02/25 0600 -- humidified;nasal cannula 6 -- --    02/02/25 0400 -- humidified;nasal cannula 6 -- --    02/02/25 0355 91 -- -- -- --    02/02/25 0200 -- humidified;nasal cannula 6 -- --    02/02/25 0004 91 -- -- -- --    02/02/25 0000 -- humidified;nasal cannula 6 -- --    02/01/25 2200 -- humidified;nasal cannula 6 -- --    02/01/25 2000 -- humidified;nasal cannula 6 -- --    02/01/25 1930 97 -- -- -- --    02/01/25 1924 95 humidified;nasal cannula 6 -- --    02/01/25 1606 -- -- 6 -- --    02/01/25 1601 -- -- 6 -- --    02/01/25 1600 -- nasal cannula 6 -- --    02/01/25 1448 93 -- -- -- --    02/01/25 1200 -- nasal cannula 6 -- --    02/01/25 1028 -- -- 6 -- --    02/01/25 0800 -- humidified;nasal cannula 6 -- --    02/01/25 0754 92 -- -- -- --    02/01/25 0554 -- humidified;nasal cannula 6 -- --    02/01/25 0400 -- humidified;nasal cannula 6 -- --    02/01/25 0336 95 -- -- -- --          Lines, Drains & Airways       Active LDAs       Name Placement date Placement time Site Days    Peripheral IV 01/30/25 1013 Right Antecubital 01/30/25  1013  Antecubital  4                  Current Facility-Administered Medications   Medication Dose Route Frequency Provider Last Rate Last Admin    acetaminophen (TYLENOL) tablet 650 mg  650 mg Oral Q4H PRN Bela Leary MD        Or    acetaminophen (TYLENOL) 160 MG/5ML oral solution 650 mg  650 mg Oral Q4H PRN Bela Leary MD        Or    acetaminophen (TYLENOL) suppository 650 mg  650 mg Rectal Q4H PRN Bela Leary MD        albuterol sulfate HFA (PROVENTIL HFA;VENTOLIN HFA;PROAIR HFA) inhaler 2 puff  2 puff Inhalation Q4H PRN Bela Leary MD        sennosides-docusate (PERICOLACE) 8.6-50 MG per tablet 2 tablet  2 tablet Oral BID PRN Bela Leary MD        And    polyethylene glycol (MIRALAX) packet 17  g  17 g Oral Daily PRN Bela Leary MD        And    bisacodyl (DULCOLAX) EC tablet 5 mg  5 mg Oral Daily PRN Bela Leary MD        And    bisacodyl (DULCOLAX) suppository 10 mg  10 mg Rectal Daily PRN Bela Leary MD        budesonide-formoterol (SYMBICORT) 160-4.5 MCG/ACT inhaler 2 puff  2 puff Inhalation BID - RT Bela Leary MD   2 puff at 02/03/25 0757    Calcium Replacement - Follow Nurse / BPA Driven Protocol   Not Applicable PRN Bela Leary MD        carvedilol (COREG) tablet 25 mg  25 mg Oral BID With Meals Bela Leary MD   25 mg at 02/03/25 0753    Enoxaparin Sodium (LOVENOX) syringe 40 mg  40 mg Subcutaneous Daily Bela Leary MD   40 mg at 02/03/25 0753    guaiFENesin (MUCINEX) 12 hr tablet 600 mg  600 mg Oral Q12H Bela Leary MD   600 mg at 02/03/25 0753    ipratropium-albuterol (DUO-NEB) nebulizer solution 3 mL  3 mL Nebulization 4x Daily - RT Bucky Bustillos MD   3 mL at 02/03/25 1302    Magnesium Standard Dose Replacement - Follow Nurse / BPA Driven Protocol   Not Applicable PRBela Berger MD        melatonin tablet 5 mg  5 mg Oral Nightly Bela Leary MD   5 mg at 02/02/25 2005    nitroglycerin (NITROSTAT) SL tablet 0.4 mg  0.4 mg Sublingual Q5 Min PRN Bela Leary MD        Phosphorus Replacement - Follow Nurse / BPA Driven Protocol   Not Applicable PRN Bela Leary MD        Potassium Replacement - Follow Nurse / BPA Driven Protocol   Not Applicable PRBela Berger MD        predniSONE (DELTASONE) tablet 40 mg  40 mg Oral Daily With Breakfast Gris Olea MD   40 mg at 02/03/25 1316    sacubitril-valsartan (ENTRESTO) 24-26 MG tablet 1 tablet  1 tablet Oral BID Bela Leary MD   1 tablet at 02/03/25 0753    sodium chloride 0.9 % flush 10 mL  10 mL Intravenous PRN Bela Leary MD        sodium chloride 0.9 % flush 10 mL  10 mL Intravenous Q12H Bela Leary MD   10 mL at 02/03/25 0756     sodium chloride 0.9 % flush 10 mL  10 mL Intravenous PRN Bela Leary MD   10 mL at 01/30/25 1430    sodium chloride 0.9 % infusion 40 mL  40 mL Intravenous PRN Bela Leary MD        sodium chloride 7 % nebulizer solution nebulizer solution 4 mL  4 mL Nebulization Once Bela Leary MD        tiotropium (SPIRIVA RESPIMAT) 2.5 mcg/act aerosol solution inhaler  2 puff Inhalation Daily - RT Bela Leary MD   2 puff at 02/03/25 0758     Lab Results (last 48 hours)       Procedure Component Value Units Date/Time    Respiratory Culture - Sputum, Trachea [[id number]] Collected: 02/01/25 0959    Specimen: Sputum from Trachea Updated: 02/03/25 0919     Respiratory Culture Moderate growth (3+) Normal respiratory [place]. No S. aureus or Pseudomonas aeruginosa detected. Final report.     Gram Stain Moderate (3+) WBCs per low power field      Moderate (3+) Epithelial cells per low power field      Few (2+) Gram positive cocci    Basic Metabolic Panel [[id number]]  (Abnormal) Collected: 02/03/25 0752    Specimen: Blood Updated: 02/03/25 0842     Glucose 127 mg/dL      BUN 39 mg/dL      Creatinine 0.60 mg/dL      Sodium 139 mmol/L      Potassium 5.1 mmol/L      Comment: Slight hemolysis detected by analyzer. Result may be falsely elevated.        Chloride 98 mmol/L      CO2 37.0 mmol/L      Calcium 10.1 mg/dL      BUN/Creatinine Ratio 65.0     Anion Gap 4.0 mmol/L      eGFR 107.1 mL/min/1.73     Narrative:      GFR Categories in Chronic Kidney Disease (CKD)      GFR Category          GFR (mL/min/1.73)    Interpretation  G1                     90 or greater         Normal or high (1)  G2                      60-89                Mild decrease (1)  G3a                   45-59                Mild to moderate decrease  G3b                   30-44                Moderate to severe decrease  G4                    15-29                Severe decrease  G5                    14 or less           Kidney  failure          (1)In the absence of evidence of kidney disease, neither GFR category G1 or G2 fulfill the criteria for CKD.    eGFR calculation 2021 CKD-EPI creatinine equation, which does not include race as a factor    Magnesium [[id number]]  (Normal) Collected: 02/03/25 0752    Specimen: Blood Updated: 02/03/25 0842     Magnesium 1.9 mg/dL     CBC (No Diff) [[id number]]  (Abnormal) Collected: 02/03/25 0752    Specimen: Blood Updated: 02/03/25 0836     WBC 10.29 10*3/mm3      RBC 5.41 10*6/mm3      Hemoglobin 16.5 g/dL      Hematocrit 53.6 %      MCV 99.1 fL      MCH 30.5 pg      MCHC 30.8 g/dL      RDW 14.3 %      RDW-SD 52.4 fl      MPV 9.5 fL      Platelets 200 10*3/mm3     Basic Metabolic Panel [[id number]]  (Abnormal) Collected: 02/02/25 0628    Specimen: Blood Updated: 02/02/25 0728     Glucose 146 mg/dL      BUN 33 mg/dL      Creatinine 0.59 mg/dL      Sodium 141 mmol/L      Potassium 5.7 mmol/L      Comment: Slight hemolysis detected by analyzer. Result may be falsely elevated.        Chloride 103 mmol/L      CO2 36.0 mmol/L      Calcium 10.3 mg/dL      BUN/Creatinine Ratio 55.9     Anion Gap 2.0 mmol/L      eGFR 107.7 mL/min/1.73     Narrative:      GFR Categories in Chronic Kidney Disease (CKD)      GFR Category          GFR (mL/min/1.73)    Interpretation  G1                     90 or greater         Normal or high (1)  G2                      60-89                Mild decrease (1)  G3a                   45-59                Mild to moderate decrease  G3b                   30-44                Moderate to severe decrease  G4                    15-29                Severe decrease  G5                    14 or less           Kidney failure          (1)In the absence of evidence of kidney disease, neither GFR category G1 or G2 fulfill the criteria for CKD.    eGFR calculation 2021 CKD-EPI creatinine equation, which does not include race as a factor    Magnesium [[id number]]  (Normal) Collected: 02/02/25 0628     Specimen: Blood Updated: 25     Magnesium 1.9 mg/dL     CBC (No Diff) [[id number]]  (Abnormal) Collected: 25    Specimen: Blood Updated: 25 07     WBC 11.39 10*3/mm3      RBC 5.37 10*6/mm3      Hemoglobin 16.4 g/dL      Hematocrit 53.4 %      MCV 99.4 fL      MCH 30.5 pg      MCHC 30.7 g/dL      RDW 14.1 %      RDW-SD 51.7 fl      MPV 9.5 fL      Platelets 203 10*3/mm3           Imaging Results (Last 48 Hours)       ** No results found for the last 48 hours. **          ECG/EMG Results (last 48 hours)       ** No results found for the last 48 hours. **          Operative/Procedure Notes (last 48 hours)  Notes from 25 1327 through 25 1327   No notes of this type exist for this encounter.          Physician Progress Notes (last 48 hours)        Gris Olea MD at 25 1228              [facility] Medicine Services  PROGRESS NOTE    Patient Name: Francisco Clark  : 1960  MRN: [id number]    Date of Admission: 2025  Primary Care Physician: Charito Weiss APRN    Subjective   Subjective     CC:  soa    HPI:  No new issues overnight, just wants to know why he is having a COPD flare.       Objective   Objective     Vital Signs:   Temp:  [97.8 °F (36.6 °C)-98.3 °F (36.8 °C)] 98.3 °F (36.8 °C)  Heart Rate:  [60-77] 69  Resp:  [16-18] 16  BP: (117-175)/(63-78) 175/78  Flow (L/min) (Oxygen Therapy):  [4] 4     Physical Exam:  Constitutional: No acute distress, awake, alert, sitting up in bed  HENT: NCAT, mucous membranes moist  Respiratory: decreased BS, respiratory effort normal, on 4LNC  Cardiovascular: RRR, no murmurs, rubs, or gallops  Gastrointestinal: Positive bowel sounds, soft, nontender, nondistended  Musculoskeletal: No bilateral ankle edema  Psychiatric: Appropriate affect, cooperative  Neurologic: Oriented x 3, BUNCH, speech clear  Skin: No rashes      Results Reviewed:  LAB RESULTS:      Lab 25  0752 25  0628  02/01/25  0743 01/31/25  1439 01/30/25  1229 01/30/25  1011   WBC 10.29 11.39* 10.70 8.28  --  7.93   HEMOGLOBIN 16.5 16.4 16.8 16.5  --  17.9*   HEMATOCRIT 53.6* 53.4* 52.9* 53.7*  --  56.0*   PLATELETS 200 203 195 186  --  191   NEUTROS ABS  --   --   --   --   --  5.31   IMMATURE GRANS (ABS)  --   --   --   --   --  0.03   LYMPHS ABS  --   --   --   --   --  1.73   MONOS ABS  --   --   --   --   --  0.76   EOS ABS  --   --   --   --   --  0.06   MCV 99.1* 99.4* 96.9 100.2*  --  96.4   SED RATE  --   --   --   --   --  53*   CRP  --   --   --   --  1.76*  --    PROCALCITONIN  --   --   --   --  0.07  --    LACTATE  --   --   --   --   --  1.1   D DIMER QUANT  --   --   --   --   --  <0.27   HSTROP T  --   --   --   --  17 19         Lab 02/03/25  0752 02/02/25  0628 02/01/25  0743 01/31/25  1439 01/30/25  1229 01/30/25  1011   SODIUM 139 141 139 137  --  136   POTASSIUM 5.1 5.7* 5.1 5.1  --  4.5   CHLORIDE 98 103 101 99  --  98   CO2 37.0* 36.0* 34.0* 29.0  --  28.0   ANION GAP 4.0* 2.0* 4.0* 9.0  --  10.0   BUN 39* 33* 41* 53*  --  28*   CREATININE 0.60* 0.59* 0.59* 0.99  --  0.95   EGFR 107.1 107.7 107.7 84.5  --  88.8   GLUCOSE 127* 146* 140* 153*  --  141*   CALCIUM 10.1 10.3 10.5 9.5  --  9.7   MAGNESIUM 1.9 1.9 1.9 2.1  --   --    HEMOGLOBIN A1C  --   --   --   --   --  6.20*   TSH  --   --   --   --  1.260  --          Lab 01/30/25  1011   TOTAL PROTEIN 7.3   ALBUMIN 3.9   GLOBULIN 3.4   ALT (SGPT) 12   AST (SGOT) 20   BILIRUBIN 0.3   ALK PHOS 93         Lab 01/30/25  1229 01/30/25  1011   PROBNP  --  109.2   HSTROP T 17 19         Lab 01/30/25  1229   CHOLESTEROL 182   LDL CHOL 118*   HDL CHOL 37*   TRIGLYCERIDES 148         Lab 01/30/25  1229 01/30/25  1011   IRON 56*  --    IRON SATURATION (TSAT) 15*  --    TIBC 378  --    TRANSFERRIN 254  --    FERRITIN 80.07  --    FOLATE  --  >20.00   VITAMIN B 12  --  642         Lab 01/30/25  1447   FIO2 44   CARBOXYHEMOGLOBIN (VENOUS) 1.4     Brief Urine Lab  Results  (Last result in the past 365 days)        Color   Clarity   Blood   Leuk Est   Nitrite   Protein   CREAT   Urine HCG        01/30/25 1456 Yellow   Clear   Negative   Negative   Negative   Trace                   Microbiology Results Abnormal       None            No radiology results from the last 24 hrs    Results for orders placed during the hospital encounter of 01/30/25    Adult Transthoracic Echo Complete w/ Color, Spectral and Contrast if necessary per protocol    Interpretation Summary    Left ventricular systolic function is normal. Calculated left ventricular EF = 66.8% Left ventricular ejection fraction appears to be 56 - 60%.    Left ventricular wall thickness is consistent with mild concentric hypertrophy.    Left ventricular diastolic function was normal.    Estimated right ventricular systolic pressure from tricuspid regurgitation is mildly elevated (35-45 mmHg).    Normal global longitudinal LV strain (GLS) = -20.4%    Improved ejection fraction compared to the echo from 2023      Current medications:  Scheduled Meds:budesonide-formoterol, 2 puff, Inhalation, BID - RT  carvedilol, 25 mg, Oral, BID With Meals  enoxaparin, 40 mg, Subcutaneous, Daily  guaiFENesin, 600 mg, Oral, Q12H  ipratropium-albuterol, 3 mL, Nebulization, 4x Daily - RT  melatonin, 5 mg, Oral, Nightly  methylPREDNISolone sodium succinate, 60 mg, Intravenous, Q8H  sacubitril-valsartan, 1 tablet, Oral, BID  sodium chloride, 10 mL, Intravenous, Q12H  sodium chloride, 4 mL, Nebulization, Once  tiotropium bromide monohydrate, 2 puff, Inhalation, Daily - RT      Continuous Infusions:   PRN Meds:.  acetaminophen **OR** acetaminophen **OR** acetaminophen    albuterol sulfate HFA    senna-docusate sodium **AND** polyethylene glycol **AND** bisacodyl **AND** bisacodyl    Calcium Replacement - Follow Nurse / BPA Driven Protocol    Magnesium Standard Dose Replacement - Follow Nurse / BPA Driven Protocol    nitroglycerin    Phosphorus  Replacement - Follow Nurse / BPA Driven Protocol    Potassium Replacement - Follow Nurse / BPA Driven Protocol    sodium chloride    sodium chloride    sodium chloride    Assessment & Plan   Assessment & Plan     Active Hospital Problems    Diagnosis  POA    **COPD exacerbation [J44.1]  Yes    Erythrocytosis [D75.1]  Unknown    Cavitary lesion of lung [J98.4]  Yes    Acute on chronic HFrEF (heart failure with reduced ejection fraction) [I50.23]  Yes    Severe malnutrition [E43]  Yes    Congestive heart failure, unspecified HF chronicity, unspecified heart failure type [I50.9]  Yes    Dilated cardiomyopathy [I42.0]  Yes    Tobacco abuse [Z72.0]  Yes    Essential hypertension [I10]  Yes    Centrilobular emphysema [J43.2]  Yes      Resolved Hospital Problems   No resolved problems to display.        Brief Hospital Course to date:  Francisco Clark is a 64 y.o. male  with PMH significant for HFrEF, COPD with pulmonary cachexia, previous cavitary lung lesion, current tobacco use, HTN and HLD who presented with COPD exacerbation.      COPD exacerbation  Central lobar emphysema  Previous Cavitary lung lesion which resolved  Acute hypoxemic respiratory failure  Tobacco use current  Chronic history of COPD, current smoker, previous PFTs April 2024 revealed an FEV1 to FVC ratio of 58% dictated, low DLCO, FVC 55% predicted with FEV1 32% predicted, no postbronchodilator testing done, patient received albuterol 2 hours prior to testing.  - Follows [facility] last seen in April 2024.  -Not home O2 dependent, desatted to 77% on room air in ER  - streptococcal and Legionella urine antigens negative, viral PCR panel was negative.   --CT chest shows no acute findings, smoking related lung disease  - Incentive spirometry, aggressive pulmonary toilet, albuterol, Mucinex and hypertonic saline.  - Continue Symbicort and Spiriva  - s/p bipap overnight on first night of admission.  Doing well on 4LNC currently  -- continue nebs,  s/p 3  days zmax  -- transition to PO steroids today   --encouraged good pulmonary toilet.  IS and flutter valve     Dilated cardiomyopathy  Acute on chronic HFrEF  HTN  Previous echo TTE 2023, revealed EF of 46 to 50%, mild concentric hypertrophy of LV, Grade 2 diastolic dysfunction with RVSP <35mmhg.  - On Coreg, Jardiance and Entresto, will continue for now, renal function is at baseline.    Hyperkalemia  --s/p lokelma x1     Severe malnutrition  Pulmonary cachexia  Significant COPD, patient has evidence of pulmonary cachexia on physical examination  - Total protein and albumin okay, HbA1c 6.1, TSH wnl  -Consulted nutritional services        Expected Discharge Location and Transportation: home tomorrow if on <4 L O2 BNC for 24 hours and if doing well on PO steroids   Expected Discharge   Expected Discharge Date: 2025; Expected Discharge Time:      VTE Prophylaxis:  Pharmacologic & mechanical VTE prophylaxis orders are present.         AM-PAC 6 Clicks Score (PT): 24 (25)    CODE STATUS:   Code Status and Medical Interventions: CPR (Attempt to Resuscitate); Full Support   Ordered at: 25 1317     Level Of Support Discussed With:    Patient     Code Status (Patient has no pulse and is not breathing):    CPR (Attempt to Resuscitate)     Medical Interventions (Patient has pulse or is breathing):    Full Support       Gris Olea MD  25        Electronically signed by Gris Olea MD at 25 1233       Bucky Bustillos MD at 25 1243              [facility] Medicine Services  PROGRESS NOTE    Patient Name: Francisco Clark  : 1960  MRN: [id number]    Date of Admission: 2025  Primary Care Physician: Charito Weiss APRN    Subjective   Subjective     CC:  soa    HPI:  Reports that he feels ok.  Breathing ok.        Objective   Objective     Vital Signs:   Temp:  [97.6 °F (36.4 °C)-98 °F (36.7 °C)] 97.6 °F (36.4 °C)  Heart  Rate:  [60-89] 66  Resp:  [17-19] 17  BP: (113-139)/(64-75) 113/68  Flow (L/min) (Oxygen Therapy):  [4-6] 4     Physical Exam:  Constitutional: No acute distress, awake, alert, sitting up in bed  HENT: NCAT, mucous membranes moist  Respiratory: decreased BS, respiratory effort normal, on 6LNC  Cardiovascular: RRR, no murmurs, rubs, or gallops  Gastrointestinal: Positive bowel sounds, soft, nontender, nondistended  Musculoskeletal: No bilateral ankle edema  Psychiatric: Appropriate affect, cooperative  Neurologic: Oriented x 3, BUNCH, speech clear  Skin: No rashes      Results Reviewed:  LAB RESULTS:      Lab 02/02/25  0628 02/01/25  0743 01/31/25  1439 01/30/25  1229 01/30/25  1011   WBC 11.39* 10.70 8.28  --  7.93   HEMOGLOBIN 16.4 16.8 16.5  --  17.9*   HEMATOCRIT 53.4* 52.9* 53.7*  --  56.0*   PLATELETS 203 195 186  --  191   NEUTROS ABS  --   --   --   --  5.31   IMMATURE GRANS (ABS)  --   --   --   --  0.03   LYMPHS ABS  --   --   --   --  1.73   MONOS ABS  --   --   --   --  0.76   EOS ABS  --   --   --   --  0.06   MCV 99.4* 96.9 100.2*  --  96.4   SED RATE  --   --   --   --  53*   CRP  --   --   --  1.76*  --    PROCALCITONIN  --   --   --  0.07  --    LACTATE  --   --   --   --  1.1   D DIMER QUANT  --   --   --   --  <0.27   HSTROP T  --   --   --  17 19         Lab 02/02/25  0628 02/01/25  0743 01/31/25  1439 01/30/25  1229 01/30/25  1011   SODIUM 141 139 137  --  136   POTASSIUM 5.7* 5.1 5.1  --  4.5   CHLORIDE 103 101 99  --  98   CO2 36.0* 34.0* 29.0  --  28.0   ANION GAP 2.0* 4.0* 9.0  --  10.0   BUN 33* 41* 53*  --  28*   CREATININE 0.59* 0.59* 0.99  --  0.95   EGFR 107.7 107.7 84.5  --  88.8   GLUCOSE 146* 140* 153*  --  141*   CALCIUM 10.3 10.5 9.5  --  9.7   MAGNESIUM 1.9 1.9 2.1  --   --    HEMOGLOBIN A1C  --   --   --   --  6.20*   TSH  --   --   --  1.260  --          Lab 01/30/25  1011   TOTAL PROTEIN 7.3   ALBUMIN 3.9   GLOBULIN 3.4   ALT (SGPT) 12   AST (SGOT) 20   BILIRUBIN 0.3   ALK PHOS 93          Lab 01/30/25  1229 01/30/25  1011   PROBNP  --  109.2   HSTROP T 17 19         Lab 01/30/25  1229   CHOLESTEROL 182   LDL CHOL 118*   HDL CHOL 37*   TRIGLYCERIDES 148         Lab 01/30/25  1229 01/30/25  1011   IRON 56*  --    IRON SATURATION (TSAT) 15*  --    TIBC 378  --    TRANSFERRIN 254  --    FERRITIN 80.07  --    FOLATE  --  >20.00   VITAMIN B 12  --  642         Lab 01/30/25  1447   FIO2 44   CARBOXYHEMOGLOBIN (VENOUS) 1.4     Brief Urine Lab Results  (Last result in the past 365 days)        Color   Clarity   Blood   Leuk Est   Nitrite   Protein   CREAT   Urine HCG        01/30/25 1456 Yellow   Clear   Negative   Negative   Negative   Trace                   Microbiology Results Abnormal       None            No radiology results from the last 24 hrs    Results for orders placed during the hospital encounter of 01/30/25    Adult Transthoracic Echo Complete w/ Color, Spectral and Contrast if necessary per protocol    Interpretation Summary    Left ventricular systolic function is normal. Calculated left ventricular EF = 66.8% Left ventricular ejection fraction appears to be 56 - 60%.    Left ventricular wall thickness is consistent with mild concentric hypertrophy.    Left ventricular diastolic function was normal.    Estimated right ventricular systolic pressure from tricuspid regurgitation is mildly elevated (35-45 mmHg).    Normal global longitudinal LV strain (GLS) = -20.4%    Improved ejection fraction compared to the echo from 2023      Current medications:  Scheduled Meds:azithromycin, 500 mg, Intravenous, Q24H  budesonide-formoterol, 2 puff, Inhalation, BID - RT  carvedilol, 25 mg, Oral, BID With Meals  enoxaparin, 40 mg, Subcutaneous, Daily  guaiFENesin, 600 mg, Oral, Q12H  ipratropium-albuterol, 3 mL, Nebulization, 4x Daily - RT  melatonin, 5 mg, Oral, Nightly  methylPREDNISolone sodium succinate, 60 mg, Intravenous, Q8H  sacubitril-valsartan, 1 tablet, Oral, BID  sodium chloride, 10 mL,  Intravenous, Q12H  sodium chloride, 4 mL, Nebulization, Once  tiotropium bromide monohydrate, 2 puff, Inhalation, Daily - RT      Continuous Infusions:   PRN Meds:.  acetaminophen **OR** acetaminophen **OR** acetaminophen    albuterol sulfate HFA    senna-docusate sodium **AND** polyethylene glycol **AND** bisacodyl **AND** bisacodyl    Calcium Replacement - Follow Nurse / BPA Driven Protocol    Magnesium Standard Dose Replacement - Follow Nurse / BPA Driven Protocol    nitroglycerin    Phosphorus Replacement - Follow Nurse / BPA Driven Protocol    Potassium Replacement - Follow Nurse / BPA Driven Protocol    sodium chloride    sodium chloride    sodium chloride    Assessment & Plan   Assessment & Plan     Active Hospital Problems    Diagnosis  POA    **COPD exacerbation [J44.1]  Yes    Erythrocytosis [D75.1]  Unknown    Cavitary lesion of lung [J98.4]  Yes    Acute on chronic HFrEF (heart failure with reduced ejection fraction) [I50.23]  Yes    Severe malnutrition [E43]  Yes    Congestive heart failure, unspecified HF chronicity, unspecified heart failure type [I50.9]  Yes    Dilated cardiomyopathy [I42.0]  Yes    Tobacco abuse [Z72.0]  Yes    Essential hypertension [I10]  Yes    Centrilobular emphysema [J43.2]  Yes      Resolved Hospital Problems   No resolved problems to display.        Brief Hospital Course to date:  Francisco Clark is a 64 y.o. male  PMH significant for HFrEF, COPD with pulmonary cachexia, previous cavitary lung lesion, current tobacco use, HTN and HLD.  Presenting to [facility] ED due to significant shortness of breath, fatigue, malaise, subjective chills as well as chest tightness with wheezing.  Also endorses symptoms of palpitations, orthopnea with PND.  He also endorses increased phlegm production however does not recall the color.  Not on oxygen at home, requiring 6 L nasal cannula oxygen desaturated 90%.  Currently in COPD exacerbation.     COPD exacerbation  Central  lobar emphysema  Previous Cavitary lung lesion which resolved  Acute hypoxemic respiratory failure  Tobacco use current  Chronic history of COPD, current smoker, previous PFTs April 2024 revealed a FEV1 to FVC ratio of 58% dictated, low DLCO, FVC 55% predicted with FEV1 32% predicted, no postbronchodilator testing done, patient received albuterol 2 hours prior to testing.  - Follows [facility] pulmonology last seen in April 2024.  -Not home O2 dependent, desatted to 77% on room air in ER  - streptococcal and Legionella urine antigens negative, viral PCR panel was negative.   --CT chest shows no acute findings, smoking related lung disease  - Incentive spirometry, aggressive pulmonary toilet, albuterol, Mucinex and hypertonic saline.  - Continue Symbicort and Spiriva  - s/p bipap overnight.  Doing well on 6LNC currently, d/w respiratory who was at bedside and asked to try to wean down  --continue solumedrol 60mg q8h, continue nebs, s/p 3  days zmax  --encouraged good pulmonary toilet.  IS and flutter valve     Dilated cardiomyopathy  Acute on chronic HFrEF  HTN  Previous echo TTE January 2023, revealed EF of 46 to 50%, mild concentric hypertrophy of LV, G2 DD with RVSP <35mmhg.  - On Coreg, Jardiance and Entresto, will continue for now, renal function is at baseline.    Hyperkalemia  --lokelma x1     Severe malnutrition  Pulmonary cachexia  Significant COPD, patient has evidence of pulmonary cachexia on physical examination  - Total protein and albumin is okay on physical examination, HbA1c 6.1, TSH wnl  -Consulted nutritional services        Expected Discharge Location and Transportation:   Expected Discharge   Expected Discharge Date: 2/4/2025; Expected Discharge Time:      VTE Prophylaxis:  Pharmacologic & mechanical VTE prophylaxis orders are present.         AM-PAC 6 Clicks Score (PT): 24 (02/01/25 2000)    CODE STATUS:   Code Status and Medical Interventions: CPR (Attempt to Resuscitate); Full Support   Ordered  at: 25 1317     Level Of Support Discussed With:    Patient     Code Status (Patient has no pulse and is not breathing):    CPR (Attempt to Resuscitate)     Medical Interventions (Patient has pulse or is breathing):    Full Support       Bucky Bustillos MD  25        Electronically signed by Bucky Bustillos MD at 25 1249       Bucky Bustillos MD at 25 1401              [facility] Medicine Services  PROGRESS NOTE    Patient Name: Francisco Clark  : 1960  MRN: [id number]    Date of Admission: 2025  Primary Care Physician: Charito Weiss APRN    Subjective   Subjective     CC:  soa    HPI:  States that he feels good.  Denies SOA.        Objective   Objective     Vital Signs:   Temp:  [97.7 °F (36.5 °C)-98.2 °F (36.8 °C)] 98.2 °F (36.8 °C)  Heart Rate:  [65-75] 67  Resp:  [16-18] 17  BP: (108-134)/(53-68) 130/64  Flow (L/min) (Oxygen Therapy):  [6] 6     Physical Exam:  Constitutional: No acute distress, awake, alert, sitting up in bed  HENT: NCAT, mucous membranes moist  Respiratory: decreased BS, respiratory effort normal, on 6LNC  Cardiovascular: RRR, no murmurs, rubs, or gallops  Gastrointestinal: Positive bowel sounds, soft, nontender, nondistended  Musculoskeletal: No bilateral ankle edema  Psychiatric: Appropriate affect, cooperative  Neurologic: Oriented x 3, BUNCH, speech clear  Skin: No rashes      Results Reviewed:  LAB RESULTS:      Lab 25  0743 25  1439 25  1229 25  1011   WBC 10.70 8.28  --  7.93   HEMOGLOBIN 16.8 16.5  --  17.9*   HEMATOCRIT 52.9* 53.7*  --  56.0*   PLATELETS 195 186  --  191   NEUTROS ABS  --   --   --  5.31   IMMATURE GRANS (ABS)  --   --   --  0.03   LYMPHS ABS  --   --   --  1.73   MONOS ABS  --   --   --  0.76   EOS ABS  --   --   --  0.06   MCV 96.9 100.2*  --  96.4   SED RATE  --   --   --  53*   CRP  --   --  1.76*  --    PROCALCITONIN  --   --  0.07  --    LACTATE  --   --   --  1.1   D DIMER  QUANT  --   --   --  <0.27   HSTROP T  --   --  17 19         Lab 02/01/25  0743 01/31/25  1439 01/30/25  1229 01/30/25  1011   SODIUM 139 137  --  136   POTASSIUM 5.1 5.1  --  4.5   CHLORIDE 101 99  --  98   CO2 34.0* 29.0  --  28.0   ANION GAP 4.0* 9.0  --  10.0   BUN 41* 53*  --  28*   CREATININE 0.59* 0.99  --  0.95   EGFR 107.7 84.5  --  88.8   GLUCOSE 140* 153*  --  141*   CALCIUM 10.5 9.5  --  9.7   MAGNESIUM 1.9 2.1  --   --    HEMOGLOBIN A1C  --   --   --  6.20*   TSH  --   --  1.260  --          Lab 01/30/25  1011   TOTAL PROTEIN 7.3   ALBUMIN 3.9   GLOBULIN 3.4   ALT (SGPT) 12   AST (SGOT) 20   BILIRUBIN 0.3   ALK PHOS 93         Lab 01/30/25  1229 01/30/25  1011   PROBNP  --  109.2   HSTROP T 17 19         Lab 01/30/25  1229   CHOLESTEROL 182   LDL CHOL 118*   HDL CHOL 37*   TRIGLYCERIDES 148         Lab 01/30/25  1229 01/30/25  1011   IRON 56*  --    IRON SATURATION (TSAT) 15*  --    TIBC 378  --    TRANSFERRIN 254  --    FERRITIN 80.07  --    FOLATE  --  >20.00   VITAMIN B 12  --  642         Lab 01/30/25  1447   FIO2 44   CARBOXYHEMOGLOBIN (VENOUS) 1.4     Brief Urine Lab Results  (Last result in the past 365 days)        Color   Clarity   Blood   Leuk Est   Nitrite   Protein   CREAT   Urine HCG        01/30/25 1456 Yellow   Clear   Negative   Negative   Negative   Trace                   Microbiology Results Abnormal       None            CT Chest Without Contrast Diagnostic    Result Date: 1/30/2025  CT CHEST WO CONTRAST DIAGNOSTIC Date of Exam: 1/30/2025 3:42 PM EST Indication: COPD exacerbation/PNA. Comparison: Chest CT 4/1/2024. Chest radiograph 1/30/2025. Technique: Axial CT images were obtained of the chest without contrast administration.  Reconstructed coronal and sagittal images were also obtained. Automated exposure control and iterative construction methods were used. Findings: Thyroid and thoracic inlet: No significant abnormality. Lymph nodes: No pathologic appearing lymph nodes by  imaging criteria. Cardiovascular: Normal appearing heart size. No pericardial effusion. Aorta and main pulmonary artery diameters are within normal range.. There are coronary artery calcifications. Aortic annular calcifications. Aortic atherosclerosis. Esophagus: Small hiatal hernia. Lung parenchyma: Emphysema. Scattered atelectasis and/or scarring. Calcified granulomas. No suspicious appearing pulmonary nodules, masses, or opacities. Airways: Small volume secretions in the lower trachea and right mainstem bronchus. Pleura: No pleural effusion or pneumothorax. Chest wall and osseous structures: Degenerative changes of the imaged spine. No acute osseous abnormality. Included abdomen: Cholelithiasis. Unchanged appearing 1 cm hypoattenuating nodule adjacent to versus arising from the left adrenal gland, possibly an adenoma or a nonspecific mildly enlarged lymph node.     Impression: Impression: No acute intrathoracic findings. Smoking-related lung disease. Electronically Signed: Rodney Ley  1/30/2025 4:09 PM EST  Workstation ID: [id number]     Results for orders placed during the hospital encounter of 01/30/25    Adult Transthoracic Echo Complete w/ Color, Spectral and Contrast if necessary per protocol    Interpretation Summary    Left ventricular systolic function is normal. Calculated left ventricular EF = 66.8% Left ventricular ejection fraction appears to be 56 - 60%.    Left ventricular wall thickness is consistent with mild concentric hypertrophy.    Left ventricular diastolic function was normal.    Estimated right ventricular systolic pressure from tricuspid regurgitation is mildly elevated (35-45 mmHg).    Normal global longitudinal LV strain (GLS) = -20.4%    Improved ejection fraction compared to the echo from 2023      Current medications:  Scheduled Meds:azithromycin, 500 mg, Intravenous, Q24H  budesonide-formoterol, 2 puff, Inhalation, BID - RT  carvedilol, 25 mg, Oral, BID With Meals  enoxaparin, 40  mg, Subcutaneous, Daily  guaiFENesin, 600 mg, Oral, Q12H  melatonin, 5 mg, Oral, Nightly  methylPREDNISolone sodium succinate, 60 mg, Intravenous, Q8H  sacubitril-valsartan, 1 tablet, Oral, BID  sodium chloride, 10 mL, Intravenous, Q12H  sodium chloride, 4 mL, Nebulization, Once  tiotropium bromide monohydrate, 2 puff, Inhalation, Daily - RT      Continuous Infusions:   PRN Meds:.  acetaminophen **OR** acetaminophen **OR** acetaminophen    albuterol sulfate HFA    senna-docusate sodium **AND** polyethylene glycol **AND** bisacodyl **AND** bisacodyl    Calcium Replacement - Follow Nurse / BPA Driven Protocol    Magnesium Standard Dose Replacement - Follow Nurse / BPA Driven Protocol    nitroglycerin    Phosphorus Replacement - Follow Nurse / BPA Driven Protocol    Potassium Replacement - Follow Nurse / BPA Driven Protocol    sodium chloride    sodium chloride    sodium chloride    Assessment & Plan   Assessment & Plan     Active Hospital Problems    Diagnosis  POA    **COPD exacerbation [J44.1]  Yes    Erythrocytosis [D75.1]  Unknown    Cavitary lesion of lung [J98.4]  Yes    Acute on chronic HFrEF (heart failure with reduced ejection fraction) [I50.23]  Yes    Severe malnutrition [E43]  Yes    Congestive heart failure, unspecified HF chronicity, unspecified heart failure type [I50.9]  Yes    Dilated cardiomyopathy [I42.0]  Yes    Tobacco abuse [Z72.0]  Yes    Essential hypertension [I10]  Yes    Centrilobular emphysema [J43.2]  Yes      Resolved Hospital Problems   No resolved problems to display.        Brief Hospital Course to date:  Francisco Clark is a 64 y.o. male  PMH significant for HFrEF, COPD with pulmonary cachexia, previous cavitary lung lesion, current tobacco use, HTN and HLD.  Presenting to [facility] ED due to significant shortness of breath, fatigue, malaise, subjective chills as well as chest tightness with wheezing.  Also endorses symptoms of palpitations, orthopnea with PND.  He  also endorses increased phlegm production however does not recall the color.  Not on oxygen at home, requiring 6 L nasal cannula oxygen desaturated 90%.  Currently in COPD exacerbation.     COPD exacerbation  Central lobar emphysema  Cavitary lung lesion  Acute hypoxemic respiratory failure  Tobacco use current  Chronic history of COPD, current smoker, previous PFTs April 2024 revealed a FEV1 to FVC ratio of 58% dictated, low DLCO, FVC 55% predicted with FEV1 32% predicted, no postbronchodilator testing done, patient received albuterol 2 hours prior to testing.  - Follows [facility] pulmonology last seen in April 2024.  - On room air at home, requiring 6 L nasal cannula oxygen to sat at 90%, desatted to 77% on room air  - streptococcal and Legionella urine antigens negative, viral PCR panel was negative.   - Will check a CT chest without contrast as patient had previous teary lesion which was solved in RUL on CT from April 2024.    --CT chest shows no acute findings, smoking related lung disease  - Incentive spirometry, aggressive pulmonary toilet, albuterol, Mucinex and hypertonic saline.  - Continue Symbicort and Spiriva  - s/p bipap overnight.  Doing well on 6LNC currenlty  --continue solumedrol 60mg q8h, continue nebs, zmax day 3  --encouraged good pulmonary toilet.  IS and flutter valve     Dilated cardiomyopathy  Acute on chronic HFrEF  HTN  Previous echo TTE January 2023, revealed EF of 46 to 50%, mild concentric hypertrophy of LV, G2 DD with RVSP <35mmhg.  - On Coreg, Jardiance and Entresto, will continue for now, renal function is at baseline.     Severe malnutrition  Pulmonary cachexia  Significant COPD, patient has evidence of pulmonary cachexia on physical examination  - Total protein and albumin is okay on physical examination, HbA1c 6.1, TSH wnl  - Consulted nutritional services        Expected Discharge Location and Transportation:   Expected Discharge   Expected Discharge Date: 2/3/2025; Expected  Discharge Time:      VTE Prophylaxis:  Pharmacologic & mechanical VTE prophylaxis orders are present.         AM-PAC 6 Clicks Score (PT): 23 (01/31/25 2000)    CODE STATUS:   Code Status and Medical Interventions: CPR (Attempt to Resuscitate); Full Support   Ordered at: 01/30/25 1317     Level Of Support Discussed With:    Patient     Code Status (Patient has no pulse and is not breathing):    CPR (Attempt to Resuscitate)     Medical Interventions (Patient has pulse or is breathing):    Full Support       Bucky Bustillos MD  02/01/25        Electronically signed by Bucky Bustillos MD at 02/01/25 1404       Consult Notes (last 48 hours)  Notes from 02/01/25 1327 through 02/03/25 1327   No notes of this type exist for this encounter.

## 2025-02-03 NOTE — CASE MANAGEMENT/SOCIAL WORK
Continued Stay Note  Psychiatric     Patient Name: Francisco Clark  MRN: [id number]  Today's Date: 2/3/2025    Admit Date: 1/30/2025    Plan: home   Discharge Plan       Row Name 02/03/25 1626       Plan    Plan home    Plan Comments Spoke with patient at bedside. Patient discharge goal is home. Patient on oxygen today. Will likely need at discharge. CM will follow.    Final Discharge Disposition Code 01 - home or self-care                   Discharge Codes    No documentation.                 Expected Discharge Date and Time       Expected Discharge Date Expected Discharge Time    Feb 4, 2025               Rebeca Colmenares RN

## 2025-02-03 NOTE — PROGRESS NOTES
[facility] Medicine Services  PROGRESS NOTE    Patient Name: Francisco Clark  : 1960  MRN: [id number]    Date of Admission: 2025  Primary Care Physician: Charito Weiss APRN    Subjective   Subjective     CC:  soa    HPI:  No new issues overnight, just wants to know why he is having a COPD flare.       Objective   Objective     Vital Signs:   Temp:  [97.8 °F (36.6 °C)-98.3 °F (36.8 °C)] 98.3 °F (36.8 °C)  Heart Rate:  [60-77] 69  Resp:  [16-18] 16  BP: (117-175)/(63-78) 175/78  Flow (L/min) (Oxygen Therapy):  [4] 4     Physical Exam:  Constitutional: No acute distress, awake, alert, sitting up in bed  HENT: NCAT, mucous membranes moist  Respiratory: decreased BS, respiratory effort normal, on 4LNC  Cardiovascular: RRR, no murmurs, rubs, or gallops  Gastrointestinal: Positive bowel sounds, soft, nontender, nondistended  Musculoskeletal: No bilateral ankle edema  Psychiatric: Appropriate affect, cooperative  Neurologic: Oriented x 3, BUNCH, speech clear  Skin: No rashes      Results Reviewed:  LAB RESULTS:      Lab 25  0752 25  0628 25  0743 25  1439 25  1229 25  1011   WBC 10.29 11.39* 10.70 8.28  --  7.93   HEMOGLOBIN 16.5 16.4 16.8 16.5  --  17.9*   HEMATOCRIT 53.6* 53.4* 52.9* 53.7*  --  56.0*   PLATELETS 200 203 195 186  --  191   NEUTROS ABS  --   --   --   --   --  5.31   IMMATURE GRANS (ABS)  --   --   --   --   --  0.03   LYMPHS ABS  --   --   --   --   --  1.73   MONOS ABS  --   --   --   --   --  0.76   EOS ABS  --   --   --   --   --  0.06   MCV 99.1* 99.4* 96.9 100.2*  --  96.4   SED RATE  --   --   --   --   --  53*   CRP  --   --   --   --  1.76*  --    PROCALCITONIN  --   --   --   --  0.07  --    LACTATE  --   --   --   --   --  1.1   D DIMER QUANT  --   --   --   --   --  <0.27   HSTROP T  --   --   --   --  17 19         Lab 25  0752 25  0628 25  0743 25  1439 25  1229 25  1011   SODIUM  139 141 139 137  --  136   POTASSIUM 5.1 5.7* 5.1 5.1  --  4.5   CHLORIDE 98 103 101 99  --  98   CO2 37.0* 36.0* 34.0* 29.0  --  28.0   ANION GAP 4.0* 2.0* 4.0* 9.0  --  10.0   BUN 39* 33* 41* 53*  --  28*   CREATININE 0.60* 0.59* 0.59* 0.99  --  0.95   EGFR 107.1 107.7 107.7 84.5  --  88.8   GLUCOSE 127* 146* 140* 153*  --  141*   CALCIUM 10.1 10.3 10.5 9.5  --  9.7   MAGNESIUM 1.9 1.9 1.9 2.1  --   --    HEMOGLOBIN A1C  --   --   --   --   --  6.20*   TSH  --   --   --   --  1.260  --          Lab 01/30/25  1011   TOTAL PROTEIN 7.3   ALBUMIN 3.9   GLOBULIN 3.4   ALT (SGPT) 12   AST (SGOT) 20   BILIRUBIN 0.3   ALK PHOS 93         Lab 01/30/25  1229 01/30/25  1011   PROBNP  --  109.2   HSTROP T 17 19         Lab 01/30/25  1229   CHOLESTEROL 182   LDL CHOL 118*   HDL CHOL 37*   TRIGLYCERIDES 148         Lab 01/30/25  1229 01/30/25  1011   IRON 56*  --    IRON SATURATION (TSAT) 15*  --    TIBC 378  --    TRANSFERRIN 254  --    FERRITIN 80.07  --    FOLATE  --  >20.00   VITAMIN B 12  --  642         Lab 01/30/25  1447   FIO2 44   CARBOXYHEMOGLOBIN (VENOUS) 1.4     Brief Urine Lab Results  (Last result in the past 365 days)        Color   Clarity   Blood   Leuk Est   Nitrite   Protein   CREAT   Urine HCG        01/30/25 1456 Yellow   Clear   Negative   Negative   Negative   Trace                   Microbiology Results Abnormal       None            No radiology results from the last 24 hrs    Results for orders placed during the hospital encounter of 01/30/25    Adult Transthoracic Echo Complete w/ Color, Spectral and Contrast if necessary per protocol    Interpretation Summary    Left ventricular systolic function is normal. Calculated left ventricular EF = 66.8% Left ventricular ejection fraction appears to be 56 - 60%.    Left ventricular wall thickness is consistent with mild concentric hypertrophy.    Left ventricular diastolic function was normal.    Estimated right ventricular systolic pressure from tricuspid  regurgitation is mildly elevated (35-45 mmHg).    Normal global longitudinal LV strain (GLS) = -20.4%    Improved ejection fraction compared to the echo from 2023      Current medications:  Scheduled Meds:budesonide-formoterol, 2 puff, Inhalation, BID - RT  carvedilol, 25 mg, Oral, BID With Meals  enoxaparin, 40 mg, Subcutaneous, Daily  guaiFENesin, 600 mg, Oral, Q12H  ipratropium-albuterol, 3 mL, Nebulization, 4x Daily - RT  melatonin, 5 mg, Oral, Nightly  methylPREDNISolone sodium succinate, 60 mg, Intravenous, Q8H  sacubitril-valsartan, 1 tablet, Oral, BID  sodium chloride, 10 mL, Intravenous, Q12H  sodium chloride, 4 mL, Nebulization, Once  tiotropium bromide monohydrate, 2 puff, Inhalation, Daily - RT      Continuous Infusions:   PRN Meds:.  acetaminophen **OR** acetaminophen **OR** acetaminophen    albuterol sulfate HFA    senna-docusate sodium **AND** polyethylene glycol **AND** bisacodyl **AND** bisacodyl    Calcium Replacement - Follow Nurse / BPA Driven Protocol    Magnesium Standard Dose Replacement - Follow Nurse / BPA Driven Protocol    nitroglycerin    Phosphorus Replacement - Follow Nurse / BPA Driven Protocol    Potassium Replacement - Follow Nurse / BPA Driven Protocol    sodium chloride    sodium chloride    sodium chloride    Assessment & Plan   Assessment & Plan     Active Hospital Problems    Diagnosis  POA    **COPD exacerbation [J44.1]  Yes    Erythrocytosis [D75.1]  Unknown    Cavitary lesion of lung [J98.4]  Yes    Acute on chronic HFrEF (heart failure with reduced ejection fraction) [I50.23]  Yes    Severe malnutrition [E43]  Yes    Congestive heart failure, unspecified HF chronicity, unspecified heart failure type [I50.9]  Yes    Dilated cardiomyopathy [I42.0]  Yes    Tobacco abuse [Z72.0]  Yes    Essential hypertension [I10]  Yes    Centrilobular emphysema [J43.2]  Yes      Resolved Hospital Problems   No resolved problems to display.        Brief Hospital Course to date:  Francisco Davis  Amber is a 64 y.o. male  with PMH significant for HFrEF, COPD with pulmonary cachexia, previous cavitary lung lesion, current tobacco use, HTN and HLD who presented with COPD exacerbation.      COPD exacerbation  Central lobar emphysema  Previous Cavitary lung lesion which resolved  Acute hypoxemic respiratory failure  Tobacco use current  Chronic history of COPD, current smoker, previous PFTs April 2024 revealed an FEV1 to FVC ratio of 58% dictated, low DLCO, FVC 55% predicted with FEV1 32% predicted, no postbronchodilator testing done, patient received albuterol 2 hours prior to testing.  - Follows [facility] pulmonology last seen in April 2024.  -Not home O2 dependent, desatted to 77% on room air in ER  - streptococcal and Legionella urine antigens negative, viral PCR panel was negative.   --CT chest shows no acute findings, smoking related lung disease  - Incentive spirometry, aggressive pulmonary toilet, albuterol, Mucinex and hypertonic saline.  - Continue Symbicort and Spiriva  - s/p bipap overnight on first night of admission.  Doing well on 4LNC currently  -- continue nebs, s/p 3  days zmax  -- transition to PO steroids today   --encouraged good pulmonary toilet.  IS and flutter valve     Dilated cardiomyopathy  Acute on chronic HFrEF  HTN  Previous echo TTE January 2023, revealed EF of 46 to 50%, mild concentric hypertrophy of LV, Grade 2 diastolic dysfunction with RVSP <35mmhg.  - On Coreg, Jardiance and Entresto, will continue for now, renal function is at baseline.    Hyperkalemia  --s/p lokelma x1     Severe malnutrition  Pulmonary cachexia  Significant COPD, patient has evidence of pulmonary cachexia on physical examination  - Total protein and albumin okay, HbA1c 6.1, TSH wnl  -Consulted nutritional services        Expected Discharge Location and Transportation: home tomorrow if on <4 L O2 BNC for 24 hours and if doing well on PO steroids   Expected Discharge   Expected Discharge Date: 2/4/2025;  Expected Discharge Time:      VTE Prophylaxis:  Pharmacologic & mechanical VTE prophylaxis orders are present.         AM-PAC 6 Clicks Score (PT): 24 (02/01/25 2000)    CODE STATUS:   Code Status and Medical Interventions: CPR (Attempt to Resuscitate); Full Support   Ordered at: 01/30/25 1317     Level Of Support Discussed With:    Patient     Code Status (Patient has no pulse and is not breathing):    CPR (Attempt to Resuscitate)     Medical Interventions (Patient has pulse or is breathing):    Full Support       Gris Olea MD  02/03/25

## 2025-02-04 ENCOUNTER — READMISSION MANAGEMENT (OUTPATIENT)
Dept: CALL CENTER | Facility: HOSPITAL | Age: 65
End: 2025-02-04
Payer: COMMERCIAL

## 2025-02-04 VITALS
DIASTOLIC BLOOD PRESSURE: 82 MMHG | SYSTOLIC BLOOD PRESSURE: 143 MMHG | WEIGHT: 131.3 LBS | TEMPERATURE: 97.7 F | OXYGEN SATURATION: 91 % | BODY MASS INDEX: 21.1 KG/M2 | RESPIRATION RATE: 18 BRPM | HEIGHT: 66 IN | HEART RATE: 72 BPM

## 2025-02-04 LAB
DEPRECATED RDW RBC AUTO: 51.7 FL (ref 37–54)
ERYTHROCYTE [DISTWIDTH] IN BLOOD BY AUTOMATED COUNT: 14.3 % (ref 12.3–15.4)
HCT VFR BLD AUTO: 53 % (ref 37.5–51)
HGB BLD-MCNC: 16.4 G/DL (ref 13–17.7)
MCH RBC QN AUTO: 30.4 PG (ref 26.6–33)
MCHC RBC AUTO-ENTMCNC: 30.9 G/DL (ref 31.5–35.7)
MCV RBC AUTO: 98.3 FL (ref 79–97)
PLATELET # BLD AUTO: 195 10*3/MM3 (ref 140–450)
PMV BLD AUTO: 9.5 FL (ref 6–12)
RBC # BLD AUTO: 5.39 10*6/MM3 (ref 4.14–5.8)
WBC NRBC COR # BLD AUTO: 9.79 10*3/MM3 (ref 3.4–10.8)

## 2025-02-04 PROCEDURE — 94799 UNLISTED PULMONARY SVC/PX: CPT

## 2025-02-04 PROCEDURE — 99239 HOSP IP/OBS DSCHRG MGMT >30: CPT | Performed by: PHYSICIAN ASSISTANT

## 2025-02-04 PROCEDURE — 25010000002 ENOXAPARIN PER 10 MG

## 2025-02-04 PROCEDURE — 94664 DEMO&/EVAL PT USE INHALER: CPT

## 2025-02-04 PROCEDURE — 63710000001 PREDNISONE PER 1 MG: Performed by: INTERNAL MEDICINE

## 2025-02-04 PROCEDURE — 94761 N-INVAS EAR/PLS OXIMETRY MLT: CPT

## 2025-02-04 PROCEDURE — 85027 COMPLETE CBC AUTOMATED: CPT

## 2025-02-04 RX ORDER — GUAIFENESIN 600 MG/1
600 TABLET, EXTENDED RELEASE ORAL EVERY 12 HOURS SCHEDULED
Qty: 60 TABLET | Refills: 0 | Status: SHIPPED | OUTPATIENT
Start: 2025-02-04

## 2025-02-04 RX ORDER — PREDNISONE 10 MG/1
TABLET ORAL
Qty: 22 TABLET | Refills: 0 | Status: SHIPPED | OUTPATIENT
Start: 2025-02-05 | End: 2025-02-15

## 2025-02-04 RX ORDER — IPRATROPIUM BROMIDE AND ALBUTEROL SULFATE 2.5; .5 MG/3ML; MG/3ML
3 SOLUTION RESPIRATORY (INHALATION) EVERY 4 HOURS PRN
Qty: 360 ML | Refills: 11 | Status: SHIPPED | OUTPATIENT
Start: 2025-02-04

## 2025-02-04 RX ADMIN — IPRATROPIUM BROMIDE AND ALBUTEROL SULFATE 3 ML: 2.5; .5 SOLUTION RESPIRATORY (INHALATION) at 08:21

## 2025-02-04 RX ADMIN — TIOTROPIUM BROMIDE INHALATION SPRAY 2 PUFF: 3.12 SPRAY, METERED RESPIRATORY (INHALATION) at 08:22

## 2025-02-04 RX ADMIN — GUAIFENESIN 600 MG: 600 TABLET ORAL at 08:25

## 2025-02-04 RX ADMIN — Medication 10 ML: at 08:26

## 2025-02-04 RX ADMIN — PREDNISONE 40 MG: 20 TABLET ORAL at 08:25

## 2025-02-04 RX ADMIN — ENOXAPARIN SODIUM 40 MG: 100 INJECTION SUBCUTANEOUS at 08:25

## 2025-02-04 RX ADMIN — CARVEDILOL 25 MG: 6.25 TABLET, FILM COATED ORAL at 08:24

## 2025-02-04 RX ADMIN — IPRATROPIUM BROMIDE AND ALBUTEROL SULFATE 3 ML: 2.5; .5 SOLUTION RESPIRATORY (INHALATION) at 12:23

## 2025-02-04 RX ADMIN — BUDESONIDE AND FORMOTEROL FUMARATE DIHYDRATE 2 PUFF: 160; 4.5 AEROSOL RESPIRATORY (INHALATION) at 08:22

## 2025-02-04 RX ADMIN — SACUBITRIL AND VALSARTAN 1 TABLET: 24; 26 TABLET, FILM COATED ORAL at 08:25

## 2025-02-04 NOTE — PLAN OF CARE
Goal Outcome Evaluation:      No complaints of pain or discomfort. Patient on 4 L NC and NSR on tele monitor. No unmet needs.

## 2025-02-04 NOTE — CASE MANAGEMENT/SOCIAL WORK
Case Management Discharge Note      Final Note: Spoke with patient at bedside. Patient needing oxygen for home O2 and nebs, CM order through [employer]. Patient has a pulse ox at home to monitor oxygen levels. Patient DME is at bedside. Mr. Clark is ready for discharge. His plan for discharge is home with oxygen and private car.         Selected Continued Care - Admitted Since 1/30/2025       Destination    No services have been selected for the patient.                Durable Medical Equipment Coordination complete.      Service Provider Services Address Phone Fax Patient Preferred    [facility] [address] [place] [address] [place], [place] [id number] [phone] --              Dialysis/Infusion    No services have been selected for the patient.                Home Medical Care    No services have been selected for the patient.                Therapy    No services have been selected for the patient.                Community Resources    No services have been selected for the patient.                Community & DME    No services have been selected for the patient.                         Final Discharge Disposition Code: 01 - home or self-care

## 2025-02-04 NOTE — PAYOR COMM NOTE
"Annette Marks (64 y.o. Male)     [id number]     Brunilda Whitehead RN  Utilization Review  [phone]  [phone]        Date of Birth   1960    Social Security Number       Address   [address] [place] [address]    Home Phone   [phone]    MRN   [id number]       Druze   None    Marital Status   Single                            Admission Date   25    Admission Type   Emergency    Admitting Provider   Maxine Cruz MD    Attending Provider   Maxine Cruz MD    Department, Room/Bed   [facility] 6B, N637/1       Discharge Date       Discharge Disposition   Home or Self Care    Discharge Destination                                 Attending Provider: Maxine Cruz MD    Allergies: No Known Allergies    Isolation: None   Infection: None   Code Status: CPR    Ht: 167.6 cm (65.98\")   Wt: 59.6 kg (131 lb 4.8 oz)    Admission Cmt: None   Principal Problem: COPD exacerbation [J44.1]                   Active Insurance as of 2025       Primary Coverage       Payor Plan Insurance Group Employer/Plan Group    [employer] PPO [id number]       Payor Plan Address Payor Plan Phone Number Payor Plan Fax Number Effective Dates    [id number] [phone]  2014 - None Entered    [facility] [id number]         Subscriber Name Subscriber Birth Date Member ID       ANNETTE MARKS 1960 [id number]                     Emergency Contacts        (Rel.) Home Phone Work Phone Mobile Phone    Webb,Alley (Daughter) [phone] -- --    Maximiliano Marks (Son) [phone] -- [phone]                 Discharge Summary        Susi Sams PA-C at [address]              [facility]  DISCHARGE SUMMARY    Patient Name: Annette Marks  : 1960  MRN: [id number]    Date of Admission: 2025 10:02 AM  Date of Discharge:  " 2/4/2025  Primary Care Physician: Charito Weiss APRN    Hospital Course     Active Hospital Problems    Diagnosis  POA    **COPD exacerbation [J44.1]  Yes    Erythrocytosis [D75.1]  Yes    Cavitary lesion of lung [J98.4]  Yes    Acute on chronic HFrEF (heart failure with reduced ejection fraction) [I50.23]  Yes    Severe malnutrition [E43]  Yes    Congestive heart failure, unspecified HF chronicity, unspecified heart failure type [I50.9]  Yes    Dilated cardiomyopathy [I42.0]  Yes    Tobacco abuse [Z72.0]  Yes    Essential hypertension [I10]  Yes    Centrilobular emphysema [J43.2]  Yes      Resolved Hospital Problems   No resolved problems to display.      Hospital Course:  Francisco Clark is a 64 y.o. male with PMH significant for HTN, dilated cardiomyopathy, chronic HFimpEF (2/1/25 EF 66%, previously 18% in 10/22 and 45-50% in 1/23), emphysema with ongoing tobacco use /chronic erythrocytosis and a prior cavitary lung lesion. He follows with NATALIO Mortensen with  Pulmonology.     He presented to [facility] ED on 1/30/25 for evaluation of dyspnea, fatigue/malaise, palpitations, orthopnea and PND. Was recently exposed to someone with flu-like symptoms and his symptoms have progressively worsened. He was hemodynamically stable in the ED but noted to be hypoxic with RA saturation 77%. He required 6L NC to maintain O2 saturations. Respiratory PCR negative. CXR unremarkable. He was admitted to the hospital medicine service for COPD exacerbation.     COPD exacerbation  History of cavitary lung lesion, resolved   Acute hypoxemic respiratory failure  Tobacco use current  - Follows [facility] pulmonology last seen in April 2024  - Not on O2 at baseline. O2 sat in ED 77% on RA, required 6L NC, then BiPAP. Has since weaned to 3L NC and will DC with home O2. We discussed weaning off of O2 at home on day of DC   - Strep pneumo / Legionalla Ags negative, respiratory viral PCR negative  - s/p IV Solumedrol. Now  on PO Prednisone taper  - Continue routine Symbicort / Spiriva  -  has arranged home nebulizer. Will Rx PRN Seng  - Follow up with pulmonology outpatient     Chronic HFimpEF  History of dilated cardiomyopathy  HTN  - EF 18% in October 2022, improved to 46-50% in January 2023  - ECHO this admission with EF 66%, normal diastolic function  - Continue routine Carvedilol, Entresto and Jardiance  - Follows with Dr. Coreas - keep OP follow up     Hyperkalemia  - s/p Lokelma x 1 dose on 2/2 for K+ 5.7   - No recurrence      Severe malnutrition  Pulmonary cachexia  - RD followed     Day of Discharge     HPI:   Resting in bed. Feeling pretty good. Continued exertional dyspnea. Disappointed to be going home on O2. Uncertain if he wanted to DC this AM but felt up to it by this afternoon.  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     Review of Systems  Gen- No fevers, chills  CV- No chest pain, palpitations  Resp- (+) intermittent cough, exertional dyspnea   GI- No N/V/D, abd pain    Vital Signs:   Temp:  [97.7 °F (36.5 °C)-98.1 °F (36.7 °C)] 97.7 °F (36.5 °C)  Heart Rate:  [72-89] 72  Resp:  [16-18] 18  BP: (139-153)/(74-90) 143/82  Flow (L/min) (Oxygen Therapy):  [3-4] 3    Physical Exam:  Constitutional: No acute distress, awake, alert and conversant. Thin and cachectic   HENT: NCAT, mucous membranes moist  Respiratory: Mild expiratory wheezing bilaterally, normal respiratory effort. Sat 90-92% on 3L NC. Desaturated to 86% on RA before I turned O2 back on   Cardiovascular: RRR  Gastrointestinal: Positive bowel sounds, soft, nontender, nondistended  Musculoskeletal: No bilateral ankle edema  Psychiatric: Appropriate affect, cooperative with exam  Neurologic: Oriented x 3, moves all extremities spontaneously without focal deficits, speech clear  Skin: No rashes to exposed surfaces     Pertinent  and/or Most Recent Results     LAB RESULTS:      Lab 02/04/25  1158 02/03/25  0752 02/02/25  0669  02/01/25  0743 01/31/25  1439 01/30/25  1229 01/30/25  1011   WBC 9.79 10.29 11.39* 10.70 8.28  --  7.93   HEMOGLOBIN 16.4 16.5 16.4 16.8 16.5  --  17.9*   HEMATOCRIT 53.0* 53.6* 53.4* 52.9* 53.7*  --  56.0*   PLATELETS 195 200 203 195 186  --  191   NEUTROS ABS  --   --   --   --   --   --  5.31   IMMATURE GRANS (ABS)  --   --   --   --   --   --  0.03   LYMPHS ABS  --   --   --   --   --   --  1.73   MONOS ABS  --   --   --   --   --   --  0.76   EOS ABS  --   --   --   --   --   --  0.06   MCV 98.3* 99.1* 99.4* 96.9 100.2*  --  96.4   SED RATE  --   --   --   --   --   --  53*   CRP  --   --   --   --   --  1.76*  --    PROCALCITONIN  --   --   --   --   --  0.07  --    LACTATE  --   --   --   --   --   --  1.1   D DIMER QUANT  --   --   --   --   --   --  <0.27         Lab 02/03/25  [facility] 02/02/25  0628 02/01/25  0743 01/31/25  1439 01/30/25  1229 01/30/25  1011   SODIUM 139 141 139 137  --  136   POTASSIUM 5.1 5.7* 5.1 5.1  --  4.5   CHLORIDE 98 103 101 99  --  98   CO2 37.0* 36.0* 34.0* 29.0  --  28.0   ANION GAP 4.0* 2.0* 4.0* 9.0  --  10.0   BUN 39* 33* 41* 53*  --  28*   CREATININE 0.60* 0.59* 0.59* 0.99  --  0.95   EGFR 107.1 107.7 107.7 84.5  --  88.8   GLUCOSE 127* 146* 140* 153*  --  141*   CALCIUM 10.1 10.3 10.5 9.5  --  9.7   MAGNESIUM 1.9 1.9 1.9 2.1  --   --    HEMOGLOBIN A1C  --   --   --   --   --  6.20*   TSH  --   --   --   --  1.260  --          Lab 01/30/25  1011   TOTAL PROTEIN 7.3   ALBUMIN 3.9   GLOBULIN 3.4   ALT (SGPT) 12   AST (SGOT) 20   BILIRUBIN 0.3   ALK PHOS 93         Lab 01/30/25  1229 01/30/25  1011   PROBNP  --  109.2   HSTROP T 17 19         Lab 01/30/25  1229   CHOLESTEROL 182   LDL CHOL 118*   HDL CHOL 37*   TRIGLYCERIDES 148         Lab 01/30/25  1229 01/30/25  1011   IRON 56*  --    IRON SATURATION (TSAT) 15*  --    TIBC 378  --    TRANSFERRIN 254  --    FERRITIN 80.07  --    FOLATE  --  >20.00   VITAMIN B 12  --  642         Lab 01/30/25  1447   FIO2 44   CARBOXYHEMOGLOBIN  (VENOUS) 1.4     Brief Urine Lab Results  (Last result in the past 365 days)        Color   Clarity   Blood   Leuk Est   Nitrite   Protein   CREAT   Urine HCG        01/30/25 1456 Yellow   Clear   Negative   Negative   Negative   Trace                 Microbiology Results (last 10 days)       Procedure Component Value - Date/Time    Respiratory Culture - Sputum, Trachea [[id number]] Collected: 02/01/25 0959    Lab Status: Final result Specimen: Sputum from Trachea Updated: 02/03/25 0919     Respiratory Culture Moderate growth (3+) Normal respiratory [place]. No S. aureus or Pseudomonas aeruginosa detected. Final report.     Gram Stain Moderate (3+) WBCs per low power field      Moderate (3+) Epithelial cells per low power field      Few (2+) Gram positive cocci    S. Pneumo Ag Urine or CSF - Urine, Urine, Clean Catch [[id number]]  (Normal) Collected: 01/30/25 1456    Lab Status: Final result Specimen: Urine, Clean Catch Updated: 01/31/25 0113     Strep Pneumo Ag Negative    Legionella Antigen, Urine - Urine, Urine, Clean Catch [[id number]]  (Normal) Collected: 01/30/25 1456    Lab Status: Final result Specimen: Urine, Clean Catch Updated: 01/31/25 0113     LEGIONELLA ANTIGEN, URINE Negative    Respiratory Panel PCR w/COVID-19(SARS-CoV-2) TREVA/[facility]/BETINA/PAD/COR/YSABEL In-House, NP Swab in UTM/VTM, 2 HR TAT - Swab, Nasopharynx [[id number]]  (Normal) Collected: 01/30/25 1011    Lab Status: Final result Specimen: Swab from Nasopharynx Updated: 01/30/25 1131     ADENOVIRUS, PCR Not Detected     Coronavirus 229E Not Detected     Coronavirus HKU1 Not Detected     Coronavirus NL63 Not Detected     Coronavirus OC43 Not Detected     COVID19 Not Detected     Human Metapneumovirus Not Detected     Human Rhinovirus/Enterovirus Not Detected     Influenza A PCR Not Detected     Influenza B PCR Not Detected     Parainfluenza Virus 1 Not Detected     Parainfluenza Virus 2 Not Detected     Parainfluenza Virus 3 Not Detected     Parainfluenza  Virus 4 Not Detected     RSV, PCR Not Detected     Bordetella pertussis pcr Not Detected     Bordetella parapertussis PCR Not Detected     Chlamydophila pneumoniae PCR Not Detected     Mycoplasma pneumo by PCR Not Detected    Narrative:      In the setting of a positive respiratory panel with a viral infection PLUS a negative procalcitonin without other underlying concern for bacterial infection, consider observing off antibiotics or discontinuation of antibiotics and continue supportive care. If the respiratory panel is positive for atypical bacterial infection (Bordetella pertussis, Chlamydophila pneumoniae, or Mycoplasma pneumoniae), consider antibiotic de-escalation to target atypical bacterial infection.          CT Chest Without Contrast Diagnostic    Result Date: 1/30/2025  CT CHEST WO CONTRAST DIAGNOSTIC Date of Exam: 1/30/2025 3:42 PM EST Indication: COPD exacerbation/PNA. Comparison: Chest CT 4/1/2024. Chest radiograph 1/30/2025. Technique: Axial CT images were obtained of the chest without contrast administration.  Reconstructed coronal and sagittal images were also obtained. Automated exposure control and iterative construction methods were used. Findings: Thyroid and thoracic inlet: No significant abnormality. Lymph nodes: No pathologic appearing lymph nodes by imaging criteria. Cardiovascular: Normal appearing heart size. No pericardial effusion. Aorta and main pulmonary artery diameters are within normal range.. There are coronary artery calcifications. Aortic annular calcifications. Aortic atherosclerosis. Esophagus: Small hiatal hernia. Lung parenchyma: Emphysema. Scattered atelectasis and/or scarring. Calcified granulomas. No suspicious appearing pulmonary nodules, masses, or opacities. Airways: Small volume secretions in the lower trachea and right mainstem bronchus. Pleura: No pleural effusion or pneumothorax. Chest wall and osseous structures: Degenerative changes of the imaged spine. No acute  osseous abnormality. Included abdomen: Cholelithiasis. Unchanged appearing 1 cm hypoattenuating nodule adjacent to versus arising from the left adrenal gland, possibly an adenoma or a nonspecific mildly enlarged lymph node.     Impression: No acute intrathoracic findings. Smoking-related lung disease. Electronically Signed: Rodney Ley  1/30/2025 4:09 PM EST  Workstation ID: [id number]    XR Chest 1 View    Result Date: 1/30/2025  XR CHEST 1 VW Date of Exam: 1/30/2025 10:13 AM EST Indication: SOA triage protocol Comparison: 1/6/2024 Findings: Heart size and pulmonary vasculature are within normal limits. Lungs appear hyperinflated. No acute pulmonary abnormality demonstrated. Costophrenic angles sharp     Impression: Pulmonary emphysema. No acute cardiopulmonary process demonstrated Electronically Signed: Cleveland Horner  1/30/2025 10:46 AM EST  Workstation ID: [id number]     Results for orders placed during the hospital encounter of 01/30/25    Adult Transthoracic Echo Complete w/ Color, Spectral and Contrast if necessary per protocol    Interpretation Summary    Left ventricular systolic function is normal. Calculated left ventricular EF = 66.8% Left ventricular ejection fraction appears to be 56 - 60%.    Left ventricular wall thickness is consistent with mild concentric hypertrophy.    Left ventricular diastolic function was normal.    Estimated right ventricular systolic pressure from tricuspid regurgitation is mildly elevated (35-45 mmHg).    Normal global longitudinal LV strain (GLS) = -20.4%    Improved ejection fraction compared to the echo from 2023    Discharge Details        Discharge Medications        New Medications        Instructions Start Date   guaiFENesin 600 MG 12 hr tablet  Commonly known as: MUCINEX   600 mg, Oral, Every 12 Hours Scheduled      ipratropium-albuterol 0.5-2.5 mg/3 ml nebulizer  Commonly known as: DUO-NEB   3 mL, Nebulization, Every 4 Hours PRN      predniSONE 10 MG  tablet  Commonly known as: DELTASONE   Take 4 tablets by mouth Daily With Breakfast for 1 day, THEN 3 tablets Daily With Breakfast for 3 days, THEN 2 tablets Daily With Breakfast for 3 days, THEN 1 tablet Daily With Breakfast for 3 days.   Start Date: February 5, 2025            Continue These Medications        Instructions Start Date   albuterol sulfate  (90 Base) MCG/ACT inhaler  Commonly known as: Proventil HFA   2 puffs, Inhalation, Every 4 Hours PRN      budesonide-formoterol 160-4.5 MCG/ACT inhaler  Commonly known as: Symbicort   2 puffs, Inhalation, 2 Times Daily - RT      carvedilol 25 MG tablet  Commonly known as: Coreg   25 mg, Oral, 2 Times Daily With Meals      empagliflozin 10 MG tablet tablet  Commonly known as: Jardiance   10 mg, Oral, Daily      Entresto 24-26 MG tablet  Generic drug: sacubitril-valsartan   1 tablet, Oral, 2 Times Daily      Spiriva Respimat 2.5 MCG/ACT aerosol solution inhaler  Generic drug: tiotropium bromide monohydrate   INHALE 2 SPRAY(S) BY MOUTH ONCE DAILY             No Known Allergies    Discharge Disposition:Home or Self Care    Diet:  Diet Instructions       Diet: Regular/House Diet, Cardiac Diets; Healthy Heart (2-3 Na+); Regular (IDDSI 7); Thin (IDDSI 0)      Discharge Diet:  Regular/House Diet  Cardiac Diets       Cardiac Diet: Healthy Heart (2-3 Na+)    Texture: Regular (IDDSI 7)    Fluid Consistency: Thin (IDDSI 0)          Activity:  Activity Instructions       Activity as Tolerated            CODE STATUS:    Code Status and Medical Interventions: CPR (Attempt to Resuscitate); Full Support   Ordered at: 01/30/25 1317     Level Of Support Discussed With:    Patient     Code Status (Patient has no pulse and is not breathing):    CPR (Attempt to Resuscitate)     Medical Interventions (Patient has pulse or is breathing):    Full Support     Future Appointments   Date Time Provider Department Center   2/7/2025  1:45 PM Charito Weiss APRN [facility]   4/1/2025   9:30 AM Amador Coreas III, MD [facility]   5/5/2025  8:30 AM [facility], [facility] LAB 1 [facility]   5/5/2025  9:00 AM Kayla Ross APRN [facility]     Additional Instructions for the Follow-ups that You Need to Schedule       Discharge Follow-up with PCP   As directed       Currently Documented PCP:    Charito Weiss APRN    PCP Phone Number:    [phone]     Follow Up Details: 1 week              Susi Sams PA-C  02/04/25    Time Spent on Discharge:  I spent 35 minutes on this discharge activity which included: face-to-face encounter with the patient, reviewing the data in the system, coordination of the care with the nursing staff as well as consultants, documentation, and entering orders.            Electronically signed by Susi Sams PA-C at 02/04/25 3485

## 2025-02-04 NOTE — CASE MANAGEMENT/SOCIAL WORK
Continued Stay Note  [facility]     Patient Name: Francisco Clark  MRN: [id number]  Today's Date: 2/4/2025    Admit Date: 1/30/2025    Plan: Home   Discharge Plan       Row Name 02/04/25 1026       Plan    Plan Home    Patient/Family in Agreement with Plan yes    Plan Comments Spoke with patient at bedside. Patient needing oxygen for home O2 and nebs, CM order through [employer]. Patient has a pulse ox at home to monitor oxygen levels. His plan for discharge is home with oxygen and private car. CM is following.    Final Discharge Disposition Code 01 - home or self-care                   Discharge Codes    No documentation.                 Expected Discharge Date and Time       Expected Discharge Date Expected Discharge Time    Feb 4, 2025               Rebeca Colmenares RN

## 2025-02-04 NOTE — DISCHARGE SUMMARY
[facility] Hospital Medicine Services  DISCHARGE SUMMARY    Patient Name: Francisco Clark  : 1960  MRN: [id number]    Date of Admission: 2025 10:02 AM  Date of Discharge:  2025  Primary Care Physician: Charito Weiss APRN    Hospital Course     Active Hospital Problems    Diagnosis  POA    **COPD exacerbation [J44.1]  Yes    Erythrocytosis [D75.1]  Yes    Cavitary lesion of lung [J98.4]  Yes    Acute on chronic HFrEF (heart failure with reduced ejection fraction) [I50.23]  Yes    Severe malnutrition [E43]  Yes    Congestive heart failure, unspecified HF chronicity, unspecified heart failure type [I50.9]  Yes    Dilated cardiomyopathy [I42.0]  Yes    Tobacco abuse [Z72.0]  Yes    Essential hypertension [I10]  Yes    Centrilobular emphysema [J43.2]  Yes      Resolved Hospital Problems   No resolved problems to display.      Hospital Course:  Francisco Clark is a 64 y.o. male with PMH significant for HTN, dilated cardiomyopathy, chronic HFimpEF (25 EF 66%, previously 18% in 10/22 and 45-50% in ), emphysema with ongoing tobacco use /chronic erythrocytosis and a prior cavitary lung lesion. He follows with NATALIO Mortensen with  Pulmonology.     He presented to [facility] ED on 25 for evaluation of dyspnea, fatigue/malaise, palpitations, orthopnea and PND. Was recently exposed to someone with flu-like symptoms and his symptoms have progressively worsened. He was hemodynamically stable in the ED but noted to be hypoxic with RA saturation 77%. He required 6L NC to maintain O2 saturations. Respiratory PCR negative. CXR unremarkable. He was admitted to the hospital medicine service for COPD exacerbation.     COPD exacerbation  History of cavitary lung lesion, resolved   Acute hypoxemic respiratory failure  Tobacco use current  - Follows [facility] pulmonology last seen in 2024  - Not on O2 at baseline. O2 sat in ED 77% on RA, required 6L NC, then  BiPAP. Has since weaned to 3L NC and will DC with home O2. We discussed weaning off of O2 at home on day of DC   - Strep pneumo / Legionalla Ags negative, respiratory viral PCR negative  - s/p IV Solumedrol. Now on PO Prednisone taper  - Continue routine Symbicort / Spiriva  -  has arranged home nebulizer. Will Rx PRN Duonebs  - Follow up with pulmonology outpatient     Chronic HFimpEF  History of dilated cardiomyopathy  HTN  - EF 18% in October 2022, improved to 46-50% in January 2023  - ECHO this admission with EF 66%, normal diastolic function  - Continue routine Carvedilol, Entresto and Jardiance  - Follows with Dr. Coreas - keep OP follow up     Hyperkalemia  - s/p Lokelma x 1 dose on 2/2 for K+ 5.7   - No recurrence      Severe malnutrition  Pulmonary cachexia  - RD followed     Day of Discharge     HPI:   Resting in bed. Feeling pretty good. Continued exertional dyspnea. Disappointed to be going home on O2. Uncertain if he wanted to DC this AM but felt up to it by this afternoon.  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     Review of Systems  Gen- No fevers, chills  CV- No chest pain, palpitations  Resp- (+) intermittent cough, exertional dyspnea   GI- No N/V/D, abd pain    Vital Signs:   Temp:  [97.7 °F (36.5 °C)-98.1 °F (36.7 °C)] 97.7 °F (36.5 °C)  Heart Rate:  [72-89] 72  Resp:  [16-18] 18  BP: (139-153)/(74-90) 143/82  Flow (L/min) (Oxygen Therapy):  [3-4] 3    Physical Exam:  Constitutional: No acute distress, awake, alert and conversant. Thin and cachectic   HENT: NCAT, mucous membranes moist  Respiratory: Mild expiratory wheezing bilaterally, normal respiratory effort. Sat 90-92% on 3L NC. Desaturated to 86% on RA before I turned O2 back on   Cardiovascular: RRR  Gastrointestinal: Positive bowel sounds, soft, nontender, nondistended  Musculoskeletal: No bilateral ankle edema  Psychiatric: Appropriate affect, cooperative with exam  Neurologic: Oriented x 3, moves all  extremities spontaneously without focal deficits, speech clear  Skin: No rashes to exposed surfaces     Pertinent  and/or Most Recent Results     LAB RESULTS:      Lab 02/04/25  1158 02/03/25 0752 02/02/25 0628 02/01/25 0743 01/31/25  1439 01/30/25  1229 01/30/25  1011   WBC 9.79 10.29 11.39* 10.70 8.28  --  7.93   HEMOGLOBIN 16.4 16.5 16.4 16.8 16.5  --  17.9*   HEMATOCRIT 53.0* 53.6* 53.4* 52.9* 53.7*  --  56.0*   PLATELETS 195 200 203 195 186  --  191   NEUTROS ABS  --   --   --   --   --   --  5.31   IMMATURE GRANS (ABS)  --   --   --   --   --   --  0.03   LYMPHS ABS  --   --   --   --   --   --  1.73   MONOS ABS  --   --   --   --   --   --  0.76   EOS ABS  --   --   --   --   --   --  0.06   MCV 98.3* 99.1* 99.4* 96.9 100.2*  --  96.4   SED RATE  --   --   --   --   --   --  53*   CRP  --   --   --   --   --  1.76*  --    PROCALCITONIN  --   --   --   --   --  0.07  --    LACTATE  --   --   --   --   --   --  1.1   D DIMER QUANT  --   --   --   --   --   --  <0.27         Lab 02/03/25  0752 02/02/25 0628 02/01/25 0743 01/31/25  1439 01/30/25  1229 01/30/25  1011   SODIUM 139 141 139 137  --  136   POTASSIUM 5.1 5.7* 5.1 5.1  --  4.5   CHLORIDE 98 103 101 99  --  98   CO2 37.0* 36.0* 34.0* 29.0  --  28.0   ANION GAP 4.0* 2.0* 4.0* 9.0  --  10.0   BUN 39* 33* 41* 53*  --  28*   CREATININE 0.60* 0.59* 0.59* 0.99  --  0.95   EGFR 107.1 107.7 107.7 84.5  --  88.8   GLUCOSE 127* 146* 140* 153*  --  141*   CALCIUM 10.1 10.3 10.5 9.5  --  9.7   MAGNESIUM 1.9 1.9 1.9 2.1  --   --    HEMOGLOBIN A1C  --   --   --   --   --  6.20*   TSH  --   --   --   --  1.260  --          Lab 01/30/25  1011   TOTAL PROTEIN 7.3   ALBUMIN 3.9   GLOBULIN 3.4   ALT (SGPT) 12   AST (SGOT) 20   BILIRUBIN 0.3   ALK PHOS 93         Lab 01/30/25  1229 01/30/25  1011   PROBNP  --  109.2   HSTROP T 17 19         Lab 01/30/25  1229   CHOLESTEROL 182   LDL CHOL 118*   HDL CHOL 37*   TRIGLYCERIDES 148         Lab 01/30/25  1229 01/30/25  1011    IRON 56*  --    IRON SATURATION (TSAT) 15*  --    TIBC 378  --    TRANSFERRIN 254  --    FERRITIN 80.07  --    FOLATE  --  >20.00   VITAMIN B 12  --  642         Lab 01/30/25  1447   FIO2 44   CARBOXYHEMOGLOBIN (VENOUS) 1.4     Brief Urine Lab Results  (Last result in the past 365 days)        Color   Clarity   Blood   Leuk Est   Nitrite   Protein   CREAT   Urine HCG        01/30/25 1456 Yellow   Clear   Negative   Negative   Negative   Trace                 Microbiology Results (last 10 days)       Procedure Component Value - Date/Time    Respiratory Culture - Sputum, Trachea [[id number]] Collected: 02/01/25 0959    Lab Status: Final result Specimen: Sputum from Trachea Updated: 02/03/25 0919     Respiratory Culture Moderate growth (3+) Normal respiratory [place]. No S. aureus or Pseudomonas aeruginosa detected. Final report.     Gram Stain Moderate (3+) WBCs per low power field      Moderate (3+) Epithelial cells per low power field      Few (2+) Gram positive cocci    S. Pneumo Ag Urine or CSF - Urine, Urine, Clean Catch [[id number]]  (Normal) Collected: 01/30/25 1456    Lab Status: Final result Specimen: Urine, Clean Catch Updated: 01/31/25 0113     Strep Pneumo Ag Negative    Legionella Antigen, Urine - Urine, Urine, Clean Catch [[id number]]  (Normal) Collected: 01/30/25 1456    Lab Status: Final result Specimen: Urine, Clean Catch Updated: 01/31/25 0113     LEGIONELLA ANTIGEN, URINE Negative    Respiratory Panel PCR w/COVID-19(SARS-CoV-2) TREVA/[facility]/BETINA/PAD/COR/YSABEL In-House, NP Swab in UTM/VTM, 2 HR TAT - Swab, Nasopharynx [[id number]]  (Normal) Collected: 01/30/25 1011    Lab Status: Final result Specimen: Swab from Nasopharynx Updated: 01/30/25 1131     ADENOVIRUS, PCR Not Detected     Coronavirus 229E Not Detected     Coronavirus HKU1 Not Detected     Coronavirus NL63 Not Detected     Coronavirus OC43 Not Detected     COVID19 Not Detected     Human Metapneumovirus Not Detected     Human Rhinovirus/Enterovirus Not  Detected     Influenza A PCR Not Detected     Influenza B PCR Not Detected     Parainfluenza Virus 1 Not Detected     Parainfluenza Virus 2 Not Detected     Parainfluenza Virus 3 Not Detected     Parainfluenza Virus 4 Not Detected     RSV, PCR Not Detected     Bordetella pertussis pcr Not Detected     Bordetella parapertussis PCR Not Detected     Chlamydophila pneumoniae PCR Not Detected     Mycoplasma pneumo by PCR Not Detected    Narrative:      In the setting of a positive respiratory panel with a viral infection PLUS a negative procalcitonin without other underlying concern for bacterial infection, consider observing off antibiotics or discontinuation of antibiotics and continue supportive care. If the respiratory panel is positive for atypical bacterial infection (Bordetella pertussis, Chlamydophila pneumoniae, or Mycoplasma pneumoniae), consider antibiotic de-escalation to target atypical bacterial infection.          CT Chest Without Contrast Diagnostic    Result Date: 1/30/2025  CT CHEST WO CONTRAST DIAGNOSTIC Date of Exam: 1/30/2025 3:42 PM EST Indication: COPD exacerbation/PNA. Comparison: Chest CT 4/1/2024. Chest radiograph 1/30/2025. Technique: Axial CT images were obtained of the chest without contrast administration.  Reconstructed coronal and sagittal images were also obtained. Automated exposure control and iterative construction methods were used. Findings: Thyroid and thoracic inlet: No significant abnormality. Lymph nodes: No pathologic appearing lymph nodes by imaging criteria. Cardiovascular: Normal appearing heart size. No pericardial effusion. Aorta and main pulmonary artery diameters are within normal range.. There are coronary artery calcifications. Aortic annular calcifications. Aortic atherosclerosis. Esophagus: Small hiatal hernia. Lung parenchyma: Emphysema. Scattered atelectasis and/or scarring. Calcified granulomas. No suspicious appearing pulmonary nodules, masses, or opacities.  Airways: Small volume secretions in the lower trachea and right mainstem bronchus. Pleura: No pleural effusion or pneumothorax. Chest wall and osseous structures: Degenerative changes of the imaged spine. No acute osseous abnormality. Included abdomen: Cholelithiasis. Unchanged appearing 1 cm hypoattenuating nodule adjacent to versus arising from the left adrenal gland, possibly an adenoma or a nonspecific mildly enlarged lymph node.     Impression: No acute intrathoracic findings. Smoking-related lung disease. Electronically Signed: Rodney Ley  1/30/2025 4:09 PM EST  Workstation ID: [id number]    XR Chest 1 View    Result Date: 1/30/2025  XR CHEST 1 VW Date of Exam: 1/30/2025 10:13 AM EST Indication: SOA triage protocol Comparison: 1/6/2024 Findings: Heart size and pulmonary vasculature are within normal limits. Lungs appear hyperinflated. No acute pulmonary abnormality demonstrated. Costophrenic angles sharp     Impression: Pulmonary emphysema. No acute cardiopulmonary process demonstrated Electronically Signed: [place] Horner  1/30/2025 10:46 AM EST  Workstation ID: [id number]     Results for orders placed during the hospital encounter of 01/30/25    Adult Transthoracic Echo Complete w/ Color, Spectral and Contrast if necessary per protocol    Interpretation Summary    Left ventricular systolic function is normal. Calculated left ventricular EF = 66.8% Left ventricular ejection fraction appears to be 56 - 60%.    Left ventricular wall thickness is consistent with mild concentric hypertrophy.    Left ventricular diastolic function was normal.    Estimated right ventricular systolic pressure from tricuspid regurgitation is mildly elevated (35-45 mmHg).    Normal global longitudinal LV strain (GLS) = -20.4%    Improved ejection fraction compared to the echo from 2023    Discharge Details        Discharge Medications        New Medications        Instructions Start Date   guaiFENesin 600 MG 12 hr tablet  Commonly  known as: MUCINEX   600 mg, Oral, Every 12 Hours Scheduled      ipratropium-albuterol 0.5-2.5 mg/3 ml nebulizer  Commonly known as: DUO-NEB   3 mL, Nebulization, Every 4 Hours PRN      predniSONE 10 MG tablet  Commonly known as: DELTASONE   Take 4 tablets by mouth Daily With Breakfast for 1 day, THEN 3 tablets Daily With Breakfast for 3 days, THEN 2 tablets Daily With Breakfast for 3 days, THEN 1 tablet Daily With Breakfast for 3 days.   Start Date: February 5, 2025            Continue These Medications        Instructions Start Date   albuterol sulfate  (90 Base) MCG/ACT inhaler  Commonly known as: Proventil HFA   2 puffs, Inhalation, Every 4 Hours PRN      budesonide-formoterol 160-4.5 MCG/ACT inhaler  Commonly known as: Symbicort   2 puffs, Inhalation, 2 Times Daily - RT      carvedilol 25 MG tablet  Commonly known as: Coreg   25 mg, Oral, 2 Times Daily With Meals      empagliflozin 10 MG tablet tablet  Commonly known as: Jardiance   10 mg, Oral, Daily      Entresto 24-26 MG tablet  Generic drug: sacubitril-valsartan   1 tablet, Oral, 2 Times Daily      Spiriva Respimat 2.5 MCG/ACT aerosol solution inhaler  Generic drug: tiotropium bromide monohydrate   INHALE 2 SPRAY(S) BY MOUTH ONCE DAILY             No Known Allergies    Discharge Disposition:Home or Self Care    Diet:  Diet Instructions       Diet: Regular/House Diet, Cardiac Diets; Healthy Heart (2-3 Na+); Regular (IDDSI 7); Thin (IDDSI 0)      Discharge Diet:  Regular/House Diet  Cardiac Diets       Cardiac Diet: Healthy Heart (2-3 Na+)    Texture: Regular (IDDSI 7)    Fluid Consistency: Thin (IDDSI 0)          Activity:  Activity Instructions       Activity as Tolerated            CODE STATUS:    Code Status and Medical Interventions: CPR (Attempt to Resuscitate); Full Support   Ordered at: 01/30/25 1317     Level Of Support Discussed With:    Patient     Code Status (Patient has no pulse and is not breathing):    CPR (Attempt to Resuscitate)      Medical Interventions (Patient has pulse or is breathing):    Full Support     Future Appointments   Date Time Provider Department Center   2/7/2025  1:45 PM Charito Weiss APRN [facility]   4/1/2025  9:30 AM Amador Coreas III, MD [facility]   5/5/2025  8:30 AM [facility], [facility]   5/5/2025  9:00 AM Kayla Ross APRN [facility]     Additional Instructions for the Follow-ups that You Need to Schedule       Discharge Follow-up with PCP   As directed       Currently Documented PCP:    Charito Weiss APRN    PCP Phone Number:    [phone]     Follow Up Details: 1 week              Susi Sams PA-C  02/04/25    Time Spent on Discharge:  I spent 35 minutes on this discharge activity which included: face-to-face encounter with the patient, reviewing the data in the system, coordination of the care with the nursing staff as well as consultants, documentation, and entering orders.

## 2025-02-04 NOTE — OUTREACH NOTE
Prep Survey      Flowsheet Row Responses   [facility] patient discharged from? [facility]   Is LACE score < 7 ? No   Eligibility [facility]   Date of Admission 01/30/25   Date of Discharge 02/04/25   Discharge diagnosis COPD exacerbation   Does the patient have one of the following disease processes/diagnoses(primary or secondary)? COPD   Is there a DME ordered? Yes   What DME was ordered? [facility] - [place]- 02   Prep survey completed? Yes            Blanca RAM - Registered Nurse

## 2025-02-05 ENCOUNTER — TRANSITIONAL CARE MANAGEMENT TELEPHONE ENCOUNTER (OUTPATIENT)
Dept: CALL CENTER | Facility: HOSPITAL | Age: 65
End: 2025-02-05
Payer: COMMERCIAL

## 2025-02-05 NOTE — OUTREACH NOTE
Call Center TCM Note      Flowsheet Row Responses   [facility] patient discharged from? [place]   Does the patient have one of the following disease processes/diagnoses(primary or secondary)? COPD   TCM attempt successful? Yes   Call start time 1534   Call end time 1538   Discharge diagnosis COPD exacerbation   Meds reviewed with patient/caregiver? Yes   Is the patient having any side effects they believe may be caused by any medication additions or changes? No   Does the patient have all medications ordered at discharge? Yes   Is the patient taking all medications as directed (includes completed medication regime)? Yes   Comments Hospital f/u 2/7/25@145pm   Does the patient have an appointment with their PCP within 7-14 days of discharge? Yes   Has home health visited the patient within 72 hours of discharge? N/A   Has all DME been delivered? Yes   Pulse Ox monitoring Intermittent   Pulse Ox device source Patient   O2 Sat comments reports sats have dropped to 80s since discharge with exertion.  Pt aware to keep sats above 88-90%.  Pt aware to pace himself, take deep breaths to keep O2 level above 88%   O2 Sat: education provided Sat levels, When to seek care   Psychosocial issues? No   Did the patient receive a copy of their discharge instructions? Yes   Nursing interventions Reviewed instructions with patient   What is the patient's perception of their health status since discharge? Same  [Pt reports still some HUNTER as noted as well as cough, taking meds as ordered and using nebs and O2.  Noted that pt also with h/o HF, he is aware to montior daily wts and for edema, increased s/s exac.  Pt has no questions for this RN today.]   Nursing Interventions Nurse provided patient education   Is the patient/caregiver able to teach back the hierarchy of who to call/visit for symptoms/problems? PCP, Specialist, Home health nurse, Urgent Care, ED, 911 Yes   TCM call completed? Yes   Call end time 1538            Nelida  FLORIAN Plata    2/5/2025, 15:43 EST

## 2025-02-05 NOTE — OUTREACH NOTE
Call Center TCM Note      Flowsheet Row Responses   [facility] [place] patient discharged from? [place]   Does the patient have one of the following disease processes/diagnoses(primary or secondary)? COPD   TCM attempt successful? No  [vr for sons Juan C and Bud]   Unsuccessful attempts Attempt 1  [attempted pt as well as jos[facility] [place] home and cell]   Call Status Left message            Nelida Plata RN    2/5/2025, 13:11 EST

## 2025-02-07 ENCOUNTER — OFFICE VISIT (OUTPATIENT)
Dept: INTERNAL MEDICINE | Facility: CLINIC | Age: 65
End: 2025-02-07
Payer: COMMERCIAL

## 2025-02-07 DIAGNOSIS — J96.01 ACUTE RESPIRATORY FAILURE WITH HYPOXIA: ICD-10-CM

## 2025-02-07 DIAGNOSIS — I50.23 ACUTE ON CHRONIC HFREF (HEART FAILURE WITH REDUCED EJECTION FRACTION): ICD-10-CM

## 2025-02-07 DIAGNOSIS — J44.1 COPD WITH EXACERBATION: ICD-10-CM

## 2025-02-07 DIAGNOSIS — Z99.81 REQUIRES SUPPLEMENTAL OXYGEN: ICD-10-CM

## 2025-02-07 DIAGNOSIS — Z09 HOSPITAL DISCHARGE FOLLOW-UP: Primary | ICD-10-CM

## 2025-02-07 DIAGNOSIS — J98.4 CAVITARY LESION OF LUNG: ICD-10-CM

## 2025-02-07 NOTE — LETTER
February 7, 2025     Patient: Francisco Clark   YOB: 1960   Date of Visit: 2/7/2025       To Whom It May Concern:    Francisco Clark was seen and evaluated by me for a hospital discharge follow up visit today 2/7/25. It is my medical opinion that Francisco Clark be excused from work until seen and evaluated by a pulmonology specialist on 2/20/25. Please let me know if you have any questions or concerns.         Sincerely,        NATALIO Arroyo

## 2025-02-07 NOTE — PROGRESS NOTES
Transitional Care Follow Up Visit  Subjective     Francisco Clark is a 64 y.o. male who presents for a transitional care management visit.    Within 48 business hours after discharge our office contacted him via telephone to coordinate his care and needs.      I reviewed and discussed the details of that call along with the discharge summary, hospital problems, inpatient lab results, inpatient diagnostic studies, and consultation reports with Francisco.     Current outpatient and discharge medications have been reconciled for the patient.  Reviewed by: NATALIO Arroyo          2/4/2025     2:56 PM   Date of TCM Phone Call   [facility]   Date of Admission 1/30/2025   Date of Discharge 2/4/2025     Risk for Readmission (LACE) No data recorded      History of Present Illness:  Patient presents today for hospital discharge follow-up.  Patient was admitted to [facility] from 1/30 - 2/4 for a COPD exacerbation and acute respiratory failure.      Patient's oxygen saturation was 77% on room air upon arrival to the ED.  He was not previously using supplemental oxygen at home.  Patient was placed on a BiPAP initially and then transitioned over to 6 L nasal cannula.  He was able to be weaned down to 3 L at the time of discharge.  He had a respiratory panel done which was negative and pneumonia was ruled out.      He does have a home nebulizer machine and is aware of how to use it.  He does have albuterol nebulizer solution that he has been using 3 times daily.  He also has a Symbicort and Spiriva inhalers he has been using.  He has a pulmonology follow-up on 2/20.    Patient does have acute on chronic HFrEF.  Most recent echo showed an EF of 66%.  He is currently taking a Beta blocker, Entresto, and Jardiance.  He is followed by Dr. Coreas.  Note some swelling in his feet yesterday morning.  He reports it had improved by last night.  Volume status in the office today is stable.    Patient  was also found to be hyperkalemic with a potassium of 5.7.  He was given Lokelma x 2 doses.  Potassium was back to normal range prior to discharge.    Patient's oxygen saturation is low in the office today at 87%.  He is on 3 L of supplemental oxygen via nasal cannula today.  After reevaluation was able to get his O2 up to 91%.  No acute respiratory distress noted.    Patient continues on daily Mucinex as well as a prednisone taper.  He had weaned down on smoking to half a pack per day.  He has not had any cigarettes in the past 2.5 weeks.    No further complaints or concerns at this time.  Pleasant visit with the patient today.       The following portions of the patient's history were reviewed and updated as appropriate: allergies, current medications, past family history, past medical history, past social history, past surgical history, and problem list.    Review of Systems   Respiratory:  Positive for cough, shortness of breath and wheezing.        Objective   Vitals:    02/07/25 1336   BP: 128/64   Pulse: 76   SpO2: 91%     Body mass index is 22.03 kg/m².     Physical Exam  Constitutional:       General: He is not in acute distress.     Appearance: Normal appearance. He is ill-appearing. He is not toxic-appearing or diaphoretic.   HENT:      Head: Normocephalic and atraumatic.      Nose: Nose normal.   Eyes:      Extraocular Movements: Extraocular movements intact.      Conjunctiva/sclera: Conjunctivae normal.      Pupils: Pupils are equal, round, and reactive to light.   Cardiovascular:      Rate and Rhythm: Normal rate and regular rhythm.      Heart sounds: Murmur heard.   Pulmonary:      Effort: Pulmonary effort is normal. No respiratory distress.      Breath sounds: Wheezing present.      Comments: Decreased breath sounds in all lung fields, supplemental oxygen in use  Musculoskeletal:         General: Normal range of motion.      Cervical back: Normal range of motion and neck supple.   Skin:     General:  Skin is warm and dry.   Neurological:      General: No focal deficit present.      Mental Status: He is alert and oriented to person, place, and time. Mental status is at baseline.   Psychiatric:         Mood and Affect: Mood normal.         Behavior: Behavior normal.         Thought Content: Thought content normal.         Judgment: Judgment normal.         Assessment & Plan   Diagnoses and all orders for this visit:    1. Hospital discharge follow-up (Primary)    2. COPD with exacerbation    3. Acute respiratory failure with hypoxia    4. Requires supplemental oxygen    5. Acute on chronic HFrEF (heart failure with reduced ejection fraction)    6. Cavitary lesion of lung           Return if symptoms worsen or fail to improve, for Next scheduled follow up.    NATALIO Arroyo    Part of this note may be an electronic transcription/translation of spoken language to printed text using the Dragon Dictation System.

## 2025-02-14 ENCOUNTER — READMISSION MANAGEMENT (OUTPATIENT)
Dept: CALL CENTER | Facility: HOSPITAL | Age: 65
End: 2025-02-14
Payer: COMMERCIAL

## 2025-02-14 NOTE — OUTREACH NOTE
COPD/PN Week 2 Survey      Flowsheet Row Responses   [facility] patient discharged from? [place]   Does the patient have one of the following disease processes/diagnoses(primary or secondary)? COPD   Week 2 attempt successful? No   Unsuccessful attempts Attempt 1            Blanca RAM - Registered Nurse

## 2025-02-17 ENCOUNTER — READMISSION MANAGEMENT (OUTPATIENT)
Dept: CALL CENTER | Facility: HOSPITAL | Age: 65
End: 2025-02-17
Payer: COMMERCIAL

## 2025-02-17 NOTE — OUTREACH NOTE
COPD/PN Week 2 Survey      Flowsheet Row Responses   [facility] patient discharged from? [place]   Does the patient have one of the following disease processes/diagnoses(primary or secondary)? COPD   Week 2 attempt successful? Yes   Call start time 1545   Call end time 1545   Discharge diagnosis COPD exacerbation   Person spoke with today (if not patient) and relationship patient   DME comments 02 as needed at night per patient.   What is the patient's perception of their health status since discharge? Improving   Nursing Interventions Nurse provided patient education   Is the patient/caregiver able to teach back the hierarchy of who to call/visit for symptoms/problems? PCP, Specialist, Home health nurse, Urgent Care, ED, 911 Yes   Is the patient able to teach back COPD zones? Yes   Patient reports what zone on this call? Green Zone   Green Zone Reports doing well, Breathing without shortness of breath   Green Zone interventions: Take daily medications   Week 2 call completed? Yes   Graduated Yes   Wrap up additional comments Patient reports doing well. No concerns or questions noted.   Call end time 1545            Blanca RAM - Registered Nurse

## 2025-02-20 ENCOUNTER — OFFICE VISIT (OUTPATIENT)
Dept: PULMONOLOGY | Facility: CLINIC | Age: 65
End: 2025-02-20
Payer: COMMERCIAL

## 2025-02-20 VITALS
HEIGHT: 66 IN | TEMPERATURE: 96.6 F | SYSTOLIC BLOOD PRESSURE: 118 MMHG | DIASTOLIC BLOOD PRESSURE: 72 MMHG | HEART RATE: 68 BPM | BODY MASS INDEX: 22.66 KG/M2 | OXYGEN SATURATION: 95 % | WEIGHT: 141 LBS

## 2025-02-20 DIAGNOSIS — J43.2 CENTRILOBULAR EMPHYSEMA: ICD-10-CM

## 2025-02-20 DIAGNOSIS — J44.9 CHRONIC OBSTRUCTIVE PULMONARY DISEASE, UNSPECIFIED COPD TYPE: Primary | ICD-10-CM

## 2025-02-20 DIAGNOSIS — Z72.0 TOBACCO ABUSE: ICD-10-CM

## 2025-02-20 NOTE — PROGRESS NOTES
"[facility] Pulmonary Follow up      Chief Complaint  Hospital Follow Up Visit    Subjective          Francisco Clark presents to [facility] for follow-up after his recent hospitalization.  He was admitted to hospital January 30 through February 4 for a COPD exacerbation.  He been having some flulike symptoms that have progressively worsened on arrival his saturations were 77% on room air.  His respiratory PCR was negative and his CT scan of the chest was negative.  He was treated for a COPD exacerbation with IV steroids and antibiotics.  He did require some oxygen at discharge.    Over the last few days he feels like he is about back to normal.  He stopped wearing his oxygen couple days ago and his sats have been 92 to 95%.  He denies any significant cough or congestion.  He has been using his nebulizers twice daily as well as Mucinex.  He did ask if he go ahead and stop that now since he is doing better.    He has stopped smoking since his hospitalization.    Objective     Vital Signs:   /72   Pulse 68   Temp 96.6 °F (35.9 °C)   Ht 167.6 cm (65.98\")   Wt 64 kg (141 lb)   SpO2 95% Comment: room air at rest  BMI 22.77 kg/m²       Immunization History   Administered Date(s) Administered    Fluzone (or Fluarix & Flulaval for VFC) >6mos 10/31/2022    Pneumococcal Conjugate 20-Valent (PCV20) 10/31/2022    Pneumococcal Polysaccharide (PPSV23) 02/01/2018       Physical Exam  Vitals reviewed.   Constitutional:       Appearance: He is well-developed.   HENT:      Head: Normocephalic and atraumatic.   Eyes:      Pupils: Pupils are equal, round, and reactive to light.   Cardiovascular:      Rate and Rhythm: Normal rate and regular rhythm.      Heart sounds: No murmur heard.  Pulmonary:      Effort: Pulmonary effort is normal. No respiratory distress.      Breath sounds: Normal breath sounds. No wheezing or rales.   Abdominal:      General: Bowel sounds are " normal. There is no distension.      Palpations: Abdomen is soft.   Musculoskeletal:         General: Normal range of motion.      Cervical back: Normal range of motion and neck supple.   Skin:     General: Skin is warm and dry.      Findings: No erythema.   Neurological:      Mental Status: He is alert and oriented to person, place, and time.   Psychiatric:         Behavior: Behavior normal.          Result Review :       Data reviewed : Radiologic studies CT chest 1/30/2025    Findings:     Thyroid and thoracic inlet: No significant abnormality.     Lymph nodes: No pathologic appearing lymph nodes by imaging criteria.     Cardiovascular: Normal appearing heart size. No pericardial effusion. Aorta and main pulmonary artery diameters are within normal range.. There are coronary artery calcifications. Aortic annular calcifications. Aortic atherosclerosis.     Esophagus: Small hiatal hernia.     Lung parenchyma: Emphysema. Scattered atelectasis and/or scarring. Calcified granulomas. No suspicious appearing pulmonary nodules, masses, or opacities.     Airways: Small volume secretions in the lower trachea and right mainstem bronchus.     Pleura: No pleural effusion or pneumothorax.     Chest wall and osseous structures: Degenerative changes of the imaged spine. No acute osseous abnormality.     Included abdomen: Cholelithiasis. Unchanged appearing 1 cm hypoattenuating nodule adjacent to versus arising from the left adrenal gland, possibly an adenoma or a nonspecific mildly enlarged lymph node.     IMPRESSION:  Impression:  No acute intrathoracic findings.     Smoking-related lung disease.                   Assessment and Plan    Problem List Items Addressed This Visit          Pulmonary and Pneumonias    Centrilobular emphysema    COPD (chronic obstructive pulmonary disease) - Primary       Tobacco    Tobacco abuse       Mr. Jones has recovered from a recent COPD exacerbation.  He is back to his baseline.  He is no  longer requiring oxygen.  Will continue Symbicort and Spiriva.  Continue on his nebulizers as needed.    I would recommending continuing to keep the oxygen at home for now, last year after hospitalization he required oxygen for a while as well.  In May we can follow-up with a 6-minute walk test and a overnight pulse oximetry.    Encouraged him on his smoking cessation efforts.    We reviewed his CT scan with no acute findings, will continue with annual low-dose screenings.    Routine follow-up in May with full PFTs and a 6-minute walk test.      Follow Up     Return for Next scheduled follow up.  Patient was given instructions and counseling regarding his condition or for health maintenance advice. Please see specific information pulled into the AVS if appropriate.         Excluding time spent on other separate services such as performing procedures or test interpretation, if applicable    Moderate level of Medical Decision Making complexity based on 2 or more chronic stable illnesses and prescription drug management.    NATALIO Mortensen, ACNP  [facility] Pulmonary Critical Care Medicine  Electronically signed

## 2025-02-21 VITALS
BODY MASS INDEX: 21.92 KG/M2 | OXYGEN SATURATION: 91 % | DIASTOLIC BLOOD PRESSURE: 64 MMHG | HEIGHT: 66 IN | WEIGHT: 136.4 LBS | SYSTOLIC BLOOD PRESSURE: 128 MMHG | HEART RATE: 76 BPM

## 2025-03-24 RX ORDER — TIOTROPIUM BROMIDE INHALATION SPRAY 3.12 UG/1
SPRAY, METERED RESPIRATORY (INHALATION)
Qty: 4 G | Refills: 4 | Status: SHIPPED | OUTPATIENT
Start: 2025-03-24

## 2025-04-01 ENCOUNTER — OFFICE VISIT (OUTPATIENT)
Dept: CARDIOLOGY | Facility: CLINIC | Age: 65
End: 2025-04-01
Payer: COMMERCIAL

## 2025-04-01 VITALS
OXYGEN SATURATION: 93 % | SYSTOLIC BLOOD PRESSURE: 130 MMHG | DIASTOLIC BLOOD PRESSURE: 78 MMHG | BODY MASS INDEX: 24.91 KG/M2 | WEIGHT: 155 LBS | HEART RATE: 75 BPM | HEIGHT: 66 IN

## 2025-04-01 DIAGNOSIS — I10 ESSENTIAL HYPERTENSION: Primary | ICD-10-CM

## 2025-04-01 DIAGNOSIS — I42.0 DILATED CARDIOMYOPATHY: ICD-10-CM

## 2025-04-01 PROCEDURE — 99214 OFFICE O/P EST MOD 30 MIN: CPT | Performed by: INTERNAL MEDICINE

## 2025-04-01 RX ORDER — CARVEDILOL 25 MG/1
25 TABLET ORAL 2 TIMES DAILY WITH MEALS
Qty: 180 TABLET | Refills: 3 | Status: SHIPPED | OUTPATIENT
Start: 2025-04-01 | End: 2026-03-27

## 2025-04-01 RX ORDER — SACUBITRIL AND VALSARTAN 24; 26 MG/1; MG/1
1 TABLET, FILM COATED ORAL 2 TIMES DAILY
Qty: 180 TABLET | Refills: 3 | Status: SHIPPED | OUTPATIENT
Start: 2025-04-01

## 2025-04-01 NOTE — PROGRESS NOTES
[facility] at [facility]  Office visit  Francisco Clark  1960  [phone]    VISIT DATE:  02/12/2018    PCP: Charito Weiss, APRN  [address] [facility] [id number]    CC:  Chief Complaint   Patient presents with    Congestive heart failure, unspecified HF chronicity, unspec       ASSESSMENT:   Diagnosis Plan   1. Essential hypertension        2. Dilated cardiomyopathy          Previous cardiac status and procedures:  October 2022  TTE    Estimated left ventricular EF = 18% Estimated left ventricular EF was in agreement with the calculated left ventricular EF. Left ventricular ejection fraction appears to be less than 20%. Left ventricular systolic function is severely decreased.    The left ventricular cavity is moderately dilated.    The following left ventricular wall segments are hypokinetic: mid anterior, apical anterior, basal anterolateral, mid anterolateral, apical lateral, basal inferolateral, mid inferolateral, apical inferior, mid inferior, apical septal, basal inferoseptal, mid inferoseptal, apex hypokinetic, mid anteroseptal, basal anterior, basal inferior and basal inferoseptal.    The findings are consistent with dilated cardiomyopathy.    Left ventricular diastolic function is consistent with (grade II w/high LAP) pseudonormalization.    Estimated right ventricular systolic pressure from tricuspid regurgitation is mildly elevated (35-45 mmHg).    There is a moderate sized left pleural effusion. There is a moderate sized right pleural effusion.    Cardiac catheterization: Normal coronary arteries, LVEDP 9 mmHg.    January 2023 TTE    Left ventricular systolic function is mildly decreased. Left ventricular ejection fraction appears to be 46 - 50%.    Left ventricular wall thickness is consistent with mild concentric hypertrophy.    Left ventricular diastolic function is consistent with (grade II w/high LAP) pseudonormalization.    Estimated right ventricular systolic pressure  from tricuspid regurgitation is normal (<35 mmHg).    February 2025 TTE    Left ventricular systolic function is normal. Calculated left ventricular EF = 66.8% Left ventricular ejection fraction appears to be 56 - 60%.    Left ventricular wall thickness is consistent with mild concentric hypertrophy.    Left ventricular diastolic function was normal.    Estimated right ventricular systolic pressure from tricuspid regurgitation is mildly elevated (35-45 mmHg).    Normal global longitudinal LV strain (GLS) = -20.4%    PLAN:  Dilated cardiomyopathy, nonischemic: Recovery from an EF of 20% up to 56-60 % on medical therapy.  [place] heart class II.  Currently euvolemic and compensated.  Functional capacity also limited by underlying moderate to severe COPD.  Continue carvedilol 25 mg p.o. twice daily, Jardiance 10 mg p.o. daily and Entresto to 24-26 mg p.o. twice daily.  Developed hyperkalemia with spironolactone and developed symptomatic hypotension on higher doses of Entresto.    Subjective  Interval assessment: No change in baseline functional capacity.  He is compliant with medical therapy.  Underlying significant COPD, was able to stop smoking 5 months ago.    Initial evaluation: 57-year-old active smoker who Recently presented with chest discomfort and hypertensive urgency in the setting of influenza.   transthoracic echocardiogram revealed an EF of 45% grade 1 diastolic dysfunction and mild aortic insufficiency.He still having intermittent episodes of mild chest tightness.  Often triggered by exertion.  Continues to smoke less than a pack per day.  Blood pressures were running less than 130/80 mmHg and intermittently less than 100/60 mmHg he had routine follow-up.  He is compliant with medical therapy.  Reports generalized fatigue on current medical therapy.    PHYSICAL EXAMINATION:  Vitals:    04/01/25 0928   BP: 130/78   BP Location: Right arm   Patient Position: Sitting   Pulse: 75   SpO2: 93%   Weight: 70.3  "kg (155 lb)   Height: 167.6 cm (66\")         General Appearance:    Alert, cooperative, no distress, appears stated age   Head:    Normocephalic, without obvious abnormality, atraumatic   Eyes:    conjunctiva/corneas clear   Nose:   Nares normal, septum midline, mucosa normal, no drainage   Throat:   Lips, teeth and gums normal   Neck:   Supple, symmetrical, trachea midline, no carotid    bruit or JVD   Lungs:     Clear to auscultation bilaterally, respirations unlabored   Chest Wall:    No tenderness or deformity    Heart:    Regular rate and rhythm, S1 and S2 normal, no murmur, rub   or gallop, normal carotid impulse bilaterally without bruit.   Abdomen:     Soft, non-tender   Extremities:   Extremities normal, atraumatic, no cyanosis or edema   Pulses:   2+ and symmetric all extremities   Skin:   Skin color, texture, turgor normal, no rashes or lesions       Diagnostic Data:  Procedures  Lab Results   Component Value Date    TRIG 148 01/30/2025    HDL 37 (L) 01/30/2025     Lab Results   Component Value Date    GLUCOSE 127 (H) 02/03/2025    BUN 39 (H) 02/03/2025    CREATININE 0.60 (L) 02/03/2025     02/03/2025    K 5.1 02/03/2025    CL 98 02/03/2025    CO2 37.0 (H) 02/03/2025     Lab Results   Component Value Date    HGBA1C 6.20 (H) 01/30/2025     Lab Results   Component Value Date    WBC 9.79 02/04/2025    HGB 16.4 02/04/2025    HCT 53.0 (H) 02/04/2025     02/04/2025       Allergies  No Known Allergies    Current Medications    Current Outpatient Medications:     albuterol sulfate HFA (Proventil HFA) 108 (90 Base) MCG/ACT inhaler, Inhale 2 puffs Every 4 (Four) Hours As Needed for Wheezing., Disp: 18 g, Rfl: 2    budesonide-formoterol (Symbicort) 160-4.5 MCG/ACT inhaler, Inhale 2 puffs 2 (Two) Times a Day., Disp: 10.2 g, Rfl: 5    carvedilol (Coreg) 25 MG tablet, Take 1 tablet by mouth 2 (Two) Times a Day With Meals for 360 days., Disp: 180 tablet, Rfl: 3    empagliflozin (Jardiance) 10 MG tablet " tablet, Take 1 tablet by mouth Daily., Disp: 90 tablet, Rfl: 3    guaiFENesin (MUCINEX) 600 MG 12 hr tablet, Take 1 tablet by mouth Every 12 (Twelve) Hours., Disp: 60 tablet, Rfl: 0    ipratropium-albuterol (DUO-NEB) 0.5-2.5 mg/3 ml nebulizer, Take 3 mL by nebulization Every 4 (Four) Hours As Needed for Wheezing or Shortness of Air., Disp: 360 mL, Rfl: 11    sacubitril-valsartan (Entresto) 24-26 MG tablet, Take 1 tablet by mouth 2 (Two) Times a Day., Disp: 180 tablet, Rfl: 3    tiotropium bromide monohydrate (Spiriva Respimat) 2.5 MCG/ACT aerosol solution inhaler, INHALE 2 SPRAY(S) BY MOUTH ONCE DAILY, Disp: 4 g, Rfl: 4          ROS  Review of Systems   Cardiovascular:  Positive for chest pain and dyspnea on exertion. Negative for irregular heartbeat and palpitations.   Respiratory:  Positive for cough and shortness of breath. Negative for sputum production.          SOCIAL HX  Social History     Socioeconomic History    Marital status: Single   Tobacco Use    Smoking status: Every Day     Current packs/day: 1.00     Average packs/day: 1 pack/day for 35.0 years (35.0 ttl pk-yrs)     Types: Cigarettes     Passive exposure: Never    Smokeless tobacco: Never    Tobacco comments:     less than 1/2 ppd since getting sick   Vaping Use    Vaping status: Never Used   Substance and Sexual Activity    Alcohol use: No    Drug use: No    Sexual activity: Defer     Comment:        FAMILY HX  Family History   Problem Relation Age of Onset    Emphysema Mother     Heart disease Father     Migraines Father     Heart attack Father     Aneurysm Sister     Heart disease Brother         50's    Migraines Brother     No Known Problems Maternal Grandmother     No Known Problems Maternal Grandfather     No Known Problems Paternal Grandmother     No Known Problems Paternal Grandfather     Other Brother         killed in [place]    No Known Problems Son     No Known Problems Son     No Known Problems Daughter              Amador PAGE  Lyudmila MURO, MD, FACC

## 2025-06-30 ENCOUNTER — OFFICE VISIT (OUTPATIENT)
Dept: INTERNAL MEDICINE | Facility: CLINIC | Age: 65
End: 2025-06-30
Payer: COMMERCIAL

## 2025-06-30 VITALS
DIASTOLIC BLOOD PRESSURE: 74 MMHG | BODY MASS INDEX: 26.39 KG/M2 | HEIGHT: 66 IN | TEMPERATURE: 98 F | OXYGEN SATURATION: 93 % | SYSTOLIC BLOOD PRESSURE: 122 MMHG | WEIGHT: 164.2 LBS | HEART RATE: 73 BPM

## 2025-06-30 DIAGNOSIS — I50.9 CONGESTIVE HEART FAILURE, UNSPECIFIED HF CHRONICITY, UNSPECIFIED HEART FAILURE TYPE: ICD-10-CM

## 2025-06-30 DIAGNOSIS — M79.672 BILATERAL FOOT PAIN: Primary | ICD-10-CM

## 2025-06-30 DIAGNOSIS — M79.671 BILATERAL FOOT PAIN: Primary | ICD-10-CM

## 2025-06-30 PROCEDURE — 99214 OFFICE O/P EST MOD 30 MIN: CPT | Performed by: NURSE PRACTITIONER

## 2025-06-30 RX ORDER — LIDOCAINE 50 MG/G
1 PATCH TOPICAL EVERY 24 HOURS
Qty: 30 PATCH | Refills: 0 | Status: SHIPPED | OUTPATIENT
Start: 2025-06-30

## 2025-06-30 NOTE — PROGRESS NOTES
Office Note     Name: Francisco Clark    : 1960     MRN: [id number]     Chief Complaint  Foot Pain (Patient has been having heel pain on both feet. He got insoles for his shoes that have helped a little for plantar fasciitis. )    Subjective     History of Present Illness:  Francisco Clark is a 64 y.o. male who presents today for foot concern    Patient regularly sees Charito for chronic conditions    Patient presents today for foot concern.  Been present about 3 to 4 weeks.  He noted some burning on the left side of his heel and Ko feel that depending on the way he turned his foot.  Described as intermittent.  He states he feels like he is compensating over to the right foot so he has had some right foot pain.  He did recently change insoles and shoes.    Patient does have known history of heart failure and is currently seeing cardiology.  Medications include Coreg, Jardiance, Entresto.  Blood pressure well-controlled in office today        Past Medical History:   Diagnosis Date    CHF (congestive heart failure)     COPD (chronic obstructive pulmonary disease)     History of stomach ulcers     Hypertension        Past Surgical History:   Procedure Laterality Date    CARDIAC CATHETERIZATION N/A 10/18/2022    Procedure: LEFT HEART CATH;  Surgeon: Kd Roque MD;  Location: [facility] CATH INVASIVE LOCATION;  Service: Cardiovascular;  Laterality: N/A;    COLONOSCOPY  2018    Dr. AGNES Olea. Normal. Repeat 1- yrs if wihtout significant family history or 5 years if first degree relative younger than age 60 with high rish polyp or colorectal cancer.     NO PAST SURGERIES         Social History     Socioeconomic History    Marital status: Single   Tobacco Use    Smoking status: Every Day     Current packs/day: 1.00     Average packs/day: 1 pack/day for 35.0 years (35.0 ttl pk-yrs)     Types: Cigarettes     Passive exposure: Never    Smokeless tobacco: Never    Tobacco comments:     less than 1/2  "ppd since getting sick   Vaping Use    Vaping status: Never Used   Substance and Sexual Activity    Alcohol use: No    Drug use: No    Sexual activity: Defer     Comment:          Current Outpatient Medications:     albuterol sulfate HFA (Proventil HFA) 108 (90 Base) MCG/ACT inhaler, Inhale 2 puffs Every 4 (Four) Hours As Needed for Wheezing., Disp: 18 g, Rfl: 2    budesonide-formoterol (Symbicort) 160-4.5 MCG/ACT inhaler, Inhale 2 puffs 2 (Two) Times a Day., Disp: 10.2 g, Rfl: 5    carvedilol (Coreg) 25 MG tablet, Take 1 tablet by mouth 2 (Two) Times a Day With Meals for 360 days., Disp: 180 tablet, Rfl: 3    empagliflozin (Jardiance) 10 MG tablet tablet, Take 1 tablet by mouth Daily., Disp: 90 tablet, Rfl: 3    guaiFENesin (MUCINEX) 600 MG 12 hr tablet, Take 1 tablet by mouth Every 12 (Twelve) Hours., Disp: 60 tablet, Rfl: 0    ipratropium-albuterol (DUO-NEB) 0.5-2.5 mg/3 ml nebulizer, Take 3 mL by nebulization Every 4 (Four) Hours As Needed for Wheezing or Shortness of Air., Disp: 360 mL, Rfl: 11    sacubitril-valsartan (Entresto) 24-26 MG tablet, Take 1 tablet by mouth 2 (Two) Times a Day., Disp: 180 tablet, Rfl: 3    tiotropium bromide monohydrate (Spiriva Respimat) 2.5 MCG/ACT aerosol solution inhaler, INHALE 2 SPRAY(S) BY MOUTH ONCE DAILY, Disp: 4 g, Rfl: 4    Diclofenac Sodium (Voltaren) 1 % gel gel, Apply 4 g topically to the appropriate area as directed 4 (Four) Times a Day As Needed (ankle pain)., Disp: 120 g, Rfl: 1    lidocaine (LIDODERM) 5 %, Place 1 patch on the skin as directed by provider Daily. Remove & Discard patch within 12 hours or as directed by MD, Disp: 30 patch, Rfl: 0    Objective     Vital Signs  /74 (BP Location: Left arm, Patient Position: Sitting, Cuff Size: Adult)   Pulse 73   Temp 98 °F (36.7 °C)   Ht 167.6 cm (66\")   Wt 74.5 kg (164 lb 3.2 oz)   SpO2 93%   BMI 26.50 kg/m²   Estimated body mass index is 26.5 kg/m² as calculated from the following:    Height as of " "this encounter: 167.6 cm (66\").    Weight as of this encounter: 74.5 kg (164 lb 3.2 oz).         Physical Exam  Vitals and nursing note reviewed.   Constitutional:       Appearance: Normal appearance.   HENT:      Head: Normocephalic and atraumatic.   Eyes:      Extraocular Movements: Extraocular movements intact.      Pupils: Pupils are equal, round, and reactive to light.   Cardiovascular:      Rate and Rhythm: Normal rate and regular rhythm.   Pulmonary:      Effort: Pulmonary effort is normal.   Musculoskeletal:         General: Normal range of motion.   Feet:      Comments: No open areas or redness noted.  No particular areas of point tenderness.  Antalgic gait noted  Skin:     General: Skin is warm and dry.   Neurological:      Mental Status: He is alert and oriented to person, place, and time.   Psychiatric:         Mood and Affect: Mood normal.         Behavior: Behavior normal.               Assessment and Plan     Diagnoses and all orders for this visit:    1. Bilateral foot pain (Primary)  -     Diclofenac Sodium (Voltaren) 1 % gel gel; Apply 4 g topically to the appropriate area as directed 4 (Four) Times a Day As Needed (ankle pain).  Dispense: 120 g; Refill: 1  -     lidocaine (LIDODERM) 5 %; Place 1 patch on the skin as directed by provider Daily. Remove & Discard patch within 12 hours or as directed by MD  Dispense: 30 patch; Refill: 0    2. Congestive heart failure, unspecified HF chronicity, unspecified heart failure type    Plan  Discussed with patient that we will avoid NSAIDs due to his noted heart failure.  Consider use of Tylenol.  Additional medication sent to pharmacy to assist with symptoms.  Patient was provided recommendations about podiatry.  We discussed podiatry did not need a referral.  Consider Epsom salt soaks at the end of the day after work.  Continue with well supporting well-fitting shoes.  Go to ER if any condition worsens or severe.  Plan to follow-up as scheduled with Charito.  " He does need a follow-up and physical    Follow Up  Return for needs follow up and annual for nikos.    NATALIO Clement    Part of this note may be an electronic transcription/translation of spoken language to printed text using the Dragon Dictation System.

## 2025-08-14 RX ORDER — TIOTROPIUM BROMIDE INHALATION SPRAY 3.12 UG/1
SPRAY, METERED RESPIRATORY (INHALATION)
Qty: 4 G | Refills: 0 | Status: SHIPPED | OUTPATIENT
Start: 2025-08-14

## (undated) DEVICE — MODEL AT P65, P/N 701554-001KIT CONTENTS: HAND CONTROLLER, 3-WAY HIGH-PRESSURE STOPCOCK WITH ROTATING END AND PREMIUM HIGH-PRESSURE TUBING: Brand: ANGIOTOUCH® KIT

## (undated) DEVICE — MODEL BT2000 P/N 700287-012KIT CONTENTS: MANIFOLD WITH SALINE AND CONTRAST PORTS, SALINE TUBING WITH SPIKE AND HAND SYRINGE, TRANSDUCER: Brand: BT2000 AUTOMATED MANIFOLD KIT

## (undated) DEVICE — CATH DIAG EXPO M/ PK 5F FL4/FR4 PIG

## (undated) DEVICE — GLIDESHEATH SLENDER STAINLESS STEEL KIT: Brand: GLIDESHEATH SLENDER

## (undated) DEVICE — GW INQWIRE FC PTFE STD J/1.5 .035 260

## (undated) DEVICE — CATH DIAG EXPO .045 FL3  5F 100CM

## (undated) DEVICE — DEV COMP RAD PRELUDESYNC 24CM

## (undated) DEVICE — PK CATH CARD 10

## (undated) DEVICE — CVR PROB ULTRASND/TRANSD W/GEL 7X11IN STRL